# Patient Record
Sex: FEMALE | Race: WHITE | Employment: UNEMPLOYED | URBAN - METROPOLITAN AREA
[De-identification: names, ages, dates, MRNs, and addresses within clinical notes are randomized per-mention and may not be internally consistent; named-entity substitution may affect disease eponyms.]

---

## 2019-07-23 ENCOUNTER — HOSPITAL ENCOUNTER (EMERGENCY)
Age: 39
Discharge: HOME | End: 2019-07-24

## 2019-07-23 ENCOUNTER — APPOINTMENT (OUTPATIENT)
Dept: CT IMAGING | Age: 39
End: 2019-07-23

## 2019-07-23 ENCOUNTER — APPOINTMENT (OUTPATIENT)
Dept: GENERAL RADIOLOGY | Age: 39
End: 2019-07-23

## 2019-07-23 VITALS
OXYGEN SATURATION: 99 % | TEMPERATURE: 97.9 F | BODY MASS INDEX: 26.61 KG/M2 | HEIGHT: 68 IN | DIASTOLIC BLOOD PRESSURE: 52 MMHG | SYSTOLIC BLOOD PRESSURE: 94 MMHG | HEART RATE: 91 BPM | RESPIRATION RATE: 18 BRPM

## 2019-07-23 DIAGNOSIS — F19.10 POLYSUBSTANCE ABUSE (HCC): ICD-10-CM

## 2019-07-23 DIAGNOSIS — M25.551 RIGHT HIP PAIN: Primary | ICD-10-CM

## 2019-07-23 LAB
A/G RATIO: 0.8 (ref 1.1–2.2)
ALBUMIN SERPL-MCNC: 3.8 G/DL (ref 3.4–5)
ALP BLD-CCNC: 92 U/L (ref 40–129)
ALT SERPL-CCNC: 20 U/L (ref 10–40)
AMPHETAMINE SCREEN, URINE: POSITIVE
ANION GAP SERPL CALCULATED.3IONS-SCNC: 11 MMOL/L (ref 3–16)
AST SERPL-CCNC: 34 U/L (ref 15–37)
BARBITURATE SCREEN URINE: ABNORMAL
BASOPHILS ABSOLUTE: 0.1 K/UL (ref 0–0.2)
BASOPHILS RELATIVE PERCENT: 1.3 %
BENZODIAZEPINE SCREEN, URINE: POSITIVE
BILIRUB SERPL-MCNC: 0.3 MG/DL (ref 0–1)
BILIRUBIN URINE: ABNORMAL
BLOOD, URINE: NEGATIVE
BUN BLDV-MCNC: 10 MG/DL (ref 7–20)
CALCIUM SERPL-MCNC: 9.7 MG/DL (ref 8.3–10.6)
CANNABINOID SCREEN URINE: ABNORMAL
CHLORIDE BLD-SCNC: 97 MMOL/L (ref 99–110)
CLARITY: CLEAR
CO2: 31 MMOL/L (ref 21–32)
COCAINE METABOLITE SCREEN URINE: POSITIVE
COLOR: ABNORMAL
CREAT SERPL-MCNC: 0.8 MG/DL (ref 0.6–1.1)
EOSINOPHILS ABSOLUTE: 0.3 K/UL (ref 0–0.6)
EOSINOPHILS RELATIVE PERCENT: 2.9 %
GFR AFRICAN AMERICAN: >60
GFR NON-AFRICAN AMERICAN: >60
GLOBULIN: 4.8 G/DL
GLUCOSE BLD-MCNC: 102 MG/DL (ref 70–99)
GLUCOSE URINE: NEGATIVE MG/DL
HCG(URINE) PREGNANCY TEST: NEGATIVE
HCT VFR BLD CALC: 39.8 % (ref 36–48)
HEMOGLOBIN: 12.9 G/DL (ref 12–16)
KETONES, URINE: NEGATIVE MG/DL
LACTIC ACID: 0.9 MMOL/L (ref 0.4–2)
LEUKOCYTE ESTERASE, URINE: NEGATIVE
LYMPHOCYTES ABSOLUTE: 2.1 K/UL (ref 1–5.1)
LYMPHOCYTES RELATIVE PERCENT: 20.8 %
Lab: ABNORMAL
MCH RBC QN AUTO: 27.1 PG (ref 26–34)
MCHC RBC AUTO-ENTMCNC: 32.4 G/DL (ref 31–36)
MCV RBC AUTO: 83.7 FL (ref 80–100)
METHADONE SCREEN, URINE: ABNORMAL
MICROSCOPIC EXAMINATION: ABNORMAL
MONOCYTES ABSOLUTE: 0.8 K/UL (ref 0–1.3)
MONOCYTES RELATIVE PERCENT: 7.7 %
NEUTROPHILS ABSOLUTE: 6.7 K/UL (ref 1.7–7.7)
NEUTROPHILS RELATIVE PERCENT: 67.3 %
NITRITE, URINE: NEGATIVE
OPIATE SCREEN URINE: POSITIVE
OXYCODONE URINE: ABNORMAL
PDW BLD-RTO: 15.3 % (ref 12.4–15.4)
PH UA: 5
PH UA: 6 (ref 5–8)
PHENCYCLIDINE SCREEN URINE: ABNORMAL
PLATELET # BLD: 530 K/UL (ref 135–450)
PMV BLD AUTO: 8 FL (ref 5–10.5)
POTASSIUM SERPL-SCNC: 5.1 MMOL/L (ref 3.5–5.1)
PROPOXYPHENE SCREEN: ABNORMAL
PROTEIN UA: NEGATIVE MG/DL
RBC # BLD: 4.76 M/UL (ref 4–5.2)
SEDIMENTATION RATE, ERYTHROCYTE: 51 MM/HR (ref 0–20)
SODIUM BLD-SCNC: 139 MMOL/L (ref 136–145)
SPECIFIC GRAVITY UA: 1.02 (ref 1–1.03)
TOTAL PROTEIN: 8.6 G/DL (ref 6.4–8.2)
URINE REFLEX TO CULTURE: ABNORMAL
URINE TYPE: ABNORMAL
UROBILINOGEN, URINE: 1 E.U./DL
WBC # BLD: 9.9 K/UL (ref 4–11)

## 2019-07-23 PROCEDURE — 85652 RBC SED RATE AUTOMATED: CPT

## 2019-07-23 PROCEDURE — 80053 COMPREHEN METABOLIC PANEL: CPT

## 2019-07-23 PROCEDURE — 73701 CT LOWER EXTREMITY W/DYE: CPT

## 2019-07-23 PROCEDURE — 85025 COMPLETE CBC W/AUTO DIFF WBC: CPT

## 2019-07-23 PROCEDURE — 6360000002 HC RX W HCPCS: Performed by: PHYSICIAN ASSISTANT

## 2019-07-23 PROCEDURE — 83605 ASSAY OF LACTIC ACID: CPT

## 2019-07-23 PROCEDURE — 73502 X-RAY EXAM HIP UNI 2-3 VIEWS: CPT

## 2019-07-23 PROCEDURE — 96374 THER/PROPH/DIAG INJ IV PUSH: CPT

## 2019-07-23 PROCEDURE — 81003 URINALYSIS AUTO W/O SCOPE: CPT

## 2019-07-23 PROCEDURE — 84703 CHORIONIC GONADOTROPIN ASSAY: CPT

## 2019-07-23 PROCEDURE — 99284 EMERGENCY DEPT VISIT MOD MDM: CPT

## 2019-07-23 PROCEDURE — 80307 DRUG TEST PRSMV CHEM ANLYZR: CPT

## 2019-07-23 PROCEDURE — 6360000004 HC RX CONTRAST MEDICATION: Performed by: PHYSICIAN ASSISTANT

## 2019-07-23 PROCEDURE — 2580000003 HC RX 258: Performed by: PHYSICIAN ASSISTANT

## 2019-07-23 PROCEDURE — 36415 COLL VENOUS BLD VENIPUNCTURE: CPT

## 2019-07-23 RX ORDER — 0.9 % SODIUM CHLORIDE 0.9 %
1000 INTRAVENOUS SOLUTION INTRAVENOUS ONCE
Status: COMPLETED | OUTPATIENT
Start: 2019-07-23 | End: 2019-07-24

## 2019-07-23 RX ORDER — KETOROLAC TROMETHAMINE 30 MG/ML
15 INJECTION, SOLUTION INTRAMUSCULAR; INTRAVENOUS ONCE
Status: COMPLETED | OUTPATIENT
Start: 2019-07-23 | End: 2019-07-23

## 2019-07-23 RX ADMIN — IOPAMIDOL 75 ML: 755 INJECTION, SOLUTION INTRAVENOUS at 23:19

## 2019-07-23 RX ADMIN — SODIUM CHLORIDE 1000 ML: 9 INJECTION, SOLUTION INTRAVENOUS at 23:45

## 2019-07-23 RX ADMIN — KETOROLAC TROMETHAMINE 15 MG: 30 INJECTION, SOLUTION INTRAMUSCULAR at 22:08

## 2019-07-23 ASSESSMENT — PAIN DESCRIPTION - LOCATION: LOCATION: HIP

## 2019-07-23 ASSESSMENT — PAIN DESCRIPTION - ORIENTATION: ORIENTATION: RIGHT

## 2019-07-23 ASSESSMENT — PAIN SCALES - GENERAL
PAINLEVEL_OUTOF10: 10
PAINLEVEL_OUTOF10: 5

## 2019-07-23 ASSESSMENT — ENCOUNTER SYMPTOMS
SHORTNESS OF BREATH: 0
COLOR CHANGE: 0
VOMITING: 0

## 2019-07-23 ASSESSMENT — PAIN DESCRIPTION - DESCRIPTORS: DESCRIPTORS: DULL

## 2019-07-23 ASSESSMENT — PAIN DESCRIPTION - PAIN TYPE: TYPE: ACUTE PAIN

## 2019-07-24 RX ORDER — NAPROXEN 500 MG/1
500 TABLET ORAL 2 TIMES DAILY WITH MEALS
Qty: 20 TABLET | Refills: 0 | Status: SHIPPED | OUTPATIENT
Start: 2019-07-24 | End: 2022-07-20 | Stop reason: ALTCHOICE

## 2019-07-24 ASSESSMENT — PAIN SCALES - WONG BAKER: WONGBAKER_NUMERICALRESPONSE: 8

## 2019-07-24 ASSESSMENT — PAIN SCALES - GENERAL: PAINLEVEL_OUTOF10: 10

## 2019-07-24 NOTE — ED PROVIDER NOTES
[address] [facility]      Pt Name: Christine Clemens  MRN: [id number]  [place] 1980  Date of evaluation: 7/23/2019  Provider: ISMAEL Le    This patient was not seen and evaluated by the attending physician No att. providers found. CHIEF COMPLAINT       Chief Complaint   Patient presents with    Hip Pain     pt states she fell 3 weeks ago injuring right hip. states it got better then 3 days ago the pain came back. denies new injury. states she can not walk. CRITICAL CARE TIME   Total Critical Care time was 15 minutes, excluding separately reportable procedures. There was a high probability of clinically significant/life threatening deterioration in the patient's condition which required my urgent intervention. HISTORY OF PRESENT ILLNESS  (Location/Symptom, Timing/Onset, Context/Setting, Quality, Duration, Modifying Factors, Severity.)   Christine Clemens is a 46 y.o. female who presents to the emergency department accompanied by her mother and daughter. Patient states that 3 weeks ago she slipped and landed weird on her side. Believes that she landed on the hip and has had hip pain since then. She states initially the pain lasted for 3 or 4 days and then resolved almost completely and then came back again 3 days ago. She denies any new trauma or injury. Denies head neck or other injury. She has tried ibuprofen and admits to IV drug use. She states that the pain has progressed over the past 3 days. She denies chance of pregnancy or otherwise known chronic medical problems. No fevers. No numbness. She adamantly denies any back pain or leg weakness just states that it is pain on the outside of her hip. No bowel or bladder dysfunction no saddle anesthesia. Nursing Notes were reviewed and I agree.     REVIEW OF SYSTEMS    (2-9 systems for level 4, 10 or more for level 5)     Review of Systems   Constitutional: Narrative:     Performed at:  [address] [facility] [address]   Phone [phone]   CBC WITH AUTO DIFFERENTIAL - Abnormal; Notable for the following components:    Platelets 380 (*)     All other components within normal limits    Narrative:     Performed at:  [address] [facility] [address]   Phone [phone]   COMPREHENSIVE METABOLIC PANEL - Abnormal; Notable for the following components:    Chloride 97 (*)     Glucose 102 (*)     Total Protein 8.6 (*)     Albumin/Globulin Ratio 0.8 (*)     All other components within normal limits    Narrative:     Performed at:  [address] [facility] [address]   Phone [phone]   SEDIMENTATION RATE - Abnormal; Notable for the following components:    Sed Rate 51 (*)     All other components within normal limits    Narrative:     Performed at:  [address] [facility] [address]   Phone [phone]   PREGNANCY, URINE    Narrative:     Performed at:  [address] [facility] [address]   Phone [phone]   LACTIC ACID, PLASMA    Narrative:     Performed at:  [address] [facility] [address]   Phone [phone]       All other labs were within normal range or not returned as of this dictation.     EMERGENCY DEPARTMENT COURSE and DIFFERENTIAL DIAGNOSIS/MDM:   Vitals:    Vitals:    07/23/19 2034 07/23/19 2211 07/23/19 2231 07/23/19 2246   BP: 109/66 111/77 (!) 94/52 (!) 94/52   Pulse: 102 91     Resp: 14 18     Temp: 97.9 °F (36.6 °C)      TempSrc: Oral      SpO2: 98% 99% 100% 99%   Height: 5' 8\" (1.727 m)        I discussed with Cam Galarzaels and/or family the exam results, diagnosis, care, prognosis, reasons to return and the importance of follow up. Patient and/or family is in full agreement with plan and all questions have been answered. Specific discharge instructions explained, including reasons to return to the emergency department. Aurelio Acuña is well appearing, non-toxic, and afebrile at the time of discharge. Patient has pain over her right hip after a fall 3 weeks ago. She has good pulses and sensation intact. Pain with range of motion of the hip and palpation over the hip without evidence of rash or lesion or redness or warmth. She has normal white count normal lactic not tachycardic not febrile. She had a normal x-ray and contrasted CT of the hip. Was able to get her up. She will be discharged with instructions to follow-up with orthopedics. I estimate there is LOW risk for FRACTURE, COMPARTMENT SYNDROME, DEEP VENOUS THROMBOSIS, SEPTIC ARTHRITIS, TENDON OR NEUROVASCULAR INJURY, thus I consider the discharge disposition reasonable. CONSULTS:  None    PROCEDURES:  None    FINAL IMPRESSION      1. Right hip pain    2.  Polysubstance abuse [facility])          DISPOSITION/PLAN   DISPOSITION Decision To Discharge 07/24/2019 12:01:39 AM      PATIENT REFERRED TO:  [facility]) Pre-Services  [address] [facility], [address]  [phone]    Call in 1 day  For follow up with orthopedics      DISCHARGE MEDICATIONS:  Discharge Medication List as of 7/24/2019 12:03 AM      START taking these medications    Details   naproxen (NAPROSYN) 500 MG tablet Take 1 tablet by mouth 2 times daily (with meals) for 20 doses Do not take with ibuprofen or other NSAIDs or anti-inflammatories, Disp-20 tablet, R-0Print             (Please note that portions of this note were completed with a voice recognition program.  Efforts were made to edit the dictations but occasionally words are mis-transcribed.)    Tasia Amaya, [address], [address]  07/24/19 4112

## 2022-07-20 ENCOUNTER — HOSPITAL ENCOUNTER (EMERGENCY)
Age: 42
Discharge: HOME | End: 2022-07-20
Attending: EMERGENCY MEDICINE
Payer: MEDICAID

## 2022-07-20 VITALS
HEART RATE: 106 BPM | HEIGHT: 68 IN | BODY MASS INDEX: 23.19 KG/M2 | SYSTOLIC BLOOD PRESSURE: 141 MMHG | OXYGEN SATURATION: 98 % | RESPIRATION RATE: 18 BRPM | WEIGHT: 153 LBS | DIASTOLIC BLOOD PRESSURE: 84 MMHG | TEMPERATURE: 98.2 F

## 2022-07-20 DIAGNOSIS — N39.0 URINARY TRACT INFECTION WITHOUT HEMATURIA, SITE UNSPECIFIED: Primary | ICD-10-CM

## 2022-07-20 LAB
AMORPHOUS: ABNORMAL /HPF
BILIRUBIN URINE: NEGATIVE
BLOOD, URINE: NEGATIVE
CLARITY: CLEAR
COLOR: YELLOW
EPITHELIAL CELLS, UA: ABNORMAL /HPF (ref 0–5)
GLUCOSE URINE: NEGATIVE MG/DL
KETONES, URINE: NEGATIVE MG/DL
LEUKOCYTE ESTERASE, URINE: ABNORMAL
MICROSCOPIC EXAMINATION: YES
MUCUS: ABNORMAL /LPF
NITRITE, URINE: NEGATIVE
PH UA: 5.5 (ref 5–8)
PROTEIN UA: NEGATIVE MG/DL
RBC UA: ABNORMAL /HPF (ref 0–4)
SARS-COV-2, NAAT: NOT DETECTED
SPECIFIC GRAVITY UA: >=1.03 (ref 1–1.03)
URINE REFLEX TO CULTURE: YES
URINE TYPE: ABNORMAL
UROBILINOGEN, URINE: 1 E.U./DL
WBC UA: ABNORMAL /HPF (ref 0–5)

## 2022-07-20 PROCEDURE — U0005 INFEC AGEN DETEC AMPLI PROBE: HCPCS

## 2022-07-20 PROCEDURE — 87086 URINE CULTURE/COLONY COUNT: CPT

## 2022-07-20 PROCEDURE — 81001 URINALYSIS AUTO W/SCOPE: CPT

## 2022-07-20 PROCEDURE — 87635 SARS-COV-2 COVID-19 AMP PRB: CPT

## 2022-07-20 PROCEDURE — U0003 INFECTIOUS AGENT DETECTION BY NUCLEIC ACID (DNA OR RNA); SEVERE ACUTE RESPIRATORY SYNDROME CORONAVIRUS 2 (SARS-COV-2) (CORONAVIRUS DISEASE [COVID-19]), AMPLIFIED PROBE TECHNIQUE, MAKING USE OF HIGH THROUGHPUT TECHNOLOGIES AS DESCRIBED BY CMS-2020-01-R: HCPCS

## 2022-07-20 PROCEDURE — 99283 EMERGENCY DEPT VISIT LOW MDM: CPT

## 2022-07-20 RX ORDER — ONDANSETRON 4 MG/1
4 TABLET, ORALLY DISINTEGRATING ORAL 4 TIMES DAILY PRN
Qty: 15 TABLET | Refills: 0 | Status: SHIPPED | OUTPATIENT
Start: 2022-07-20 | End: 2022-07-25

## 2022-07-20 RX ORDER — IBUPROFEN 800 MG/1
800 TABLET ORAL EVERY 8 HOURS PRN
Qty: 30 TABLET | Refills: 0 | Status: SHIPPED | OUTPATIENT
Start: 2022-07-20 | End: 2022-09-07

## 2022-07-20 RX ORDER — CEFUROXIME AXETIL 250 MG/1
250 TABLET ORAL 2 TIMES DAILY
Qty: 20 TABLET | Refills: 0 | Status: SHIPPED | OUTPATIENT
Start: 2022-07-20 | End: 2022-07-30

## 2022-07-20 ASSESSMENT — PAIN - FUNCTIONAL ASSESSMENT: PAIN_FUNCTIONAL_ASSESSMENT: NONE - DENIES PAIN

## 2022-07-21 LAB — SARS-COV-2: NOT DETECTED

## 2022-07-21 NOTE — ED PROVIDER NOTES
Emergency Physician Note        Note Open Time: 10:33 PM EDT    Chief Complaint  Concern For BPTVV-92 (Sick since yesterday. Reports frontal HA at 6, feeling thirsty, no appetite, sore throat at 4, runny nose, and burning with urination. Dtr and gr child have covid now. Took tylenol at 1400) and Dysuria       History of Present Illness  Shukri Erwin is a 40 y.o. female who presents to the ED for dysuria. Patient reports she has dysuria and urinary frequency. She also has rhinorrhea and sore throat and family members with COVID this week. She denies any vomiting or diarrhea. She also denies chest pain or shortness of breath.     10 systems reviewed, pertinent positives per HPI otherwise noted to be negative    I have reviewed the following from the nursing documentation:      Prior to Admission medications    Not on File       Allergies as of 07/20/2022 - Fully Reviewed 07/20/2022   Allergen Reaction Noted    Ultram [tramadol hcl] Swelling 10/14/2010    Robaxin [methocarbamol] Other (See Comments) 10/14/2010    Penicillins  07/01/2010       Past Medical History:   Diagnosis Date    ADHD (attention deficit hyperactivity disorder)     Arthritis     Back pain     Depression     Migraine         Surgical History:   Past Surgical History:   Procedure Laterality Date    KNEE ARTHROSCOPY  10-5-10    PARTIAL HYSTERECTOMY (CERVIX NOT REMOVED)      TUBAL LIGATION  2004        Family History:    Family History   Problem Relation Age of Onset    Breast Cancer Maternal Grandmother 39    Arthritis Other     Asthma Other     Cancer Other     Diabetes Other     Seizures Other     Stroke Other        Social History     Socioeconomic History    Marital status: Single     Spouse name: Not on file    Number of children: Not on file    Years of education: Not on file    Highest education level: Not on file   Occupational History    Not on file   Tobacco Use    Smoking status: Every Day     Packs/day: 0.50     Years: 2.00 Pack years: 1.00     Types: Cigarettes    Smokeless tobacco: Never   Substance and Sexual Activity    Alcohol use: Yes     Comment: occas    Drug use: No    Sexual activity: Not on file   Other Topics Concern    Not on file   Social History Narrative    Not on file     Social Determinants of Health     Financial Resource Strain: Not on file   Food Insecurity: Not on file   Transportation Needs: Not on file   Physical Activity: Not on file   Stress: Not on file   Social Connections: Not on file   Intimate Partner Violence: Not on file   Housing Stability: Not on file       Nursing notes reviewed. ED Triage Vitals [07/20/22 2135]   Enc Vitals Group      BP (!) 141/84      Heart Rate (!) 106      Resp 18      Temp 98.2 °F (36.8 °C)      Temp Source Oral      SpO2 98 %      Weight 153 lb (69.4 kg)      Height 5' 8\" (1.727 m)      Head Circumference       Peak Flow       Pain Score       Pain Loc       Pain Edu? Excl. in [address]? GENERAL:  Awake, alert. Well developed, well nourished with no apparent distress. HENT:  Normocephalic, Atraumatic, moist mucous membranes. Posterior oropharynx slightly erythematous with no edema or exudate. EYES:  Pupils equal round and reactive to light, Conjunctiva normal, extraocular movements normal.  NECK:  No meningeal signs, Supple. CHEST:  Regular rate and rhythm, chest wall non-tender. LUNGS:  Clear to auscultation bilaterally. ABDOMEN:  Soft, non-tender, no rebound, rigidity or guarding, non-distended, normal bowel sounds. No costovertebral angle tenderness to palpation. BACK:  No tenderness. EXTREMITIES:  Normal range of motion, no edema, no bony tenderness, no deformity, distal pulses present. SKIN: Warm, dry and intact. NEUROLOGIC: Normal mental status. Moving all extremities to command.      LABS  Labs Reviewed   URINALYSIS WITH REFLEX TO CULTURE - Abnormal; Notable for the following components:       Result Value    Leukocyte Esterase, Urine SMALL (*) All other components within normal limits   MICROSCOPIC URINALYSIS - Abnormal; Notable for the following components:    Mucus, UA Rare (*)     WBC, UA 21-50 (*)     Epithelial Cells, UA 6-10 (*)     All other components within normal limits   COVID-19, RAPID   CULTURE, URINE       MEDICAL DECISION MAKING          I advised the patient to return to the emergency department immediately for any new or worsening symptoms, such as fever, chest pain or shortness of breath. The patient voiced agreement and understanding of the treatment plan. I estimate there is LOW risk for EPIGLOTTITIS, PNEUMONIA, MENINGITIS, OR URINARY TRACT INFECTION, thus I consider the discharge disposition reasonable. Also, there is no evidence or peritonitis, sepsis, or toxicity. Cristina Abarca and I have discussed the diagnosis and risks, and we agree with discharging home to follow-up with their primary doctor. We also discussed returning to the Emergency Department immediately if new or worsening symptoms occur. We have discussed the symptoms which are most concerning (e.g., changing or worsening pain, trouble swallowing or breating, neck stiffness, fever) that necessitate immediate return. Final Impression    1. Urinary tract infection without hematuria, site unspecified        Discharge Vital Signs:  Blood pressure (!) 141/84, pulse (!) 106, temperature 98.2 °F (36.8 °C), temperature source Oral, resp. rate 18, height 5' 8\" (1.727 m), weight 153 lb (69.4 kg), SpO2 98 %. Patient was given scripts for the following medications. I counseled patient how to take these medications. New Prescriptions    CEFUROXIME (CEFTIN) 250 MG TABLET    Take 1 tablet by mouth in the morning and 1 tablet before bedtime. Do all this for 10 days.     IBUPROFEN (ADVIL;MOTRIN) 800 MG TABLET    Take 1 tablet by mouth every 8 hours as needed for Pain    ONDANSETRON (ZOFRAN ODT) 4 MG DISINTEGRATING TABLET    Take 1 tablet by mouth 4 times daily as needed for Nausea or Vomiting       Disposition  Pt is in good condition upon Discharge to home. This chart was generated using the [address] [place] [address] [address] dictation system. I created this record but it may contain dictation errors.           Tay Clayton MD  07/20/22 1351

## 2022-07-21 NOTE — ED NOTES
Sick since yesterday. Reports frontal HA at 6, feeling thirsty, no appetite, sore throat at 4, runny nose. Dtr and gr child have covid now.    Took tylenol at [address]  07/20/22 3115

## 2022-07-22 LAB — URINE CULTURE, ROUTINE: NORMAL

## 2022-09-06 ENCOUNTER — HOSPITAL ENCOUNTER (EMERGENCY)
Age: 42
Discharge: HOME | End: 2022-09-07
Attending: EMERGENCY MEDICINE
Payer: MEDICAID

## 2022-09-06 DIAGNOSIS — M54.50 ACUTE RIGHT-SIDED LOW BACK PAIN, UNSPECIFIED WHETHER SCIATICA PRESENT: Primary | ICD-10-CM

## 2022-09-06 DIAGNOSIS — M79.671 RIGHT FOOT PAIN: ICD-10-CM

## 2022-09-06 DIAGNOSIS — N39.0 ACUTE UTI: ICD-10-CM

## 2022-09-06 PROCEDURE — 96372 THER/PROPH/DIAG INJ SC/IM: CPT

## 2022-09-06 PROCEDURE — 6360000002 HC RX W HCPCS: Performed by: EMERGENCY MEDICINE

## 2022-09-06 PROCEDURE — 99284 EMERGENCY DEPT VISIT MOD MDM: CPT

## 2022-09-06 RX ORDER — KETOROLAC TROMETHAMINE 15 MG/ML
15 INJECTION, SOLUTION INTRAMUSCULAR; INTRAVENOUS ONCE
Status: COMPLETED | OUTPATIENT
Start: 2022-09-07 | End: 2022-09-06

## 2022-09-06 RX ADMIN — KETOROLAC TROMETHAMINE 15 MG: 15 INJECTION, SOLUTION INTRAMUSCULAR; INTRAVENOUS at 23:57

## 2022-09-07 VITALS
WEIGHT: 141.09 LBS | HEART RATE: 97 BPM | RESPIRATION RATE: 16 BRPM | SYSTOLIC BLOOD PRESSURE: 120 MMHG | TEMPERATURE: 97.6 F | HEIGHT: 68 IN | OXYGEN SATURATION: 97 % | DIASTOLIC BLOOD PRESSURE: 80 MMHG | BODY MASS INDEX: 21.38 KG/M2

## 2022-09-07 LAB
BACTERIA: ABNORMAL /HPF
BILIRUBIN URINE: ABNORMAL
BLOOD, URINE: ABNORMAL
CLARITY: CLEAR
COLOR: YELLOW
COMMENT UA: ABNORMAL
EPITHELIAL CELLS, UA: ABNORMAL /HPF (ref 0–5)
GLUCOSE BLD-MCNC: 115 MG/DL (ref 70–99)
GLUCOSE URINE: 100 MG/DL
KETONES, URINE: ABNORMAL MG/DL
LEUKOCYTE ESTERASE, URINE: ABNORMAL
MICROSCOPIC EXAMINATION: YES
MUCUS: ABNORMAL /LPF
NITRITE, URINE: NEGATIVE
PERFORMED ON: ABNORMAL
PH UA: 5 (ref 5–8)
PROTEIN UA: 100 MG/DL
RBC UA: ABNORMAL /HPF (ref 0–4)
SARS-COV-2, NAAT: NOT DETECTED
SPECIFIC GRAVITY UA: >=1.03 (ref 1–1.03)
URINE REFLEX TO CULTURE: YES
URINE TYPE: ABNORMAL
UROBILINOGEN, URINE: 2 E.U./DL
WBC UA: ABNORMAL /HPF (ref 0–5)

## 2022-09-07 PROCEDURE — 87086 URINE CULTURE/COLONY COUNT: CPT

## 2022-09-07 PROCEDURE — 6370000000 HC RX 637 (ALT 250 FOR IP): Performed by: EMERGENCY MEDICINE

## 2022-09-07 PROCEDURE — 87186 SC STD MICRODIL/AGAR DIL: CPT

## 2022-09-07 PROCEDURE — 87635 SARS-COV-2 COVID-19 AMP PRB: CPT

## 2022-09-07 PROCEDURE — 99284 EMERGENCY DEPT VISIT MOD MDM: CPT

## 2022-09-07 PROCEDURE — 81001 URINALYSIS AUTO W/SCOPE: CPT

## 2022-09-07 PROCEDURE — 87077 CULTURE AEROBIC IDENTIFY: CPT

## 2022-09-07 RX ORDER — CEFDINIR 300 MG/1
300 CAPSULE ORAL ONCE
Status: COMPLETED | OUTPATIENT
Start: 2022-09-07 | End: 2022-09-07

## 2022-09-07 RX ORDER — CEFDINIR 300 MG/1
300 CAPSULE ORAL 2 TIMES DAILY
Qty: 14 CAPSULE | Refills: 0 | Status: ON HOLD
Start: 2022-09-07 | End: 2022-09-29 | Stop reason: HOSPADM

## 2022-09-07 RX ORDER — IBUPROFEN 800 MG/1
800 TABLET ORAL EVERY 8 HOURS PRN
Qty: 20 TABLET | Refills: 0 | Status: ON HOLD
Start: 2022-09-07 | End: 2022-09-29 | Stop reason: HOSPADM

## 2022-09-07 RX ADMIN — CEFDINIR 300 MG: 300 CAPSULE ORAL at 02:22

## 2022-09-07 ASSESSMENT — PAIN DESCRIPTION - ONSET: ONSET: ON-GOING

## 2022-09-07 ASSESSMENT — PAIN - FUNCTIONAL ASSESSMENT: PAIN_FUNCTIONAL_ASSESSMENT: WONG-BAKER FACES

## 2022-09-07 ASSESSMENT — PAIN DESCRIPTION - DESCRIPTORS: DESCRIPTORS: ACHING

## 2022-09-07 ASSESSMENT — PAIN DESCRIPTION - LOCATION: LOCATION: BACK;FOOT

## 2022-09-07 ASSESSMENT — PAIN DESCRIPTION - PAIN TYPE: TYPE: CHRONIC PAIN;ACUTE PAIN

## 2022-09-07 NOTE — ED NOTES
Pt states after her pain shot kicks in she will attempt to urinate .  Dr Susanne Isaac notified      Jose Martin Patton RN  09/07/22 6269

## 2022-09-07 NOTE — DISCHARGE INSTRUCTIONS
Follow-up with primary care for urine recheck. Finish antibiotics, we have sent your urine to culture, you will be notified if antibiotics change is needed.

## 2022-09-07 NOTE — ED NOTES
Pt ambulated to the bathroom.  Pt states she is unable to void at this time     Tisha De Anda RN  09/07/22 1737

## 2022-09-07 NOTE — ED PROVIDER NOTES
Emergency Department Physician Note     Location: [address] [place] [address]  9/6/2022    CHIEF COMPLAINT  Back pain and right foot pain    HISTORY OF PRESENT ILLNESS  Teresa Morris is a 40 y.o. female presents to the ED with right low back pain, concerned she could have a UTI, she did have a little frequency/dysuria right before this started, and an odor to the urine, symptoms started about 4 days ago, she has also been having foot pain on the right arch, reports having to carry a heavy book bag and walking funny, up on the ball of her foot because her back hurts, she does have a history of back pain, no abdominal pain, no known fevers, cannot rule out COVID, would like tested. No nausea/vomiting/diarrhea, no vaginal bleeding or discharge, history of hysterectomy, no headache or neck stiffness, denies fevers/chills, no IV drug use, no unexplained weight loss, no pain awakening from sleep, no saddle anesthesia, no new numbness or weakness, no direct trauma/injury, no bowel or bladder incontinence, no urinary retention, no history of cancer, no recent steroid use, no immunosuppression, no recent back surgery or injections, not on any anticoagulants, no other complaints, modifying factors or associated symptoms. I have reviewed the following from the nursing documentation.     Past Medical History:   Diagnosis Date    ADHD (attention deficit hyperactivity disorder)     Arthritis     Back pain     Depression     Migraine      Past Surgical History:   Procedure Laterality Date    KNEE ARTHROSCOPY  10-5-10    PARTIAL HYSTERECTOMY (CERVIX NOT REMOVED)      TUBAL LIGATION  2004     Family History   Problem Relation Age of Onset    Breast Cancer Maternal Grandmother 39    Arthritis Other     Asthma Other     Cancer Other     Diabetes Other     Seizures Other     Stroke Other      Social History     Socioeconomic History    Marital status: Single     Spouse name: Not on file    Number of children: Not on file    Years of education: Not on file    Highest education level: Not on file   Occupational History    Not on file   Tobacco Use    Smoking status: Every Day     Packs/day: 0.50     Years: 2.00     Pack years: 1.00     Types: Cigarettes    Smokeless tobacco: Never   Substance and Sexual Activity    Alcohol use: Yes     Comment: occas    Drug use: No    Sexual activity: Not on file   Other Topics Concern    Not on file   Social History Narrative    Not on file     Social Determinants of Health     Financial Resource Strain: Not on file   Food Insecurity: Not on file   Transportation Needs: Not on file   Physical Activity: Not on file   Stress: Not on file   Social Connections: Not on file   Intimate Partner Violence: Not on file   Housing Stability: Not on file     No current facility-administered medications for this encounter. Current Outpatient Medications   Medication Sig Dispense Refill    ibuprofen (ADVIL;MOTRIN) 800 MG tablet Take 1 tablet by mouth every 8 hours as needed for Pain 20 tablet 0    cefdinir (OMNICEF) 300 MG capsule Take 1 capsule by mouth 2 times daily for 7 days 14 capsule 0     Allergies   Allergen Reactions    Ultram [Tramadol Hcl] Swelling    Robaxin [Methocarbamol] Other (See Comments)     Made patient very dizzy    Penicillins        REVIEW OF SYSTEMS  10 systems reviewed, pertinent positives per HPI otherwise noted to be negative. PHYSICAL EXAM   /80   Pulse 97   Temp 97.6 °F (36.4 °C) (Oral)   Resp 16   Ht 5' 8\" (1.727 m)   Wt 141 lb 1.5 oz (64 kg)   SpO2 97%   BMI 21.45 kg/m²   GENERAL APPEARANCE: Awake and alert. Cooperative. No acute distress, appears fatigued, appears older than stated, scent of smoke on patient  HEAD: Normocephalic. Atraumatic. No agrawal's sign. EYES: PERRL. EOM's grossly intact. No scleral icterus. No drainage. No periorbital ecchymosis. ENT: Mucous membranes are moist. Airway patent. No stridor. No epistaxis.  No otorrhea or rhinorrhea. NECK/back: Supple. No rigidity, trachea midline, no midline spinal tenderness, she has tenderness to palpation over the right SI joint area, upper buttock on the right  HEART: RRR, borderline tachycardic around 100. No murmurs/gallups/rubs  LUNGS: Respirations unlabored, Lungs are clear to ausculation bilaterally, no wheezes/crackles/rhonchi   ABDOMEN: Soft. Non-distended. Non-tender. No guarding, no rebound tenderness, no rigidity. Normal bowel sounds. No McBurney's point tenderness, negative Rovsing's sign, negative Ramos's sign, no CVA tenderness  EXTREMITIES: No peripheral edema. Moves all extremities equally. No obvious deformities. Tenderness to palpation of the right arch of foot, able to perform full range of motion, extremities with 2+ DP and PT pulses, distal sensation intact in all digits, less than 2-second cap refill in all digits  SKIN: Warm and dry. No acute rashes. NEUROLOGICAL: Alert and oriented x4. No gross facial drooping. Strength 5/5, sensation intact. No truncal ataxia. 2+ DTR's present in the patellar/Achilles bilaterally  PSYCHIATRIC: Normal mood and affect. LABS  I have reviewed all labs for this visit.    Results for orders placed or performed during the hospital encounter of 09/06/22   COVID-19, Rapid    Specimen: Nasopharyngeal Swab   Result Value Ref Range    SARS-CoV-2, NAAT Not Detected Not Detected   Urinalysis with Reflex to Culture    Specimen: Urine   Result Value Ref Range    Color, UA Yellow Straw/Yellow    Clarity, UA Clear Clear    Glucose, Ur 100 (A) Negative mg/dL    Bilirubin Urine MODERATE (A) Negative    Ketones, Urine TRACE (A) Negative mg/dL    Specific Gravity, UA >=1.030 1.005 - 1.030    Blood, Urine SMALL (A) Negative    pH, UA 5.0 5.0 - 8.0    Protein,  (A) Negative mg/dL    Urobilinogen, Urine 2.0 (A) <2.0 E.U./dL    Nitrite, Urine Negative Negative    Leukocyte Esterase, Urine SMALL (A) Negative    Microscopic Examination YES     Urine Type NotGiven     Urine Reflex to Culture Yes    Microscopic Urinalysis   Result Value Ref Range    Mucus, UA Rare (A) None Seen /LPF    WBC, UA 10-20 (A) 0 - 5 /HPF    RBC, UA 5-10 (A) 0 - 4 /HPF    Epithelial Cells, UA 6-10 (A) 0 - 5 /HPF    Bacteria, UA 3+ (A) None Seen /HPF    Urinalysis Comments see below    POCT Glucose   Result Value Ref Range    POC Glucose 115 (H) 70 - 99 mg/dl    Performed on ACCU-CHEK          RADIOLOGY  No results found. PROCEDURES  -If applicable    I am the primary clinician of record. ED COURSE/MDM  Patient seen and evaluated. Old records reviewed. Labs and imaging reviewed and results discussed with patient. 40 y.o. female with concern for UTI, there were obvious infectious findings on UA, initiated antibiotics, took quite a while for her to give the urine specimen, prolonged her ED course, she felt little better after Toradol, patient slept throughout much of her ED visit, given prescription for the antibiotic and ibuprofen, she did have glucosuria, though checked her blood sugar and it was only 115, she was tachycardic on arrival, but normalized on repeat, she had requested to stay here and sleep for a while, I informed her that would be up to nursing protocol, COVID-negative, she attributes the right foot pain to the way she has been walking due to her back pain, no red flag back pain symptoms, low suspicion for obstructive uropathy or pyelonephritis, no CVA tenderness, the back tenderness was very low, SI joint and even buttock area primarily, no midline tenderness, no current suspicion for cauda equina/epidural abscess/epidural hematoma, normal neuro exam, encouraged primary care follow-up, strict return precautions given, all questions answered, will return if any worsening symptoms or new concerns, see AVS for further discharge information, patient verbalized understanding of plan, felt comfortable going home.      Orders Placed This Encounter   Procedures COVID-19, Rapid    Culture, Urine    Urinalysis with Reflex to Culture    Microscopic Urinalysis    Referral for No Primary Care Physician - Urgent    POCT Glucose    POCT Glucose     Orders Placed This Encounter   Medications    ketorolac (TORADOL) injection 15 mg    cefdinir (OMNICEF) capsule 300 mg     Order Specific Question:   Antimicrobial Indications     Answer:   Urinary Tract Infection    ibuprofen (ADVIL;MOTRIN) 800 MG tablet     Sig: Take 1 tablet by mouth every 8 hours as needed for Pain     Dispense:  20 tablet     Refill:  0    cefdinir (OMNICEF) 300 MG capsule     Sig: Take 1 capsule by mouth 2 times daily for 7 days     Dispense:  14 capsule     Refill:  0     ED Course as of 09/07/22 0301   Tue Sep 06, 2022   [id number] Patient states she needs about 10 more minutes to give urine specimen, had peed before she came, given specimen cup. [SY]      ED Course User Index  [SY] Piotr Rubio DO         CLINICAL IMPRESSION  1. Acute right-sided low back pain, unspecified whether sciatica present    2. Acute UTI    3. Right foot pain        Blood pressure 120/80, pulse 97, temperature 97.6 °F (36.4 °C), temperature source Oral, resp. rate 16, height 5' 8\" (1.727 m), weight 141 lb 1.5 oz (64 kg), SpO2 97 %. DISPOSITION  Cody Giron was discharged to home in stable condition.                        Piotr Rubio DO  09/07/22 0301

## 2022-09-09 LAB
ORGANISM: ABNORMAL
URINE CULTURE, ROUTINE: ABNORMAL

## 2022-09-12 ENCOUNTER — APPOINTMENT (OUTPATIENT)
Dept: CT IMAGING | Age: 42
DRG: 720 | End: 2022-09-12
Payer: MEDICAID

## 2022-09-12 ENCOUNTER — APPOINTMENT (OUTPATIENT)
Dept: GENERAL RADIOLOGY | Age: 42
DRG: 720 | End: 2022-09-12
Payer: MEDICAID

## 2022-09-12 ENCOUNTER — HOSPITAL ENCOUNTER (INPATIENT)
Age: 42
LOS: 19 days | Discharge: HOME | DRG: 720 | End: 2022-10-01
Attending: EMERGENCY MEDICINE | Admitting: INTERNAL MEDICINE
Payer: MEDICAID

## 2022-09-12 DIAGNOSIS — L02.31 GLUTEAL ABSCESS: ICD-10-CM

## 2022-09-12 DIAGNOSIS — A41.9 SEPTICEMIA (HCC): Primary | ICD-10-CM

## 2022-09-12 DIAGNOSIS — I76 SEPTIC EMBOLISM (HCC): ICD-10-CM

## 2022-09-12 DIAGNOSIS — F19.90 IVDU (INTRAVENOUS DRUG USER): ICD-10-CM

## 2022-09-12 PROBLEM — R11.2 INTRACTABLE NAUSEA AND VOMITING: Status: ACTIVE | Noted: 2022-09-12

## 2022-09-12 LAB
A/G RATIO: 0.4 (ref 1.1–2.2)
ALBUMIN SERPL-MCNC: 2 G/DL (ref 3.4–5)
ALP BLD-CCNC: 265 U/L (ref 40–129)
ALT SERPL-CCNC: 17 U/L (ref 10–40)
ANION GAP SERPL CALCULATED.3IONS-SCNC: 12 MMOL/L (ref 3–16)
ANISOCYTOSIS: ABNORMAL
AST SERPL-CCNC: 42 U/L (ref 15–37)
BACTERIA: ABNORMAL /HPF
BASOPHILS ABSOLUTE: 0 K/UL (ref 0–0.2)
BASOPHILS RELATIVE PERCENT: 0.1 %
BILIRUB SERPL-MCNC: 1.3 MG/DL (ref 0–1)
BILIRUBIN URINE: NEGATIVE
BLOOD, URINE: ABNORMAL
BUN BLDV-MCNC: 73 MG/DL (ref 7–20)
CALCIUM SERPL-MCNC: 9.4 MG/DL (ref 8.3–10.6)
CHLORIDE BLD-SCNC: 95 MMOL/L (ref 99–110)
CLARITY: ABNORMAL
CO2: 28 MMOL/L (ref 21–32)
COLOR: YELLOW
CREAT SERPL-MCNC: 1.5 MG/DL (ref 0.6–1.1)
EKG ATRIAL RATE: 144 BPM
EKG DIAGNOSIS: NORMAL
EKG P AXIS: 75 DEGREES
EKG P-R INTERVAL: 114 MS
EKG Q-T INTERVAL: 286 MS
EKG QRS DURATION: 82 MS
EKG QTC CALCULATION (BAZETT): 442 MS
EKG R AXIS: 50 DEGREES
EKG T AXIS: 56 DEGREES
EKG VENTRICULAR RATE: 144 BPM
EOSINOPHILS ABSOLUTE: 0 K/UL (ref 0–0.6)
EOSINOPHILS RELATIVE PERCENT: 0.3 %
EPITHELIAL CELLS, UA: 17 /HPF (ref 0–5)
GFR AFRICAN AMERICAN: 46
GFR NON-AFRICAN AMERICAN: 38
GLUCOSE BLD-MCNC: 97 MG/DL (ref 70–99)
GLUCOSE URINE: NEGATIVE MG/DL
HCT VFR BLD CALC: 35.9 % (ref 36–48)
HEMOGLOBIN: 12.1 G/DL (ref 12–16)
HYALINE CASTS: 3 /LPF (ref 0–8)
INR BLD: 1.2 (ref 0.87–1.14)
KETONES, URINE: NEGATIVE MG/DL
LACTIC ACID: 1.6 MMOL/L (ref 0.4–2)
LEUKOCYTE ESTERASE, URINE: ABNORMAL
LYMPHOCYTES ABSOLUTE: 0.9 K/UL (ref 1–5.1)
LYMPHOCYTES RELATIVE PERCENT: 5.9 %
MCH RBC QN AUTO: 27.2 PG (ref 26–34)
MCHC RBC AUTO-ENTMCNC: 33.7 G/DL (ref 31–36)
MCV RBC AUTO: 80.6 FL (ref 80–100)
MICROSCOPIC EXAMINATION: YES
MONOCYTES ABSOLUTE: 0.3 K/UL (ref 0–1.3)
MONOCYTES RELATIVE PERCENT: 1.9 %
NEUTROPHILS ABSOLUTE: 13.2 K/UL (ref 1.7–7.7)
NEUTROPHILS RELATIVE PERCENT: 91.8 %
NITRITE, URINE: NEGATIVE
OVALOCYTES: ABNORMAL
PDW BLD-RTO: 16.3 % (ref 12.4–15.4)
PH UA: 5.5 (ref 5–8)
PLATELET # BLD: 127 K/UL (ref 135–450)
PLATELET SLIDE REVIEW: ABNORMAL
PMV BLD AUTO: 8.6 FL (ref 5–10.5)
POTASSIUM REFLEX MAGNESIUM: 4.9 MMOL/L (ref 3.5–5.1)
PROCALCITONIN: 6.6 NG/ML (ref 0–0.15)
PROTEIN UA: 100 MG/DL
PROTHROMBIN TIME: 15.1 SEC (ref 11.7–14.5)
RBC # BLD: 4.45 M/UL (ref 4–5.2)
RBC UA: 105 /HPF (ref 0–4)
SLIDE REVIEW: ABNORMAL
SODIUM BLD-SCNC: 135 MMOL/L (ref 136–145)
SPECIFIC GRAVITY UA: 1.02 (ref 1–1.03)
TARGET CELLS: ABNORMAL
TOTAL PROTEIN: 7 G/DL (ref 6.4–8.2)
TROPONIN: <0.01 NG/ML
URINE REFLEX TO CULTURE: YES
URINE TYPE: ABNORMAL
UROBILINOGEN, URINE: 1 E.U./DL
WBC # BLD: 14.4 K/UL (ref 4–11)
WBC UA: 24 /HPF (ref 0–5)

## 2022-09-12 PROCEDURE — 2580000003 HC RX 258: Performed by: INTERNAL MEDICINE

## 2022-09-12 PROCEDURE — 85610 PROTHROMBIN TIME: CPT

## 2022-09-12 PROCEDURE — 71250 CT THORAX DX C-: CPT

## 2022-09-12 PROCEDURE — 36415 COLL VENOUS BLD VENIPUNCTURE: CPT

## 2022-09-12 PROCEDURE — 99285 EMERGENCY DEPT VISIT HI MDM: CPT

## 2022-09-12 PROCEDURE — 93010 ELECTROCARDIOGRAM REPORT: CPT | Performed by: INTERNAL MEDICINE

## 2022-09-12 PROCEDURE — 87150 DNA/RNA AMPLIFIED PROBE: CPT

## 2022-09-12 PROCEDURE — 86403 PARTICLE AGGLUT ANTBDY SCRN: CPT

## 2022-09-12 PROCEDURE — 87040 BLOOD CULTURE FOR BACTERIA: CPT

## 2022-09-12 PROCEDURE — 93005 ELECTROCARDIOGRAM TRACING: CPT | Performed by: EMERGENCY MEDICINE

## 2022-09-12 PROCEDURE — 81001 URINALYSIS AUTO W/SCOPE: CPT

## 2022-09-12 PROCEDURE — 71045 X-RAY EXAM CHEST 1 VIEW: CPT

## 2022-09-12 PROCEDURE — 96374 THER/PROPH/DIAG INJ IV PUSH: CPT

## 2022-09-12 PROCEDURE — 2580000003 HC RX 258: Performed by: EMERGENCY MEDICINE

## 2022-09-12 PROCEDURE — 87186 SC STD MICRODIL/AGAR DIL: CPT

## 2022-09-12 PROCEDURE — 83605 ASSAY OF LACTIC ACID: CPT

## 2022-09-12 PROCEDURE — 84145 PROCALCITONIN (PCT): CPT

## 2022-09-12 PROCEDURE — 6370000000 HC RX 637 (ALT 250 FOR IP): Performed by: EMERGENCY MEDICINE

## 2022-09-12 PROCEDURE — 94761 N-INVAS EAR/PLS OXIMETRY MLT: CPT

## 2022-09-12 PROCEDURE — 96361 HYDRATE IV INFUSION ADD-ON: CPT

## 2022-09-12 PROCEDURE — 6360000002 HC RX W HCPCS: Performed by: INTERNAL MEDICINE

## 2022-09-12 PROCEDURE — 87449 NOS EACH ORGANISM AG IA: CPT

## 2022-09-12 PROCEDURE — 84484 ASSAY OF TROPONIN QUANT: CPT

## 2022-09-12 PROCEDURE — 80053 COMPREHEN METABOLIC PANEL: CPT

## 2022-09-12 PROCEDURE — 2060000000 HC ICU INTERMEDIATE R&B

## 2022-09-12 PROCEDURE — 85025 COMPLETE CBC W/AUTO DIFF WBC: CPT

## 2022-09-12 PROCEDURE — 87086 URINE CULTURE/COLONY COUNT: CPT

## 2022-09-12 PROCEDURE — 6360000002 HC RX W HCPCS: Performed by: EMERGENCY MEDICINE

## 2022-09-12 PROCEDURE — 87077 CULTURE AEROBIC IDENTIFY: CPT

## 2022-09-12 RX ORDER — SODIUM CHLORIDE 9 MG/ML
INJECTION, SOLUTION INTRAVENOUS PRN
Status: DISCONTINUED | OUTPATIENT
Start: 2022-09-12 | End: 2022-09-17 | Stop reason: SDUPTHER

## 2022-09-12 RX ORDER — SODIUM CHLORIDE 0.9 % (FLUSH) 0.9 %
10 SYRINGE (ML) INJECTION PRN
Status: DISCONTINUED | OUTPATIENT
Start: 2022-09-12 | End: 2022-09-17 | Stop reason: SDUPTHER

## 2022-09-12 RX ORDER — ENOXAPARIN SODIUM 100 MG/ML
40 INJECTION SUBCUTANEOUS DAILY
Status: DISCONTINUED | OUTPATIENT
Start: 2022-09-13 | End: 2022-09-19

## 2022-09-12 RX ORDER — POTASSIUM CHLORIDE 7.45 MG/ML
10 INJECTION INTRAVENOUS PRN
Status: DISCONTINUED | OUTPATIENT
Start: 2022-09-12 | End: 2022-09-16 | Stop reason: SDUPTHER

## 2022-09-12 RX ORDER — MAGNESIUM SULFATE IN WATER 40 MG/ML
2000 INJECTION, SOLUTION INTRAVENOUS PRN
Status: DISCONTINUED | OUTPATIENT
Start: 2022-09-12 | End: 2022-09-19

## 2022-09-12 RX ORDER — SODIUM CHLORIDE 9 MG/ML
INJECTION, SOLUTION INTRAVENOUS CONTINUOUS
Status: DISCONTINUED | OUTPATIENT
Start: 2022-09-12 | End: 2022-09-19

## 2022-09-12 RX ORDER — 0.9 % SODIUM CHLORIDE 0.9 %
30 INTRAVENOUS SOLUTION INTRAVENOUS ONCE
Status: COMPLETED | OUTPATIENT
Start: 2022-09-12 | End: 2022-09-12

## 2022-09-12 RX ORDER — ACETAMINOPHEN 650 MG/1
650 SUPPOSITORY RECTAL EVERY 6 HOURS PRN
Status: DISCONTINUED | OUTPATIENT
Start: 2022-09-12 | End: 2022-10-01 | Stop reason: HOSPADM

## 2022-09-12 RX ORDER — ACETAMINOPHEN 650 MG/1
975 SUPPOSITORY RECTAL ONCE
Status: COMPLETED | OUTPATIENT
Start: 2022-09-12 | End: 2022-09-12

## 2022-09-12 RX ORDER — ACETAMINOPHEN 325 MG/1
650 TABLET ORAL EVERY 6 HOURS PRN
Status: DISCONTINUED | OUTPATIENT
Start: 2022-09-12 | End: 2022-10-01 | Stop reason: HOSPADM

## 2022-09-12 RX ORDER — MORPHINE SULFATE 2 MG/ML
1 INJECTION, SOLUTION INTRAMUSCULAR; INTRAVENOUS EVERY 4 HOURS PRN
Status: DISCONTINUED | OUTPATIENT
Start: 2022-09-12 | End: 2022-09-13

## 2022-09-12 RX ORDER — POTASSIUM CHLORIDE 7.45 MG/ML
10 INJECTION INTRAVENOUS PRN
Status: DISCONTINUED | OUTPATIENT
Start: 2022-09-12 | End: 2022-09-19

## 2022-09-12 RX ORDER — PROMETHAZINE HYDROCHLORIDE 25 MG/1
12.5 TABLET ORAL EVERY 6 HOURS PRN
Status: DISCONTINUED | OUTPATIENT
Start: 2022-09-12 | End: 2022-10-01 | Stop reason: HOSPADM

## 2022-09-12 RX ORDER — ONDANSETRON 2 MG/ML
4 INJECTION INTRAMUSCULAR; INTRAVENOUS EVERY 6 HOURS PRN
Status: DISCONTINUED | OUTPATIENT
Start: 2022-09-12 | End: 2022-10-01 | Stop reason: HOSPADM

## 2022-09-12 RX ORDER — SODIUM CHLORIDE 0.9 % (FLUSH) 0.9 %
10 SYRINGE (ML) INJECTION EVERY 12 HOURS SCHEDULED
Status: DISCONTINUED | OUTPATIENT
Start: 2022-09-12 | End: 2022-09-17 | Stop reason: SDUPTHER

## 2022-09-12 RX ORDER — POTASSIUM CHLORIDE 20 MEQ/1
40 TABLET, EXTENDED RELEASE ORAL PRN
Status: DISCONTINUED | OUTPATIENT
Start: 2022-09-12 | End: 2022-09-19

## 2022-09-12 RX ADMIN — CEFEPIME 2000 MG: 2 INJECTION, POWDER, FOR SOLUTION INTRAVENOUS at 18:12

## 2022-09-12 RX ADMIN — SODIUM CHLORIDE 1920 ML: 9 INJECTION, SOLUTION INTRAVENOUS at 15:49

## 2022-09-12 RX ADMIN — Medication 1500 MG: at 21:00

## 2022-09-12 RX ADMIN — ACETAMINOPHEN 975 MG: 650 SUPPOSITORY RECTAL at 16:02

## 2022-09-12 RX ADMIN — SODIUM CHLORIDE: 9 INJECTION, SOLUTION INTRAVENOUS at 21:01

## 2022-09-12 ASSESSMENT — PAIN SCALES - GENERAL
PAINLEVEL_OUTOF10: 0

## 2022-09-12 ASSESSMENT — PAIN - FUNCTIONAL ASSESSMENT: PAIN_FUNCTIONAL_ASSESSMENT: 0-10

## 2022-09-12 ASSESSMENT — ENCOUNTER SYMPTOMS: BACK PAIN: 1

## 2022-09-12 NOTE — H&P
Hospital Medicine History & Physical      PCP: No primary care provider on file. Date of Admission: 9/12/2022    Chief Complaint:  not feeling well, body achs    History Of Present Illness:    72-year-old female with past medical history of drug abuse, ADHD, who presents to the hospital due to feeling fatigue, pain in her legs as well as back, she has a history of IV drug abuse, per patient she has been sober for now, patient family is also on the bedside who also contributed to the patient history. According to the patient's sister patient has been feeling sick for past few days, not feeling well. Patient was noticed to have fevers as well as chills. Past Medical History:          Diagnosis Date    ADHD (attention deficit hyperactivity disorder)     Arthritis     Back pain     Depression     Migraine        Past Surgical History:          Procedure Laterality Date    KNEE ARTHROSCOPY  10-5-10    PARTIAL HYSTERECTOMY (CERVIX NOT REMOVED)      TUBAL LIGATION  2004       Medications Prior to Admission:      Prior to Admission medications    Medication Sig Start Date End Date Taking? Authorizing Provider   ibuprofen (ADVIL;MOTRIN) 800 MG tablet Take 1 tablet by mouth every 8 hours as needed for Pain 9/7/22   Jacey Smith DO   cefdinir (OMNICEF) 300 MG capsule Take 1 capsule by mouth 2 times daily for 7 days 9/7/22 9/14/22  Jacey Smith DO       Allergies:  Ultram [tramadol hcl], Robaxin [methocarbamol], and Penicillins    Social History:      TOBACCO:   reports that she has been smoking cigarettes. She has a 1.00 pack-year smoking history. She has never used smokeless tobacco.  ETOH:   reports current alcohol use.       Family History:       Reviewed in detail and non contributory          Problem Relation Age of Onset    Breast Cancer Maternal Grandmother 39    Arthritis Other     Asthma Other     Cancer Other     Diabetes Other     Seizures Other     Stroke Other        REVIEW OF SYSTEMS: Pertinent positives as noted in the HPI. All other systems reviewed and negative. PHYSICAL EXAM PERFORMED:    /64   Pulse (!) 136   Temp (!) 101.4 °F (38.6 °C) (Rectal)   Resp 12   SpO2 93%     General appearance:  No apparent distress, cooperative. HEENT:  Normal cephalic, atraumatic without obvious deformity. Conjunctivae/corneas clear. Neck: Supple, with full range of motion. No cervical lymphadenopathy  Respiratory:  Normal respiratory effort. Clear to auscultation, bilaterally without Rales/Wheezes/Rhonchi. Cardiovascular:  Regular rate and rhythm with normal S1/S2 without murmurs, rubs or gallops. Abdomen: Soft, non-tender, non-distended, normal bowel sounds. Musculoskeletal:  No edema noted bilaterally. No tenderness on palpation   Skin: no rash visible  Neurologic:  Neurologically intact without any focal sensory/motor deficits. grossly non-focal.  Psychiatric:  Alert and oriented, normal mood  Peripheral Pulses: +2 palpable, equal bilaterally       Labs:     Recent Labs     09/12/22  1544   WBC 14.4*   HGB 12.1   HCT 35.9*   *     Recent Labs     09/12/22  1544   *   K 4.9   CL 95*   CO2 28   BUN 73*   CREATININE 1.5*   CALCIUM 9.4     Recent Labs     09/12/22  1544   AST 42*   ALT 17   BILITOT 1.3*   ALKPHOS 265*     Recent Labs     09/12/22  1544   INR 1.20*     Recent Labs     09/12/22  1544   TROPONINI <0.01       Urinalysis:      Lab Results   Component Value Date/Time    NITRU Negative 09/12/2022 04:16 PM    WBCUA 24 09/12/2022 04:16 PM    BACTERIA None Seen 09/12/2022 04:16 PM    RBCUA 105 09/12/2022 04:16 PM    BLOODU LARGE 09/12/2022 04:16 PM    SPECGRAV 1.018 09/12/2022 04:16 PM    GLUCOSEU Negative 09/12/2022 04:16 PM    GLUCOSEU NEGATIVE 07/31/2010 02:59 PM       Radiology:       XR CHEST PORTABLE   Final Result   Multifocal patchy airspace disease and cavitary nodules. The appearance is   suggestive of septic emboli in this clinical setting.       Follow-up recommended to assure resolution. CT CHEST WO CONTRAST    (Results Pending)           Active Hospital Problems    Diagnosis Date Noted    Intractable nausea and vomiting [R11.2] 09/12/2022     Priority: Medium     28-year-old female with past medical history of drug abuse, ADHD, who presents to the hospital due to feeling fatigue, pain in her legs as well as back, she has a history of IV drug abuse, per patient she has been sober for now, patient family is also on the bedside who also contributed to the patient history. According to the patient's sister patient has been feeling sick for past few days, not feeling well. Patient was noticed to have fevers as well as chills. Assessment  Sepsis present on admission, unclear focal source at this time  Pulmonary septic emboli, cavitary nodules  History of IV drug abuse  ADHD      Plan  Start IV antibiotics with vancomycin, cefepime  Check blood cultures  Check procalcitonin  Consult infectious disease  Suspect endocarditis, will order echocardiogram  Resume home medications  DVT prophylaxis-Lovenox  Pain control  Diet: No diet orders on file  Code Status: No Order    PT/OT Eval Status: ordered    Dispo - pending clinical improvement       Lani Rosas MD    The note was completed using EMR and Dragon dictation system. Every effort was made to ensure accuracy; however, inadvertent computerized transcription errors may be present. Thank you No primary care provider on file. for the opportunity to be involved in this patient's care. If you have any questions or concerns please feel free to contact me at [phone].     Lani Rosas MD

## 2022-09-12 NOTE — ED PROVIDER NOTES
[address] [facility]  eMERGENCY dEPARTMENT eNCOUnter        Pt Name: Moriah Yougn  MRN: [id number]  [place] 1980  Date of evaluation: 9/12/2022  Provider: Jamaica Hoffman MD  PCP: No primary care provider on file. CHIEF COMPLAINT       Chief Complaint   Patient presents with    Tachycardia     Ems arrival tachycardic, 146 hr on monitor. Back pain ongoing, generalized weakness. HISTORY OFPRESENT ILLNESS   (Location/Symptom, Timing/Onset, Context/Setting, Quality, Duration, Modifying Factors,Severity)  Note limiting factors. Moriah Young is a 40 y.o. female   with a history of    has a past medical history of ADHD (attention deficit hyperactivity disorder), Arthritis, Back pain, Depression, and Migraine. Who presents with back pain. Patient is right now lethargic and not a very good provider of her history. She is an IV drug user. Other than that I could not get a lot of detail some the patient. However she did turn out to have 103.8 rectal temperature. History of present illness limited due to the patient's mental status. Nursing Noteswere all reviewed and agreed with or any disagreements were addressed  in the HPI. REVIEW OF SYSTEMS    (2-9 systems for level 4, 10 or more for level 5)     Review of Systems   Unable to perform ROS: Mental status change   Constitutional:  Positive for fever. Musculoskeletal:  Positive for back pain.        PAST MEDICAL HISTORY     Past Medical History:   Diagnosis Date    ADHD (attention deficit hyperactivity disorder)     Arthritis     Back pain     Depression     Migraine          SURGICAL HISTORY     Past Surgical History:   Procedure Laterality Date    KNEE ARTHROSCOPY  10-5-10    PARTIAL HYSTERECTOMY (CERVIX NOT REMOVED)      TUBAL LIGATION  2004         [address]       Current Discharge Medication List        CONTINUE these medications which have NOT CHANGED    Details   ibuprofen (ADVIL;MOTRIN) 800 MG tablet Take 1 tablet by mouth every 8 hours as needed for Pain  Qty: 20 tablet, Refills: 0      cefdinir (OMNICEF) 300 MG capsule Take 1 capsule by mouth 2 times daily for 7 days  Qty: 14 capsule, Refills: 0             ALLERGIES     Ultram [tramadol hcl], Robaxin [methocarbamol], and Penicillins    FAMILY HISTORY       Family History   Problem Relation Age of Onset    Breast Cancer Maternal Grandmother 39    Arthritis Other     Asthma Other     Cancer Other     Diabetes Other     Seizures Other     Stroke Other           SOCIAL HISTORY       Social History     Socioeconomic History    Marital status: Single   Tobacco Use    Smoking status: Every Day     Packs/day: 0.50     Years: 2.00     Pack years: 1.00     Types: Cigarettes    Smokeless tobacco: Never   Substance and Sexual Activity    Alcohol use: Yes     Comment: occas    Drug use: No       SCREENINGS    [place] Coma Scale  Eye Opening: Spontaneous  Best Verbal Response: Oriented  Best Motor Response: Obeys commands  Mag Coma Scale Score: 15        PHYSICAL EXAM    (up to 7 for level 4, 8 or more for level 5)     ED Triage Vitals [09/12/22 1454]   BP Temp Temp Source Heart Rate Resp SpO2 Height Weight   118/62 97.6 °F (36.4 °C) Oral (!) 147 (!) 32 93 % -- --      height is 5' 8\" (1.727 m) and weight is 140 lb 10.5 oz (63.8 kg). Her oral temperature is 100.3 °F (37.9 °C). Her blood pressure is 116/55 (abnormal) and her pulse is 127 (abnormal). Her respiration is 27 and oxygen saturation is 94%. Physical Exam  Constitutional:       Appearance: She is well-developed. She is ill-appearing. She is not diaphoretic. HENT:      Head: Normocephalic and atraumatic. Right Ear: External ear normal.      Left Ear: External ear normal.   Eyes:      General: No scleral icterus. Right eye: No discharge. Left eye: No discharge. Pupils: Pupils are equal, round, and reactive to light. Neck:      Thyroid: No thyromegaly. Vascular: No JVD. Trachea: No tracheal deviation. Cardiovascular:      Rate and Rhythm: Regular rhythm. Tachycardia present. Heart sounds: Normal heart sounds. No murmur heard. No friction rub. No gallop. Pulmonary:      Effort: Tachypnea present. No respiratory distress. Breath sounds: Decreased air movement present. No stridor. Decreased breath sounds present. No wheezing or rales. Abdominal:      General: There is no distension. Palpations: Abdomen is soft. Tenderness: There is generalized abdominal tenderness. There is no guarding or rebound. Musculoskeletal:         General: No tenderness. Cervical back: Normal range of motion. Skin:     General: Skin is warm and dry. Findings: No rash (On exposed body surfaces). Neurological:      General: No focal deficit present. Mental Status: She is lethargic and confused.       Coordination: Coordination normal.       DIAGNOSTIC RESULTS   LABS:    Results for orders placed or performed during the hospital encounter of 09/12/22   CBC with Auto Differential   Result Value Ref Range    WBC 14.4 (H) 4.0 - 11.0 K/uL    RBC 4.45 4.00 - 5.20 M/uL    Hemoglobin 12.1 12.0 - 16.0 g/dL    Hematocrit 35.9 (L) 36.0 - 48.0 %    MCV 80.6 80.0 - 100.0 fL    MCH 27.2 26.0 - 34.0 pg    MCHC 33.7 31.0 - 36.0 g/dL    RDW 16.3 (H) 12.4 - 15.4 %    Platelets 227 (L) 957 - 450 K/uL    MPV 8.6 5.0 - 10.5 fL    PLATELET SLIDE REVIEW Decreased     SLIDE REVIEW see below     Neutrophils % 91.8 %    Lymphocytes % 5.9 %    Monocytes % 1.9 %    Eosinophils % 0.3 %    Basophils % 0.1 %    Neutrophils Absolute 13.2 (H) 1.7 - 7.7 K/uL    Lymphocytes Absolute 0.9 (L) 1.0 - 5.1 K/uL    Monocytes Absolute 0.3 0.0 - 1.3 K/uL    Eosinophils Absolute 0.0 0.0 - 0.6 K/uL    Basophils Absolute 0.0 0.0 - 0.2 K/uL    Anisocytosis Occasional (A)     Ovalocytes Occasional (A)     Target Cells Occasional (A)    CMP w/ Reflex to MG   Result Value Ref Range    Sodium 135 (L) 136 - 145 mmol/L    Potassium reflex Magnesium 4.9 3.5 - 5.1 mmol/L    Chloride 95 (L) 99 - 110 mmol/L    CO2 28 21 - 32 mmol/L    Anion Gap 12 3 - 16    Glucose 97 70 - 99 mg/dL    BUN 73 (H) 7 - 20 mg/dL    Creatinine 1.5 (H) 0.6 - 1.1 mg/dL    GFR Non- 38 (A) >60    GFR  46 (A) >60    Calcium 9.4 8.3 - 10.6 mg/dL    Total Protein 7.0 6.4 - 8.2 g/dL    Albumin 2.0 (L) 3.4 - 5.0 g/dL    Albumin/Globulin Ratio 0.4 (L) 1.1 - 2.2    Total Bilirubin 1.3 (H) 0.0 - 1.0 mg/dL    Alkaline Phosphatase 265 (H) 40 - 129 U/L    ALT 17 10 - 40 U/L    AST 42 (H) 15 - 37 U/L   Lactic Acid   Result Value Ref Range    Lactic Acid 1.6 0.4 - 2.0 mmol/L   Troponin   Result Value Ref Range    Troponin <0.01 <0.01 ng/mL   Protime-INR   Result Value Ref Range    Protime 15.1 (H) 11.7 - 14.5 sec    INR 1.20 (H) 0.87 - 1.14   EKG 12 Lead   Result Value Ref Range    Ventricular Rate 144 BPM    Atrial Rate 144 BPM    P-R Interval 114 ms    QRS Duration 82 ms    Q-T Interval 286 ms    QTc Calculation (Bazett) 442 ms    P Axis 75 degrees    R Axis 50 degrees    T Axis 56 degrees    Diagnosis       ** Critical Test Result: High HRSinus tachycardiaPossible Left atrial enlargementNonspecific ST abnormalityAbnormal ECGNo previous ECGs availableConfirmed by [address] ([id number]) on 9/12/2022 5:00:35 PM       All other labs were within normal range or not returned as of this dictation. EKG: All EKG's are interpreted by the Emergency Department Physician who either signs orCo-signs this chart in the absence of a cardiologist.    EKG visualized preliminarily interpreted by myself shows sinus tachycardia. The rate is 144. Axis is 50. There is a RSR prime pattern. There is diffuse nonspecific ST findings but I do not see an acute injury and I just do not see any obvious flutter waves.     RADIOLOGY:   plain film images such as CT, Ultrasound and MRI are read by the radiologist. Plain radiographic images are visualized and preliminarily interpreted by the  EDProvider with the below findings:    XR CHEST PORTABLE    Result Date: 9/12/2022  EXAMINATION: ONE XRAY VIEW OF THE CHEST 9/12/2022 4:19 pm COMPARISON: None. HISTORY: ORDERING SYSTEM PROVIDED HISTORY: IVDU/Fever TECHNOLOGIST PROVIDED HISTORY: Reason for exam:->IVDU/Fever Reason for Exam: fever FINDINGS: The cardiomediastinal silhouette is normal in size. Multifocal patchy airspace opacities, as well as cavitary nodules are present bilaterally. No pleural effusion or pneumothorax is identified. Multifocal patchy airspace disease and cavitary nodules. The appearance is suggestive of septic emboli in this clinical setting. Follow-up recommended to assure resolution. PROCEDURES   Unless otherwise noted below, none     Procedures    CRITICAL CARE TIME   CRITICAL CARE: There was a high probability of clinically significant/life threatening deterioration in this patient's condition which required my urgent intervention. Total critical care time was 45 minutes. This excludes any time for separately reportable procedures.        CONSULTS:  PHARMACY TO DOSE VANCOMYCIN  IP CONSULT TO INFECTIOUS DISEASES    EMERGENCY DEPARTMENT COURSE and DIFFERENTIAL DIAGNOSIS/MDM:   Vitals:    Vitals:    09/12/22 1915 09/12/22 1945 09/12/22 2000 09/12/22 2053   BP:       Pulse: (!) 133   (!) 127   Resp: 23   27   Temp:  (!) 101.4 °F (38.6 °C) 100.3 °F (37.9 °C)    TempSrc:  Axillary Oral    SpO2:    94%   Weight:   140 lb 10.5 oz (63.8 kg)    Height:   5' 8\" (1.727 m)        Patient was given the following medications:  Medications   morphine (PF) injection 1 mg (has no administration in time range)   0.9 % sodium chloride infusion ( IntraVENous New Bag 9/12/22 2101)   promethazine (PHENERGAN) 6.25 mg in sodium chloride 0.9% 50 mL IVPB (has no administration in time range)   sodium chloride flush 0.9 % injection 10 mL ( IntraVENous Canceled Entry 9/12/22 2225)   sodium chloride flush 0.9 % injection 10 mL (has no administration in time range)   0.9 % sodium chloride infusion (has no administration in time range)   potassium chloride (KLOR-CON M) extended release tablet 40 mEq (has no administration in time range)     Or   potassium bicarb-citric acid (EFFER-K) effervescent tablet 40 mEq (has no administration in time range)     Or   potassium chloride 10 mEq/100 mL IVPB (Peripheral Line) (has no administration in time range)   potassium chloride 10 mEq/100 mL IVPB (Peripheral Line) (has no administration in time range)   magnesium sulfate 2000 mg in 50 mL IVPB premix (has no administration in time range)   enoxaparin (LOVENOX) injection 40 mg (has no administration in time range)   promethazine (PHENERGAN) tablet 12.5 mg (has no administration in time range)     Or   ondansetron (ZOFRAN) injection 4 mg (has no administration in time range)   magnesium hydroxide (MILK OF MAGNESIA) 400 MG/5ML suspension 30 mL (has no administration in time range)   acetaminophen (TYLENOL) tablet 650 mg (has no administration in time range)     Or   acetaminophen (TYLENOL) suppository 650 mg (has no administration in time range)   cefepime (MAXIPIME) 2000 mg IVPB minibag (has no administration in time range)   perflutren lipid microspheres (DEFINITY) injection 1.65 mg (has no administration in time range)   vancomycin (VANCOCIN) 1500 mg in dextrose 5 % 250 mL IVPB (1,500 mg IntraVENous New Bag 9/12/22 2100)   vancomycin (VANCOCIN) 1250 mg in dextrose 5 % 250 mL IVPB (has no administration in time range)   vancomycin (VANCOCIN) intermittent dosing (placeholder) (has no administration in time range)   acetaminophen (TYLENOL) suppository 975 mg (975 mg Rectal Given 9/12/22 1602)   0.9 % sodium chloride bolus (0 mLs IntraVENous Stopped 9/12/22 1750)   cefepime (MAXIPIME) 2000 mg IVPB minibag (2,000 mg IntraVENous New Bag 9/12/22 1812)       Heart rate is down to 128. Blood pressure is 022 systolic.   Much more alert requesting fluids to drink. Vancomycin and cefepime have been ordered. Fortunately lactic acid is less than 2. Is this patient to be included in the SEP-1 Core Measure due to severe sepsis or septic shock? Yes   SEP-1 CORE MEASURE DATA      Sepsis Criteria   Severe Sepsis Criteria   Septic Shock Criteria     Must be confirmed or suspected to move forward with diagnosis of sepsis. Must meet 2:    [x] Temperature > 100.9 F (38.3 C)        or < 96.8 F (36 C)  [x] HR > 90  [] RR > 20  [x] WBC > 12 or < 4 or 10% bands      AND:      [x] Infection Confirmed or        Suspected. Must meet 1:    [] Lactate > 2       or   [x] Signs of Organ Dysfunction:    - SBP < 90 or MAP < 65  - Altered mental status  - Creatinine > 2 or increased from      baseline  - Urine Output < 0.5 ml/kg/hr  - Bilirubin > 2  - INR > 1.5 (not anticoagulated)  - Platelets < 891,416  - Acute Respiratory Failure as     evidenced by new need for NIPPV     or mechanical ventilation      [] No criteria met for Severe Sepsis. Must meet 1:    [] Lactate > 4        or   [] SBP < 90 or MAP < 65 for at        least two readings in the first        hour after fluid bolus        administration      [] Vasopressors initiated (if hypotension persists after fluid resuscitation)        [x] No criteria met for Septic Shock.    Patient Vitals for the past 6 hrs:   BP Temp Pulse Resp SpO2 Height Weight Weight Method Percent Weight Change   09/12/22 1630 125/66 -- (!) 140 (!) 34 -- -- -- -- --   09/12/22 1645 98/65 -- (!) 142 (!) 52 93 % -- -- -- --   09/12/22 1700 (!) 114/57 -- (!) 143 27 -- -- -- -- --   09/12/22 1715 (!) 102/45 -- (!) 140 (!) 40 -- -- -- -- --   09/12/22 1730 (!) 121/53 -- (!) 141 (!) 39 -- -- -- -- --   09/12/22 1745 113/60 -- (!) 139 30 -- -- -- -- --   09/12/22 1800 123/64 -- (!) 136 12 -- -- -- -- --   09/12/22 1815 (!) 120/48 -- (!) 133 (!) 47 -- -- -- -- --   09/12/22 1820 -- (!) 101.4 °F (38.6 °C) -- -- -- -- -- -- --   09/12/22 1830 (!) 114/49 -- (!) 132 (!) 41 -- -- -- -- --   09/12/22 1845 (!) 117/53 -- (!) 131 (!) 46 91 % -- -- -- --   09/12/22 1900 (!) 116/55 -- (!) 132 (!) 38 95 % -- -- -- --   09/12/22 1915 -- -- (!) 133 23 -- -- -- -- --   09/12/22 1945 -- (!) 101.4 °F (38.6 °C) -- -- -- -- -- -- --   09/12/22 2000 -- 100.3 °F (37.9 °C) -- -- -- 5' 8\" (1.727 m) 140 lb 10.5 oz (63.8 kg) Actual;Bed scale 0   09/12/22 2053 -- -- (!) 127 27 94 % -- -- -- --      Recent Labs     09/12/22  1544 09/12/22  1553   WBC 14.4*  --    LACTA  --  1.6   CREATININE 1.5*  --    BILITOT 1.3*  --    INR 1.20*  --    *  --          Time Severe Sepsis Identified: 1725    Fluid Resuscitation Rational: at least 30mL/kg based on entered actual weight at time of triage      Repeat lactate level: not indicated due to initial lactate < 2    Reassessment Exam:   Not applicable. Patient does not have septic shock. FINAL IMPRESSION      1. Septicemia ([facility] [address]    2. IVDU (intravenous drug user)          DISPOSITION/PLAN   DISPOSITION Admitted 09/12/2022 06:59:43 PM      PATIENT REFERRED TO:  No follow-up provider specified.     DISCHARGE MEDICATIONS:  Current Discharge Medication List          DISCONTINUED MEDICATIONS:  Current Discharge Medication List                 (Please note that portions of this note were completed with a voice recognition program.  Efforts were made to editthe dictations but occasionally words are mis-transcribed.)    Sruthi العراقي MD (electronically signed)           Sruthi العراقي MD  09/12/22 8131

## 2022-09-12 NOTE — PROGRESS NOTES
Medication Reconciliation    List of medications patient is currently taking is complete. Source of information: 1. Conversation with patient's family at bedside                                      2. EPIC records                                       3. [place] report     Allergies  Ultram [tramadol hcl], Robaxin [methocarbamol], and Penicillins     Notes regarding home medications:   1. Patient was unable to communicate if she takes any medications. A cefdinir and ibuprofen were sent in on 9/7. Unsure if she has been taking these or not. 2. Patient's family member believes she may be seen [facility] in [place] and they may give her medication. I was unable to confirm this as they are currently closed, [place] was checked but nothing has been reported.      Yanick Valero, PharmD Candidate 9/12/2022 5:39 PM

## 2022-09-12 NOTE — ED NOTES
One set of blood cultures collected at this time. Per Dr. Refugio Frankel ok to start broad spectrum abx with one set.       Geovanny Herrmann RN  09/12/22 2951

## 2022-09-12 NOTE — LETTER
[facility]  [address] [address]  [phone]             To Whom It May concern,             Selena Banuelos was admitted to the hospital on September 12, 2022 and is still currently being treated in the inpatient setting.                                      Respectfully Signed,                           Electronically signed by Arti Fernandez RN on 9/19/2022 at 1:05 PM

## 2022-09-12 NOTE — LETTER
Rosendo Regan is being treated in the inpatient hospital setting. Patient was admitted on 9/12/2022 and is currently still being treated.          Electronically signed by Governor Chikis RN on 9/19/2022 at 12:58 PM

## 2022-09-12 NOTE — ED NOTES
Pt arrived to dept via ems. Pt c/o pain and fatigue. Tachycardic on monitor 145 hr.  Pt awake, responsive to voice, follows commands to best of ability. Skin warm and dry/normal color for ethnicity. Resp fast and unlabored. Pt placed in gown and on cardiac monitor. Call light in reach. Will continue to monitor.       Sarai Schneider RN  09/12/22 2511

## 2022-09-12 NOTE — ED NOTES
Straight cath performed per sterile protocol. Urine specimen obtained and sent to lab. Call light within reach. Will continue to monitor patient.         Gracie Gregg RN  09/12/22 9889

## 2022-09-13 LAB
ANION GAP SERPL CALCULATED.3IONS-SCNC: 13 MMOL/L (ref 3–16)
BASOPHILS ABSOLUTE: 0 K/UL (ref 0–0.2)
BASOPHILS RELATIVE PERCENT: 0 %
BUN BLDV-MCNC: 47 MG/DL (ref 7–20)
CALCIUM SERPL-MCNC: 7.7 MG/DL (ref 8.3–10.6)
CHLORIDE BLD-SCNC: 103 MMOL/L (ref 99–110)
CO2: 24 MMOL/L (ref 21–32)
CREAT SERPL-MCNC: 1.1 MG/DL (ref 0.6–1.1)
EOSINOPHILS ABSOLUTE: 0 K/UL (ref 0–0.6)
EOSINOPHILS RELATIVE PERCENT: 0.3 %
GFR AFRICAN AMERICAN: >60
GFR NON-AFRICAN AMERICAN: 55
GLUCOSE BLD-MCNC: 88 MG/DL (ref 70–99)
HCT VFR BLD CALC: 28.2 % (ref 36–48)
HEMOGLOBIN: 9.4 G/DL (ref 12–16)
L. PNEUMOPHILA SEROGP 1 UR AG: NORMAL
LV EF: 63 %
LVEF MODALITY: NORMAL
LYMPHOCYTES ABSOLUTE: 0.7 K/UL (ref 1–5.1)
LYMPHOCYTES RELATIVE PERCENT: 4.7 %
MCH RBC QN AUTO: 27.1 PG (ref 26–34)
MCHC RBC AUTO-ENTMCNC: 33.4 G/DL (ref 31–36)
MCV RBC AUTO: 81.1 FL (ref 80–100)
MONOCYTES ABSOLUTE: 0.4 K/UL (ref 0–1.3)
MONOCYTES RELATIVE PERCENT: 2.8 %
NEUTROPHILS ABSOLUTE: 14.3 K/UL (ref 1.7–7.7)
NEUTROPHILS RELATIVE PERCENT: 92.2 %
PDW BLD-RTO: 16.4 % (ref 12.4–15.4)
PLATELET # BLD: 103 K/UL (ref 135–450)
PMV BLD AUTO: 8.6 FL (ref 5–10.5)
POTASSIUM REFLEX MAGNESIUM: 3.7 MMOL/L (ref 3.5–5.1)
RBC # BLD: 3.47 M/UL (ref 4–5.2)
REPORT: NORMAL
SODIUM BLD-SCNC: 140 MMOL/L (ref 136–145)
STREP PNEUMONIAE ANTIGEN, URINE: NORMAL
URINE CULTURE, ROUTINE: NORMAL
VANCOMYCIN RANDOM: 16 UG/ML
WBC # BLD: 15.5 K/UL (ref 4–11)

## 2022-09-13 PROCEDURE — 97166 OT EVAL MOD COMPLEX 45 MIN: CPT

## 2022-09-13 PROCEDURE — 6370000000 HC RX 637 (ALT 250 FOR IP): Performed by: INTERNAL MEDICINE

## 2022-09-13 PROCEDURE — 97163 PT EVAL HIGH COMPLEX 45 MIN: CPT | Performed by: PHYSICAL THERAPIST

## 2022-09-13 PROCEDURE — 99255 IP/OBS CONSLTJ NEW/EST HI 80: CPT | Performed by: INTERNAL MEDICINE

## 2022-09-13 PROCEDURE — 97530 THERAPEUTIC ACTIVITIES: CPT | Performed by: PHYSICAL THERAPIST

## 2022-09-13 PROCEDURE — 2580000003 HC RX 258: Performed by: INTERNAL MEDICINE

## 2022-09-13 PROCEDURE — 36415 COLL VENOUS BLD VENIPUNCTURE: CPT

## 2022-09-13 PROCEDURE — 80202 ASSAY OF VANCOMYCIN: CPT

## 2022-09-13 PROCEDURE — 87641 MR-STAPH DNA AMP PROBE: CPT

## 2022-09-13 PROCEDURE — 6360000002 HC RX W HCPCS: Performed by: INTERNAL MEDICINE

## 2022-09-13 PROCEDURE — 80048 BASIC METABOLIC PNL TOTAL CA: CPT

## 2022-09-13 PROCEDURE — 97530 THERAPEUTIC ACTIVITIES: CPT

## 2022-09-13 PROCEDURE — 93306 TTE W/DOPPLER COMPLETE: CPT

## 2022-09-13 PROCEDURE — 2060000000 HC ICU INTERMEDIATE R&B

## 2022-09-13 PROCEDURE — 85025 COMPLETE CBC W/AUTO DIFF WBC: CPT

## 2022-09-13 PROCEDURE — 94760 N-INVAS EAR/PLS OXIMETRY 1: CPT

## 2022-09-13 RX ORDER — MORPHINE SULFATE 2 MG/ML
0.5 INJECTION, SOLUTION INTRAMUSCULAR; INTRAVENOUS EVERY 4 HOURS PRN
Status: DISCONTINUED | OUTPATIENT
Start: 2022-09-13 | End: 2022-09-14

## 2022-09-13 RX ORDER — CALCIUM CARBONATE 500 MG/1
500 TABLET, CHEWABLE ORAL 3 TIMES DAILY PRN
Status: DISCONTINUED | OUTPATIENT
Start: 2022-09-13 | End: 2022-10-01 | Stop reason: HOSPADM

## 2022-09-13 RX ADMIN — CEFEPIME 2000 MG: 2 INJECTION, POWDER, FOR SOLUTION INTRAVENOUS at 05:37

## 2022-09-13 RX ADMIN — MORPHINE SULFATE 1 MG: 2 INJECTION, SOLUTION INTRAMUSCULAR; INTRAVENOUS at 15:59

## 2022-09-13 RX ADMIN — ACETAMINOPHEN 650 MG: 325 TABLET ORAL at 09:34

## 2022-09-13 RX ADMIN — ENOXAPARIN SODIUM 40 MG: 100 INJECTION SUBCUTANEOUS at 09:28

## 2022-09-13 RX ADMIN — SODIUM CHLORIDE: 9 INJECTION, SOLUTION INTRAVENOUS at 09:31

## 2022-09-13 RX ADMIN — MORPHINE SULFATE 0.5 MG: 2 INJECTION, SOLUTION INTRAMUSCULAR; INTRAVENOUS at 20:39

## 2022-09-13 RX ADMIN — VANCOMYCIN HYDROCHLORIDE 1250 MG: 10 INJECTION, POWDER, LYOPHILIZED, FOR SOLUTION INTRAVENOUS at 15:16

## 2022-09-13 RX ADMIN — ONDANSETRON 4 MG: 2 INJECTION INTRAMUSCULAR; INTRAVENOUS at 07:09

## 2022-09-13 RX ADMIN — CEFEPIME 2000 MG: 2 INJECTION, POWDER, FOR SOLUTION INTRAVENOUS at 22:19

## 2022-09-13 RX ADMIN — ONDANSETRON 4 MG: 2 INJECTION INTRAMUSCULAR; INTRAVENOUS at 20:42

## 2022-09-13 RX ADMIN — Medication 10 ML: at 09:32

## 2022-09-13 RX ADMIN — ONDANSETRON 4 MG: 2 INJECTION INTRAMUSCULAR; INTRAVENOUS at 15:58

## 2022-09-13 RX ADMIN — MORPHINE SULFATE 1 MG: 2 INJECTION, SOLUTION INTRAMUSCULAR; INTRAVENOUS at 11:30

## 2022-09-13 RX ADMIN — ACETAMINOPHEN 650 MG: 325 TABLET ORAL at 01:04

## 2022-09-13 RX ADMIN — Medication 10 ML: at 20:39

## 2022-09-13 RX ADMIN — ANTACID TABLETS 500 MG: 500 TABLET, CHEWABLE ORAL at 12:55

## 2022-09-13 RX ADMIN — CEFEPIME 2000 MG: 2 INJECTION, POWDER, FOR SOLUTION INTRAVENOUS at 14:41

## 2022-09-13 ASSESSMENT — PAIN SCALES - GENERAL
PAINLEVEL_OUTOF10: 10
PAINLEVEL_OUTOF10: 0
PAINLEVEL_OUTOF10: 7
PAINLEVEL_OUTOF10: 10
PAINLEVEL_OUTOF10: 6
PAINLEVEL_OUTOF10: 8
PAINLEVEL_OUTOF10: 10
PAINLEVEL_OUTOF10: 7

## 2022-09-13 ASSESSMENT — PAIN DESCRIPTION - DESCRIPTORS
DESCRIPTORS: ACHING
DESCRIPTORS: ACHING;CRAMPING

## 2022-09-13 ASSESSMENT — LIFESTYLE VARIABLES
HOW OFTEN DO YOU HAVE A DRINK CONTAINING ALCOHOL: NEVER
HOW MANY STANDARD DRINKS CONTAINING ALCOHOL DO YOU HAVE ON A TYPICAL DAY: PATIENT DOES NOT DRINK

## 2022-09-13 ASSESSMENT — PAIN DESCRIPTION - PAIN TYPE
TYPE: CHRONIC PAIN

## 2022-09-13 ASSESSMENT — PAIN DESCRIPTION - ORIENTATION
ORIENTATION: RIGHT;LEFT
ORIENTATION: RIGHT;LEFT

## 2022-09-13 ASSESSMENT — PAIN DESCRIPTION - LOCATION
LOCATION: BACK
LOCATION: ABDOMEN
LOCATION: BACK;LEG
LOCATION: BACK

## 2022-09-13 ASSESSMENT — PAIN DESCRIPTION - ONSET
ONSET: ON-GOING
ONSET: ON-GOING

## 2022-09-13 ASSESSMENT — PAIN - FUNCTIONAL ASSESSMENT
PAIN_FUNCTIONAL_ASSESSMENT: PREVENTS OR INTERFERES SOME ACTIVE ACTIVITIES AND ADLS
PAIN_FUNCTIONAL_ASSESSMENT: PREVENTS OR INTERFERES SOME ACTIVE ACTIVITIES AND ADLS

## 2022-09-13 NOTE — PROGRESS NOTES
Clinical Pharmacy Note  Vancomycin Consult    Lg Collier is a 40 y.o. female ordered Vancomycin for MRSA bacteremia; consult received from Dr. Patricia Ruiz to manage therapy. Also receiving cefepime. Allergies:  Ultram [tramadol hcl], Robaxin [methocarbamol], and Penicillins     Temp max:  Temp (24hrs), Av.3 °F (37.9 °C), Min:97.6 °F (36.4 °C), Max:103.8 °F (39.9 °C)      Recent Labs     22  1544 22  0604   WBC 14.4* 15.5*       Recent Labs     22  1544 22  0604   BUN 73* 47*   CREATININE 1.5* 1.1         Intake/Output Summary (Last 24 hours) at 2022 1157  Last data filed at 2022 0533  Gross per 24 hour   Intake 1014 ml   Output 300 ml   Net 714 ml       Culture Results:  Blood cultures + MRSA and Serratia marcescens    Ht Readings from Last 1 Encounters:   22 5' 8\" (1.727 m)        Wt Readings from Last 1 Encounters:   22 142 lb 13.7 oz (64.8 kg)         Estimated Creatinine Clearance: 68 mL/min (based on SCr of 1.1 mg/dL). Assessment:  Day # 1 of vancomycin. Current regimen: Intermittent dosing based on levels due to MARYAN upon admission  Vancomycin level: 16 mg/L  SCr improving    Plan:  Vanco 1250 mg x 1 today  Random vanco tomorrow am    Thank you for the consult. Rogelio Farris, PharmD.   2022  11:59 AM

## 2022-09-13 NOTE — FLOWSHEET NOTE
Pt admitted into room 5131 via stretcher. Pt assisted into bed and placed on telemetry. ST per monitor. Rhythm confirmed with the [facility]. 's. Temp 100.3. Respirations 30-40's, shallow but unlabored and on room air. BP WNL. Pt daughter at bedside. States she will be staying with her tonight. Pt lethargic. Daughter oriented to room bed unit and call light. Encouraged daughter to have pt call for assist out of bed. Bed alarm on and call light in reach.

## 2022-09-13 NOTE — PROGRESS NOTES
This nurse attempted to sit patient on side of bed to dangle and patient unable to move due to generalized weakness Arpit Weaver RN

## 2022-09-13 NOTE — PROGRESS NOTES
Occupational Therapy  Facility/Department: [id number] [facility]  Occupational Therapy Initial Assessment    Name: Moriah Young  : 1980  MRN: [id number]  Date of Service: 2022    Discharge Recommendations:  Patient would benefit from continued therapy after discharge, 3-5 sessions per week  OT Equipment Recommendations  Other: will continue to assess     Moriah Young scored a  on the AM-PAC ADL Inpatient form. Current research shows that an AM-PAC score of 17 or less is typically not associated with a discharge to the patient's home setting. Based on the patient's AM-PAC score and their current ADL deficits, it is recommended that the patient have 3-5 sessions per week of Occupational Therapy at d/c to increase the patient's independence. Please see assessment section for further patient specific details. If patient discharges prior to next session this note will serve as a discharge summary. Please see below for the latest assessment towards goals. Patient Diagnosis(es): The primary encounter diagnosis was Septicemia ([facility] [address] A diagnosis of IVDU (intravenous drug user) was also pertinent to this visit. Past Medical History:  has a past medical history of ADHD (attention deficit hyperactivity disorder), Arthritis, Back pain, Depression, and Migraine. Past Surgical History:  has a past surgical history that includes Partial hysterectomy; Knee arthroscopy (10-5-10); and Tubal ligation (). Assessment   Performance deficits / Impairments: Decreased functional mobility ; Decreased safe awareness;Decreased balance;Decreased ADL status; Decreased high-level IADLs;Decreased endurance;Decreased strength  Assessment: 39 y/o female admitted 2022 with Sepsis and Pulmonary septic emboli, cavitary nodules. Pt with history of IV drug abuse. PTA pt lives at home with her parents and was independent with ADLs and functional mobility.  Today, pt significantly limited by pain all over. Pt required max A x 2 for bed mobility and tolerated sitting EOB ~ 5-6 min with min A. Pt with dec UE ROM- question effort vs weakness. Anticipate pt will require up to max A for LB ADLs. Pt is functioning below baseline and benefit from skilled therapy. Prognosis: Good  Decision Making: Medium Complexity  REQUIRES OT FOLLOW-UP: Yes  Activity Tolerance  Activity Tolerance Comments: significant back pain but did not rate        Plan   Plan  Times per Week: 3-5  Current Treatment Recommendations: Strengthening, Balance training, Functional mobility training, Endurance training, Self-Care / ADL, Safety education & training, Gait training     Restrictions  Restrictions/Precautions  Restrictions/Precautions: Fall Risk    Subjective   General  Chart Reviewed: Yes  Patient assessed for rehabilitation services?: Yes  Additional Pertinent Hx: Per H&P: \"40year-old female with past medical history of drug abuse, ADHD, who presents to the hospital due to feeling fatigue, pain in her legs as well as back, she has a history of IV drug abuse, per patient she has been sober for now, patient family is also on the bedside who also contributed to the patient history. According to the patient's sister patient has been feeling sick for past few days, not feeling well. Patient was noticed to have fevers as well as chills. \"  Family / Caregiver Present: No  Referring Practitioner: Dr. Villa Smoker: Pt seen bedside and agreeable to sitting EOB. Pt reporting significant pain sitting EOB. General Comment  Comments: Per RN ok for therapy.      Social/Functional History  Social/Functional History  Lives With: Family (mom and dad)  Type of Home: House  Home Layout: Two level, Laundry in basement (one story and a basement)  Home Access: Stairs to enter with rails  Entrance Stairs - Number of Steps: 10-12 with rail  Entrance Stairs - Rails: Both  Bathroom Shower/Tub: Tub/Shower unit  Bathroom Toilet: Standard  Bathroom Equipment: Shower chair  Home Equipment:  (no DME)  Has the patient had two or more falls in the past year or any fall with injury in the past year?: Yes (fall about a week ago)  ADL Assistance: Independent  Homemaking Assistance: Independent  Ambulation Assistance: Independent (no AD)  Transfer Assistance: Independent  Active : No  Occupation: Unemployed       Objective     Safety Devices  Type of Devices: Call light within reach;Nurse notified;Gait belt;Left in bed;Bed alarm in place           ADL  Additional Comments: Anticipate pt will be max A for LB bathing/dressing and toileting, min A for UB bathing/dressing and grooming based on balance and pain tolerance observed        Bed mobility  Sit to Supine: Maximum assistance;2 Person assistance  Scootin Person assistance;Maximal assistance  Bed Mobility Comments: Pt tolerated sitting EOB ~ 5-6 min with min A d/t pain    Transfers  Transfer Comments: Pt unable to tolerate standing d/t pain  Vision  Vision: Within Functional Limits  Hearing  Hearing: Within functional limits               Education Given To: Patient  Education Provided: Role of Therapy;Plan of Care;Transfer Training  Education Method: Demonstration;Verbal  Barriers to Learning: None  Education Outcome: Verbalized understanding;Demonstrated understanding    LUE AROM (degrees)  LUE General AROM: dec shoulder ROM- question effort provided  Left Hand AROM (degrees)  Left Hand AROM: WFL  RUE AROM (degrees)  RUE General AROM: dec shoulder ROM- question effort provided  Right Hand AROM (degrees)  Right Hand AROM: [facility]       AM-[facility] Score        AM-[facility] Inpatient Daily Activity Raw Score: 11 (22)  AM-PAC Inpatient ADL T-Scale Score : 29.04 (22)  ADL Inpatient CMS 0-100% Score: 70.42 (22)  ADL Inpatient CMS G-Code Modifier : CL (22)    Goals  Short Term Goals  Time Frame for Short term goals: Prior to DC:   Short Term Goal 1: Pt will complete ADL transfers with supervision  Short Term Goal 2: Pt will complete functional mobility with supervision  Short Term Goal 3: Pt will tolerate standing > 3 min for functional task with supervision  Short Term Goal 4: Pt will complete toileting with min A  Short Term Goal 5: Pt will complete UE exercises in all plane to inc strength endurance for ADLs  Patient Goals   Patient goals : to feel better       Therapy Time   Individual Concurrent Group Co-treatment   Time In 1440         Time Out 1512         Minutes 32         Timed Code Treatment Minutes: 15 Minutes     This note to serve as OT d/c summary if pt is d/c-ed prior to next therapy session.     Lizett Mills, OTR/L

## 2022-09-13 NOTE — CONSULTS
Infectious Diseases Inpatient Consult Note      Reason for Consult:  Sepsis, MRSA and Serratia Bacteremia from IVDA    Requesting Physician:       Primary Care Physician:  No primary care provider on file. History Obtained From:  Epic and pt    CHIEF COMPLAINT:     Chief Complaint   Patient presents with    Tachycardia     Ems arrival tachycardic, 146 hr on monitor. Back pain ongoing, generalized weakness. HISTORY OF PRESENT ILLNESS:  40 y.o. woman with a history of IV drug abuse, ADHD, back pain, depression admitted to the hospital secondary to fever chills fatigue back pain. Patient family noted she was unable to get up from the bed for the past 3 days secondary to fevers and not feeling well. Admission labs indicate creatinine 1.5 procalcitonin elevated to 6.6, WBC elevated 15.5 hemoglobin 9.4 bilirubin 1.3 AST 42, blood cultures from admission positive for MRSA as well as Serratia. T-max 103.8 on admission. CT chest with diffuse innumerable bilateral pulmonary nodules consistent with cavitation and septic emboli. Given the presentation concern is for endocarditis secondary to IV drug abuse. Patient not much interactive during normalization some of the history is provided by her daughters,        Past Medical History:    Past Medical History:   Diagnosis Date    ADHD (attention deficit hyperactivity disorder)     Arthritis     Back pain     Depression     Migraine        Past Surgical History:    Past Surgical History:   Procedure Laterality Date    KNEE ARTHROSCOPY  10-5-10    PARTIAL HYSTERECTOMY (CERVIX NOT REMOVED)      TUBAL LIGATION  2004       Current Medications:    No outpatient medications have been marked as taking for the 9/12/22 encounter [facility] Encounter).        Allergies:  Ultram [tramadol hcl], Robaxin [methocarbamol], and Penicillins    Immunizations :   Immunization History   Administered Date(s) Administered    Tdap (Boostrix, Adacel) 02/11/2015 Social History:    Social History     Tobacco Use    Smoking status: Every Day     Packs/day: 0.50     Years: 2.00     Pack years: 1.00     Types: Cigarettes    Smokeless tobacco: Never   Substance Use Topics    Alcohol use: Yes     Comment: occas    Drug use: No     Social History     Tobacco Use   Smoking Status Every Day    Packs/day: 0.50    Years: 2.00    Pack years: 1.00    Types: Cigarettes   Smokeless Tobacco Never      Family History   Problem Relation Age of Onset    Breast Cancer Maternal Grandmother 45    Arthritis Other     Asthma Other     Cancer Other     Diabetes Other     Seizures Other     Stroke Other           REVIEW OF SYSTEMS:      Constitutional:   fevers,++  chills,++  night sweats  Eyes:  negative for blurred vision, eye discharge, visual disturbance   HEENT:  negative for hearing loss, ear drainage,nasal congestion  Respiratory:  r cough+ , shortness of breath+  or hemoptysis   Cardiovascular:  negative for chest pain, palpitations, syncope  Gastrointestinal:  negative for nausea, vomiting, diarrhea, constipation, abdominal pain  Genitourinary:  negative for frequency, dysuria, urinary incontinence, hematuria  Hematologic/Lymphatic:  negative for easy bruising, bleeding and lymphadenopathy  Allergic/Immunologic:  negative for recurrent infections, angioedema, anaphylaxis   Endocrine:  negative for weight changes, polyuria, polydipsia and polyphagia  Musculoskeletal:  negative for joint  pain, swelling, decreased range of motion  Integumentary: No rashes, skin lesions  Neurological:  negative for headaches, slurred speech, unilateral weakness  Psychiatric: negative for hallucinations,confusion,agitation.      PHYSICAL EXAM:      Vitals:  t MAX  103.8   /68   Pulse 100   Temp 99.1 °F (37.3 °C) (Oral)   Resp 30   Ht 5' 8\" (1.727 m)   Wt 142 lb 13.7 oz (64.8 kg)   SpO2 100%   BMI 21.72 kg/m²     General Appearance: alert,in some acute distress, ++ pallor, no icterus   Skin: warm and dry, no rash or erythema  Head: normocephalic and atraumatic  Eyes: pupils equal, round, and reactive to light, conjunctivae normal  ENT: tympanic membrane, external ear and ear canal normal bilaterally, nose without deformity, nasal mucosa and turbinates normal without polyps  Neck: supple and non-tender without mass, no thyromegaly  no cervical lymphadenopathy  Pulmonary/Chest: Bi basal crepts+ - no wheezes, rales or rhonchi, normal air movement, no respiratory distress  Cardiovascular: normal rate, regular rhythm, normal S1 and S2, esm+ murmurs, rubs, clicks, or gallops, no carotid bruits  Abdomen: soft, non-tender, non-distended, normal bowel sounds, no masses or organomegaly  Extremities: no cyanosis, clubbing or edema  Musculoskeletal: normal range of motion, no joint swelling, deformity or tenderness  Integumentary: No rashes, no abnormal skin lesions, no petechiae  Neurologic: reflexes normal and symmetric, no cranial nerve deficit  Psych:  Orientation, sensorium, mood normal   Lines: IV  Needle tracks++    DATA:    CBC:   Lab Results   Component Value Date    WBC 15.5 (H) 09/13/2022    HGB 9.4 (L) 09/13/2022    HCT 28.2 (L) 09/13/2022    MCV 81.1 09/13/2022     (L) 09/13/2022     RENAL:   Lab Results   Component Value Date    CREATININE 1.1 09/13/2022    BUN 47 (H) 09/13/2022     09/13/2022    K 3.7 09/13/2022     09/13/2022    CO2 24 09/13/2022     SED RATE:   Lab Results   Component Value Date/Time    SEDRATE 51 07/23/2019 09:59 PM     CK: No results found for: CKTOTAL  CRP: No results found for: CRP  Hepatic Function Panel:   Lab Results   Component Value Date/Time    ALKPHOS 265 09/12/2022 03:44 PM    ALT 17 09/12/2022 03:44 PM    AST 42 09/12/2022 03:44 PM    PROT 7.0 09/12/2022 03:44 PM    PROT 6.5 07/28/2012 11:20 PM    BILITOT 1.3 09/12/2022 03:44 PM    LABALBU 2.0 09/12/2022 03:44 PM     UA:  Lab Results   Component Value Date/Time    COLORU Yellow 09/12/2022 04:16 PM CLARITYU CLOUDY 09/12/2022 04:16 PM    GLUCOSEU Negative 09/12/2022 04:16 PM    GLUCOSEU NEGATIVE 07/31/2010 02:59 PM    BILIRUBINUR Negative 09/12/2022 04:16 PM    BILIRUBINUR NEGATIVE 07/31/2010 02:59 PM    KETUA Negative 09/12/2022 04:16 PM    SPECGRAV 1.018 09/12/2022 04:16 PM    BLOODU LARGE 09/12/2022 04:16 PM    PHUR 5.5 09/12/2022 04:16 PM    PROTEINU 100 09/12/2022 04:16 PM    UROBILINOGEN 1.0 09/12/2022 04:16 PM    NITRU Negative 09/12/2022 04:16 PM    LEUKOCYTESUR MODERATE 09/12/2022 04:16 PM    LABMICR YES 09/12/2022 04:16 PM    URINETYPE NotGiven 09/12/2022 04:16 PM      Urine Microscopic:   Lab Results   Component Value Date/Time    BACTERIA None Seen 09/12/2022 04:16 PM    COMU see below 09/07/2022 01:55 AM    HYALCAST 3 09/12/2022 04:16 PM    WBCUA 24 09/12/2022 04:16 PM    RBCUA 105 09/12/2022 04:16 PM    EPIU 17 09/12/2022 04:16 PM     Urine Reflex to Culture:   Lab Results   Component Value Date/Time    URRFLXCULT Yes 09/12/2022 04:16 PM     Creat  1.5     Procal  6.60   WBC  15.5       MICRO: cultures reviewed and updated by me     Result Notes  Component 9/7/22 0201    Organism Klebsiella pneumoniae Abnormal     Urine Culture, Routine >100,000 CFU/ml    Resulting Agency [address] [facility]        Susceptibility    Klebsiella pneumoniae (1)    Antibiotic Interpretation Microscan  Method Status    ampicillin Resistant   BACTERIAL SUSCEPTIBILITY PANEL BY YA     ampicillin-sulbactam Sensitive 4 mcg/mL BACTERIAL SUSCEPTIBILITY PANEL BY YA     ceFAZolin Sensitive <=4 mcg/mL BACTERIAL SUSCEPTIBILITY PANEL BY YA      NOTE: Cefazolin should only be used for uncomplicated UTI         for E.coli or Klebsiella pneumoniae.         cefepime Sensitive <=0.12 mcg/mL BACTERIAL SUSCEPTIBILITY PANEL BY YA     cefTRIAXone Sensitive <=0.25 mcg/mL BACTERIAL SUSCEPTIBILITY PANEL BY YA     ciprofloxacin Sensitive <=0.25 mcg/mL BACTERIAL SUSCEPTIBILITY PANEL BY YA     ertapenem Sensitive <=0.12 mcg/mL BACTERIAL SUSCEPTIBILITY PANEL BY YA     gentamicin Sensitive <=1 mcg/mL BACTERIAL SUSCEPTIBILITY PANEL BY YA     levofloxacin Sensitive <=0.12 mcg/mL BACTERIAL SUSCEPTIBILITY PANEL BY YA     nitrofurantoin Sensitive <=16 mcg/mL BACTERIAL SUSCEPTIBILITY PANEL BY YA     piperacillin-tazobactam Sensitive <=4 mcg/mL BACTERIAL SUSCEPTIBILITY PANEL BY YA     trimethoprim-sulfamethoxazole Sensitive <=20 mcg/mL BACTERIAL SUSCEPTIBILITY PANEL BY YA        Narrative  Performed by: [facility]  ORDER#: [id number]                          ORDERED BY: Darryl Fowler   SOURCE: Urine Clean Catch                  COLLECTED:  09/07/22 02:01   ANTIBIOTICS AT [facility].:                      RECEIVED :  09/07/22 08:18      Specimen Collected: 09/07/22 02:01 EDT Last Resulted: 09/09/22                   Culture, Blood 2 [[id number]] (Abnormal) Collected: 09/12/22 2012   Order Status: Completed Specimen: Blood Updated: 09/13/22 1057    Culture, Blood 2 -- Abnormal     Gram stain Aerobic bottle:   Gram positive cocci in clusters   resembling Staphylococcus   Information to follow    Abnormal    Narrative:     ORDER#: [id number]                          ORDERED BY: Jenni Loera   SOURCE: Blood                              COLLECTED:  09/12/22 20:12   ANTIBIOTICS AT [facility]A.:                      RECEIVED :  09/12/22 20:21   CALL  [facility] tel. [phone],   Previous panic on this admission - call not needed per SOP, 09/13/2022 10:56,   by Shanti Patton   If child <=2 yrs old please draw pediatric bottle. ~Blood Culture #2   Strep Pneumoniae Antigen [[id number]] Collected: 09/12/22 1900   Order Status: Completed Specimen: Urine, clean catch Updated: 09/13/22 1054    STREP PNEUMONIAE ANTIGEN, URINE --    Presumptive Negative   Presumptive negative suggests no current or recent   pneumococcal infection.  Infection due to Strep pneumoniae   cannot be ruled out since the antigen present in the sample   may be below the detection limit of the test. Normal Range:Presumptive Negative    Narrative:     ORDER#: [id number]                          ORDERED BY: Dennise De Santiago   SOURCE: Urine Clean Catch                  COLLECTED:  09/12/22 19:00   ANTIBIOTICS AT [facility]A.:                      RECEIVED :  09/13/22 07:04   Legionella antigen, urine [[id number]] Collected: 09/12/22 1900   Order Status: Completed Specimen: Urine, clean catch Updated: 09/13/22 1047    L. pneumophila Serogp 1 Ur Ag --    Presumptive Negative   No Legionella pneumophila serogroup 1 antigens detected. A negative result does not exclude infection with   Legionella pneumophila serogroup 1 nor does it rule out   other microbial-caused respiratory infections or   disease caused by other serogroups of   Legionella pneumophila.    Normal Range: Presumptive Negative    Narrative:     ORDER#: [id number]                          ORDERED BY: KATIE FLETCHER   SOURCE: Urine Clean Catch                  COLLECTED:  09/12/22 19:00   ANTIBIOTICS AT [facility]A.:                      RECEIVED :  09/13/22 07:04   Culture, Blood, PCR ID Panel [[id number]] Collected: 09/12/22 1616   Order Status: Completed Updated: 09/13/22 0707    Report SEE IMAGE   Narrative:     Arnaldo Rust  [facility] tel. [phone],   Microbiology results called to and read back by Leona Gutiérrez,   09/13/2022 07:05, by [facility]   Blood Culture 1 [[id number]] (Abnormal) Collected: 09/12/22 1616   Order Status: Completed Specimen: Blood Updated: 09/13/22 0706    Blood Culture, Routine -- Abnormal     Gram stain Aerobic bottle:   Gram positive cocci in clusters   resembling Staphylococcus   Information to follow   and   Gram negative rods   Information to follow   Gram stain Anaerobic bottle:   Gram positive cocci in clusters   resembling Staphylococcus   Information to follow    Abnormal     Organism Staph aureus MRSA DNA Detected Abnormal     Blood Culture, Routine -- Abnormal     CONTACT PRECAUTIONS INDICATED   See additional report for complete BCID panel.   mecA/C gene and MREJ gene Detected. Abnormal     Organism Serratia marcescens DNA Detected Abnormal     Blood Culture, Routine See additional report for complete BCID panel. Narrative:     ORDER#: [id number]                          ORDERED BY: Lacho Castañeda   SOURCE: Blood                              COLLECTED:  09/12/22 16:16   ANTIBIOTICS AT [facility]A.:                      RECEIVED :  09/12/22 16:32   [facility] [phone],   Microbiology results called to and read back by [facility],   09/13/2022 07:05, by Pat Mendoza   If child <=2 yrs old please draw pediatric bottle. ~Blood Culture 1   Culture, Urine [[id number]] Collected: 09/12/22 1616   Order Status: No result Updated: 09/12/22 1935   MRSA DNA Probe, Nasal [[id number]]    Order Status: Sent Specimen: Nares    Culture, Blood 2 [[id number]] Collected: 09/12/22 0000   Order Status: Canceled Specimen: Blood    Culture, Blood 1 [[id number]] Collected: 09/12/22 0000   Order Status: Canceled Specimen: Blood        Blood Culture:   Lab Results   Component Value Date/Time    [facility]  09/12/2022 04:16 PM     Gram stain Aerobic bottle:  Gram positive cocci in clusters  resembling Staphylococcus  Information to follow  and  Gram negative rods  Information to follow  Gram stain Anaerobic bottle:  Gram positive cocci in clusters  resembling Staphylococcus  Information to follow      [facility]  09/12/2022 04:16 PM     CONTACT PRECAUTIONS INDICATED  See additional report for complete BCID panel. mecA/C gene and MREJ gene Detected.       BC See additional report for complete BCID panel. 09/12/2022 04:16 PM    Lico Nava  09/12/2022 08:12 PM     Gram stain Aerobic bottle:  Gram positive cocci in clusters  resembling Staphylococcus  Information to follow         Viral Culture:    Lab Results   Component Value Date/Time    COVID19 Not Detected 09/07/2022 12:00 AM    COVID19 Not Detected 07/20/2022 11:20 PM     Urine Culture: No results for input(s): Fabio Massey in the last 72 hours. Scheduled Meds:   cefepime  2,000 mg IntraVENous Q8H    vancomycin  1,250 mg IntraVENous Once    sodium chloride flush  10 mL IntraVENous 2 times per day    enoxaparin  40 mg SubCUTAneous Daily    vancomycin (VANCOCIN) intermittent dosing (placeholder)   Other RX Placeholder       Continuous Infusions:   sodium chloride 125 mL/hr at 09/13/22 0931    sodium chloride         PRN Meds:  calcium carbonate, morphine, promethazine, sodium chloride flush, sodium chloride, potassium chloride **OR** potassium alternative oral replacement **OR** potassium chloride, potassium chloride, magnesium sulfate, promethazine **OR** ondansetron, magnesium hydroxide, acetaminophen **OR** acetaminophen, perflutren lipid microspheres    Imaging:   CT CHEST WO CONTRAST   Final Result   Diffuse in numeral bilateral pulmonary nodules many of which are cavitary   becoming coalescent at the right lung apex however diffusely throughout the   bilateral lungs consistent of septic emboli with small to moderate right   pleural effusion      Partially visualized portions of the upper abdomen reveal hepatosplenomegaly         XR CHEST PORTABLE   Final Result   Multifocal patchy airspace disease and cavitary nodules. The appearance is   suggestive of septic emboli in this clinical setting. Follow-up recommended to assure resolution. All pertinent images and reports for the current Hospitalization were reviewed by me.     IMPRESSION:    Patient Active Problem List   Diagnosis    Tear of medial cartilage or meniscus of knee, current    Plica syndrome    Boxer's metacarpal fracture, neck, closed    Chondromalacia of patella    Degeneration of lumbar or lumbosacral intervertebral disc    Varicose veins of lower extremity    Intractable nausea and vomiting     Sepsis  Fevers  WBC elevation  IVDA  Septic embolism   Cavitary PNA  Endocarditis   Suspect TV involvement  MRSA bacteremia  Serratia Bacteremia  LFT elevation CT chest is abnormal     She remains very ill from ongoing sepsis high-grade bacteremia from MRSA and Serratia. WBC count is elevated high fever from ongoing bloodstream infection. Given IV drug abuse suspect endocarditis transthoracic echocardiogram requested is pending      Labs, Microbiology, Radiology and pertinent results from current hospitalization and care every where were reviewed by me as a part of the consultation. PLAN :  Cont IV cefepime x 2 gm q 8 hrs  Cont IV Vancomycin x 750 mg q 12 HRS  Add IV Linezolid  Repeat Blood cx  TTE pending   Will need IV abx  Watch for complications  HIV and Hepatitis screen     Discussed with patient/Family and Nursing   Risk of Complications/Morbidity: High      Illness(es)/ Infection present that pose threat to bodily function. There is potential for severe exacerbation of infection/side effects of treatment. Therapy requires intensive monitoring for antimicrobial agent toxicity. Thanks for allowing me to participate in your patient's care please call me with any questions or concerns.     Dr. Kari Ortiz MD  [facility]  Phone: [phone]   Fax : [phone]

## 2022-09-13 NOTE — PROGRESS NOTES
Patient c/o generalized chronic pain in back and bilateral feet/legs-tylenol non effective-prn morphine administered with positive effects Mingo Hebert RN

## 2022-09-13 NOTE — CONSULTS
Clinical Pharmacy Note  Vancomycin Consult    Selena Banuelos is a 40 y.o. female ordered Vancomycin for sepsis, R/O endocarditits; consult received from Dr. Odalys Potter to manage therapy. Also receiving cefepime. Allergies:  Ultram [tramadol hcl], Robaxin [methocarbamol], and Penicillins     Temp max:  Temp (24hrs), Av.9 °F (38.3 °C), Min:97.6 °F (36.4 °C), Max:103.8 °F (39.9 °C)      Recent Labs     22  1544   WBC 14.4*       Recent Labs     22  1544   BUN 73*   CREATININE 1.5*       No intake or output data in the 24 hours ending 22    Culture Results:  pending    Ht Readings from Last 1 Encounters:   22 5' 8\" (1.727 m)        Wt Readings from Last 1 Encounters:   22 140 lb 10.5 oz (63.8 kg)         Estimated Creatinine Clearance: 50 mL/min (A) (based on SCr of 1.5 mg/dL (H)). Assessment/Plan:  Vancomycin 1500 mg IV loading dose then 1250mg every 24 hours ordered. Regimen projects a trough level of 15-20 mg/L. Level ordered for 0600 9/15/22. Thank you for the consult.

## 2022-09-13 NOTE — PROGRESS NOTES
Physical Therapy  Facility/Department: [facility] 4Z PROGRESSIVE CARE  Physical Therapy Initial Assessment    Name: Tim De Leon  : 1980  MRN: [id number]  Date of Service: 2022    Discharge Recommendations:  Patient would benefit from continued therapy after discharge (3-5x/wk)   PT Equipment Recommendations  Other: defer to next level of care    Tim De Leon scored a 8/24 on the AM-PAC short mobility form. Current research shows that an AM-PAC score of 17 or less is typically not associated with a discharge to the patient's home setting. Based on the patient's AM-PAC score and their current functional mobility deficits, it is recommended that the patient have 3-5 sessions per week of Physical Therapy at d/c to increase the patient's independence. Please see assessment section for further patient specific details. If patient discharges prior to next session this note will serve as a discharge summary. Please see below for the latest assessment towards goals. Patient Diagnosis(es): The primary encounter diagnosis was Septicemia ([facility] [address] A diagnosis of IVDU (intravenous drug user) was also pertinent to this visit. Past Medical History:  has a past medical history of ADHD (attention deficit hyperactivity disorder), Arthritis, Back pain, Depression, and Migraine. Past Surgical History:  has a past surgical history that includes Partial hysterectomy; Knee arthroscopy (10-5-10); and Tubal ligation (). Assessment   Body Structures, Functions, Activity Limitations Requiring Skilled Therapeutic Intervention: Decreased functional mobility   Assessment: Pt is a 45-year-old female with past medical history of drug abuse, ADHD, who presents to the hospital due to feeling fatigue, pain in her legs as well as back, she has a history of IV drug abuse, per patient she has been sober for now, patient family is also on the bedside who also contributed to the patient history.  According to the patient's sister patient has been feeling sick for past few days, not feeling well. Patient was noticed to have fevers as well as chills. Pt at baseline is Ind with mobility tasks; she currently is very weak and needing max A of 2 for bed mob and not able to attempt transfers; pt is a high fall risk and not safe to return home at discharge; recommend continued therapy 3-5x/wk to address deficits to allow pt to regain her Ind  Therapy Prognosis: Fair;Guarded  Decision Making: High Complexity  Clinical Presentation: evolving  Barriers to Learning: pain  Requires PT Follow-Up: Yes  Activity Tolerance  Activity Tolerance: Patient tolerated evaluation without incident;Patient limited by pain; Patient limited by endurance     Plan   Plan  Plan: 3-5 times per week  Current Treatment Recommendations: Functional mobility training  Safety Devices  Type of Devices: Call light within reach, Nurse notified, Gait belt, Left in bed, Bed alarm in place     Restrictions  Restrictions/Precautions  Restrictions/Precautions: Fall Risk     Subjective   General  Chart Reviewed: Yes  Patient assessed for rehabilitation services?: Yes  Additional Pertinent Hx: per Dr Esdras Guerrero note: \"40year-old female with past medical history of drug abuse, ADHD, who presents to the hospital due to feeling fatigue, pain in her legs as well as back, she has a history of IV drug abuse, per patient she has been sober for now, patient family is also on the bedside who also contributed to the patient history. According to the patient's sister patient has been feeling sick for past few days, not feeling well. Patient was noticed to have fevers as well as chills. \"  Response To Previous Treatment: Not applicable  Family / Caregiver Present: Yes  Referring Practitioner: Dr Judith Draper  Referral Date : 09/12/22  Follows Commands: Impaired  Subjective  Subjective: pt lethargic during session; c/o pain in her back and R hip (fell 3 weeks ago per pt repor)         Social/Functional History  Social/Functional History  Lives With: Family (mom and dad)  Type of Home: House  Home Layout: Two level, Laundry in basement (one story and a basement)  Home Access: Stairs to enter with rails  Entrance Stairs - Number of Steps: 10-12 with rail  Entrance Stairs - Rails: Both  Bathroom Shower/Tub: Tub/Shower unit  Bathroom Toilet: Standard  Bathroom Equipment: Shower chair  Home Equipment:  (no DME)  Has the patient had two or more falls in the past year or any fall with injury in the past year?: Yes (fall about a week ago)  ADL Assistance: Independent  Homemaking Assistance: Independent  Ambulation Assistance: Independent (no AD)  Transfer Assistance: Independent  Active : No  Occupation: Unemployed  Vision/Hearing  Vision  Vision: Within Functional Limits  Hearing  Hearing: Within functional limits    Cognition         Objective   Heart Rate: 100  Heart Rate Source: Monitor  BP: 124/68  BP Location: Right Arm  BP Method: Automatic  Patient Position: Semi fowlers  MAP (Calculated): 86.67  Resp: 30  SpO2: 100 %  O2 Device: Nasal cannula              AROM RLE (degrees)  RLE General AROM: AAROM WFL but limited due to pain  AROM LLE (degrees)  LLE AROM : WFL  Strength RLE  Comment: limited due to pain; approx 2 to 2+/5 grossly throughout  Strength LLE  Comment: approx 2 to 2+/5 grossly throughout           Bed mobility  Supine to Sit: Maximum assistance;2 Person assistance  Sit to Supine: Maximum assistance;2 Person assistance  Scootin Person assistance;Maximal assistance  Bed Mobility Comments: Pt tolerated sitting EOB ~ 5-6 min with min A d/t pain  Transfers  Sit to Stand: Unable to assess  Stand to sit: Unable to assess        Balance  Comments: CGA/min A for sitting EOB; too weak to attempt standing           OutComes Score                                                  AM-PAC Score  AM-PAC Inpatient Mobility Raw Score : 8 (22 1519)  AM-PAC Inpatient T-Scale Score : 28.52 (22 1519)  Mobility Inpatient CMS 0-100% Score: 86.62 (09/13/22 1519)  Mobility Inpatient CMS G-Code Modifier : CM (09/13/22 1519)          Tinneti Score       Goals  Short Term Goals  Time Frame for Short term goals: by discharge  Short term goal 1: bed mob mod A  Short term goal 2: transfers mod A  Short term goal 3: progress to gait when able  Patient Goals   Patient goals : pt did not state a goal       Education  Patient Education  Education Given To: Patient  Education Provided: Role of Therapy  Education Method: Verbal  Education Outcome: Verbalized understanding;Continued education needed      Therapy Time   Individual Concurrent Group Co-treatment   Time In 1435         Time Out 1520         Minutes 45                 CHLOÉ ALMAZAN PT   Electronically signed by CHLOÉ ALMAZAN PT on 9/13/2022 at 3:27 PM

## 2022-09-13 NOTE — CARE COORDINATION
INITIAL CASE MANAGEMENT ASSESSMENT    Met with patient to assess possible discharge needs. Explained Case Management role/services. 09/13/22 2079   Service Assessment   Patient Orientation Alert and Oriented;Person;Place;Situation;Self   Cognition Alert   History Provided By Patient; Child/Family   Primary Caregiver Self   Accompanied By/Relationship daughter   Support Systems Family Members   Patient's Healthcare Decision Maker is: Legal Next of Irina Pickard   PCP Verified by CM No  (no pcp at this time, list given)   Prior Functional Level Independent in ADLs/IADLs   Current Functional Level Independent in ADLs/IADLs   Can patient return to prior living arrangement Yes   Ability to make needs known: Good   Family able to assist with home care needs: Yes   Would you like for me to discuss the discharge plan with any other family members/significant others, and if so, who? No   Social/Functional History   Lives With Family  (grandfather)   Type of [address] One level   [facility] to enter with rails   Entrance Stairs - Number of Steps 10-12   Entrance Stairs - Rails Both   Bathroom Shower/Tub Tub/Shower unit   Bathroom Toilet Standard   ADL Assistance Independent   Homemaking Assistance Independent   Ambulation Assistance Independent   Transfer Assistance Independent   Active  No   Discharge Planning   Type of [place] Other (Comment)  (grandfather)   Current Services Prior To Admission None   Potential Assistance Needed N/A   DME Ordered? No   Potential Assistance Purchasing Medications No   Type of Home Care Services None   Patient expects to be discharged to: [address] Discharge   Transition of Care Consult (CM Consult) Other   Services At/After Discharge None   Confirm Follow Up Transport Family     Mary [phone]    PT/OT RECS: Eval pending at this time.     PLAN/COMMENTS: Patient plans to return home with family. Family can transport. Denied any needs at discharge. ID consult pending for potential need for antibiotics at discharge. Provided contact information for patient or family to call with any questions. Will follow and assist as needed.     #[phone]  Electronically signed by Delilah Mejia RN on 9/13/2022 at 1:33 PM

## 2022-09-13 NOTE — PROGRESS NOTES
Hospitalist Progress Note      PCP: No primary care provider on file. Chief Complaint. 45-year-old female with past medical history of drug abuse, ADHD, who presents to the hospital due to feeling fatigue, pain in her legs as well as back, she has a history of IV drug abuse, per patient she has been sober for now, patient family is also on the bedside who also contributed to the patient history. According to the patient's sister patient has been feeling sick for past few days, not feeling well. Patient was noticed to have fevers as well as chills. Date of Admission: 9/12/2022    Subjective:   denies chest pain, nausea, vomiting, shortness of breath, fever or chills. mention feels overall better    Medications:  Reviewed    Infusion Medications    sodium chloride 125 mL/hr at 09/13/22 0931    sodium chloride       Scheduled Medications    cefepime  2,000 mg IntraVENous Q8H    sodium chloride flush  10 mL IntraVENous 2 times per day    enoxaparin  40 mg SubCUTAneous Daily    vancomycin (VANCOCIN) intermittent dosing (placeholder)   Other RX Placeholder     PRN Meds: calcium carbonate, morphine, promethazine, sodium chloride flush, sodium chloride, potassium chloride **OR** potassium alternative oral replacement **OR** potassium chloride, potassium chloride, magnesium sulfate, promethazine **OR** ondansetron, magnesium hydroxide, acetaminophen **OR** acetaminophen, perflutren lipid microspheres      Intake/Output Summary (Last 24 hours) at 9/13/2022 1912  Last data filed at 9/13/2022 0533  Gross per 24 hour   Intake 1014 ml   Output 300 ml   Net 714 ml       Physical Exam Performed:    /64   Pulse 98   Temp 98.7 °F (37.1 °C)   Resp (!) 39   Ht 5' 8\" (1.727 m)   Wt 142 lb 13.7 oz (64.8 kg)   SpO2 99%   BMI 21.72 kg/m²     General appearance: NAD   HEENT:  Conjunctivae/corneas clear. Neck: Supple, with full range of motion. Respiratory:  Normal respiratory effort.  Clear to auscultation, bilaterally without Rales/Wheezes/Rhonchi. Cardiovascular: Regular rate and rhythm with normal S1/S2 without murmurs or rubs  Abdomen: Soft, non-tender, non-distended, normal bowel sounds. Musculoskeletal: No cyanosis or edema bilaterally  Neurologic:  without any focal sensory/motor deficits. grossly non-focal.  Psychiatric: Alert and oriented, Normal mood  Peripheral Pulses: +2 palpable, equal bilaterally     Labs:   Recent Labs     09/12/22  1544 09/13/22  0604   WBC 14.4* 15.5*   HGB 12.1 9.4*   HCT 35.9* 28.2*   * 103*     Recent Labs     09/12/22  1544 09/13/22  0604   * 140   K 4.9 3.7   CL 95* 103   CO2 28 24   BUN 73* 47*   CREATININE 1.5* 1.1   CALCIUM 9.4 7.7*     Recent Labs     09/12/22  1544   AST 42*   ALT 17   BILITOT 1.3*   ALKPHOS 265*     Recent Labs     09/12/22  1544   INR 1.20*     Recent Labs     09/12/22  1544   TROPONINI <0.01       Urinalysis:      Lab Results   Component Value Date/Time    NITRU Negative 09/12/2022 04:16 PM    WBCUA 24 09/12/2022 04:16 PM    BACTERIA None Seen 09/12/2022 04:16 PM    RBCUA 105 09/12/2022 04:16 PM    BLOODU LARGE 09/12/2022 04:16 PM    SPECGRAV 1.018 09/12/2022 04:16 PM    GLUCOSEU Negative 09/12/2022 04:16 PM    GLUCOSEU NEGATIVE 07/31/2010 02:59 PM       Radiology:  CT CHEST WO CONTRAST   Final Result   Diffuse in numeral bilateral pulmonary nodules many of which are cavitary   becoming coalescent at the right lung apex however diffusely throughout the   bilateral lungs consistent of septic emboli with small to moderate right   pleural effusion      Partially visualized portions of the upper abdomen reveal hepatosplenomegaly         XR CHEST PORTABLE   Final Result   Multifocal patchy airspace disease and cavitary nodules. The appearance is   suggestive of septic emboli in this clinical setting. Follow-up recommended to assure resolution.                Assessment/Plan:    Active Hospital Problems    Diagnosis     Intractable nausea and vomiting [R11.2]      Priority: Medium       71-year-old female with past medical history of drug abuse, ADHD, who presents to the hospital due to feeling fatigue, pain in her legs as well as back, she has a history of IV drug abuse, per patient she has been sober for now, patient family is also on the bedside who also contributed to the patient history. According to the patient's sister patient has been feeling sick for past few days, not feeling well. Patient was noticed to have fevers as well as chills. Assessment  Sepsis present on admission, unclear focal source at this time  Pulmonary septic emboli, cavitary nodules  Endocarditis  History of IV drug abuse  ADHD     Plan  Start IV antibiotics with vancomycin, cefepime  Check blood cultures  Check procalcitonin  Consult infectious disease  Suspect endocarditis, echocardiogram - vegetation on TV   Resume home medications  DVT prophylaxis-Lovenox  Pain control  DVT Prophylaxis:  Diet: ADULT DIET;  Regular  Code Status: Full Code    PT/OT Eval Status: ordered    Dispo/Plan of care - Echo positive for vagetation on TV, ID consulted    Denise Weinberg MD

## 2022-09-14 ENCOUNTER — APPOINTMENT (OUTPATIENT)
Dept: CT IMAGING | Age: 42
DRG: 720 | End: 2022-09-14
Payer: MEDICAID

## 2022-09-14 LAB
ANION GAP SERPL CALCULATED.3IONS-SCNC: 9 MMOL/L (ref 3–16)
BASOPHILS ABSOLUTE: 0 K/UL (ref 0–0.2)
BASOPHILS RELATIVE PERCENT: 0.1 %
BUN BLDV-MCNC: 36 MG/DL (ref 7–20)
CALCIUM SERPL-MCNC: 7.4 MG/DL (ref 8.3–10.6)
CHLORIDE BLD-SCNC: 105 MMOL/L (ref 99–110)
CO2: 24 MMOL/L (ref 21–32)
CREAT SERPL-MCNC: 1.1 MG/DL (ref 0.6–1.1)
EOSINOPHILS ABSOLUTE: 0.1 K/UL (ref 0–0.6)
EOSINOPHILS RELATIVE PERCENT: 0.4 %
GFR AFRICAN AMERICAN: >60
GFR NON-AFRICAN AMERICAN: 55
GLUCOSE BLD-MCNC: 108 MG/DL (ref 70–99)
GLUCOSE BLD-MCNC: 92 MG/DL (ref 70–99)
HAV IGM SER IA-ACNC: ABNORMAL
HCT VFR BLD CALC: 23.6 % (ref 36–48)
HEMOGLOBIN: 8.1 G/DL (ref 12–16)
HEPATITIS B CORE IGM ANTIBODY: ABNORMAL
HEPATITIS B SURFACE ANTIGEN INTERPRETATION: ABNORMAL
HEPATITIS C ANTIBODY INTERPRETATION: REACTIVE
HIV AG/AB: NORMAL
HIV ANTIGEN: NORMAL
HIV-1 ANTIBODY: NORMAL
HIV-2 AB: NORMAL
LYMPHOCYTES ABSOLUTE: 1 K/UL (ref 1–5.1)
LYMPHOCYTES RELATIVE PERCENT: 6.8 %
MCH RBC QN AUTO: 27.5 PG (ref 26–34)
MCHC RBC AUTO-ENTMCNC: 34.4 G/DL (ref 31–36)
MCV RBC AUTO: 80 FL (ref 80–100)
MONOCYTES ABSOLUTE: 0.6 K/UL (ref 0–1.3)
MONOCYTES RELATIVE PERCENT: 3.9 %
MRSA SCREEN RT-PCR: NORMAL
NEUTROPHILS ABSOLUTE: 12.8 K/UL (ref 1.7–7.7)
NEUTROPHILS RELATIVE PERCENT: 88.8 %
PDW BLD-RTO: 16.1 % (ref 12.4–15.4)
PERFORMED ON: ABNORMAL
PLATELET # BLD: 163 K/UL (ref 135–450)
PMV BLD AUTO: 8.5 FL (ref 5–10.5)
POTASSIUM REFLEX MAGNESIUM: 3.7 MMOL/L (ref 3.5–5.1)
RBC # BLD: 2.95 M/UL (ref 4–5.2)
SODIUM BLD-SCNC: 138 MMOL/L (ref 136–145)
VANCOMYCIN RANDOM: 14.1 UG/ML
WBC # BLD: 14.4 K/UL (ref 4–11)

## 2022-09-14 PROCEDURE — 85025 COMPLETE CBC W/AUTO DIFF WBC: CPT

## 2022-09-14 PROCEDURE — 36415 COLL VENOUS BLD VENIPUNCTURE: CPT

## 2022-09-14 PROCEDURE — 99233 SBSQ HOSP IP/OBS HIGH 50: CPT | Performed by: INTERNAL MEDICINE

## 2022-09-14 PROCEDURE — 86702 HIV-2 ANTIBODY: CPT

## 2022-09-14 PROCEDURE — 86701 HIV-1ANTIBODY: CPT

## 2022-09-14 PROCEDURE — 2580000003 HC RX 258: Performed by: INTERNAL MEDICINE

## 2022-09-14 PROCEDURE — 80048 BASIC METABOLIC PNL TOTAL CA: CPT

## 2022-09-14 PROCEDURE — 6360000002 HC RX W HCPCS: Performed by: INTERNAL MEDICINE

## 2022-09-14 PROCEDURE — 6370000000 HC RX 637 (ALT 250 FOR IP): Performed by: INTERNAL MEDICINE

## 2022-09-14 PROCEDURE — 80074 ACUTE HEPATITIS PANEL: CPT

## 2022-09-14 PROCEDURE — 97530 THERAPEUTIC ACTIVITIES: CPT

## 2022-09-14 PROCEDURE — 97535 SELF CARE MNGMENT TRAINING: CPT

## 2022-09-14 PROCEDURE — 94760 N-INVAS EAR/PLS OXIMETRY 1: CPT

## 2022-09-14 PROCEDURE — 87040 BLOOD CULTURE FOR BACTERIA: CPT

## 2022-09-14 PROCEDURE — 80202 ASSAY OF VANCOMYCIN: CPT

## 2022-09-14 PROCEDURE — 70450 CT HEAD/BRAIN W/O DYE: CPT

## 2022-09-14 PROCEDURE — 97530 THERAPEUTIC ACTIVITIES: CPT | Performed by: PHYSICAL THERAPIST

## 2022-09-14 PROCEDURE — 2060000000 HC ICU INTERMEDIATE R&B

## 2022-09-14 PROCEDURE — 87390 HIV-1 AG IA: CPT

## 2022-09-14 RX ORDER — LINEZOLID 2 MG/ML
600 INJECTION, SOLUTION INTRAVENOUS EVERY 12 HOURS
Status: DISCONTINUED | OUTPATIENT
Start: 2022-09-14 | End: 2022-09-24

## 2022-09-14 RX ORDER — NALOXONE HYDROCHLORIDE 0.4 MG/ML
0.4 INJECTION, SOLUTION INTRAMUSCULAR; INTRAVENOUS; SUBCUTANEOUS PRN
Status: DISCONTINUED | OUTPATIENT
Start: 2022-09-14 | End: 2022-10-01 | Stop reason: HOSPADM

## 2022-09-14 RX ORDER — NAPROXEN 250 MG/1
250 TABLET ORAL ONCE
Status: COMPLETED | OUTPATIENT
Start: 2022-09-14 | End: 2022-09-14

## 2022-09-14 RX ORDER — MORPHINE SULFATE 2 MG/ML
0.5 INJECTION, SOLUTION INTRAMUSCULAR; INTRAVENOUS EVERY 4 HOURS PRN
Status: DISCONTINUED | OUTPATIENT
Start: 2022-09-14 | End: 2022-09-21

## 2022-09-14 RX ADMIN — ENOXAPARIN SODIUM 40 MG: 100 INJECTION SUBCUTANEOUS at 09:02

## 2022-09-14 RX ADMIN — SODIUM CHLORIDE 250 ML: 9 INJECTION, SOLUTION INTRAVENOUS at 09:14

## 2022-09-14 RX ADMIN — CEFEPIME 2000 MG: 2 INJECTION, POWDER, FOR SOLUTION INTRAVENOUS at 14:14

## 2022-09-14 RX ADMIN — NALOXONE HYDROCHLORIDE 0.4 MG: 0.4 INJECTION, SOLUTION INTRAMUSCULAR; INTRAVENOUS; SUBCUTANEOUS at 11:07

## 2022-09-14 RX ADMIN — SODIUM CHLORIDE: 9 INJECTION, SOLUTION INTRAVENOUS at 22:53

## 2022-09-14 RX ADMIN — CEFEPIME 2000 MG: 2 INJECTION, POWDER, FOR SOLUTION INTRAVENOUS at 05:42

## 2022-09-14 RX ADMIN — LINEZOLID 600 MG: 600 INJECTION, SOLUTION INTRAVENOUS at 22:56

## 2022-09-14 RX ADMIN — VANCOMYCIN HYDROCHLORIDE 750 MG: 750 INJECTION, POWDER, LYOPHILIZED, FOR SOLUTION INTRAVENOUS at 20:41

## 2022-09-14 RX ADMIN — SODIUM CHLORIDE: 9 INJECTION, SOLUTION INTRAVENOUS at 23:07

## 2022-09-14 RX ADMIN — Medication 10 ML: at 09:03

## 2022-09-14 RX ADMIN — ACETAMINOPHEN 650 MG: 325 TABLET ORAL at 13:04

## 2022-09-14 RX ADMIN — ONDANSETRON 4 MG: 2 INJECTION INTRAMUSCULAR; INTRAVENOUS at 04:39

## 2022-09-14 RX ADMIN — SODIUM CHLORIDE: 9 INJECTION, SOLUTION INTRAVENOUS at 20:38

## 2022-09-14 RX ADMIN — CEFEPIME 2000 MG: 2 INJECTION, POWDER, FOR SOLUTION INTRAVENOUS at 23:09

## 2022-09-14 RX ADMIN — MORPHINE SULFATE 0.5 MG: 2 INJECTION, SOLUTION INTRAMUSCULAR; INTRAVENOUS at 18:58

## 2022-09-14 RX ADMIN — MORPHINE SULFATE 0.5 MG: 2 INJECTION, SOLUTION INTRAMUSCULAR; INTRAVENOUS at 04:39

## 2022-09-14 RX ADMIN — VANCOMYCIN HYDROCHLORIDE 750 MG: 750 INJECTION, POWDER, LYOPHILIZED, FOR SOLUTION INTRAVENOUS at 09:15

## 2022-09-14 RX ADMIN — LINEZOLID 600 MG: 600 INJECTION, SOLUTION INTRAVENOUS at 13:02

## 2022-09-14 RX ADMIN — NAPROXEN 250 MG: 250 TABLET ORAL at 15:11

## 2022-09-14 RX ADMIN — MORPHINE SULFATE 0.5 MG: 2 INJECTION, SOLUTION INTRAMUSCULAR; INTRAVENOUS at 00:44

## 2022-09-14 RX ADMIN — MORPHINE SULFATE 0.5 MG: 2 INJECTION, SOLUTION INTRAMUSCULAR; INTRAVENOUS at 09:02

## 2022-09-14 ASSESSMENT — PAIN DESCRIPTION - ORIENTATION
ORIENTATION: RIGHT;MID
ORIENTATION: RIGHT
ORIENTATION: RIGHT;LOWER
ORIENTATION: RIGHT
ORIENTATION: RIGHT;LOWER
ORIENTATION: RIGHT
ORIENTATION: RIGHT

## 2022-09-14 ASSESSMENT — PAIN DESCRIPTION - DESCRIPTORS
DESCRIPTORS: SHARP
DESCRIPTORS: DISCOMFORT
DESCRIPTORS: SHARP
DESCRIPTORS: ACHING;DISCOMFORT
DESCRIPTORS: SHARP

## 2022-09-14 ASSESSMENT — PAIN DESCRIPTION - LOCATION
LOCATION: BACK;LEG
LOCATION: BACK;LEG
LOCATION: LEG
LOCATION: BACK

## 2022-09-14 ASSESSMENT — PAIN SCALES - GENERAL
PAINLEVEL_OUTOF10: 7
PAINLEVEL_OUTOF10: 10
PAINLEVEL_OUTOF10: 7
PAINLEVEL_OUTOF10: 7
PAINLEVEL_OUTOF10: 10
PAINLEVEL_OUTOF10: 7
PAINLEVEL_OUTOF10: 3
PAINLEVEL_OUTOF10: 3

## 2022-09-14 ASSESSMENT — PAIN DESCRIPTION - ONSET: ONSET: ON-GOING

## 2022-09-14 NOTE — PROGRESS NOTES
Hospitalist Progress Note      PCP: No primary care provider on file. Chief Complaint. 45-year-old female with past medical history of drug abuse, ADHD, who presents to the hospital due to feeling fatigue, pain in her legs as well as back, she has a history of IV drug abuse, per patient she has been sober for now, patient family is also on the bedside who also contributed to the patient history. According to the patient's sister patient has been feeling sick for past few days, not feeling well. Patient was noticed to have fevers as well as chills.     Date of Admission: 9/12/2022    Subjective: she had rapid response called after she was lethargic after morphine    Medications:  Reviewed    Infusion Medications    sodium chloride 125 mL/hr at 09/13/22 0931    sodium chloride 250 mL (09/14/22 0914)     Scheduled Medications    vancomycin  750 mg IntraVENous Q12H    linezolid  600 mg IntraVENous Q12H    cefepime  2,000 mg IntraVENous Q8H    sodium chloride flush  10 mL IntraVENous 2 times per day    enoxaparin  40 mg SubCUTAneous Daily    vancomycin (VANCOCIN) intermittent dosing (placeholder)   Other RX Placeholder     PRN Meds: naloxone, morphine, calcium carbonate, promethazine, sodium chloride flush, sodium chloride, potassium chloride **OR** potassium alternative oral replacement **OR** potassium chloride, potassium chloride, magnesium sulfate, promethazine **OR** ondansetron, magnesium hydroxide, acetaminophen **OR** acetaminophen, perflutren lipid microspheres      Intake/Output Summary (Last 24 hours) at 9/14/2022 1840  Last data filed at 9/14/2022 0355  Gross per 24 hour   Intake 240 ml   Output 1200 ml   Net -960 ml         Physical Exam Performed:    /75   Pulse 87   Temp 98.2 °F (36.8 °C) (Oral)   Resp 20   Ht 5' 8\" (1.727 m)   Wt 142 lb 6.7 oz (64.6 kg)   SpO2 96%   BMI 21.65 kg/m²     Patient not in room    Labs:   Recent Labs     09/12/22  1544 09/13/22  0604 09/14/22  7507 WBC 14.4* 15.5* 14.4*   HGB 12.1 9.4* 8.1*   HCT 35.9* 28.2* 23.6*   * 103* 163       Recent Labs     09/12/22  1544 09/13/22  0604 09/14/22  0435   * 140 138   K 4.9 3.7 3.7   CL 95* 103 105   CO2 28 24 24   BUN 73* 47* 36*   CREATININE 1.5* 1.1 1.1   CALCIUM 9.4 7.7* 7.4*       Recent Labs     09/12/22  1544   AST 42*   ALT 17   BILITOT 1.3*   ALKPHOS 265*       Recent Labs     09/12/22  1544   INR 1.20*       Recent Labs     09/12/22  1544   TROPONINI <0.01         Urinalysis:      Lab Results   Component Value Date/Time    NITRU Negative 09/12/2022 04:16 PM    WBCUA 24 09/12/2022 04:16 PM    BACTERIA None Seen 09/12/2022 04:16 PM    RBCUA 105 09/12/2022 04:16 PM    BLOODU LARGE 09/12/2022 04:16 PM    SPECGRAV 1.018 09/12/2022 04:16 PM    GLUCOSEU Negative 09/12/2022 04:16 PM    GLUCOSEU NEGATIVE 07/31/2010 02:59 PM       Radiology:  CT HEAD WO CONTRAST   Final Result   No acute intracranial abnormality. CT CHEST WO CONTRAST   Final Result   Diffuse in numeral bilateral pulmonary nodules many of which are cavitary   becoming coalescent at the right lung apex however diffusely throughout the   bilateral lungs consistent of septic emboli with small to moderate right   pleural effusion      Partially visualized portions of the upper abdomen reveal hepatosplenomegaly         XR CHEST PORTABLE   Final Result   Multifocal patchy airspace disease and cavitary nodules. The appearance is   suggestive of septic emboli in this clinical setting. Follow-up recommended to assure resolution.          VL Extremity Venous Bilateral    (Results Pending)         Assessment/Plan:    Active Hospital Problems    Diagnosis     Intractable nausea and vomiting [R11.2]      Priority: Medium       76-year-old female with past medical history of drug abuse, ADHD, who presents to the hospital due to feeling fatigue, pain in her legs as well as back, she has a history of IV drug abuse, per patient she has been sober for now, patient family is also on the bedside who also contributed to the patient history. According to the patient's sister patient has been feeling sick for past few days, not feeling well. Patient was noticed to have fevers as well as chills. Assessment  Sepsis present on admission, unclear focal source at this time  Pulmonary septic emboli, cavitary nodules  Endocarditis  History of IV drug abuse  ADHD     Plan  Start IV antibiotics with vancomycin, cefepime  Check blood cultures  Check procalcitonin  Consult infectious disease  Suspect endocarditis, echocardiogram - vegetation on TV   Resume home medications  DVT prophylaxis-Lovenox  Pain control  DVT Prophylaxis:  Diet: ADULT DIET; Regular  Code Status: Full Code    PT/OT Eval Status: ordered    Dispo/Plan of care - Echo positive for vagetation on TV, ID consulted, continue IV abx, patient given narcan during rapid response.  ID following, CT head unremarkable    Phyllis Vincent MD

## 2022-09-14 NOTE — CARE COORDINATION
Therapy recommending PT/OT 3-5 sessions. Patient likely to decline services at discharge. The Plan for Transition of Care is related to the following treatment goals: strengthening    The Patient and/or patient representative family was provided with a choice of provider and agrees   with the discharge plan. undecided    Freedom of choice list was provided with basic dialogue that supports the patient's individualized plan of care/goals, treatment preferences and shares the quality data associated with the providers. [x] Yes [] No    Home Health Agency list copy given to family at bedside. Awaiting decision or choices. Rapid response called today.     Electronically signed by Demarco Mejia RN on 9/14/2022 at 2:42 PM

## 2022-09-14 NOTE — PROGRESS NOTES
Infectious Disease Follow up Notes  Admit Date: 9/12/2022  Hospital Day: 3    Antibiotics :   IV Vancomycin   IV Linezolid  IV Cefepime     CHIEF COMPLAINT:     Sepsis  MRSA bacteremia  Serratia Bacteremia  Endocarditis  IVDA  TV Vegetation     Subjective interval History :  40 y. o.woman with a history of IV drug abuse, ADHD, back pain, depression admitted to the hospital secondary to fever chills fatigue back pain. Patient family noted she was unable to get up from the bed for the past 3 days secondary to fevers and not feeling well. Admission labs indicate creatinine 1.5 procalcitonin elevated to 6.6, WBC elevated 15.5 hemoglobin 9.4 bilirubin 1.3 AST 42, blood cultures from admission positive for MRSA as well as Serratia. T-max 103.8 on admission. CT chest with diffuse innumerable bilateral pulmonary nodules consistent with cavitation and septic emboli. Given the presentation concern is for endocarditis secondary to IV drug abuse. Patient not much interactive during normalization some of the history is provided by her daughters,       Interval History : more awake asking for pain meds and on going back pain and cough + fever trend down - repeat Blood cx in process       Past Medical History:    Past Medical History:   Diagnosis Date    ADHD (attention deficit hyperactivity disorder)     Arthritis     Back pain     Depression     Migraine        Past Surgical History:    Past Surgical History:   Procedure Laterality Date    KNEE ARTHROSCOPY  10-5-10    PARTIAL HYSTERECTOMY (CERVIX NOT REMOVED)      TUBAL LIGATION  2004       Current Medications:    No outpatient medications have been marked as taking for the 9/12/22 encounter [facility] Encounter).        Allergies:  Ultram [tramadol hcl], Robaxin [methocarbamol], and Penicillins    Immunizations :   Immunization History   Administered Date(s) Administered    Tdap (Boostrix, Adacel) 02/11/2015       Social History:    Social History     Tobacco Use    Smoking status: Every Day     Packs/day: 0.50     Years: 2.00     Pack years: 1.00     Types: Cigarettes    Smokeless tobacco: Never   Substance Use Topics    Alcohol use: Yes     Comment: occas    Drug use: No     Social History     Tobacco Use   Smoking Status Every Day    Packs/day: 0.50    Years: 2.00    Pack years: 1.00    Types: Cigarettes   Smokeless Tobacco Never      Family History   Problem Relation Age of Onset    Breast Cancer Maternal Grandmother 45    Arthritis Other     Asthma Other     Cancer Other     Diabetes Other     Seizures Other     Stroke Other           REVIEW OF SYSTEMS:      Constitutional:  fevers,++  chills +=, night sweats  Eyes:  negative for blurred vision, eye discharge, visual disturbance   HEENT:  negative for hearing loss, ear drainage,nasal congestion  Respiratory:  negative for cough, shortness of breath or hemoptysis   Cardiovascular:  negative for chest pain, palpitations, syncope  Gastrointestinal:  negative for nausea, vomiting, diarrhea, constipation, abdominal pain  Genitourinary:  negative for frequency, dysuria, urinary incontinence, hematuria  Hematologic/Lymphatic:  negative for easy bruising, bleeding and lymphadenopathy  Allergic/Immunologic:  negative for recurrent infections, angioedema, anaphylaxis   Endocrine:  negative for weight changes, polyuria, polydipsia and polyphagia  Musculoskeletal:  negative for joint  pain, swelling, decreased range of motion  Integumentary: No rashes, skin lesions  Neurological:  negative for headaches, slurred speech, unilateral weakness  Psychiatric: negative for hallucinations,confusion,agitation.                 PHYSICAL EXAM:      Vitals:    /79   Pulse 89   Temp 97.4 °F (36.3 °C) (Axillary)   Resp (!) 32   Ht 5' 8\" (1.727 m)   Wt 142 lb 6.7 oz (64.6 kg)   SpO2 97%   BMI 21.65 kg/m²        General Appearance: alert,in some acute distress, ++ pallor, no icterus   Skin: warm and dry, no rash or erythema  Head: normocephalic and atraumatic  Eyes: pupils equal, round, and reactive to light, conjunctivae normal  ENT: tympanic membrane, external ear and ear canal normal bilaterally, nose without deformity, nasal mucosa and turbinates normal without polyps  Neck: supple and non-tender without mass, no thyromegaly  no cervical lymphadenopathy  Pulmonary/Chest: Bi basal crepts+ - no wheezes, rales or rhonchi, normal air movement, no respiratory distress  Cardiovascular: normal rate, regular rhythm, normal S1 and S2, esm+ murmurs, rubs, clicks, or gallops, no carotid bruits  Abdomen: soft, non-tender, non-distended, normal bowel sounds, no masses or organomegaly  Extremities: no cyanosis, clubbing or edema  Musculoskeletal: normal range of motion, no joint swelling, deformity or tenderness  Integumentary: No rashes, no abnormal skin lesions, no petechiae  Neurologic: reflexes normal and symmetric, no cranial nerve deficit  Psych:  Orientation, sensorium, mood normal            Lines: IV  Needle tracks++     Data Review:    CBC:   Lab Results   Component Value Date    WBC 14.4 (H) 09/14/2022    HGB 8.1 (L) 09/14/2022    HCT 23.6 (L) 09/14/2022    MCV 80.0 09/14/2022     09/14/2022     RENAL:   Lab Results   Component Value Date    CREATININE 1.1 09/14/2022    BUN 36 (H) 09/14/2022     09/14/2022    K 3.7 09/14/2022     09/14/2022    CO2 24 09/14/2022     SED RATE:   Lab Results   Component Value Date/Time    SEDRATE 51 07/23/2019 09:59 PM     CK: No results found for: CKTOTAL  CRP: No results found for: CRP  Hepatic Function Panel:   Lab Results   Component Value Date/Time    ALKPHOS 265 09/12/2022 03:44 PM    ALT 17 09/12/2022 03:44 PM    AST 42 09/12/2022 03:44 PM    PROT 7.0 09/12/2022 03:44 PM    PROT 6.5 07/28/2012 11:20 PM    BILITOT 1.3 09/12/2022 03:44 PM    LABALBU 2.0 09/12/2022 03:44 PM     UA:  Lab Results   Component Value Date/Time    COLORU Yellow 09/12/2022 04:16 PM    CLARITYU CLOUDY 09/12/2022 04:16 PM    GLUCOSEU Negative 09/12/2022 04:16 PM    GLUCOSEU NEGATIVE 07/31/2010 02:59 PM    BILIRUBINUR Negative 09/12/2022 04:16 PM    BILIRUBINUR NEGATIVE 07/31/2010 02:59 PM    KETUA Negative 09/12/2022 04:16 PM    SPECGRAV 1.018 09/12/2022 04:16 PM    BLOODU LARGE 09/12/2022 04:16 PM    PHUR 5.5 09/12/2022 04:16 PM    PROTEINU 100 09/12/2022 04:16 PM    UROBILINOGEN 1.0 09/12/2022 04:16 PM    NITRU Negative 09/12/2022 04:16 PM    LEUKOCYTESUR MODERATE 09/12/2022 04:16 PM    LABMICR YES 09/12/2022 04:16 PM    URINETYPE NotGiven 09/12/2022 04:16 PM      Urine Microscopic:   Lab Results   Component Value Date/Time    BACTERIA None Seen 09/12/2022 04:16 PM    COMU see below 09/07/2022 01:55 AM    HYALCAST 3 09/12/2022 04:16 PM    WBCUA 24 09/12/2022 04:16 PM    RBCUA 105 09/12/2022 04:16 PM    EPIU 17 09/12/2022 04:16 PM     Urine Reflex to Culture:   Lab Results   Component Value Date/Time    URRFLXCULT Yes 09/12/2022 04:16 PM         MICRO: cultures reviewed and updated by me   Blood Culture:          MRSA DNA Probe, Nasal [[id number]] Collected: 09/13/22 1220   Order Status: Completed Specimen: Nares Updated: 09/14/22 1131    MRSA SCREEN RT-PCR --    Negative  MRSA DNA not detected.    Normal Range: Not detected    Narrative:     ORDER#: [id number]                          ORDERED BY: Ambrosio Wayne   SOURCE: Nares                              COLLECTED:  09/13/22 12:20   ANTIBIOTICS AT [facility]LLA.:                      RECEIVED :  09/13/22 12:46   Blood Culture 1 [[id number]] (Abnormal) Collected: 09/12/22 1616   Order Status: Completed Specimen: Blood Updated: 09/14/22 0939    Blood Culture, Routine -- Abnormal     Gram stain Aerobic bottle:   Gram positive cocci in clusters   resembling Staphylococcus   Information to follow   and   Gram negative rods   Information to follow   Gram stain Anaerobic bottle:   Gram positive cocci in clusters resembling Staphylococcus   Information to follow    Abnormal     Organism Staph aureus MRSA DNA Detected Abnormal     Blood Culture, Routine -- Abnormal     CONTACT PRECAUTIONS INDICATED   See additional report for complete BCID panel. mecA/C gene and MREJ gene Detected. Abnormal     Organism Serratia marcescens DNA Detected Abnormal     Blood Culture, Routine See additional report for complete BCID panel. Organism Staph aureus MRSA Abnormal     Blood Culture, Routine -- Abnormal     POSITIVE for   Sensitivity to follow   CONTACT PRECAUTIONS INDICATED   Isolated two of two sets    Abnormal     Organism Serratia marcescens Abnormal     Blood Culture, Routine --    POSITIVE for   Sensitivity to follow   Isolated one of two sets    Narrative:     ORDER#: [id number]                          ORDERED BY: Saloni Love   SOURCE: Blood                              COLLECTED:  09/12/22 16:16   ANTIBIOTICS AT [facility].:                      RECEIVED :  09/12/22 16:32   [facility]  [phone]   Microbiology results called to and read back by Lewis Mac RN,   09/13/2022 08:24, by [facility]   Microbiology results called to and read back by Godfrey Campos,   09/13/2022 07:05, by [facility]   If child <=2 yrs old please draw pediatric bottle. ~Blood Culture 1   Culture, Blood 2 [[id number]] Collected: 09/14/22 0525   Order Status: Sent Specimen: Blood Updated: 09/14/22 0600   Culture, Blood 1 [[id number]] Collected: 09/14/22 0436   Order Status: Sent Specimen: Blood Updated: 09/14/22 0441   Culture, Urine [[id number]] Collected: 09/12/22 1616   Order Status: Completed Specimen: Urine, clean catch Updated: 09/13/22 2245    Urine Culture, Routine No growth at 18 to 36 hours   Narrative:     ORDER#: [id number]                          ORDERED BY: Saloni Love   SOURCE: Urine Clean Catch                  COLLECTED:  09/12/22 16:16   ANTIBIOTICS AT [facility].:                      RECEIVED :  09/12/22 19:35   Culture, Blood 2 [[id number]] (Abnormal) Collected: 09/12/22 2012   Order Status: Completed Specimen: Blood Updated: 09/13/22 1416    Culture, Blood 2 -- Abnormal     Gram stain Aerobic bottle:   Gram positive cocci in clusters   resembling Staphylococcus   Information to follow   Gram stain Anaerobic bottle:   Gram positive cocci in clusters   resembling Staphylococcus   Information to follow    Abnormal    Narrative:     ORDER#: [id number]                          ORDERED BY: Dae North   SOURCE: Blood                              COLLECTED:  09/12/22 20:12   ANTIBIOTICS AT [facility].:                      RECEIVED :  09/12/22 20:21   CALL  [place]  [facility] tel. [phone],   Previous panic on this admission - call not needed per SOP, 09/13/2022 10:56,   by Garcia Hurst   If child <=2 yrs old please draw pediatric bottle. ~Blood Culture #2   Strep Pneumoniae Antigen [[id number]] Collected: 09/12/22 1900   Order Status: Completed Specimen: Urine, clean catch Updated: 09/13/22 1054    STREP PNEUMONIAE ANTIGEN, URINE --    Presumptive Negative   Presumptive negative suggests no current or recent   pneumococcal infection. Infection due to Strep pneumoniae   cannot be ruled out since the antigen present in the sample   may be below the detection limit of the test.   Normal Range:Presumptive Negative    Narrative:     ORDER#: [id number]                          ORDERED BY: Kitty Rice   SOURCE: Urine Clean Catch                  COLLECTED:  09/12/22 19:00   ANTIBIOTICS AT [facility].:                      RECEIVED :  09/13/22 07:04   Legionella antigen, urine [[id number]] Collected: 09/12/22 1900   Order Status: Completed Specimen: Urine, clean catch Updated: 09/13/22 1047    L. pneumophila Serogp 1 Ur Ag --    Presumptive Negative   No Legionella pneumophila serogroup 1 antigens detected.    A negative result does not exclude infection with   Legionella pneumophila serogroup 1 nor does it rule out   other microbial-caused respiratory infections or disease caused by other serogroups of   Legionella pneumophila. Normal Range: Presumptive Negative    Narrative:     ORDER#: [id number]                          ORDERED BY: KATIE FLETCHER   SOURCE: Urine Clean Catch                  COLLECTED:  09/12/22 19:00   ANTIBIOTICS AT [facility].:                      RECEIVED :  09/13/22 07:04   Culture, Blood, PCR ID Panel [[id number]] Collected: 09/12/22 1616   Order Status: Completed Updated: 09/13/22 0707    Report SEE IMAGE   Narrative:     [facility] tel. [phone],   Microbiology results called to and read back by Maddy Campos,   09/13/2022 07:05, by Michelle Cassette   Culture, Blood 2 [[id number]] Collected: 09/12/22 0000   Order Status: Canceled Specimen: Blood    Culture, Blood 1 [[id number]] Collected: 09/12/22 0000   Order Status: Canceled Specimen: Blood      Lab Results   Component Value Date/Time    [facility]  09/12/2022 04:16 PM     Gram stain Aerobic bottle:  Gram positive cocci in clusters  resembling Staphylococcus  Information to follow  and  Gram negative rods  Information to follow  Gram stain Anaerobic bottle:  Gram positive cocci in clusters  resembling Staphylococcus  Information to follow      [facility]  09/12/2022 04:16 PM     CONTACT PRECAUTIONS INDICATED  See additional report for complete BCID panel. mecA/C gene and MREJ gene Detected.       BC See additional report for complete BCID panel. 09/12/2022 04:16 PM    [facility]  09/12/2022 04:16 PM     POSITIVE for  Sensitivity to follow  CONTACT PRECAUTIONS INDICATED  Isolated two of two sets      [facility]  09/12/2022 04:16 PM     POSITIVE for  Sensitivity to follow  Isolated one of two sets      Letta Banco  09/12/2022 08:12 PM     Gram stain Aerobic bottle:  Gram positive cocci in clusters  resembling Staphylococcus  Information to follow  Gram stain Anaerobic bottle:  Gram positive cocci in clusters  resembling Staphylococcus  Information to follow         Respiratory Culture:  No results found for: CULTRESP, LABGRAM  AFB:No results found for: AFBSMEAR  Viral Culture:  Lab Results   Component Value Date/Time    COVID19 Not Detected 09/07/2022 12:00 AM    COVID19 Not Detected 07/20/2022 11:20 PM     Urine Culture:   Recent Labs     09/12/22  1616   LABURIN No growth at 18 to 36 hours     Summary   Normal left ventricle size, wall thickness, and systolic function with an   estimated ejection fraction of 60-65%. No regional wall motion abnormalities   are seen. Normal diastolic function. Normal right ventricular size and function. Mobile vegetation on tricuspid valve suggestive of endocarditis. Moderate tricuspid regurgitation. The right atrium is mildly dilated. Signature      ------------------------------------------------------------------   Electronically signed by Sarthak Avila MD   (Interpreting physician) on 09/13/2022 at 04:15 PM   ------------------------------------------------------------------    IMAGING:    CT HEAD WO CONTRAST   Final Result   No acute intracranial abnormality. CT CHEST WO CONTRAST   Final Result   Diffuse in numeral bilateral pulmonary nodules many of which are cavitary   becoming coalescent at the right lung apex however diffusely throughout the   bilateral lungs consistent of septic emboli with small to moderate right   pleural effusion      Partially visualized portions of the upper abdomen reveal hepatosplenomegaly         XR CHEST PORTABLE   Final Result   Multifocal patchy airspace disease and cavitary nodules. The appearance is   suggestive of septic emboli in this clinical setting. Follow-up recommended to assure resolution.                All the pertinent images and reports for the current Hospitalization were reviewed by me     Scheduled Meds:   vancomycin  750 mg IntraVENous Q12H    linezolid  600 mg IntraVENous Q12H    cefepime  2,000 mg IntraVENous Q8H    sodium chloride flush  10 mL IntraVENous 2 times per day    enoxaparin  40 mg SubCUTAneous Daily    vancomycin (VANCOCIN) intermittent dosing (placeholder)   Other RX Placeholder       Continuous Infusions:   sodium chloride 125 mL/hr at 09/13/22 0931    sodium chloride 250 mL (09/14/22 0914)       PRN Meds:  naloxone, calcium carbonate, promethazine, sodium chloride flush, sodium chloride, potassium chloride **OR** potassium alternative oral replacement **OR** potassium chloride, potassium chloride, magnesium sulfate, promethazine **OR** ondansetron, magnesium hydroxide, acetaminophen **OR** acetaminophen, perflutren lipid microspheres      Assessment:     Patient Active Problem List   Diagnosis    Tear of medial cartilage or meniscus of knee, current    Plica syndrome    Boxer's metacarpal fracture, neck, closed    Chondromalacia of patella    Degeneration of lumbar or lumbosacral intervertebral disc    Varicose veins of lower extremity    Intractable nausea and vomiting     Sepsis  Fevers  WBC elevation  IVDA  Septic embolism   Cavitary PNA  Endocarditis   Suspect TV involvement  MRSA bacteremia  Serratia Bacteremia  LFT elevation   CT chest is abnormal septic emboli  TV Vegetation +      She remains very ill from ongoing sepsis high-grade bacteremia from MRSA and Serratia. WBC count is elevated high fever from ongoing bloodstream infection. Given IV drug abuse suspect endocarditis transthoracic echocardiogram with TV Vegetation    Trend WBC and repeat Blood cx in process    Will need placement to complete IV ABX    Will ask Cardiology eval for possible angiovac given the vegetation ?     Labs, Microbiology, Radiology and all the pertinent results from current hospitalization and  care every where were reviewed  by me as a part of the evaluation   Plan:   Cont IV cefepime x 2 gm q 8 hrs for Serratia bacteremia   Cont IV Vancomycin x 750 mg q 12 HRS  Cont  IV Linezolid x 600 mg q 12 HRS   Repeat Blood cx  TTE Abnormal with vegetation   Will need IV abx  Watch for complications  HIV -ve and Hepatitis screen Hep C+Ve  Cardiology consult  for possible angiovac ? Discussed with patient/Family and Nursing   Risk of Complications/Morbidity: High      Illness(es)/ Infection present that pose threat to bodily function. There is potential for severe exacerbation of infection/side effects of treatment. Therapy requires intensive monitoring for antimicrobial agent toxicity. Discussed with patient/Family and Nursing staff     Thanks for allowing me to participate in your patient's care and please call me with any questions or concerns.     Colton Juarez MD  Infectious Disease  [facility] [place] ([facility]) Physician  Phone: [phone]   Fax : [phone]

## 2022-09-14 NOTE — SIGNIFICANT EVENT
Called to rapid response. Patient has been receiving morphine and patient was noted to be minimally responsive to painful stimulus and vigorous sternal rub. Upon arrival, patient was lethargic. She was able to wake to voice, tell me her name, where she was, the year, and why she was in the hospital. Shortly thereafter, she was difficult to arouse. IV Narvan 0.4 mg was ordered and administered, and a PRN order was placed. Patient became more responsive after administration of Narcan and remains stable. Physical Exam  Constitutional:       Appearance: She is ill-appearing. HENT:      Head: Normocephalic and atraumatic. Nose: Nose normal.      Mouth/Throat:      Mouth: Mucous membranes are moist.      Pharynx: Oropharynx is clear. Eyes:      Conjunctiva/sclera: Conjunctivae normal.   Cardiovascular:      Rate and Rhythm: Normal rate and regular rhythm. Pulses: Normal pulses. Heart sounds: Normal heart sounds. Pulmonary:      Effort: Pulmonary effort is normal.      Breath sounds: Normal breath sounds. Abdominal:      General: Abdomen is flat. Bowel sounds are normal.      Palpations: Abdomen is soft. Musculoskeletal:         General: Normal range of motion. Cervical back: Normal range of motion and neck supple. Skin:     General: Skin is warm and dry. Neurological:      Mental Status: She is disoriented. Patient may remain on 5W at this time. IV Narcan PRN order remains in place. I spent 40 minutes of critical care time due to rapid response for unresponsiveness and concern for clinical deterioration.     Conner Gunter MD, MPH  Hospitalist  Pager: [phone]

## 2022-09-14 NOTE — PROGRESS NOTES
Physical Therapy  Facility/Department: [address] PROGRESSIVE CARE  Physical Therapy Treatment Note  Name: Buzz Ball  : 1980  MRN: [id number]  Date of Service: 2022    Discharge Recommendations:  Patient would benefit from continued therapy after discharge (3-5x/wk)   PT Equipment Recommendations  Other: defer to next level of care    Buzz Ball scored a 9/24 on the AM-PAC short mobility form. Current research shows that an AM-PAC score of 17 or less is typically not associated with a discharge to the patient's home setting. Based on the patient's AM-PAC score and their current functional mobility deficits, it is recommended that the patient have 3-5 sessions per week of Physical Therapy at d/c to increase the patient's independence. Please see assessment section for further patient specific details. If patient discharges prior to next session this note will serve as a discharge summary. Please see below for the latest assessment towards goals. Patient Diagnosis(es): The primary encounter diagnosis was Septicemia ([facility] [address] A diagnosis of IVDU (intravenous drug user) was also pertinent to this visit. Past Medical History:  has a past medical history of ADHD (attention deficit hyperactivity disorder), Arthritis, Back pain, Depression, and Migraine. Past Surgical History:  has a past surgical history that includes Partial hysterectomy; Knee arthroscopy (10-5-10); and Tubal ligation ().     Assessment   Body Structures, Functions, Activity Limitations Requiring Skilled Therapeutic Intervention: Decreased functional mobility   Assessment: pt making some progress in therapy however pt still very weak and has poor endurance; pt was able to complete bed mob with mod A of 2 and min/mod A of 2 to stand on roger stedy; pt is a high fall risk and not safe to return directly home at discharge; recommend continued therapy 3-5x/wk to address deficits to assist pt in regaining her max potential  Therapy Prognosis: Fair  Decision Making: High Complexity  Barriers to Learning: pain  Requires PT Follow-Up: Yes  Activity Tolerance  Activity Tolerance: Patient limited by endurance     Plan   Plan  Plan: 3-5 times per week  Current Treatment Recommendations: Functional mobility training  Safety Devices  Type of Devices: Call light within reach, Nurse notified, Gait belt, Chair alarm in place, Left in chair     Restrictions  Restrictions/Precautions  Restrictions/Precautions: Fall Risk     Subjective   General  Chart Reviewed: Yes  Additional Pertinent Hx: per Dr Juanita Hanna note: \"40year-old female with past medical history of drug abuse, ADHD, who presents to the hospital due to feeling fatigue, pain in her legs as well as back, she has a history of IV drug abuse, per patient she has been sober for now, patient family is also on the bedside who also contributed to the patient history. According to the patient's sister patient has been feeling sick for past few days, not feeling well. Patient was noticed to have fevers as well as chills. \"  Response To Previous Treatment: Patient with no complaints from previous session.   Family / Caregiver Present: Yes (daughter in room)  Referring Practitioner: Dr Azul Rodriguez  Referral Date : 09/12/22  General Comment  Comments: pt had a rapid response called earlier today due to being less responsive; narcan given with some improvement per nursing report  Subjective  Subjective: pt was intermittently lethargic but after sitting at EOB more awake and agreeable to getting up to chair         Social/Functional History  Social/Functional History  Lives With: Family (mom and dad)  Type of Home: House  Home Layout: Two level, Laundry in basement (one story and a basement)  Home Access: Stairs to enter with rails  Entrance Stairs - Number of Steps: 10-12 with rail  Entrance Stairs - Rails: Both  Bathroom Shower/Tub: Tub/Shower unit  Bathroom Toilet: Standard  Bathroom Equipment: Shower chair  Home Equipment:  (no DME)  Has the patient had two or more falls in the past year or any fall with injury in the past year?: Yes (fall about a week ago)  ADL Assistance: Independent  Homemaking Assistance: Independent  Ambulation Assistance: Independent (no AD)  Transfer Assistance: Independent  Active : No  Occupation: Unemployed  Vision/Hearing  Vision  Vision: Within Functional Limits  Hearing  Hearing: Within functional limits    Cognition         Objective   Heart Rate: 79  Heart Rate Source: Monitor  BP: 117/77  BP Location: Right upper arm  BP Method: Automatic  Patient Position: Sitting;Up in chair  MAP (Calculated): 90.33  Resp: (!) 33  SpO2: 97 %  O2 Device: None (Room air)                          Bed Mobility Training  Bed Mobility Training: Yes  Overall Level of Assistance: Moderate assistance;Assist X2  Interventions: Demonstration;Manual cues; Safety awareness training  Supine to Sit: Moderate assistance;Assist X2  Sit to Supine:  (left in recliner at end of session)  Scooting: Contact-guard assistance  Balance  Sitting: Intact  Standing: With support  Transfer Training  Transfer Training: Yes  Sit to Stand: Minimum assistance; Moderate assistance;Assist X2 (sit to  roger stedy with min mod A of 2 and cues for safety.)  Stand to Sit: Minimum assistance;Assist X2  Bed mobility  Supine to Sit: Moderate assistance;2 Person assistance  Scooting: Contact guard assistance (to scoot to EOB in sitting)  Bed Mobility Comments: pt up in chair at end of session; maxi move lift pad under her in chair  Transfers  Sit to Stand: Minimal Assistance; Moderate Assistance;2 Person Assistance (from EOB to stedy)  Stand to sit: Minimal Assistance; Moderate Assistance (from stedy to chair)  Bed to Chair: Dependent/Total (with stedy)  Comment: pt leaning over front bar of stedy on her forearms when sitting on stedy   Pt left sitting in chair with LEs elevated with all needs in reach Balance  Comments: CGA/min A for sitting EOB; stood briefly on stedy with CGA/min A of 2 to ricarda adult brief but only stood for approx 10-20 sec           OutComes Score                                                  AM-PAC Score  AM-PAC Inpatient Mobility Raw Score : 9 (09/14/22 1517)  AM-PAC Inpatient T-Scale Score : 30.55 (09/14/22 1517)  Mobility Inpatient CMS 0-100% Score: 81.38 (09/14/22 1517)  Mobility Inpatient CMS G-Code Modifier : CM (09/14/22 1517)          Tinneti Score       Goals  Short Term Goals  Time Frame for Short term goals: by discharge  Short term goal 1: bed mob mod A  Short term goal 2: transfers mod A  Short term goal 3: progress to gait when able  Patient Goals   Patient goals : pt did not state a goal       Education  Patient Education  Education Given To: Patient  Education Provided Comments: reviewed call light and not getting up without assist  Education Method: Verbal  Education Outcome: Verbalized understanding;Continued education needed      Therapy Time   Individual Concurrent Group Co-treatment   Time In 1440         Time Out 1520         Minutes 40                 CHLOÉ ALMAZAN, PT   Electronically signed by CHLOÉ ALMAZAN PT on 9/14/2022 at 3:22 PM

## 2022-09-14 NOTE — PROGRESS NOTES
This Rn responded to family member's asking for someone to look at the patient. Pt was minimally responsive to painful stimulus and vigorous sternal rub. This RN called the primary nurse and with no response called charge nurse. Secure message sent to MD for a PRN order of naloxone but with no responsive, Rapid Responsive called and MD placed PRN order. Naloxone given. Pt more responsive. Vitals WDL. Primary RN at bedside now.      Electronically signed by Benito Torres RN on 9/14/2022 at 11:14 AM

## 2022-09-14 NOTE — PROGRESS NOTES
Clinical Pharmacy Note  Vancomycin Consult    Moriah Young is a 40 y.o. female ordered Vancomycin for MRSA bacteremia; consult received from Dr. Doug Ng to manage therapy. Also receiving cefepime. Allergies:  Ultram [tramadol hcl], Robaxin [methocarbamol], and Penicillins     Temp max:  Temp (24hrs), Av.4 °F (37.4 °C), Min:98.6 °F (37 °C), Max:101.1 °F (38.4 °C)      Recent Labs     22  1544 22  0604 22  0436   WBC 14.4* 15.5* 14.4*         Recent Labs     22  1544 22  0604 22  0435   BUN 73* 47* 36*   CREATININE 1.5* 1.1 1.1           Intake/Output Summary (Last 24 hours) at 2022 6380  Last data filed at 2022 0355  Gross per 24 hour   Intake 240 ml   Output 1200 ml   Net -960 ml         Culture Results:  Blood cultures + MRSA and Serratia marcescens    Ht Readings from Last 1 Encounters:   22 5' 8\" (1.727 m)        Wt Readings from Last 1 Encounters:   22 142 lb 6.7 oz (64.6 kg)         Estimated Creatinine Clearance: 68 mL/min (based on SCr of 1.1 mg/dL). Assessment:  Day # 3 of vancomycin. SCr continues at 1.1  Vancomycin level: 14.1 mg/L  SCr improving    Plan:  Vanco 750 mg q12h  Random vanco tomorrow 1900    Thank you for the consult.      Electronically signed by Fransico Cortez [facility] on 2022 at 7:14 AM

## 2022-09-14 NOTE — ACP (ADVANCE CARE PLANNING)
Advance Care Planning     Advance Care Planning Inpatient Note  [facility] Department    Today's Date: 9/14/2022  Unit: [facility] 5W PROGRESSIVE CARE    Received request from IDT Member. Upon review of chart and communication with care team, request Health Care Provider's clarification of patient's decision making capacity. . Patient and Child/Children was/were present in the room during visit. Goals of ACP Conversation:  Discuss advance care planning documents    Health Care Decision Makers:       Primary Decision Maker: Georgia Vora - Parent - [phone]    Primary Decision Maker: Enrique Rosario - Parent - [phone]    Supplemental (Other) Decision Maker: Huong Castillo Child - [phone]  Summary:  Completed [facility]    Advance Care Planning Documents (Patient Wishes):  Healthcare Power of /Advance Directive Appointment of Health Care Agent     Assessment:  Patient lethargic and lying in bed, but willing to complete HC-POA. Patient identified her two parents and one of her daughters as healthcare decision makers. Her youngest daughter [facility] is at bedside and will make sure all the agents get copies of the advance directives. Patient had expressed same wishes to her nurse Joshua Russell today. Interventions:  Completed HC-POA with patient and daughter Chriss Vuong Preferences Communicated:   No    Outcomes/Plan:  ACP Discussion: Completed  New advance directive completed. Returned original document(s) to patient, as well as copies for distribution to appointed agents  Copy of advance directive given to staff to scan into medical record.     Electronically signed by Germán Figueroa, [address] [facility] on 9/14/2022 at 5:30 PM

## 2022-09-14 NOTE — PROGRESS NOTES
Occupational Therapy  Facility/Department: [facility] 5W PROGRESSIVE CARE  Daily Treatment Note  Should patient be discharged prior to another treatment session, this note shall serve as the discharge summary. NAME: Ernesto Luke  : 1980  MRN: [id number]    Date of Service: 2022    Discharge Recommendations:  Patient would benefit from continued therapy after discharge, 3-5 sessions per week, Continue to assess pending progress  OT Equipment Recommendations  Other: will continue to assess      Patient Diagnosis(es): The primary encounter diagnosis was Septicemia ([facility] [address] A diagnosis of IVDU (intravenous drug user) was also pertinent to this visit. Assessment    Assessment: Pt seen in room, sound asleep at first but then became aware and engaged and wanting to get OOB. Pt moved to EOB with  mod A of 2 but sat without assist.  Pt stood to [address] [place] [address] with min/mod A of 2 for transfer to bedside recliner. Left on maxi move pad for poss transfer back to bed. Pt's dtr present to assist with feeding. Pt improved with mobility but her endurance is still very limited. Recommend cont OT slower pace 3-5x but will cont to assess. Activity Tolerance: Patient limited by endurance  Discharge Recommendations: Patient would benefit from continued therapy after discharge;3-5 sessions per week;Continue to assess pending progress  Other: will continue to assess      Plan   Plan  Times per Week: 3-5  Current Treatment Recommendations: Strengthening;Balance training;Functional mobility training; Endurance training;Self-Care / ADL; Safety education & training;Gait training;Equipment evaluation, education, & procurement;Patient/Caregiver education & training     Restrictions  Restrictions/Precautions  Restrictions/Precautions: Fall Risk    Subjective   Subjective  Subjective: Pt seen in room with PT. Pt asleep but awoke with encouragement. Pt's dtr present. Pt agreed to OOB.   Complaining of pain in LEs but was sound asleep on arrival.  RN in to address pain  Orientation  Overall Orientation Status: Within Functional Limits  Cognition  Overall Cognitive Status: WFL  Cognition Comment: poor judgment but appears cognitively wfl        Objective    Vitals     Bed Mobility Training  Bed Mobility Training: Yes  Overall Level of Assistance: Moderate assistance;Assist X2  Interventions: Demonstration;Manual cues; Safety awareness training  Supine to Sit: Moderate assistance;Assist X2  Sit to Supine:  (left in recliner at end of session)  Scooting: Contact-guard assistance  Balance  Sitting: Intact  Standing: With support  Transfer Training  Transfer Training: Yes  Sit to Stand: Minimum assistance; Moderate assistance;Assist X2 (sit to  [facility] with min mod A of 2 and cues for safety.)  Stand to Sit: Minimum assistance;Assist X2     ADL  Toileting: Maximum assistance  Toileting Skilled Clinical Factors: assist to don depends while standing in [facility]        Safety Devices  Type of Devices: Call light within reach;Nurse notified;Gait belt; Chair alarm in place; Left in chair     Patient Education  Education Given To: Patient  Education Provided: Role of Therapy;Plan of Care;Transfer Training  Barriers to Learning: None  Education Outcome: Verbalized understanding;Demonstrated understanding    Goals  Short Term Goals  Time Frame for Short term goals: Prior to DC:   Short Term Goal 1: Pt will complete ADL transfers with supervision  Short Term Goal 2: Pt will complete functional mobility with supervision  Short Term Goal 3: Pt will tolerate standing > 3 min for functional task with supervision  Short Term Goal 4: Pt will complete toileting with min A  Short Term Goal 5: Pt will complete UE exercises in all plane to inc strength endurance for ADLs  Patient Goals   Patient goals : to feel better       Therapy Time   Individual Concurrent Group Co-treatment   Time In 1440         Time Out 1514         Minutes 34         Timed Code Treatment Minutes: 3 Toshia Angulo [place]

## 2022-09-15 ENCOUNTER — APPOINTMENT (OUTPATIENT)
Dept: MRI IMAGING | Age: 42
DRG: 720 | End: 2022-09-15
Payer: MEDICAID

## 2022-09-15 PROBLEM — F19.90 IVDU (INTRAVENOUS DRUG USER): Status: ACTIVE | Noted: 2022-09-15

## 2022-09-15 PROBLEM — A41.02 SEPSIS DUE TO METHICILLIN RESISTANT STAPHYLOCOCCUS AUREUS (MRSA) WITHOUT ACUTE ORGAN DYSFUNCTION (HCC): Status: ACTIVE | Noted: 2022-09-15

## 2022-09-15 PROBLEM — A41.9 SEPTICEMIA (HCC): Status: ACTIVE | Noted: 2022-09-15

## 2022-09-15 PROBLEM — B95.62 MRSA BACTEREMIA: Status: ACTIVE | Noted: 2022-09-15

## 2022-09-15 PROBLEM — I76 SEPTIC EMBOLISM (HCC): Status: ACTIVE | Noted: 2022-09-15

## 2022-09-15 PROBLEM — B18.2 HEP C W/O COMA, CHRONIC (HCC): Status: ACTIVE | Noted: 2022-09-15

## 2022-09-15 PROBLEM — A49.8 BACTERIAL INFECTION DUE TO SERRATIA: Status: ACTIVE | Noted: 2022-09-15

## 2022-09-15 PROBLEM — R50.9 FEVER AND CHILLS: Status: ACTIVE | Noted: 2022-09-15

## 2022-09-15 PROBLEM — I33.0 ENDOCARDITIS DUE TO STAPHYLOCOCCUS: Status: ACTIVE | Noted: 2022-09-15

## 2022-09-15 PROBLEM — R78.81 MRSA BACTEREMIA: Status: ACTIVE | Noted: 2022-09-15

## 2022-09-15 PROBLEM — R93.89 ABNORMAL CT OF THE CHEST: Status: ACTIVE | Noted: 2022-09-15

## 2022-09-15 PROBLEM — D72.9 NEUTROPHILIA: Status: ACTIVE | Noted: 2022-09-15

## 2022-09-15 PROBLEM — B95.8 ENDOCARDITIS DUE TO STAPHYLOCOCCUS: Status: ACTIVE | Noted: 2022-09-15

## 2022-09-15 LAB
ANION GAP SERPL CALCULATED.3IONS-SCNC: 12 MMOL/L (ref 3–16)
BASOPHILS ABSOLUTE: 0 K/UL (ref 0–0.2)
BASOPHILS RELATIVE PERCENT: 0.2 %
BLOOD CULTURE, ROUTINE: ABNORMAL
BUN BLDV-MCNC: 36 MG/DL (ref 7–20)
CALCIUM SERPL-MCNC: 7.4 MG/DL (ref 8.3–10.6)
CHLORIDE BLD-SCNC: 106 MMOL/L (ref 99–110)
CO2: 21 MMOL/L (ref 21–32)
CREAT SERPL-MCNC: 1.3 MG/DL (ref 0.6–1.1)
CULTURE, BLOOD 2: ABNORMAL
EOSINOPHILS ABSOLUTE: 0.1 K/UL (ref 0–0.6)
EOSINOPHILS RELATIVE PERCENT: 1.1 %
GFR AFRICAN AMERICAN: 54
GFR NON-AFRICAN AMERICAN: 45
GLUCOSE BLD-MCNC: 97 MG/DL (ref 70–99)
HCT VFR BLD CALC: 21.8 % (ref 36–48)
HEMOGLOBIN: 7.3 G/DL (ref 12–16)
LYMPHOCYTES ABSOLUTE: 1 K/UL (ref 1–5.1)
LYMPHOCYTES RELATIVE PERCENT: 8.1 %
MCH RBC QN AUTO: 27.5 PG (ref 26–34)
MCHC RBC AUTO-ENTMCNC: 33.6 G/DL (ref 31–36)
MCV RBC AUTO: 81.7 FL (ref 80–100)
MONOCYTES ABSOLUTE: 0.6 K/UL (ref 0–1.3)
MONOCYTES RELATIVE PERCENT: 4.6 %
NEUTROPHILS ABSOLUTE: 10.9 K/UL (ref 1.7–7.7)
NEUTROPHILS RELATIVE PERCENT: 86 %
ORGANISM: ABNORMAL
PDW BLD-RTO: 16.4 % (ref 12.4–15.4)
PLATELET # BLD: 231 K/UL (ref 135–450)
PMV BLD AUTO: 8.5 FL (ref 5–10.5)
POTASSIUM REFLEX MAGNESIUM: 3.7 MMOL/L (ref 3.5–5.1)
RBC # BLD: 2.67 M/UL (ref 4–5.2)
SODIUM BLD-SCNC: 139 MMOL/L (ref 136–145)
VANCOMYCIN RANDOM: 24 UG/ML
WBC # BLD: 12.7 K/UL (ref 4–11)

## 2022-09-15 PROCEDURE — 80048 BASIC METABOLIC PNL TOTAL CA: CPT

## 2022-09-15 PROCEDURE — 93970 EXTREMITY STUDY: CPT

## 2022-09-15 PROCEDURE — 6360000002 HC RX W HCPCS: Performed by: INTERNAL MEDICINE

## 2022-09-15 PROCEDURE — 99233 SBSQ HOSP IP/OBS HIGH 50: CPT | Performed by: INTERNAL MEDICINE

## 2022-09-15 PROCEDURE — 36415 COLL VENOUS BLD VENIPUNCTURE: CPT

## 2022-09-15 PROCEDURE — 85025 COMPLETE CBC W/AUTO DIFF WBC: CPT

## 2022-09-15 PROCEDURE — 2580000003 HC RX 258: Performed by: INTERNAL MEDICINE

## 2022-09-15 PROCEDURE — 6370000000 HC RX 637 (ALT 250 FOR IP): Performed by: INTERNAL MEDICINE

## 2022-09-15 PROCEDURE — 6360000004 HC RX CONTRAST MEDICATION: Performed by: INTERNAL MEDICINE

## 2022-09-15 PROCEDURE — A9577 INJ MULTIHANCE: HCPCS | Performed by: INTERNAL MEDICINE

## 2022-09-15 PROCEDURE — 94760 N-INVAS EAR/PLS OXIMETRY 1: CPT

## 2022-09-15 PROCEDURE — 9990000010 HC NO CHARGE VISIT: Performed by: PHYSICAL THERAPIST

## 2022-09-15 PROCEDURE — 99222 1ST HOSP IP/OBS MODERATE 55: CPT | Performed by: NURSE PRACTITIONER

## 2022-09-15 PROCEDURE — 72158 MRI LUMBAR SPINE W/O & W/DYE: CPT

## 2022-09-15 PROCEDURE — 2060000000 HC ICU INTERMEDIATE R&B

## 2022-09-15 PROCEDURE — 80202 ASSAY OF VANCOMYCIN: CPT

## 2022-09-15 RX ADMIN — LINEZOLID 600 MG: 600 INJECTION, SOLUTION INTRAVENOUS at 22:27

## 2022-09-15 RX ADMIN — SODIUM CHLORIDE: 9 INJECTION, SOLUTION INTRAVENOUS at 00:41

## 2022-09-15 RX ADMIN — MORPHINE SULFATE 0.5 MG: 2 INJECTION, SOLUTION INTRAMUSCULAR; INTRAVENOUS at 00:15

## 2022-09-15 RX ADMIN — ACETAMINOPHEN 650 MG: 325 TABLET ORAL at 19:53

## 2022-09-15 RX ADMIN — ACETAMINOPHEN 650 MG: 325 TABLET ORAL at 13:39

## 2022-09-15 RX ADMIN — MORPHINE SULFATE 0.5 MG: 2 INJECTION, SOLUTION INTRAMUSCULAR; INTRAVENOUS at 11:36

## 2022-09-15 RX ADMIN — Medication 10 ML: at 13:06

## 2022-09-15 RX ADMIN — SODIUM CHLORIDE: 9 INJECTION, SOLUTION INTRAVENOUS at 13:08

## 2022-09-15 RX ADMIN — VANCOMYCIN HYDROCHLORIDE 750 MG: 750 INJECTION, POWDER, LYOPHILIZED, FOR SOLUTION INTRAVENOUS at 11:31

## 2022-09-15 RX ADMIN — Medication 10 ML: at 22:27

## 2022-09-15 RX ADMIN — MORPHINE SULFATE 0.5 MG: 2 INJECTION, SOLUTION INTRAMUSCULAR; INTRAVENOUS at 21:03

## 2022-09-15 RX ADMIN — LINEZOLID 600 MG: 600 INJECTION, SOLUTION INTRAVENOUS at 11:36

## 2022-09-15 RX ADMIN — MORPHINE SULFATE 0.5 MG: 2 INJECTION, SOLUTION INTRAMUSCULAR; INTRAVENOUS at 16:50

## 2022-09-15 RX ADMIN — GADOBENATE DIMEGLUMINE 13 ML: 529 INJECTION, SOLUTION INTRAVENOUS at 16:30

## 2022-09-15 RX ADMIN — ACETAMINOPHEN 650 MG: 325 TABLET ORAL at 05:25

## 2022-09-15 RX ADMIN — CEFEPIME 2000 MG: 2 INJECTION, POWDER, FOR SOLUTION INTRAVENOUS at 05:25

## 2022-09-15 RX ADMIN — ENOXAPARIN SODIUM 40 MG: 100 INJECTION SUBCUTANEOUS at 11:07

## 2022-09-15 RX ADMIN — CEFEPIME 2000 MG: 2 INJECTION, POWDER, FOR SOLUTION INTRAVENOUS at 17:08

## 2022-09-15 ASSESSMENT — PAIN DESCRIPTION - LOCATION
LOCATION: LEG
LOCATION: LEG;BACK
LOCATION: BACK;LEG
LOCATION: BACK

## 2022-09-15 ASSESSMENT — PAIN DESCRIPTION - DESCRIPTORS
DESCRIPTORS: DISCOMFORT
DESCRIPTORS: ACHING
DESCRIPTORS: STABBING
DESCRIPTORS: ACHING;STABBING

## 2022-09-15 ASSESSMENT — PAIN SCALES - GENERAL
PAINLEVEL_OUTOF10: 7
PAINLEVEL_OUTOF10: 8
PAINLEVEL_OUTOF10: 8
PAINLEVEL_OUTOF10: 7
PAINLEVEL_OUTOF10: 8
PAINLEVEL_OUTOF10: 9

## 2022-09-15 ASSESSMENT — PAIN DESCRIPTION - ORIENTATION
ORIENTATION: POSTERIOR
ORIENTATION: RIGHT
ORIENTATION: RIGHT

## 2022-09-15 NOTE — PROGRESS NOTES
Physical Therapy  Perez Tejada  [id number]  [id number]    Attempted to see for PT session however pt out of room to vascular lab; will continue to follow and attempt later as schedule permits  Electronically signed by CHLOÉ ALMAZAN, PT on 9/15/2022 at 9:30 AM

## 2022-09-15 NOTE — PLAN OF CARE
Problem: Discharge Planning  Goal: Discharge to home or other facility with appropriate resources  Outcome: Progressing  Flowsheets (Taken 9/14/2022 6645 by Juliana Monet)  Discharge to home or other facility with appropriate resources: Identify barriers to discharge with patient and caregiver     Problem: Safety - Adult  Goal: Free from fall injury  Outcome: Progressing     Problem: Pain  Goal: Verbalizes/displays adequate comfort level or baseline comfort level  Outcome: Progressing

## 2022-09-15 NOTE — PLAN OF CARE
Problem: Discharge Planning  Goal: Discharge to home or other facility with appropriate resources  9/15/2022 0414 by Naheed Epperson RN  Outcome: Progressing  9/14/2022 2048 by Reta Lake RN  Outcome: Progressing  Flowsheets (Taken 9/14/2022 0845 by Amy Bah)  Discharge to home or other facility with appropriate resources: Identify barriers to discharge with patient and caregiver     Problem: Safety - Adult  Goal: Free from fall injury  9/15/2022 0414 by Naheed Epperson RN  Outcome: Progressing  9/14/2022 2048 by Reta Lake RN  Outcome: Progressing  Flowsheets (Taken 9/14/2022 2048)  Free From Fall Injury: Instruct family/caregiver on patient safety     Problem: Pain  Goal: Verbalizes/displays adequate comfort level or baseline comfort level  9/15/2022 0414 by Naheed Epperson RN  Outcome: Progressing  9/14/2022 2048 by Reta Lake RN  Outcome: Progressing     Problem: ABCDS Injury Assessment  Goal: Absence of physical injury  Outcome: Progressing     Problem: Skin/Tissue Integrity  Goal: Absence of new skin breakdown  Description: 1. Monitor for areas of redness and/or skin breakdown  2. Assess vascular access sites hourly  3. Every 4-6 hours minimum:  Change oxygen saturation probe site  4. Every 4-6 hours:  If on nasal continuous positive airway pressure, respiratory therapy assess nares and determine need for appliance change or resting period.   Outcome: Progressing

## 2022-09-15 NOTE — PLAN OF CARE
Problem: Discharge Planning  Goal: Discharge to home or other facility with appropriate resources  9/15/2022 1256 by Kaiden Melchor RN  Outcome: Progressing     Problem: Safety - Adult  Goal: Free from fall injury  9/15/2022 1256 by Kaiden Melchor RN  Outcome: Progressing     Problem: Pain  Goal: Verbalizes/displays adequate comfort level or baseline comfort level  9/15/2022 1256 by Kaiden Melchor RN  Outcome: Progressing     Problem: ABCDS Injury Assessment  Goal: Absence of physical injury  9/15/2022 1256 by Kaiden Melchor RN  Outcome: Progressing     Problem: Skin/Tissue Integrity  Goal: Absence of new skin breakdown  Description: 1. Monitor for areas of redness and/or skin breakdown  2. Assess vascular access sites hourly  3. Every 4-6 hours minimum:  Change oxygen saturation probe site  4. Every 4-6 hours:  If on nasal continuous positive airway pressure, respiratory therapy assess nares and determine need for appliance change or resting period.   9/15/2022 1256 by Kaiden Melchor RN  Outcome: Progressing

## 2022-09-15 NOTE — PROGRESS NOTES
Occupational Therapy  Therapy attempt. Pt off the floor for testing. Will follow and attempt later as schedule permits. Refer to the last note for status if pt is discharged.   Maryse NASH/SRAVANTHI,122

## 2022-09-15 NOTE — CONSULTS
[facility], [place]  Cardiology Consult              Patient Name: Perez Tejada  Date of admission: 9/12/2022  2:47 PM  Patient's age: 40 y.o., 1980  Admission Dx: Septicemia ([address] [A41.9]  IVDU (intravenous drug user) [F19.90]  Intractable nausea and vomiting [R11.2]    Reason for Consult:  endocarditis   Requesting Physician: Festus Wright MD  Code Status: Full Code   CHIEF COMPLAINT:  fevers, weakness     History Obtained From:  patient, electronic medical record    HISTORY OF PRESENT ILLNESS:      The patient is a 40 y.o.  female who is admitted to the hospital for fevers, chills and lethargy. Family was unable to get patient out of bed for at least the past three days. Patient with history of IV drug abuse per patient she has been sober. Concern for endocarditis. Cardiology consulted for consideration of angiovac given mobile vegetation per TTE on tricuspid valve. Patient with significant back pain. Continues to be febrile. Past Medical History/Past Surgical History/Social History/Family History/Living Situation: Reviewed per record            Home Medications:    Prior to Admission medications    Medication Sig Start Date End Date Taking?  Authorizing Provider   ibuprofen (ADVIL;MOTRIN) 800 MG tablet Take 1 tablet by mouth every 8 hours as needed for Pain 9/7/22   Timmothy Schlatter, DO       Current Medications:  Scheduled Meds:   vancomycin  750 mg IntraVENous Q12H    linezolid  600 mg IntraVENous Q12H    cefepime  2,000 mg IntraVENous Q8H    sodium chloride flush  10 mL IntraVENous 2 times per day    enoxaparin  40 mg SubCUTAneous Daily    vancomycin (VANCOCIN) intermittent dosing (placeholder)   Other RX Placeholder     Continuous Infusions:   sodium chloride 125 mL/hr at 09/15/22 1308    sodium chloride Stopped (09/15/22 0041)     PRN Meds:.naloxone, morphine, calcium carbonate, promethazine, sodium chloride flush, sodium chloride, potassium chloride **OR** potassium alternative oral replacement **OR** potassium chloride, potassium chloride, magnesium sulfate, promethazine **OR** ondansetron, magnesium hydroxide, acetaminophen **OR** acetaminophen, perflutren lipid microspheres    Allergies:  Ultram [tramadol hcl], Robaxin [methocarbamol], and Penicillins      REVIEW OF SYSTEMS:    Constitutional:No change in weight  Yes change in energy level, Yes change in sleep pattern, Yes change in  activity level. Yes fevers, chills. Eyes: No visual changes or diplopia. No scleral icterus. ENT: No Headaches, hearing loss or vertigo. No mouth sores or sore throat. Cardiovascular: Yes for chest pain, Yes for dyspnea on exertion, No for palpitations or No for loss of consciousness. No cough, hemoptysis, Yes for pleuritic pain, or phlebitis. Respiratory: Yes for cough or [place]zing, No sputum production. Gastrointestinal: No abdominal pain, appetite loss, blood in stools. No change in bowel or bladder habits. Genitourinary: No dysuria, trouble voiding, or hematuria. Musculoskeletal:  No gait disturbance, Yes for weakness or joint complaints. Integumentary: No rash or pruritis. Neurological: No headache, diplopia, change in muscle strength, numbness or tingling. No change in gait, balance, coordination, mood, affect, memory, mentation, behavior. Psychiatric: No anxiety, or depression. Endocrine: No temperature intolerance. No excessive thirst, fluid intake, or urination. No tremor. Hematologic/Lymphatic: No abnormal bruising or bleeding, blood clots or swollen lymph nodes. Allergic/Immunologic: No nasal congestion or hives. PHYSICAL EXAM:      /78   Pulse 67   Temp 97.8 °F (36.6 °C) (Oral)   Resp (!) 32   Ht 5' 8\" (1.727 m)   Wt 143 lb 11.8 oz (65.2 kg)   SpO2 93%   BMI 21.86 kg/m²    Constitutional and General Appearance: fatigued, cooperative, mild distress, and appears older than stated age  Eyes: PERRL, non-icteric  HENT: no cervical lymphadenopathy.  No masses palpable. Normal oral mucosa, no neck stiffness  Skin: Normal turgor, no rashes, no bruising  Respiratory:   Normal excursion and expansion without use of accessory muscles   Resp Auscultation: Distant   Cardiovascular:   Heart tones are crisp and normal. Distant, regular S1 and S2. No murmurs, rubs or gallops   Jugular venous pulsation Normal    Peripheral pulses are symmetrical and full  Gastrointestinal:   Soft, non-tender              No masses     Bowel sounds normal active x4  Musculoskeletal:  No muscle wasting or decreased range of motion  Extremities:   No Cyanosis or Clubbing   Lower extremity edema: No   Skin: Warm and dry  Neurological:   CN 3-12 grossly intact   No gross sensory deficits   No tremors   Psychiatric:   Alert oriented to person, place and time. Normal insight and normal affect. DATA:      Telemetry: normal sinus rhythm  EC22    Radiology/Imaging:    CT Chest:  Diffuse in numeral bilateral pulmonary nodules many of which are cavitary   becoming coalescent at the right lung apex however diffusely throughout the   bilateral lungs consistent of septic emboli with small to moderate right   pleural effusion       Partially visualized portions of the upper abdomen reveal hepatosplenomegaly     Echo: 22   Summary   Normal left ventricle size, wall thickness, and systolic function with an   estimated ejection fraction of 60-65%. No regional wall motion abnormalities   are seen. Normal diastolic function. Normal right ventricular size and function. Mobile vegetation on tricuspid valve suggestive of endocarditis. Moderate tricuspid regurgitation. The right atrium is mildly dilated.     VL extremity:  Pending       In: 3617 [I.V.:2554.1]  Out: -    Wt Readings from Last 7 Encounters:   09/15/22 143 lb 11.8 oz (65.2 kg)   22 141 lb 1.5 oz (64 kg)   22 153 lb (69.4 kg)   13 182 lb (82.6 kg)   12 173 lb (78.5 kg)   12 180 lb (81.6 kg) 04/26/12 180 lb (81.6 kg)         IMPRESSION:    Patient Active Problem List   Diagnosis    Tear of medial cartilage or meniscus of knee, current    Plica syndrome    Boxer's metacarpal fracture, neck, closed    Chondromalacia of patella    Degeneration of lumbar or lumbosacral intervertebral disc    Varicose veins of lower extremity    Intractable nausea and vomiting    Septicemia (HCC)    IVDU (intravenous drug user)    MRSA bacteremia    Sepsis due to methicillin resistant Staphylococcus aureus (MRSA) without acute organ dysfunction ([address]    Bacterial infection due to Serratia    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 Endocarditis   3. Sepsis    Per ID   4. Anemia    Trasfuse if hemoglobin < 7   Defer further workup to primary         All questions and concerns were addressed to the patient/family. Alternatives to my treatment were discussed. The note was completed using EMR. Every effort was made to ensure accuracy; however, inadvertent computerized transcription errors may be present. Discussed with other Dr Janell Phipps and Nurse.     Marilu Yang, [address]  9/15/2022  1:35 PM

## 2022-09-15 NOTE — PROGRESS NOTES
Hospitalist Progress Note      PCP: No primary care provider on file. Chief Complaint. 50-year-old female with past medical history of drug abuse, ADHD, who presents to the hospital due to feeling fatigue, pain in her legs as well as back, she has a history of IV drug abuse, per patient she has been sober for now, patient family is also on the bedside who also contributed to the patient history. According to the patient's sister patient has been feeling sick for past few days, not feeling well. Patient was noticed to have fevers as well as chills. Date of Admission: 9/12/2022    Subjective: alert and awake, complains of back pain    Medications:  Reviewed    Infusion Medications    sodium chloride 125 mL/hr at 09/15/22 1308    sodium chloride Stopped (09/15/22 0041)     Scheduled Medications    vancomycin  750 mg IntraVENous Q12H    linezolid  600 mg IntraVENous Q12H    cefepime  2,000 mg IntraVENous Q8H    sodium chloride flush  10 mL IntraVENous 2 times per day    enoxaparin  40 mg SubCUTAneous Daily    vancomycin (VANCOCIN) intermittent dosing (placeholder)   Other RX Placeholder     PRN Meds: naloxone, morphine, calcium carbonate, promethazine, sodium chloride flush, sodium chloride, potassium chloride **OR** potassium alternative oral replacement **OR** potassium chloride, potassium chloride, magnesium sulfate, promethazine **OR** ondansetron, magnesium hydroxide, acetaminophen **OR** acetaminophen, perflutren lipid microspheres      Intake/Output Summary (Last 24 hours) at 9/15/2022 1927  Last data filed at 9/15/2022 0402  Gross per 24 hour   Intake 3617.03 ml   Output --   Net 3617.03 ml         Physical Exam Performed:    /78   Pulse 67   Temp 97.8 °F (36.6 °C) (Oral)   Resp (!) 32   Ht 5' 8\" (1.727 m)   Wt 143 lb 11.8 oz (65.2 kg)   SpO2 93%   BMI 21.86 kg/m²     General appearance: NAD  HEENT:  Conjunctivae/corneas clear. Neck: Supple, with full range of motion.    Respiratory: Normal respiratory effort. Clear to auscultation, bilaterally without Rales/Wheezes/Rhonchi. Cardiovascular: Regular rate and rhythm with normal S1/S2 without murmurs or rubs  Abdomen: Soft, non-tender, non-distended, normal bowel sounds. Musculoskeletal: No cyanosis or edema bilaterally  Neurologic:  without any focal sensory/motor deficits. grossly non-focal.  Psychiatric: Alert and oriented, Normal mood  Peripheral Pulses: +2 palpable, equal bilaterally     Labs:   Recent Labs     09/13/22 0604 09/14/22  0436 09/15/22  0602   WBC 15.5* 14.4* 12.7*   HGB 9.4* 8.1* 7.3*   HCT 28.2* 23.6* 21.8*   * 163 231       Recent Labs     09/13/22  0604 09/14/22  0435 09/15/22  0602    138 139   K 3.7 3.7 3.7    105 106   CO2 24 24 21   BUN 47* 36* 36*   CREATININE 1.1 1.1 1.3*   CALCIUM 7.7* 7.4* 7.4*       No results for input(s): AST, ALT, BILIDIR, BILITOT, ALKPHOS in the last 72 hours. No results for input(s): INR in the last 72 hours. No results for input(s): Normajean Mize in the last 72 hours. Urinalysis:      Lab Results   Component Value Date/Time    NITRU Negative 09/12/2022 04:16 PM    WBCUA 24 09/12/2022 04:16 PM    BACTERIA None Seen 09/12/2022 04:16 PM    RBCUA 105 09/12/2022 04:16 PM    BLOODU LARGE 09/12/2022 04:16 PM    SPECGRAV 1.018 09/12/2022 04:16 PM    GLUCOSEU Negative 09/12/2022 04:16 PM    GLUCOSEU NEGATIVE 07/31/2010 02:59 PM       Radiology:  MRI LUMBAR SPINE W WO CONTRAST   Final Result   Partially visualized septic arthritis/osteomyelitis of the right sacroiliac   joint, with a large adjacent retroperitoneal abscess extending into the right   pelvic sidewall. Small intramuscular abscess also present within the left psoas muscle. Epidural phlegmon/abscess of the sacral canal.      Pelvic ascites. The findings were sent to the Radiology Results [address] at 7:23   pm on 9/15/2022 to be communicated to a licensed caregiver.          VL Extremity Venous Bilateral   Final Result      CT HEAD WO CONTRAST   Final Result   No acute intracranial abnormality. CT CHEST WO CONTRAST   Final Result   Diffuse in numeral bilateral pulmonary nodules many of which are cavitary   becoming coalescent at the right lung apex however diffusely throughout the   bilateral lungs consistent of septic emboli with small to moderate right   pleural effusion      Partially visualized portions of the upper abdomen reveal hepatosplenomegaly         XR CHEST PORTABLE   Final Result   Multifocal patchy airspace disease and cavitary nodules. The appearance is   suggestive of septic emboli in this clinical setting. Follow-up recommended to assure resolution. 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 [I33.0, B95.8]      Priority: Medium    Septic embolism (HCC) [I76]      Priority: Medium    Abnormal CT of the chest [R93.89]      Priority: Medium    Neutrophilia [D72.9]      Priority: Medium    Fever and chills [R50.9]      Priority: Medium    Hep C w/o coma, chronic (HCC) [B18.2]      Priority: Medium    Intractable nausea and vomiting [R11.2]      Priority: Medium       76-year-old female with past medical history of drug abuse, ADHD, who presents to the hospital due to feeling fatigue, pain in her legs as well as back, she has a history of IV drug abuse, per patient she has been sober for now, patient family is also on the bedside who also contributed to the patient history.   According to the patient's sister patient has been feeling sick for past few days, not feeling well. Patient was noticed to have fevers as well as chills. Assessment  Sepsis present on admission, unclear focal source at this time  Pulmonary septic emboli, cavitary nodules  Endocarditis  History of IV drug abuse  ADHD     Plan  Start IV antibiotics with vancomycin, cefepime  Check blood cultures  Check procalcitonin  Consult infectious disease  Suspect endocarditis, echocardiogram - vegetation on TV   Resume home medications  DVT prophylaxis-Lovenox  Pain control  DVT Prophylaxis:  Diet: ADULT DIET;  Regular  Diet NPO  Code Status: Full Code    PT/OT Eval Status: ordered    Dispo/Plan of care - Echo positive for vagetation on TV, cardiology consulted - STEFANO tomorrow, NPO after MN, ID consulted  continue IV abx, ID following, CT head unremarkable    Xavier Lopez MD

## 2022-09-15 NOTE — PROGRESS NOTES
Infectious Disease Follow up Notes  Admit Date: 9/12/2022  Hospital Day: 4    Antibiotics :   IV Vancomycin   IV Linezolid  IV Cefepime     CHIEF COMPLAINT:     Sepsis  MRSA bacteremia  Serratia Bacteremia  Endocarditis  IVDA  TV Vegetation     Subjective interval History :  40 y. o.woman with a history of IV drug abuse, ADHD, back pain, depression admitted to the hospital secondary to fever chills fatigue back pain. Patient family noted she was unable to get up from the bed for the past 3 days secondary to fevers and not feeling well. Admission labs indicate creatinine 1.5 procalcitonin elevated to 6.6, WBC elevated 15.5 hemoglobin 9.4 bilirubin 1.3 AST 42, blood cultures from admission positive for MRSA as well as Serratia. T-max 103.8 on admission. CT chest with diffuse innumerable bilateral pulmonary nodules consistent with cavitation and septic emboli. Given the presentation concern is for endocarditis secondary to IV drug abuse. Patient not much interactive during normalization some of the history is provided by her daughters,       Interval History : more awake asking for pain meds and on going back pain and Rt gluteal area pain , Blood cx in process having pain with Rt hip movements     Past Medical History:    Past Medical History:   Diagnosis Date    ADHD (attention deficit hyperactivity disorder)     Arthritis     Back pain     Depression     Migraine     Neutrophilia 9/15/2022       Past Surgical History:    Past Surgical History:   Procedure Laterality Date    KNEE ARTHROSCOPY  10-5-10    PARTIAL HYSTERECTOMY (CERVIX NOT REMOVED)      TUBAL LIGATION  2004       Current Medications:    No outpatient medications have been marked as taking for the 9/12/22 encounter [facility] Encounter).        Allergies:  Ultram [tramadol hcl], Robaxin [methocarbamol], and Penicillins    Immunizations :   Immunization History   Administered Date(s) Administered    Tdap (Boostrix, Adacel) 02/11/2015       Social History:    Social History     Tobacco Use    Smoking status: Every Day     Packs/day: 0.50     Years: 2.00     Pack years: 1.00     Types: Cigarettes    Smokeless tobacco: Never   Substance Use Topics    Alcohol use: Yes     Comment: occas    Drug use: No     Social History     Tobacco Use   Smoking Status Every Day    Packs/day: 0.50    Years: 2.00    Pack years: 1.00    Types: Cigarettes   Smokeless Tobacco Never      Family History   Problem Relation Age of Onset    Breast Cancer Maternal Grandmother 45    Arthritis Other     Asthma Other     Cancer Other     Diabetes Other     Seizures Other     Stroke Other           REVIEW OF SYSTEMS:      Constitutional:  fevers,++  chills +=, night sweats  Eyes:  negative for blurred vision, eye discharge, visual disturbance   HEENT:  negative for hearing loss, ear drainage,nasal congestion  Respiratory:  negative for cough, shortness of breath or hemoptysis   Cardiovascular:  negative for chest pain, palpitations, syncope  Gastrointestinal:  negative for nausea, vomiting, diarrhea, constipation, abdominal pain  Genitourinary:  negative for frequency, dysuria, urinary incontinence, hematuria  Hematologic/Lymphatic:  negative for easy bruising, bleeding and lymphadenopathy  Allergic/Immunologic:  negative for recurrent infections, angioedema, anaphylaxis   Endocrine:  negative for weight changes, polyuria, polydipsia and polyphagia  Musculoskeletal:  Rt hip and lower back pain ++   pain, swelling, decreased range of motion  Integumentary: No rashes, skin lesions  Neurological:  negative for headaches, slurred speech, unilateral weakness  Psychiatric: negative for hallucinations,confusion,agitation.                 PHYSICAL EXAM:      Vitals:    /78   Pulse 67   Temp 97.8 °F (36.6 °C) (Oral)   Resp (!) 32   Ht 5' 8\" (1.727 m)   Wt 143 lb 11.8 oz (65.2 kg)   SpO2 93%   BMI 21.86 kg/m² General Appearance: alert,in  acute distress, ++ pallor, no icterus  poor skin hygiene   Skin: warm and dry, no rash or erythema  Head: normocephalic and atraumatic  Eyes: pupils equal, round, and reactive to light, conjunctivae normal  ENT: tympanic membrane, external ear and ear canal normal bilaterally, nose without deformity, nasal mucosa and turbinates normal without polyps  Neck: supple and non-tender without mass, no thyromegaly  no cervical lymphadenopathy  Pulmonary/Chest: Bi basal crepts+ - no wheezes, rales or rhonchi, normal air movement, no respiratory distress  Cardiovascular: normal rate, regular rhythm, normal S1 and S2, esm+ murmurs, rubs, clicks, or gallops, no carotid bruits  Abdomen: soft, non-tender, non-distended, normal bowel sounds, no masses or organomegaly  Extremities: no cyanosis, clubbing or edema  Musculoskeletal: normal range of motion, no joint swelling, deformity or tenderness  Integumentary: No rashes, no abnormal skin lesions, no petechiae  Neurologic: reflexes normal and symmetric, no cranial nerve deficit  Psych:  Orientation, sensorium, mood normal            Lines: IV  Needle tracks++   Rt gluteal area pain  Lower leg multiple skin lesions from IVDA++     Data Review:    CBC:   Lab Results   Component Value Date    WBC 12.7 (H) 09/15/2022    HGB 7.3 (L) 09/15/2022    HCT 21.8 (L) 09/15/2022    MCV 81.7 09/15/2022     09/15/2022     RENAL:   Lab Results   Component Value Date    CREATININE 1.3 (H) 09/15/2022    BUN 36 (H) 09/15/2022     09/15/2022    K 3.7 09/15/2022     09/15/2022    CO2 21 09/15/2022     SED RATE:   Lab Results   Component Value Date/Time    SEDRATE 51 07/23/2019 09:59 PM     CK: No results found for: CKTOTAL  CRP: No results found for: CRP  Hepatic Function Panel:   Lab Results   Component Value Date/Time    ALKPHOS 265 09/12/2022 03:44 PM    ALT 17 09/12/2022 03:44 PM    AST 42 09/12/2022 03:44 PM    PROT 7.0 09/12/2022 03:44 PM    PROT 6.5 07/28/2012 11:20 PM    BILITOT 1.3 09/12/2022 03:44 PM    LABALBU 2.0 09/12/2022 03:44 PM     UA:  Lab Results   Component Value Date/Time    COLORU Yellow 09/12/2022 04:16 PM    CLARITYU CLOUDY 09/12/2022 04:16 PM    GLUCOSEU Negative 09/12/2022 04:16 PM    GLUCOSEU NEGATIVE 07/31/2010 02:59 PM    BILIRUBINUR Negative 09/12/2022 04:16 PM    BILIRUBINUR NEGATIVE 07/31/2010 02:59 PM    KETUA Negative 09/12/2022 04:16 PM    SPECGRAV 1.018 09/12/2022 04:16 PM    BLOODU LARGE 09/12/2022 04:16 PM    PHUR 5.5 09/12/2022 04:16 PM    PROTEINU 100 09/12/2022 04:16 PM    UROBILINOGEN 1.0 09/12/2022 04:16 PM    NITRU Negative 09/12/2022 04:16 PM    LEUKOCYTESUR MODERATE 09/12/2022 04:16 PM    LABMICR YES 09/12/2022 04:16 PM    URINETYPE NotGiven 09/12/2022 04:16 PM      Urine Microscopic:   Lab Results   Component Value Date/Time    BACTERIA None Seen 09/12/2022 04:16 PM    COMU see below 09/07/2022 01:55 AM    HYALCAST 3 09/12/2022 04:16 PM    WBCUA 24 09/12/2022 04:16 PM    RBCUA 105 09/12/2022 04:16 PM    EPIU 17 09/12/2022 04:16 PM     Urine Reflex to Culture:   Lab Results   Component Value Date/Time    URRFLXCULT Yes 09/12/2022 04:16 PM         MICRO: cultures reviewed and updated by me   Blood Culture:          MRSA DNA Probe, Nasal [[id number]] Collected: 09/13/22 1220   Order Status: Completed Specimen: Nares Updated: 09/14/22 1131    MRSA SCREEN RT-PCR --    Negative  MRSA DNA not detected.    Normal Range: Not detected    Narrative:     ORDER#: [id number]                          ORDERED BY: Jennifer Velasquez   SOURCE: Nares                              COLLECTED:  09/13/22 12:20   ANTIBIOTICS AT [facility].:                      RECEIVED :  09/13/22 12:46   Blood Culture 1 [[id number]] (Abnormal) Collected: 09/12/22 1616   Order Status: Completed Specimen: Blood Updated: 09/14/22 0939    Blood Culture, Routine -- Abnormal     Gram stain Aerobic bottle:   Gram positive cocci in clusters   resembling Staphylococcus Information to follow   and   Gram negative rods   Information to follow   Gram stain Anaerobic bottle:   Gram positive cocci in clusters   resembling Staphylococcus   Information to follow    Abnormal     Organism Staph aureus MRSA DNA Detected Abnormal     Blood Culture, Routine -- Abnormal     CONTACT PRECAUTIONS INDICATED   See additional report for complete BCID panel. mecA/C gene and MREJ gene Detected. Abnormal     Organism Serratia marcescens DNA Detected Abnormal     Blood Culture, Routine See additional report for complete BCID panel. Organism Staph aureus MRSA Abnormal     Blood Culture, Routine -- Abnormal     POSITIVE for   Sensitivity to follow   CONTACT PRECAUTIONS INDICATED   Isolated two of two sets    Abnormal     Organism Serratia marcescens Abnormal     Blood Culture, Routine --    POSITIVE for   Sensitivity to follow   Isolated one of two sets    Narrative:     ORDER#: [id number]                          ORDERED BY: Roger Monday   SOURCE: Blood                              COLLECTED:  09/12/22 16:16   ANTIBIOTICS AT [facility].:                      RECEIVED :  09/12/22 16:32   CALL  [facility]  [id number] Jacintalinda Held [phone],   Microbiology results called to and read back by Carmelo Charles RN,   09/13/2022 08:24, by [facility]   Microbiology results called to and read back by Michael Campos,   09/13/2022 07:05, by [facility]   If child <=2 yrs old please draw pediatric bottle. ~Blood Culture 1   Culture, Blood 2 [[id number]] Collected: 09/14/22 0525   Order Status: Sent Specimen: Blood Updated: 09/14/22 0600   Culture, Blood 1 [[id number]] Collected: 09/14/22 0436   Order Status: Sent Specimen: Blood Updated: 09/14/22 0441   Culture, Urine [[id number]] Collected: 09/12/22 1616   Order Status: Completed Specimen: Urine, clean catch Updated: 09/13/22 2245    Urine Culture, Routine No growth at 18 to 36 hours   Narrative:     ORDER#: [id number]                          ORDERED BY: Roger Monday   SOURCE: Urine Clean Catch                  COLLECTED:  09/12/22 16:16   ANTIBIOTICS AT [facility].:                      RECEIVED :  09/12/22 19:35   Culture, Blood 2 [[id number]] (Abnormal) Collected: 09/12/22 2012   Order Status: Completed Specimen: Blood Updated: 09/13/22 1416    Culture, Blood 2 -- Abnormal     Gram stain Aerobic bottle:   Gram positive cocci in clusters   resembling Staphylococcus   Information to follow   Gram stain Anaerobic bottle:   Gram positive cocci in clusters   resembling Staphylococcus   Information to follow    Abnormal    Narrative:     ORDER#: [id number]                          ORDERED BY: Malachi Loco   SOURCE: Blood                              COLLECTED:  09/12/22 20:12   ANTIBIOTICS AT [facility].:                      RECEIVED :  09/12/22 20:21   [phone]   Previous panic on this admission - call not needed per SOP, 09/13/2022 10:56,   by Chip Began   If child <=2 yrs old please draw pediatric bottle. ~Blood Culture #2   Strep Pneumoniae Antigen [[id number]] Collected: 09/12/22 1900   Order Status: Completed Specimen: Urine, clean catch Updated: 09/13/22 1054    STREP PNEUMONIAE ANTIGEN, URINE --    Presumptive Negative   Presumptive negative suggests no current or recent   pneumococcal infection. Infection due to Strep pneumoniae   cannot be ruled out since the antigen present in the sample   may be below the detection limit of the test.   Normal Range:Presumptive Negative    Narrative:     ORDER#: [id number]                          ORDERED BY: Monika Morton   SOURCE: Urine Clean Catch                  COLLECTED:  09/12/22 19:00   ANTIBIOTICS AT [facility].:                      RECEIVED :  09/13/22 07:04   Legionella antigen, urine [[id number]] Collected: 09/12/22 1900   Order Status: Completed Specimen: Urine, clean catch Updated: 09/13/22 1047    L. pneumophila Serogp 1 Ur Ag --    Presumptive Negative   No Legionella pneumophila serogroup 1 antigens detected.    A negative result does not exclude infection with   Legionella pneumophila serogroup 1 nor does it rule out   other microbial-caused respiratory infections or   disease caused by other serogroups of   Legionella pneumophila. Normal Range: Presumptive Negative    Narrative:     ORDER#: [id number]                          ORDERED BY: Hossein Webster   SOURCE: Urine Clean Catch                  COLLECTED:  09/12/22 19:00   ANTIBIOTICS AT [facility].:                      RECEIVED :  09/13/22 07:04   Culture, Blood, PCR ID Panel [[id number]] Collected: 09/12/22 1616   Order Status: Completed Updated: 09/13/22 0707    Report SEE IMAGE   Narrative:     Germán Omalley  [id number] tel. [phone],   Microbiology results called to and read back by Raphael Queen,   09/13/2022 07:05, by Rikki Greene   Culture, Blood 2 [[id number]] Collected: 09/12/22 0000   Order Status: Canceled Specimen: Blood    Culture, Blood 1 [[id number]] Collected: 09/12/22 0000   Order Status: Canceled Specimen: Blood      Lab Results   Component Value Date/Time    [facility]  09/14/2022 04:36 AM     No Growth to date. Any change in status will be called. Chris Lennox  09/14/2022 05:25 AM     No Growth to date. Any change in status will be called.      Susceptibility    Staph aureus mrsa (3)    Antibiotic Interpretation Microscan  Method Status    ceFAZolin Resistant >16 mcg/mL BACTERIAL SUSCEPTIBILITY PANEL BY YA     clindamycin Sensitive <=0.5 mcg/mL BACTERIAL SUSCEPTIBILITY PANEL BY YA     erythromycin Resistant >4 mcg/mL BACTERIAL SUSCEPTIBILITY PANEL BY YA     oxacillin Resistant >2 mcg/mL BACTERIAL SUSCEPTIBILITY PANEL BY YA     tetracycline Sensitive <=4 mcg/mL BACTERIAL SUSCEPTIBILITY PANEL BY YA     trimethoprim-sulfamethoxazole Sensitive 1/19 mcg/mL BACTERIAL SUSCEPTIBILITY PANEL BY YA     vancomycin Sensitive 2 mcg/mL BACTERIAL SUSCEPTIBILITY PANEL BY YA       Serratia marcescens (4)    Antibiotic Interpretation Microscan  Method Status    amoxicillin-clavulanate Resistant >16/8 mcg/mL BACTERIAL SUSCEPTIBILITY PANEL BY YA     ampicillin Resistant >16 mcg/mL BACTERIAL SUSCEPTIBILITY PANEL BY YA     ampicillin-sulbactam Resistant 16/8 mcg/mL BACTERIAL SUSCEPTIBILITY PANEL BY YA     ceFAZolin Resistant >16 mcg/mL BACTERIAL SUSCEPTIBILITY PANEL BY YA     cefepime Sensitive <=2 mcg/mL BACTERIAL SUSCEPTIBILITY PANEL BY YA     cefTRIAXone Sensitive <=1 mcg/mL BACTERIAL SUSCEPTIBILITY PANEL BY YA     cefuroxime Resistant >16 mcg/mL BACTERIAL SUSCEPTIBILITY PANEL BY YA     ciprofloxacin Sensitive <=1 mcg/mL BACTERIAL SUSCEPTIBILITY PANEL BY YA     ertapenem Sensitive <=0.5 mcg/mL BACTERIAL SUSCEPTIBILITY PANEL BY YA     gentamicin Sensitive <=4 mcg/mL BACTERIAL SUSCEPTIBILITY PANEL BY YA     meropenem Sensitive <=1 mcg/mL BACTERIAL SUSCEPTIBILITY PANEL BY YA     piperacillin-tazobactam Sensitive <=16 mcg/mL BACTERIAL SUSCEPTIBILITY PANEL BY YA     trimethoprim-sulfamethoxazole Sensitive <=2/38 mcg/mL BACTERIAL SUSCEPTIBILITY PANEL BY YA      Condensed View       Respiratory Culture:  No results found for: Kaila Maher  AFB:No results found for: AFSANTOSH  Viral Culture:  Lab Results   Component Value Date/Time    COVID19 Not Detected 09/07/2022 12:00 AM    COVID19 Not Detected 07/20/2022 11:20 PM     Urine Culture:   Recent Labs     09/12/22  1616   LABURIN No growth at 18 to 36 hours       Summary   Normal left ventricle size, wall thickness, and systolic function with an   estimated ejection fraction of 60-65%. No regional wall motion abnormalities   are seen. Normal diastolic function. Normal right ventricular size and function. Mobile vegetation on tricuspid valve suggestive of endocarditis. Moderate tricuspid regurgitation. The right atrium is mildly dilated.       Signature      ------------------------------------------------------------------   Electronically signed by Christopher Orozco MD   (Interpreting physician) on 09/13/2022 at 04:15 PM ------------------------------------------------------------------    IMAGING:    CT HEAD WO CONTRAST   Final Result   No acute intracranial abnormality. CT CHEST WO CONTRAST   Final Result   Diffuse in numeral bilateral pulmonary nodules many of which are cavitary   becoming coalescent at the right lung apex however diffusely throughout the   bilateral lungs consistent of septic emboli with small to moderate right   pleural effusion      Partially visualized portions of the upper abdomen reveal hepatosplenomegaly         XR CHEST PORTABLE   Final Result   Multifocal patchy airspace disease and cavitary nodules. The appearance is   suggestive of septic emboli in this clinical setting. Follow-up recommended to assure resolution.          VL Extremity Venous Bilateral    (Results Pending)         All the pertinent images and reports for the current Hospitalization were reviewed by me     Scheduled Meds:   vancomycin  750 mg IntraVENous Q12H    linezolid  600 mg IntraVENous Q12H    cefepime  2,000 mg IntraVENous Q8H    sodium chloride flush  10 mL IntraVENous 2 times per day    enoxaparin  40 mg SubCUTAneous Daily    vancomycin (VANCOCIN) intermittent dosing (placeholder)   Other RX Placeholder       Continuous Infusions:   sodium chloride 125 mL/hr at 09/15/22 0402    sodium chloride Stopped (09/15/22 0041)       PRN Meds:  naloxone, morphine, calcium carbonate, promethazine, sodium chloride flush, sodium chloride, potassium chloride **OR** potassium alternative oral replacement **OR** potassium chloride, potassium chloride, magnesium sulfate, promethazine **OR** ondansetron, magnesium hydroxide, acetaminophen **OR** acetaminophen, perflutren lipid microspheres      Assessment:     Patient Active Problem List   Diagnosis    Tear of medial cartilage or meniscus of knee, current    Plica syndrome    Boxer's metacarpal fracture, neck, closed    Chondromalacia of patella    Degeneration of lumbar or lumbosacral intervertebral disc    Varicose veins of lower extremity    Intractable nausea and vomiting    Septicemia (HCC)    IVDU (intravenous drug user)    MRSA bacteremia    Sepsis due to methicillin resistant Staphylococcus aureus (MRSA) without acute organ dysfunction (HCC)    Bacterial infection due to Serratia    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 WBC count is elevated high fever from ongoing bloodstream infection. Given IV drug abuse suspect endocarditis transthoracic echocardiogram with TV Vegetation    Trend WBC and repeat Blood cx in process    Will need placement to complete IV ABX    Will ask Cardiology eval for possible angiovac given the vegetation ? Rt gluteal and lower Lumbar area pain will check for abscess or Diskitis given IVDA      Labs, Microbiology, Radiology and all the pertinent results from current hospitalization and  care every where were reviewed  by me as a part of the evaluation   Plan:   Cont IV cefepime x 2 gm q 8 hrs for Serratia bacteremia   Cont IV Vancomycin x 750 mg q 12 HRS  Cont  IV Linezolid x 600 mg q 12 HRS   Repeat Blood cx in process    TTE Abnormal with vegetation   Will need IV abx  Watch for complications  HIV -ve and Hepatitis screen Hep C+Ve  Cardiology consult  for possible angiovac ? MRI L spine to include sacral area check for abscess and Diskitis      Discussed with patient/Family and Nursing   Risk of Complications/Morbidity: High      Illness(es)/ Infection present that pose threat to bodily function.    There is potential for severe exacerbation of infection/side effects of treatment. Therapy requires intensive monitoring for antimicrobial agent toxicity. Discussed with patient/Family and Nursing staff     Thanks for allowing me to participate in your patient's care and please call me with any questions or concerns.     Kindra Schneider MD  Infectious Disease  [facility]) Physician  Phone: [phone]   Fax : [phone]

## 2022-09-16 ENCOUNTER — HOSPITAL ENCOUNTER (INPATIENT)
Dept: CARDIAC CATH/INVASIVE PROCEDURES | Age: 42
Discharge: HOME | DRG: 720 | End: 2022-09-16
Payer: MEDICAID

## 2022-09-16 ENCOUNTER — APPOINTMENT (OUTPATIENT)
Dept: ULTRASOUND IMAGING | Age: 42
DRG: 720 | End: 2022-09-16
Payer: MEDICAID

## 2022-09-16 ENCOUNTER — APPOINTMENT (OUTPATIENT)
Dept: CT IMAGING | Age: 42
DRG: 720 | End: 2022-09-16
Payer: MEDICAID

## 2022-09-16 LAB
ANION GAP SERPL CALCULATED.3IONS-SCNC: 8 MMOL/L (ref 3–16)
BASOPHILS ABSOLUTE: 0 K/UL (ref 0–0.2)
BASOPHILS RELATIVE PERCENT: 0.2 %
BUN BLDV-MCNC: 42 MG/DL (ref 7–20)
C4 COMPLEMENT: 11 MG/DL (ref 10–40)
CALCIUM SERPL-MCNC: 7.2 MG/DL (ref 8.3–10.6)
CHLORIDE BLD-SCNC: 107 MMOL/L (ref 99–110)
CO2: 22 MMOL/L (ref 21–32)
CREAT SERPL-MCNC: 1.8 MG/DL (ref 0.6–1.1)
EOSINOPHILS ABSOLUTE: 0.2 K/UL (ref 0–0.6)
EOSINOPHILS RELATIVE PERCENT: 1.6 %
GFR AFRICAN AMERICAN: 37
GFR NON-AFRICAN AMERICAN: 31
GLUCOSE BLD-MCNC: 89 MG/DL (ref 70–99)
HCT VFR BLD CALC: 20.8 % (ref 36–48)
HEMOGLOBIN: 7 G/DL (ref 12–16)
LV EF: 58 %
LVEF MODALITY: NORMAL
LYMPHOCYTES ABSOLUTE: 0.9 K/UL (ref 1–5.1)
LYMPHOCYTES RELATIVE PERCENT: 8.2 %
MCH RBC QN AUTO: 27.1 PG (ref 26–34)
MCHC RBC AUTO-ENTMCNC: 33.4 G/DL (ref 31–36)
MCV RBC AUTO: 81 FL (ref 80–100)
MONOCYTES ABSOLUTE: 0.6 K/UL (ref 0–1.3)
MONOCYTES RELATIVE PERCENT: 5.4 %
NEUTROPHILS ABSOLUTE: 9.4 K/UL (ref 1.7–7.7)
NEUTROPHILS RELATIVE PERCENT: 84.6 %
PDW BLD-RTO: 16.1 % (ref 12.4–15.4)
PHOSPHORUS: 4.5 MG/DL (ref 2.5–4.9)
PLATELET # BLD: 327 K/UL (ref 135–450)
PMV BLD AUTO: 8.5 FL (ref 5–10.5)
POTASSIUM REFLEX MAGNESIUM: 3.8 MMOL/L (ref 3.5–5.1)
RBC # BLD: 2.57 M/UL (ref 4–5.2)
SODIUM BLD-SCNC: 137 MMOL/L (ref 136–145)
TOTAL CK: 10 U/L (ref 26–192)
WBC # BLD: 11.1 K/UL (ref 4–11)

## 2022-09-16 PROCEDURE — 93325 DOPPLER ECHO COLOR FLOW MAPG: CPT

## 2022-09-16 PROCEDURE — 2060000000 HC ICU INTERMEDIATE R&B

## 2022-09-16 PROCEDURE — 6370000000 HC RX 637 (ALT 250 FOR IP): Performed by: INTERNAL MEDICINE

## 2022-09-16 PROCEDURE — 0W9H30Z DRAINAGE OF RETROPERITONEUM WITH DRAINAGE DEVICE, PERCUTANEOUS APPROACH: ICD-10-PCS | Performed by: RADIOLOGY

## 2022-09-16 PROCEDURE — 6370000000 HC RX 637 (ALT 250 FOR IP)

## 2022-09-16 PROCEDURE — 93312 ECHO TRANSESOPHAGEAL: CPT

## 2022-09-16 PROCEDURE — 77012 CT SCAN FOR NEEDLE BIOPSY: CPT

## 2022-09-16 PROCEDURE — 99233 SBSQ HOSP IP/OBS HIGH 50: CPT | Performed by: INTERNAL MEDICINE

## 2022-09-16 PROCEDURE — 87186 SC STD MICRODIL/AGAR DIL: CPT

## 2022-09-16 PROCEDURE — 6360000002 HC RX W HCPCS: Performed by: RADIOLOGY

## 2022-09-16 PROCEDURE — 2580000003 HC RX 258: Performed by: INTERNAL MEDICINE

## 2022-09-16 PROCEDURE — 2580000003 HC RX 258

## 2022-09-16 PROCEDURE — 6360000002 HC RX W HCPCS: Performed by: INTERNAL MEDICINE

## 2022-09-16 PROCEDURE — 6360000002 HC RX W HCPCS

## 2022-09-16 PROCEDURE — 86403 PARTICLE AGGLUT ANTBDY SCRN: CPT

## 2022-09-16 PROCEDURE — 2500000003 HC RX 250 WO HCPCS

## 2022-09-16 PROCEDURE — 86160 COMPLEMENT ANTIGEN: CPT

## 2022-09-16 PROCEDURE — 85025 COMPLETE CBC W/AUTO DIFF WBC: CPT

## 2022-09-16 PROCEDURE — 76770 US EXAM ABDO BACK WALL COMP: CPT

## 2022-09-16 PROCEDURE — 9990000010 HC NO CHARGE VISIT: Performed by: PHYSICAL THERAPIST

## 2022-09-16 PROCEDURE — 87075 CULTR BACTERIA EXCEPT BLOOD: CPT

## 2022-09-16 PROCEDURE — B24BZZ4 ULTRASONOGRAPHY OF HEART WITH AORTA, TRANSESOPHAGEAL: ICD-10-PCS | Performed by: INTERNAL MEDICINE

## 2022-09-16 PROCEDURE — 84100 ASSAY OF PHOSPHORUS: CPT

## 2022-09-16 PROCEDURE — 80048 BASIC METABOLIC PNL TOTAL CA: CPT

## 2022-09-16 PROCEDURE — 94760 N-INVAS EAR/PLS OXIMETRY 1: CPT

## 2022-09-16 PROCEDURE — 87077 CULTURE AEROBIC IDENTIFY: CPT

## 2022-09-16 PROCEDURE — 82550 ASSAY OF CK (CPK): CPT

## 2022-09-16 PROCEDURE — 87205 SMEAR GRAM STAIN: CPT

## 2022-09-16 PROCEDURE — 87070 CULTURE OTHR SPECIMN AEROBIC: CPT

## 2022-09-16 PROCEDURE — C1769 GUIDE WIRE: HCPCS

## 2022-09-16 PROCEDURE — 36415 COLL VENOUS BLD VENIPUNCTURE: CPT

## 2022-09-16 RX ORDER — MIDAZOLAM HYDROCHLORIDE 1 MG/ML
INJECTION INTRAMUSCULAR; INTRAVENOUS DAILY PRN
Status: COMPLETED | OUTPATIENT
Start: 2022-09-16 | End: 2022-09-16

## 2022-09-16 RX ORDER — FENTANYL CITRATE 50 UG/ML
INJECTION, SOLUTION INTRAMUSCULAR; INTRAVENOUS DAILY PRN
Status: COMPLETED | OUTPATIENT
Start: 2022-09-16 | End: 2022-09-16

## 2022-09-16 RX ADMIN — MORPHINE SULFATE 0.5 MG: 2 INJECTION, SOLUTION INTRAMUSCULAR; INTRAVENOUS at 01:06

## 2022-09-16 RX ADMIN — CEFEPIME 2000 MG: 2 INJECTION, POWDER, FOR SOLUTION INTRAVENOUS at 00:37

## 2022-09-16 RX ADMIN — FENTANYL CITRATE 25 MCG: 50 INJECTION INTRAMUSCULAR; INTRAVENOUS at 11:58

## 2022-09-16 RX ADMIN — ONDANSETRON 4 MG: 2 INJECTION INTRAMUSCULAR; INTRAVENOUS at 07:31

## 2022-09-16 RX ADMIN — MORPHINE SULFATE 0.5 MG: 2 INJECTION, SOLUTION INTRAMUSCULAR; INTRAVENOUS at 23:58

## 2022-09-16 RX ADMIN — LINEZOLID 600 MG: 600 INJECTION, SOLUTION INTRAVENOUS at 22:30

## 2022-09-16 RX ADMIN — SODIUM CHLORIDE: 9 INJECTION, SOLUTION INTRAVENOUS at 21:53

## 2022-09-16 RX ADMIN — LINEZOLID 600 MG: 600 INJECTION, SOLUTION INTRAVENOUS at 10:36

## 2022-09-16 RX ADMIN — CEFTAROLINE FOSAMIL 600 MG: 600 POWDER, FOR SOLUTION INTRAVENOUS at 14:21

## 2022-09-16 RX ADMIN — SODIUM CHLORIDE: 9 INJECTION, SOLUTION INTRAVENOUS at 12:44

## 2022-09-16 RX ADMIN — MORPHINE SULFATE 0.5 MG: 2 INJECTION, SOLUTION INTRAMUSCULAR; INTRAVENOUS at 19:55

## 2022-09-16 RX ADMIN — MIDAZOLAM 0.5 MG: 1 INJECTION INTRAMUSCULAR; INTRAVENOUS at 11:58

## 2022-09-16 RX ADMIN — VANCOMYCIN HYDROCHLORIDE 750 MG: 750 INJECTION, POWDER, LYOPHILIZED, FOR SOLUTION INTRAVENOUS at 05:38

## 2022-09-16 RX ADMIN — Medication 10 ML: at 19:56

## 2022-09-16 RX ADMIN — SODIUM CHLORIDE: 9 INJECTION, SOLUTION INTRAVENOUS at 02:19

## 2022-09-16 RX ADMIN — MORPHINE SULFATE 0.5 MG: 2 INJECTION, SOLUTION INTRAMUSCULAR; INTRAVENOUS at 15:28

## 2022-09-16 RX ADMIN — MORPHINE SULFATE 0.5 MG: 2 INJECTION, SOLUTION INTRAMUSCULAR; INTRAVENOUS at 05:04

## 2022-09-16 RX ADMIN — ACETAMINOPHEN 650 MG: 325 TABLET ORAL at 17:19

## 2022-09-16 ASSESSMENT — PAIN SCALES - GENERAL
PAINLEVEL_OUTOF10: 9
PAINLEVEL_OUTOF10: 7
PAINLEVEL_OUTOF10: 8
PAINLEVEL_OUTOF10: 7
PAINLEVEL_OUTOF10: 7

## 2022-09-16 ASSESSMENT — PAIN DESCRIPTION - ORIENTATION
ORIENTATION: RIGHT
ORIENTATION: RIGHT

## 2022-09-16 ASSESSMENT — PAIN DESCRIPTION - DESCRIPTORS
DESCRIPTORS: ACHING
DESCRIPTORS: ACHING
DESCRIPTORS: ACHING;DISCOMFORT
DESCRIPTORS: ACHING;DISCOMFORT

## 2022-09-16 ASSESSMENT — PAIN DESCRIPTION - ONSET
ONSET: ON-GOING
ONSET: ON-GOING

## 2022-09-16 ASSESSMENT — PAIN DESCRIPTION - FREQUENCY
FREQUENCY: CONTINUOUS
FREQUENCY: CONTINUOUS

## 2022-09-16 ASSESSMENT — PAIN DESCRIPTION - LOCATION
LOCATION: LEG
LOCATION: LEG;BACK
LOCATION: BACK
LOCATION: BACK

## 2022-09-16 ASSESSMENT — PAIN DESCRIPTION - PAIN TYPE
TYPE: ACUTE PAIN
TYPE: ACUTE PAIN

## 2022-09-16 NOTE — PRE SEDATION
Sedation Pre-Procedure Note    Patient Name: Rosendo Regan   YOB: 1980  Room/Bed: R0U-1680/1852-17  Medical Record Number: [id number]  Date: 9/16/2022   Time: 12:23 PM       Indication:  abscess drain placement. Consent: I have discussed with the patient and/or the patient representative the indication, alternatives, and the possible risks and/or complications of the planned procedure and the anesthesia methods. The patient and/or patient representative appear to understand and agree to proceed. Vital Signs:   Vitals:    09/16/22 1215   BP:    Pulse: 87   Resp: 23   Temp:    SpO2:        Past Medical History:   has a past medical history of ADHD (attention deficit hyperactivity disorder), Arthritis, Back pain, Depression, Migraine, and Neutrophilia. Past Surgical History:   has a past surgical history that includes Partial hysterectomy; Knee arthroscopy (10-5-10); and Tubal ligation (2004). Medications:   Scheduled Meds:    ceftaroline fosamil (TEFLARO) IVPB  600 mg IntraVENous Q12H    linezolid  600 mg IntraVENous Q12H    sodium chloride flush  10 mL IntraVENous 2 times per day    enoxaparin  40 mg SubCUTAneous Daily     Continuous Infusions:    sodium chloride 125 mL/hr at 09/16/22 0219    sodium chloride Stopped (09/15/22 0041)     PRN Meds: naloxone, morphine, calcium carbonate, promethazine, sodium chloride flush, sodium chloride, potassium chloride **OR** potassium alternative oral replacement **OR** potassium chloride, magnesium sulfate, promethazine **OR** ondansetron, magnesium hydroxide, acetaminophen **OR** acetaminophen, perflutren lipid microspheres  Home Meds:   Prior to Admission medications    Medication Sig Start Date End Date Taking?  Authorizing Provider   ibuprofen (ADVIL;MOTRIN) 800 MG tablet Take 1 tablet by mouth every 8 hours as needed for Pain 9/7/22   Raegan Neely DO     Coumadin Use Last 7 Days:  no  Antiplatelet drug therapy use last 7 days: no  Other anticoagulant use last 7 days: no  Additional Medication Information:  n/a      Pre-Sedation Documentation and Exam:   I have reviewed the patient's history and review of systems.     Mallampati Airway Assessment:  Mallampati Class II - (soft palate, fauces & uvula are visible)    Prior History of Anesthesia Complications:   none    ASA Classification:  Class 2 - A normal healthy patient with mild systemic disease    Sedation/ Anesthesia Plan:   intravenous sedation    Medications Planned:   midazolam (Versed) intravenously and fentanyl intravenously    Patient is an appropriate candidate for plan of sedation: yes    Electronically signed by Valentino Battle, MD on 9/16/2022 at 12:23 PM

## 2022-09-16 NOTE — PROGRESS NOTES
Physical Therapy  [facility]  [id number]  [id number]    Attempted to see for PT session however pt just back from having drain placed in the right iliacus abscess and pt in too much pain to participate; will continue to follow  Electronically signed by CHLOÉ ALMAZAN, PT on 9/16/2022 at 1:11 PM

## 2022-09-16 NOTE — PLAN OF CARE
Problem: Discharge Planning  Goal: Discharge to home or other facility with appropriate resources  9/16/2022 1142 by Vamsi Vaughn RN  Outcome: Progressing     Problem: Safety - Adult  Goal: Free from fall injury  9/16/2022 1142 by Vamsi Vaughn RN  Outcome: Progressing     Problem: Pain  Goal: Verbalizes/displays adequate comfort level or baseline comfort level  9/16/2022 1142 by Vamsi Vaughn RN  Outcome: Progressing     Problem: ABCDS Injury Assessment  Goal: Absence of physical injury  9/16/2022 1142 by Vamsi Vaughn RN  Outcome: Progressing     Problem: Skin/Tissue Integrity  Goal: Absence of new skin breakdown  Description: 1. Monitor for areas of redness and/or skin breakdown  2. Assess vascular access sites hourly  3. Every 4-6 hours minimum:  Change oxygen saturation probe site  4. Every 4-6 hours:  If on nasal continuous positive airway pressure, respiratory therapy assess nares and determine need for appliance change or resting period.   9/16/2022 1142 by Vamsi Vaughn RN  Outcome: Progressing

## 2022-09-16 NOTE — PROGRESS NOTES
Occupational Therapy    Attempted to see pt for OT Tx. Pt off floor in cath lab. Will follow up as schedule and pt condition permit.     Electronically signed by Mo Rogers OT on 9/16/2022 at 8:56 AM

## 2022-09-16 NOTE — PLAN OF CARE
Problem: Discharge Planning  Goal: Discharge to home or other facility with appropriate resources  Outcome: Progressing     Problem: Safety - Adult  Goal: Free from fall injury  Outcome: Progressing     Problem: Pain  Goal: Verbalizes/displays adequate comfort level or baseline comfort level  Outcome: Progressing     Problem: ABCDS Injury Assessment  Goal: Absence of physical injury  Outcome: Progressing     Problem: Skin/Tissue Integrity  Goal: Absence of new skin breakdown  Description: 1. Monitor for areas of redness and/or skin breakdown  2. Assess vascular access sites hourly  3. Every 4-6 hours minimum:  Change oxygen saturation probe site  4. Every 4-6 hours:  If on nasal continuous positive airway pressure, respiratory therapy assess nares and determine need for appliance change or resting period.   Outcome: Progressing

## 2022-09-16 NOTE — BRIEF OP NOTE
Brief Postoperative Note    Hailey Nicholas  YOB: 1980  [id number]    Pre-operative Diagnosis: right iliacus abscess    Post-operative Diagnosis: Same    Procedure: CT-guided drain placement    Anesthesia: Moderate Sedation    Surgeons: Oni Johnston MD    Estimated Blood Loss: Less than 5 mL    Complications: None    Specimens: Was Obtained: 100cc purulent fluid drained and sent for culture    Findings: Successful placement of a 10F drain in the right iliacus abscess.     Electronically signed by Oni Johnston MD on 9/16/2022 at 12:24 PM

## 2022-09-16 NOTE — CONSULTS
[facility]  Phone: [phone]  Fax: [phone]  [email or web address]         Reason for Consult: MARYAN  Requesting Physician:  Dr. Deirdre Gracia From:  patient, electronic medical record    History of Present Ilness:  39 y/o WF with h/o depression, IVDA and back pain admitted with feeling weak, fatigued and pain in her back She was seen in ER on 9/7/22 with back apin and diagnosed with UTI Received toradol and was discharged on Motrin and Cefdinir Urine CX grew klebsiella pan senstive However she continued to feel poorly with back pain, and fatigue   Had fever to 101 on admission Started on Vancomycin and Cefepime Cr was 1.5 mg on admission improved to 1.1 mg but has increased to 1.8 mg now [facility] from admission growing MRSA and Serratia CT chest with cavitation and septic emboli  She reports decreased UOP no dysuria hematuria  Has been taking Ibuprofen 800 mg TID prior to admission  MRI showed OM of R SI joint and large retroperitoneal abscess   Underwent drainage tube placement today          Past Medical History:        Diagnosis Date    ADHD (attention deficit hyperactivity disorder)     Arthritis     Back pain     Depression     Migraine     Neutrophilia 9/15/2022       Past Surgical History:        Procedure Laterality Date    KNEE ARTHROSCOPY  10-5-10    PARTIAL HYSTERECTOMY (CERVIX NOT REMOVED)      TUBAL LIGATION  2004       Home Medications:    No current facility-administered medications on file prior to encounter.      Current Outpatient Medications on File Prior to Encounter   Medication Sig Dispense Refill    ibuprofen (ADVIL;MOTRIN) 800 MG tablet Take 1 tablet by mouth every 8 hours as needed for Pain 20 tablet 0       Allergies:  Ultram [tramadol hcl], Robaxin [methocarbamol], and Penicillins    Social History:    Social History     Socioeconomic History    Marital status: Single     Spouse name: Not on file    Number of children: Not on file    Years of education: Not on file    Highest education level: Not on file   Occupational History    Not on file   Tobacco Use    Smoking status: Every Day     Packs/day: 0.50     Years: 2.00     Pack years: 1.00     Types: Cigarettes    Smokeless tobacco: Never   Substance and Sexual Activity    Alcohol use: Yes     Comment: occas    Drug use: No    Sexual activity: Not on file   Other Topics Concern    Not on file   Social History Narrative    Not on file     Social Determinants of Health     Financial Resource Strain: Not on file   Food Insecurity: Not on file   Transportation Needs: Not on file   Physical Activity: Not on file   Stress: Not on file   Social Connections: Not on file   Intimate Partner Violence: Not on file   Housing Stability: Not on file       Family History:   Family History   Problem Relation Age of Onset    Breast Cancer Maternal Grandmother 45    Arthritis Other     Asthma Other     Cancer Other     Diabetes Other     Seizures Other     Stroke Other        Review of Systems:   Pertinent positives stated above in HPI. All other systems were reviewed and were negative.     Physical exam:   Constitutional:  VITALS:  /69   Pulse 84   Temp 98.3 °F (36.8 °C)   Resp 27   Ht 5' 8\" (1.727 m)   Wt 147 lb 11.3 oz (67 kg)   SpO2 100%   BMI 22.46 kg/m²   Gen: ill appearing   Skin: no rash, turgor wnl  Heent:  eomi, mmm  Neck: no bruits or jvd noted, thyroid normal  Cardiovascular:  S1, S2 without m/r/g  Respiratory: CTA B without w/r/r; respiratory effort normal  Abdomen:  +bs, soft, nt, nd, no hepatosplenomegaly Drain present RLQ   Ext: no lower extremity edema  Psychiatric: mood and affect appropriate; judgement and insight intact  Musculoskeletal:  Rom, muscular strength intact; digits, nails normal    Data/  CBC:   Lab Results   Component Value Date/Time    WBC 11.1 09/16/2022 04:13 AM    RBC 2.57 09/16/2022 04:13 AM    HGB 7.0 09/16/2022 04:13 AM    HCT 20.8 09/16/2022 04:13 AM    MCV 81.0 09/16/2022 04:13 AM MCH 27.1 09/16/2022 04:13 AM    MCHC 33.4 09/16/2022 04:13 AM    RDW 16.1 09/16/2022 04:13 AM     09/16/2022 04:13 AM    MPV 8.5 09/16/2022 04:13 AM     BMP:    Lab Results   Component Value Date/Time     09/16/2022 04:13 AM    K 3.8 09/16/2022 04:13 AM     09/16/2022 04:13 AM    CO2 22 09/16/2022 04:13 AM    BUN 42 09/16/2022 04:13 AM    LABALBU 2.0 09/12/2022 03:44 PM    CREATININE 1.8 09/16/2022 04:13 AM    CALCIUM 7.2 09/16/2022 04:13 AM    GFRAA 37 09/16/2022 04:13 AM    GFRAA >60 07/28/2012 11:20 PM    LABGLOM 31 09/16/2022 04:13 AM    GLUCOSE 89 09/16/2022 04:13 AM         Assessment/  1-MARYAN suspect Vancomycin toxicity Vanc random level 24 yesterday correlates with the rise in Cr although, SIRS can be contributing Cannot r/o post infectious GN , doubt AIN   2-MRSA and serratia bacteremia with retroperitoneal abscess  septic pulmonary emboli and 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 Please do not hesitate to call with questions.     Mk Hernandez MD, Linsey Perry

## 2022-09-16 NOTE — PROGRESS NOTES
Infectious Disease Follow up Notes  Admit Date: 9/12/2022  Hospital Day: 5    Antibiotics :   IV Vancomycin d/c due to MARYAN  IV Linezolid  IV Ceftaroline      CHIEF COMPLAINT:     Sepsis  MRSA bacteremia  Serratia Bacteremia  Endocarditis  IVDA  TV Vegetation     Subjective interval History :  40 y. o.woman with a history of IV drug abuse, ADHD, back pain, depression admitted to the hospital secondary to fever chills fatigue back pain. Patient family noted she was unable to get up from the bed for the past 3 days secondary to fevers and not feeling well. Admission labs indicate creatinine 1.5 procalcitonin elevated to 6.6, WBC elevated 15.5 hemoglobin 9.4 bilirubin 1.3 AST 42, blood cultures from admission positive for MRSA as well as Serratia. T-max 103.8 on admission. CT chest with diffuse innumerable bilateral pulmonary nodules consistent with cavitation and septic emboli. Given the presentation concern is for endocarditis secondary to IV drug abuse. Patient not much interactive during normalization some of the history is provided by her daughters,       Interval History : more awake asking for pain meds and on going back pain and Rt gluteal area pain ,YESI with Rt side abscess noted and IR consult placed for drainage  -   Past Medical History:    Past Medical History:   Diagnosis Date    ADHD (attention deficit hyperactivity disorder)     Arthritis     Back pain     Depression     Migraine     Neutrophilia 9/15/2022       Past Surgical History:    Past Surgical History:   Procedure Laterality Date    KNEE ARTHROSCOPY  10-5-10    PARTIAL HYSTERECTOMY (CERVIX NOT REMOVED)      TUBAL LIGATION  2004       Current Medications:    No outpatient medications have been marked as taking for the 9/12/22 encounter [facility] Encounter).        Allergies:  Ultram [tramadol hcl], Robaxin [methocarbamol], and Penicillins    Immunizations : Immunization History   Administered Date(s) Administered    Tdap (Boostrix, Adacel) 02/11/2015       Social History:    Social History     Tobacco Use    Smoking status: Every Day     Packs/day: 0.50     Years: 2.00     Pack years: 1.00     Types: Cigarettes    Smokeless tobacco: Never   Substance Use Topics    Alcohol use: Yes     Comment: occas    Drug use: No     Social History     Tobacco Use   Smoking Status Every Day    Packs/day: 0.50    Years: 2.00    Pack years: 1.00    Types: Cigarettes   Smokeless Tobacco Never      Family History   Problem Relation Age of Onset    Breast Cancer Maternal Grandmother 45    Arthritis Other     Asthma Other     Cancer Other     Diabetes Other     Seizures Other     Stroke Other           REVIEW OF SYSTEMS:      Constitutional:  fevers,++  chills +=, night sweats  Eyes:  negative for blurred vision, eye discharge, visual disturbance   HEENT:  negative for hearing loss, ear drainage,nasal congestion  Respiratory:  negative for cough, shortness of breath or hemoptysis   Cardiovascular:  negative for chest pain, palpitations, syncope  Gastrointestinal:  negative for nausea, vomiting, diarrhea, constipation, abdominal pain  Genitourinary:  negative for frequency, dysuria, urinary incontinence, hematuria  Hematologic/Lymphatic:  negative for easy bruising, bleeding and lymphadenopathy  Allergic/Immunologic:  negative for recurrent infections, angioedema, anaphylaxis   Endocrine:  negative for weight changes, polyuria, polydipsia and polyphagia  Musculoskeletal:  Rt hip and lower back pain ++   pain, swelling, decreased range of motion  Integumentary: No rashes, skin lesions  Neurological:  negative for headaches, slurred speech, unilateral weakness  Psychiatric: negative for hallucinations,confusion,agitation.                 PHYSICAL EXAM:      Vitals:    /70   Pulse 82   Temp 98.5 °F (36.9 °C) (Oral)   Resp 20   Ht 5' 8\" (1.727 m)   Wt 147 lb 11.3 oz (67 kg)   SpO2 97%   BMI 22.46 kg/m²        General Appearance: alert,in  acute distress, ++ pallor, no icterus  poor skin hygiene   Skin: warm and dry, no rash or erythema  Head: normocephalic and atraumatic  Eyes: pupils equal, round, and reactive to light, conjunctivae normal  ENT: tympanic membrane, external ear and ear canal normal bilaterally, nose without deformity, nasal mucosa and turbinates normal without polyps  Neck: supple and non-tender without mass, no thyromegaly  no cervical lymphadenopathy  Pulmonary/Chest: Bi basal crepts+ - no wheezes, rales or rhonchi, normal air movement, no respiratory distress  Cardiovascular: normal rate, regular rhythm, normal S1 and S2, esm+ murmurs, rubs, clicks, or gallops, no carotid bruits  Abdomen: soft, non-tender, non-distended, normal bowel sounds, no masses or organomegaly  Extremities: no cyanosis, clubbing or edema  Musculoskeletal: normal range of motion, no joint swelling, deformity or tenderness  Integumentary: No rashes, no abnormal skin lesions, no petechiae  Neurologic: reflexes normal and symmetric, no cranial nerve deficit  Psych:  Orientation, sensorium, mood normal            Lines: IV  Needle tracks++   Rt gluteal area pain  Lower leg multiple skin lesions from IVDA++     Data Review:    CBC:   Lab Results   Component Value Date    WBC 11.1 (H) 09/16/2022    HGB 7.0 (L) 09/16/2022    HCT 20.8 (LL) 09/16/2022    MCV 81.0 09/16/2022     09/16/2022     RENAL:   Lab Results   Component Value Date    CREATININE 1.8 (H) 09/16/2022    BUN 42 (H) 09/16/2022     09/16/2022    K 3.8 09/16/2022     09/16/2022    CO2 22 09/16/2022     SED RATE:   Lab Results   Component Value Date/Time    SEDRATE 51 07/23/2019 09:59 PM     CK: No results found for: CKTOTAL  CRP: No results found for: CRP  Hepatic Function Panel:   Lab Results   Component Value Date/Time    ALKPHOS 265 09/12/2022 03:44 PM    ALT 17 09/12/2022 03:44 PM    AST 42 09/12/2022 03:44 PM    PROT 7.0 09/12/2022 03:44 PM    PROT 6.5 07/28/2012 11:20 PM    BILITOT 1.3 09/12/2022 03:44 PM    LABALBU 2.0 09/12/2022 03:44 PM     UA:  Lab Results   Component Value Date/Time    COLORU Yellow 09/12/2022 04:16 PM    CLARITYU CLOUDY 09/12/2022 04:16 PM    GLUCOSEU Negative 09/12/2022 04:16 PM    GLUCOSEU NEGATIVE 07/31/2010 02:59 PM    BILIRUBINUR Negative 09/12/2022 04:16 PM    BILIRUBINUR NEGATIVE 07/31/2010 02:59 PM    KETUA Negative 09/12/2022 04:16 PM    SPECGRAV 1.018 09/12/2022 04:16 PM    BLOODU LARGE 09/12/2022 04:16 PM    PHUR 5.5 09/12/2022 04:16 PM    PROTEINU 100 09/12/2022 04:16 PM    UROBILINOGEN 1.0 09/12/2022 04:16 PM    NITRU Negative 09/12/2022 04:16 PM    LEUKOCYTESUR MODERATE 09/12/2022 04:16 PM    LABMICR YES 09/12/2022 04:16 PM    URINETYPE NotGiven 09/12/2022 04:16 PM      Urine Microscopic:   Lab Results   Component Value Date/Time    BACTERIA None Seen 09/12/2022 04:16 PM    COMU see below 09/07/2022 01:55 AM    HYALCAST 3 09/12/2022 04:16 PM    WBCUA 24 09/12/2022 04:16 PM    RBCUA 105 09/12/2022 04:16 PM    EPIU 17 09/12/2022 04:16 PM     Urine Reflex to Culture:   Lab Results   Component Value Date/Time    URRFLXCULT Yes 09/12/2022 04:16 PM         MICRO: cultures reviewed and updated by me   Blood Culture:          MRSA DNA Probe, Nasal [[id number]] Collected: 09/13/22 1220   Order Status: Completed Specimen: Nares Updated: 09/14/22 1131    MRSA SCREEN RT-PCR --    Negative  MRSA DNA not detected.    Normal Range: Not detected    Narrative:     ORDER#: [id number]                          ORDERED BY: Shahriar Olivier   SOURCE: Nares                              COLLECTED:  09/13/22 12:20   ANTIBIOTICS AT ANABELLA.:                      RECEIVED :  09/13/22 12:46   Blood Culture 1 [[id number]] (Abnormal) Collected: 09/12/22 1616   Order Status: Completed Specimen: Blood Updated: 09/14/22 0939    Blood Culture, Routine -- Abnormal     Gram stain Aerobic bottle:   Gram positive cocci in clusters resembling Staphylococcus   Information to follow   and   Gram negative rods   Information to follow   Gram stain Anaerobic bottle:   Gram positive cocci in clusters   resembling Staphylococcus   Information to follow    Abnormal     Organism Staph aureus MRSA DNA Detected Abnormal     Blood Culture, Routine -- Abnormal     CONTACT PRECAUTIONS INDICATED   See additional report for complete BCID panel. mecA/C gene and MREJ gene Detected. Abnormal     Organism Serratia marcescens DNA Detected Abnormal     Blood Culture, Routine See additional report for complete BCID panel. Organism Staph aureus MRSA Abnormal     Blood Culture, Routine -- Abnormal     POSITIVE for   Sensitivity to follow   CONTACT PRECAUTIONS INDICATED   Isolated two of two sets    Abnormal     Organism Serratia marcescens Abnormal     Blood Culture, Routine --    POSITIVE for   Sensitivity to follow   Isolated one of two sets    Narrative:     ORDER#: [id number]                          ORDERED BY: Oscar Chang   SOURCE: Blood                              COLLECTED:  09/12/22 16:16   ANTIBIOTICS AT ANABELLA.:                      RECEIVED :  09/12/22 16:32   CALL  [facility] [phone],   Microbiology results called to and read back by Zaire Green RN,   09/13/2022 08:24, by [facility]   Microbiology results called to and read back by Rose Campos,   09/13/2022 07:05, by [facility]   If child <=2 yrs old please draw pediatric bottle. ~Blood Culture 1   Culture, Blood 2 [[id number]] Collected: 09/14/22 0525   Order Status: Sent Specimen: Blood Updated: 09/14/22 0600   Culture, Blood 1 [[id number]] Collected: 09/14/22 0436   Order Status: Sent Specimen: Blood Updated: 09/14/22 0441   Culture, Urine [[id number]] Collected: 09/12/22 1616   Order Status: Completed Specimen: Urine, clean catch Updated: 09/13/22 2245    Urine Culture, Routine No growth at 18 to 36 hours   Narrative:     ORDER#: [id number]                          ORDERED BY: Swapna Rudolph   SOURCE: Urine Clean Catch                  COLLECTED:  09/12/22 16:16   ANTIBIOTICS AT ANABELLA.:                      RECEIVED :  09/12/22 19:35   Culture, Blood 2 [[id number]] (Abnormal) Collected: 09/12/22 2012   Order Status: Completed Specimen: Blood Updated: 09/13/22 1416    Culture, Blood 2 -- Abnormal     Gram stain Aerobic bottle:   Gram positive cocci in clusters   resembling Staphylococcus   Information to follow   Gram stain Anaerobic bottle:   Gram positive cocci in clusters   resembling Staphylococcus   Information to follow    Abnormal    Narrative:     ORDER#: [id number]                          ORDERED BY: Swapna Rudolph   SOURCE: Blood                              COLLECTED:  09/12/22 20:12   ANTIBIOTICS AT ANABELLA.:                      RECEIVED :  09/12/22 20:21   CALL  [facility]  [id number] tel. [phone],   Previous panic on this admission - call not needed per SOP, 09/13/2022 10:56,   by Ashly Downing   If child <=2 yrs old please draw pediatric bottle. ~Blood Culture #2   Strep Pneumoniae Antigen [[id number]] Collected: 09/12/22 1900   Order Status: Completed Specimen: Urine, clean catch Updated: 09/13/22 1054    STREP PNEUMONIAE ANTIGEN, URINE --    Presumptive Negative   Presumptive negative suggests no current or recent   pneumococcal infection.  Infection due to Strep pneumoniae   cannot be ruled out since the antigen present in the sample   may be below the detection limit of the test.   Normal Range:Presumptive Negative    Narrative:     ORDER#: [id number]                          ORDERED BY: Renetta Ochoaegal   SOURCE: Urine Clean Catch                  COLLECTED:  09/12/22 19:00   ANTIBIOTICS AT ANABELLA.:                      RECEIVED :  09/13/22 07:04   Legionella antigen, urine [[id number]] Collected: 09/12/22 1900   Order Status: Completed Specimen: Urine, clean catch Updated: 09/13/22 1047    L. pneumophila Serogp 1 Ur Ag --    Presumptive Negative   No Legionella pneumophila serogroup 1 antigens detected. A negative result does not exclude infection with   Legionella pneumophila serogroup 1 nor does it rule out   other microbial-caused respiratory infections or   disease caused by other serogroups of   Legionella pneumophila. Normal Range: Presumptive Negative    Narrative:     ORDER#: [id number]                          ORDERED BY: Desi Lagos   SOURCE: Urine Clean Catch                  COLLECTED:  09/12/22 19:00   ANTIBIOTICS AT ANABELLA.:                      RECEIVED :  09/13/22 07:04   Culture, Blood, PCR ID Panel [[id number]] Collected: 09/12/22 1616   Order Status: Completed Updated: 09/13/22 0707    Report SEE IMAGE   Narrative:     Haily Martinez  [id number] tel. [phone],   Microbiology results called to and read back by Woody Galindo,   09/13/2022 07:05, by Catarino Cedeno   Culture, Blood 2 [[id number]] Collected: 09/12/22 0000   Order Status: Canceled Specimen: Blood    Culture, Blood 1 [[id number]] Collected: 09/12/22 0000   Order Status: Canceled Specimen: Blood      Lab Results   Component Value Date/Time    [facility]  09/14/2022 04:36 AM     No Growth to date. Any change in status will be called. Shawnee Yanez  09/14/2022 05:25 AM     No Growth to date. Any change in status will be called.      Susceptibility    Staph aureus mrsa (3)    Antibiotic Interpretation Microscan  Method Status    ceFAZolin Resistant >16 mcg/mL BACTERIAL SUSCEPTIBILITY PANEL BY YA     clindamycin Sensitive <=0.5 mcg/mL BACTERIAL SUSCEPTIBILITY PANEL BY YA     erythromycin Resistant >4 mcg/mL BACTERIAL SUSCEPTIBILITY PANEL BY YA     oxacillin Resistant >2 mcg/mL BACTERIAL SUSCEPTIBILITY PANEL BY YA     tetracycline Sensitive <=4 mcg/mL BACTERIAL SUSCEPTIBILITY PANEL BY YA     trimethoprim-sulfamethoxazole Sensitive 1/19 mcg/mL BACTERIAL SUSCEPTIBILITY PANEL BY YA     vancomycin Sensitive 2 mcg/mL BACTERIAL SUSCEPTIBILITY PANEL BY YA       Serratia marcescens (4)    Antibiotic Interpretation Microscan  Method Status amoxicillin-clavulanate Resistant >16/8 mcg/mL BACTERIAL SUSCEPTIBILITY PANEL BY YA     ampicillin Resistant >16 mcg/mL BACTERIAL SUSCEPTIBILITY PANEL BY YA     ampicillin-sulbactam Resistant 16/8 mcg/mL BACTERIAL SUSCEPTIBILITY PANEL BY YA     ceFAZolin Resistant >16 mcg/mL BACTERIAL SUSCEPTIBILITY PANEL BY YA     cefepime Sensitive <=2 mcg/mL BACTERIAL SUSCEPTIBILITY PANEL BY YA     cefTRIAXone Sensitive <=1 mcg/mL BACTERIAL SUSCEPTIBILITY PANEL BY YA     cefuroxime Resistant >16 mcg/mL BACTERIAL SUSCEPTIBILITY PANEL BY YA     ciprofloxacin Sensitive <=1 mcg/mL BACTERIAL SUSCEPTIBILITY PANEL BY YA     ertapenem Sensitive <=0.5 mcg/mL BACTERIAL SUSCEPTIBILITY PANEL BY YA     gentamicin Sensitive <=4 mcg/mL BACTERIAL SUSCEPTIBILITY PANEL BY YA     meropenem Sensitive <=1 mcg/mL BACTERIAL SUSCEPTIBILITY PANEL BY YA     piperacillin-tazobactam Sensitive <=16 mcg/mL BACTERIAL SUSCEPTIBILITY PANEL BY YA     trimethoprim-sulfamethoxazole Sensitive <=2/38 mcg/mL BACTERIAL SUSCEPTIBILITY PANEL BY YA      Condensed View       Respiratory Culture:  No results found for: Barry No  AFB:No results found for: DERIKBSJIM  Viral Culture:  Lab Results   Component Value Date/Time    COVID19 Not Detected 09/07/2022 12:00 AM    COVID19 Not Detected 07/20/2022 11:20 PM     Urine Culture:   No results for input(s): LABURIN in the last 72 hours. Summary   Normal left ventricle size, wall thickness, and systolic function with an   estimated ejection fraction of 60-65%. No regional wall motion abnormalities   are seen. Normal diastolic function. Normal right ventricular size and function. Mobile vegetation on tricuspid valve suggestive of endocarditis. Moderate tricuspid regurgitation. The right atrium is mildly dilated.       Signature      ------------------------------------------------------------------   Electronically signed by Nolan Javed MD   (Interpreting physician) on 09/13/2022 at 04:15 PM   ------------------------------------------------------------------    IMAGING:    MRI LUMBAR SPINE W WO CONTRAST   Final Result   Partially visualized septic arthritis/osteomyelitis of the right sacroiliac   joint, with a large adjacent retroperitoneal abscess extending into the right   pelvic sidewall. Small intramuscular abscess also present within the left psoas muscle. Epidural phlegmon/abscess of the sacral canal.      Pelvic ascites. The findings were sent to the Radiology Results [address] at 7:23   pm on 9/15/2022 to be communicated to a licensed caregiver. VL Extremity Venous Bilateral   Final Result      CT HEAD WO CONTRAST   Final Result   No acute intracranial abnormality. CT CHEST WO CONTRAST   Final Result   Diffuse in numeral bilateral pulmonary nodules many of which are cavitary   becoming coalescent at the right lung apex however diffusely throughout the   bilateral lungs consistent of septic emboli with small to moderate right   pleural effusion      Partially visualized portions of the upper abdomen reveal hepatosplenomegaly         XR CHEST PORTABLE   Final Result   Multifocal patchy airspace disease and cavitary nodules. The appearance is   suggestive of septic emboli in this clinical setting. Follow-up recommended to assure resolution.          CT ABSCESS DRAINAGE W CATH PLACEMENT S&I    (Results Pending)         All the pertinent images and reports for the current Hospitalization were reviewed by me     Scheduled Meds:   vancomycin  750 mg IntraVENous Q18H    linezolid  600 mg IntraVENous Q12H    cefepime  2,000 mg IntraVENous Q8H    sodium chloride flush  10 mL IntraVENous 2 times per day    enoxaparin  40 mg SubCUTAneous Daily    vancomycin (VANCOCIN) intermittent dosing (placeholder)   Other RX Placeholder       Continuous Infusions:   sodium chloride 125 mL/hr at 09/16/22 0219    sodium chloride Stopped (09/15/22 0041)       PRN Meds:  naloxone, morphine, calcium carbonate, promethazine, sodium chloride flush, sodium chloride, potassium chloride **OR** potassium alternative oral replacement **OR** potassium chloride, potassium chloride, magnesium sulfate, promethazine **OR** ondansetron, magnesium hydroxide, acetaminophen **OR** acetaminophen, perflutren lipid microspheres      Assessment:     Patient Active Problem List   Diagnosis    Tear of medial cartilage or meniscus of knee, current    Plica syndrome    Boxer's metacarpal fracture, neck, closed    Chondromalacia of patella    Degeneration of lumbar or lumbosacral intervertebral disc    Varicose veins of lower extremity    Intractable nausea and vomiting    Septicemia ([facility] [address]    IVDU (intravenous drug user)    MRSA bacteremia    Sepsis due to methicillin resistant Staphylococcus aureus (MRSA) without acute organ dysfunction ([facility] [address]    Bacterial infection due to Serratia    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 WBC count is elevated high fever from ongoing bloodstream infection. Given IV drug abuse suspect endocarditis transthoracic echocardiogram with TV Vegetation    Trend WBC and repeat Blood cx in process    Will need placement to complete IV ABX    Will ask Cardiology eval for possible angiovac given the vegetation ?     Rt gluteal and lower Lumbar area pain MRI L spine with abscess, septic joint   S/p IR aspiration of the abscess cx in process      Labs, Microbiology, Radiology and all the pertinent results from current hospitalization and  care every where were reviewed  by me as a part of the evaluation   Plan:   Change  TO IV Ceftaroline - 600 mg q 12 HRS will cover MRSA and Serratia  Cont  IV Linezolid x 600 mg q 12 HRS   Repeat Blood cx in process    TTE Abnormal with vegetation   Will need IV abx  Watch for complications  HIV -ve and Hepatitis screen Hep C+Ve  Cardiology consult  STEFANO completed  d/w Dr. Aliza Canas is small for angiovac  would be able to treat with IV abx   MRI L spine noted very abnormal s/p ID abscess dot  Daptomycin not effective in LUNGS and Vancomycin resulted in MARYAN          Discussed with patient/Family and Nursing   Risk of Complications/Morbidity: High      Illness(es)/ Infection present that pose threat to bodily function. There is potential for severe exacerbation of infection/side effects of treatment. Therapy requires intensive monitoring for antimicrobial agent toxicity. Discussed with patient/Family and Nursing staff     Thanks for allowing me to participate in your patient's care and please call me with any questions or concerns.     Kindra Schneider MD  Infectious Disease  [facility]) Physician  Phone: [phone]   Fax : [phone]

## 2022-09-16 NOTE — PROGRESS NOTES
Clinical Pharmacy Note  Vancomycin Consult    Slade Bassett is a 40 y.o. female ordered Vancomycin for MRSA bacteremia; consult received from Dr. Janice Villa to manage therapy. Also receiving cefepime. Allergies:  Ultram [tramadol hcl], Robaxin [methocarbamol], and Penicillins     Temp max:  Temp (24hrs), Av.5 °F (36.9 °C), Min:97.8 °F (36.6 °C), Max:99 °F (37.2 °C)      Recent Labs     22  0604 22  0436 09/15/22  0602   WBC 15.5* 14.4* 12.7*         Recent Labs     22  0604 22  0435 09/15/22  0602   BUN 47* 36* 36*   CREATININE 1.1 1.1 1.3*           Intake/Output Summary (Last 24 hours) at 9/15/2022 2247  Last data filed at 9/15/2022 0402  Gross per 24 hour   Intake 3617.03 ml   Output --   Net 3617.03 ml         Culture Results:  Blood cultures + MRSA and Serratia marcescens    Ht Readings from Last 1 Encounters:   22 5' 8\" (1.727 m)        Wt Readings from Last 1 Encounters:   09/15/22 143 lb 11.8 oz (65.2 kg)         Estimated Creatinine Clearance: 57 mL/min (A) (based on SCr of 1.3 mg/dL (H)). Assessment:  Day # 4 of vancomycin. Current regimen: vancomycin 750mg IV Q12H  SCr slightly worsened to 1.3 mg/dL  Vancomycin level (8 hours after dose): 24.0 mg/L --> after assessment of level, current regimen predicts supratherapeutic     Plan:  -Based on PK, will adjust to vancomycin 750 mg Q18H to begin at 0600 on .  -Estimated , trough 15.5 mg/L  -Will draw another level after 2 doses of new regimen (ordered for 1600 on )    Thank you for the consult.     Darlene Marino, [facility] on 9/15/2022 at 10:47 PM

## 2022-09-16 NOTE — PROGRESS NOTES
Hospitalist Progress Note      PCP: No primary care provider on file. Chief Complaint. 28-year-old female with past medical history of drug abuse, ADHD, who presents to the hospital due to feeling fatigue, pain in her legs as well as back, she has a history of IV drug abuse, per patient she has been sober for now, patient family is also on the bedside who also contributed to the patient history. According to the patient's sister patient has been feeling sick for past few days, not feeling well. Patient was noticed to have fevers as well as chills. Date of Admission: 9/12/2022    Subjective: alert and awake, complains of back pain, she had abscess draining from iliacus muscle this morning by OR    Medications:  Reviewed    Infusion Medications    sodium chloride 125 mL/hr at 09/16/22 1244    sodium chloride Stopped (09/15/22 0041)     Scheduled Medications    ceftaroline fosamil (TEFLARO) IVPB  600 mg IntraVENous Q12H    linezolid  600 mg IntraVENous Q12H    sodium chloride flush  10 mL IntraVENous 2 times per day    enoxaparin  40 mg SubCUTAneous Daily     PRN Meds: naloxone, morphine, calcium carbonate, promethazine, sodium chloride flush, sodium chloride, potassium chloride **OR** potassium alternative oral replacement **OR** potassium chloride, magnesium sulfate, promethazine **OR** ondansetron, magnesium hydroxide, acetaminophen **OR** acetaminophen, perflutren lipid microspheres      Intake/Output Summary (Last 24 hours) at 9/16/2022 1923  Last data filed at 9/16/2022 1209  Gross per 24 hour   Intake --   Output 100 ml   Net -100 ml         Physical Exam Performed:    /62   Pulse 91   Temp 98.3 °F (36.8 °C) (Oral)   Resp 15   Ht 5' 8\" (1.727 m)   Wt 147 lb 11.3 oz (67 kg)   SpO2 98%   BMI 22.46 kg/m²     General appearance: NAD, lying in bed, alert awake  HEENT:  Conjunctivae/corneas clear. Neck: Supple, with full range of motion. Respiratory:  Normal respiratory effort.  Clear to auscultation, bilaterally without Rales/Wheezes/Rhonchi. Cardiovascular: Regular rate and rhythm with normal S1/S2 without murmurs or rubs  Abdomen: Soft, non-tender, non-distended, normal bowel sounds. Musculoskeletal: No cyanosis or edema bilaterally  Neurologic:  without any focal sensory/motor deficits. grossly non-focal.  Psychiatric: Alert and oriented, Normal mood  Peripheral Pulses: +2 palpable, equal bilaterally     Labs:   Recent Labs     09/14/22  0436 09/15/22  0602 09/16/22  0413   WBC 14.4* 12.7* 11.1*   HGB 8.1* 7.3* 7.0*   HCT 23.6* 21.8* 20.8*    231 327       Recent Labs     09/14/22  0435 09/15/22  0602 09/16/22  0413    139 137   K 3.7 3.7 3.8    106 107   CO2 24 21 22   BUN 36* 36* 42*   CREATININE 1.1 1.3* 1.8*   CALCIUM 7.4* 7.4* 7.2*   PHOS  --   --  4.5       No results for input(s): AST, ALT, BILIDIR, BILITOT, ALKPHOS in the last 72 hours. No results for input(s): INR in the last 72 hours. Recent Labs     09/16/22 0413   CKTOTAL 10*         Urinalysis:      Lab Results   Component Value Date/Time    NITRU Negative 09/12/2022 04:16 PM    WBCUA 24 09/12/2022 04:16 PM    BACTERIA None Seen 09/12/2022 04:16 PM    RBCUA 105 09/12/2022 04:16 PM    BLOODU LARGE 09/12/2022 04:16 PM    SPECGRAV 1.018 09/12/2022 04:16 PM    GLUCOSEU Negative 09/12/2022 04:16 PM    GLUCOSEU NEGATIVE 07/31/2010 02:59 PM       Radiology:  CT ABSCESS DRAINAGE W CATH PLACEMENT S&I   Final Result   Successful CT guided placement of a 10 Hungarian drainage catheter in the right   iliacus abscess. CT GUIDED NEEDLE PLACEMENT   Final Result   Successful CT guided placement of a 10 Hungarian drainage catheter in the right   iliacus abscess. MRI LUMBAR SPINE W WO CONTRAST   Final Result   Partially visualized septic arthritis/osteomyelitis of the right sacroiliac   joint, with a large adjacent retroperitoneal abscess extending into the right   pelvic sidewall.       Small intramuscular abscess also present within the left psoas muscle. Epidural phlegmon/abscess of the sacral canal.      Pelvic ascites. The findings were sent to the Radiology Results [address] at 7:23   pm on 9/15/2022 to be communicated to a licensed caregiver. VL Extremity Venous Bilateral   Final Result      CT HEAD WO CONTRAST   Final Result   No acute intracranial abnormality. CT CHEST WO CONTRAST   Final Result   Diffuse in numeral bilateral pulmonary nodules many of which are cavitary   becoming coalescent at the right lung apex however diffusely throughout the   bilateral lungs consistent of septic emboli with small to moderate right   pleural effusion      Partially visualized portions of the upper abdomen reveal hepatosplenomegaly         XR CHEST PORTABLE   Final Result   Multifocal patchy airspace disease and cavitary nodules. The appearance is   suggestive of septic emboli in this clinical setting. Follow-up recommended to assure resolution.          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, 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]      Priority: Medium    Septic embolism ([facility] [address] [I76]      Priority: Medium    Abnormal CT of the chest [R93.89]      Priority: Medium    Neutrophilia [D72.9]      Priority: Medium    Fever and chills [R50.9]      Priority: Medium    Hep C w/o coma, chronic (HCC) [B18.2]      Priority: Medium    Intractable nausea and vomiting [R11.2]      Priority: Medium       58-year-old female with past medical history of drug abuse, ADHD, who presents to the hospital due to feeling fatigue, pain in her legs as well as back, she has a history of IV drug abuse, per patient she has been sober for now, patient family is also on the bedside who also contributed to the patient history. According to the patient's sister patient has been feeling sick for past few days, not feeling well. Patient was noticed to have fevers as well as chills. Assessment  Sepsis present on admission, unclear focal source at this time  Pulmonary septic emboli, cavitary nodules  Endocarditis  History of IV drug abuse  ADHD     Plan  Start IV antibiotics with vancomycin, cefepime  Check blood cultures  Check procalcitonin  Consult infectious disease  Suspect endocarditis, echocardiogram - vegetation on TV   Resume home medications  DVT prophylaxis-Lovenox  Pain control  DVT Prophylaxis:  Diet: ADULT DIET;  Regular  Code Status: Full Code    PT/OT Eval Status: ordered    Dispo/Plan of care - Echo positive for vagetation on TV, cardiology consulted - s/p STEFANO, sever TR  continue IV abx, ID following    Gary Ferrer MD

## 2022-09-16 NOTE — CARE COORDINATION
Attempted to meet with patient at bedside to discuss options for treatment at discharge. Patient off the floor for procedure at this time. Unable to discharge home with home care and iv antibiotics due to history of substance abuse. SNF versus home with daily infusions for iv antibiotics at discharge. Will follow up as time permits.    JAROD Kumar, STEVEN, Social Work/Case Management   [phone]  Electronically signed by JAROD Kumar, STEVEN on 9/16/2022 at 11:29 AM

## 2022-09-17 LAB
ABO/RH: NORMAL
ANION GAP SERPL CALCULATED.3IONS-SCNC: 13 MMOL/L (ref 3–16)
ANTIBODY SCREEN: NORMAL
BASOPHILS ABSOLUTE: 0 K/UL (ref 0–0.2)
BASOPHILS RELATIVE PERCENT: 0.3 %
BLOOD BANK DISPENSE STATUS: NORMAL
BLOOD BANK DISPENSE STATUS: NORMAL
BLOOD BANK PRODUCT CODE: NORMAL
BLOOD BANK PRODUCT CODE: NORMAL
BPU ID: NORMAL
BPU ID: NORMAL
BUN BLDV-MCNC: 39 MG/DL (ref 7–20)
C3 COMPLEMENT: 101 MG/DL (ref 90–180)
CALCIUM IONIZED: 1.08 MMOL/L (ref 1.12–1.32)
CALCIUM SERPL-MCNC: 7 MG/DL (ref 8.3–10.6)
CHLORIDE BLD-SCNC: 108 MMOL/L (ref 99–110)
CO2: 16 MMOL/L (ref 21–32)
CREAT SERPL-MCNC: 1.9 MG/DL (ref 0.6–1.1)
DAT POLYSPECIFIC: NORMAL
DESCRIPTION BLOOD BANK: NORMAL
DESCRIPTION BLOOD BANK: NORMAL
EOSINOPHILS ABSOLUTE: 0.1 K/UL (ref 0–0.6)
EOSINOPHILS RELATIVE PERCENT: 0.9 %
FERRITIN: 1045 NG/ML (ref 15–150)
GFR AFRICAN AMERICAN: 35
GFR NON-AFRICAN AMERICAN: 29
GLUCOSE BLD-MCNC: 89 MG/DL (ref 70–99)
HCT VFR BLD CALC: 17.5 % (ref 36–48)
HCT VFR BLD CALC: 19.4 % (ref 36–48)
HCT VFR BLD CALC: 21.1 % (ref 36–48)
HEMOGLOBIN: 5.9 G/DL (ref 12–16)
HEMOGLOBIN: 6.2 G/DL (ref 12–16)
HEMOGLOBIN: 7 G/DL (ref 12–16)
IRON SATURATION: 18 % (ref 15–50)
IRON: 23 UG/DL (ref 37–145)
LYMPHOCYTES ABSOLUTE: 1 K/UL (ref 1–5.1)
LYMPHOCYTES RELATIVE PERCENT: 11.1 %
MCH RBC QN AUTO: 27.5 PG (ref 26–34)
MCHC RBC AUTO-ENTMCNC: 33.5 G/DL (ref 31–36)
MCV RBC AUTO: 82.1 FL (ref 80–100)
MONOCYTES ABSOLUTE: 0.6 K/UL (ref 0–1.3)
MONOCYTES RELATIVE PERCENT: 6.8 %
NEUTROPHILS ABSOLUTE: 7.4 K/UL (ref 1.7–7.7)
NEUTROPHILS RELATIVE PERCENT: 80.9 %
PDW BLD-RTO: 16 % (ref 12.4–15.4)
PH VENOUS: 7.43 (ref 7.35–7.45)
PLATELET # BLD: 331 K/UL (ref 135–450)
PMV BLD AUTO: 8.1 FL (ref 5–10.5)
POTASSIUM REFLEX MAGNESIUM: 3.8 MMOL/L (ref 3.5–5.1)
RBC # BLD: 2.13 M/UL (ref 4–5.2)
SODIUM BLD-SCNC: 137 MMOL/L (ref 136–145)
TOTAL IRON BINDING CAPACITY: 127 UG/DL (ref 260–445)
VITAMIN D 25-HYDROXY: 16.2 NG/ML
WBC # BLD: 9.2 K/UL (ref 4–11)

## 2022-09-17 PROCEDURE — 6360000002 HC RX W HCPCS: Performed by: INTERNAL MEDICINE

## 2022-09-17 PROCEDURE — 80048 BASIC METABOLIC PNL TOTAL CA: CPT

## 2022-09-17 PROCEDURE — 86160 COMPLEMENT ANTIGEN: CPT

## 2022-09-17 PROCEDURE — 86901 BLOOD TYPING SEROLOGIC RH(D): CPT

## 2022-09-17 PROCEDURE — 82306 VITAMIN D 25 HYDROXY: CPT

## 2022-09-17 PROCEDURE — 36415 COLL VENOUS BLD VENIPUNCTURE: CPT

## 2022-09-17 PROCEDURE — 86900 BLOOD TYPING SEROLOGIC ABO: CPT

## 2022-09-17 PROCEDURE — 94760 N-INVAS EAR/PLS OXIMETRY 1: CPT

## 2022-09-17 PROCEDURE — 2580000003 HC RX 258: Performed by: INTERNAL MEDICINE

## 2022-09-17 PROCEDURE — 02HV33Z INSERTION OF INFUSION DEVICE INTO SUPERIOR VENA CAVA, PERCUTANEOUS APPROACH: ICD-10-PCS | Performed by: INTERNAL MEDICINE

## 2022-09-17 PROCEDURE — 83540 ASSAY OF IRON: CPT

## 2022-09-17 PROCEDURE — 82728 ASSAY OF FERRITIN: CPT

## 2022-09-17 PROCEDURE — 85018 HEMOGLOBIN: CPT

## 2022-09-17 PROCEDURE — 6370000000 HC RX 637 (ALT 250 FOR IP): Performed by: INTERNAL MEDICINE

## 2022-09-17 PROCEDURE — 82330 ASSAY OF CALCIUM: CPT

## 2022-09-17 PROCEDURE — 83550 IRON BINDING TEST: CPT

## 2022-09-17 PROCEDURE — 86923 COMPATIBILITY TEST ELECTRIC: CPT

## 2022-09-17 PROCEDURE — 85014 HEMATOCRIT: CPT

## 2022-09-17 PROCEDURE — 85025 COMPLETE CBC W/AUTO DIFF WBC: CPT

## 2022-09-17 PROCEDURE — P9016 RBC LEUKOCYTES REDUCED: HCPCS

## 2022-09-17 PROCEDURE — 6370000000 HC RX 637 (ALT 250 FOR IP): Performed by: NURSE PRACTITIONER

## 2022-09-17 PROCEDURE — 2060000000 HC ICU INTERMEDIATE R&B

## 2022-09-17 PROCEDURE — 86850 RBC ANTIBODY SCREEN: CPT

## 2022-09-17 PROCEDURE — 36430 TRANSFUSION BLD/BLD COMPNT: CPT

## 2022-09-17 RX ORDER — HYDROXYZINE PAMOATE 25 MG/1
25 CAPSULE ORAL 3 TIMES DAILY PRN
Status: DISCONTINUED | OUTPATIENT
Start: 2022-09-17 | End: 2022-10-01 | Stop reason: HOSPADM

## 2022-09-17 RX ORDER — LIDOCAINE HYDROCHLORIDE 10 MG/ML
5 INJECTION, SOLUTION EPIDURAL; INFILTRATION; INTRACAUDAL; PERINEURAL ONCE
Status: DISCONTINUED | OUTPATIENT
Start: 2022-09-17 | End: 2022-10-01 | Stop reason: HOSPADM

## 2022-09-17 RX ORDER — SODIUM CHLORIDE 0.9 % (FLUSH) 0.9 %
5-40 SYRINGE (ML) INJECTION EVERY 12 HOURS SCHEDULED
Status: DISCONTINUED | OUTPATIENT
Start: 2022-09-17 | End: 2022-10-01 | Stop reason: HOSPADM

## 2022-09-17 RX ORDER — CHLORHEXIDINE GLUCONATE 0.12 MG/ML
15 RINSE ORAL 2 TIMES DAILY PRN
Status: DISCONTINUED | OUTPATIENT
Start: 2022-09-17 | End: 2022-10-01 | Stop reason: HOSPADM

## 2022-09-17 RX ORDER — SODIUM CHLORIDE 9 MG/ML
25 INJECTION, SOLUTION INTRAVENOUS PRN
Status: DISCONTINUED | OUTPATIENT
Start: 2022-09-17 | End: 2022-10-01 | Stop reason: HOSPADM

## 2022-09-17 RX ORDER — SODIUM CHLORIDE 0.9 % (FLUSH) 0.9 %
5-40 SYRINGE (ML) INJECTION PRN
Status: DISCONTINUED | OUTPATIENT
Start: 2022-09-17 | End: 2022-10-01 | Stop reason: HOSPADM

## 2022-09-17 RX ORDER — SODIUM CHLORIDE 9 MG/ML
INJECTION, SOLUTION INTRAVENOUS PRN
Status: DISCONTINUED | OUTPATIENT
Start: 2022-09-17 | End: 2022-10-01 | Stop reason: HOSPADM

## 2022-09-17 RX ADMIN — MORPHINE SULFATE 0.5 MG: 2 INJECTION, SOLUTION INTRAMUSCULAR; INTRAVENOUS at 04:00

## 2022-09-17 RX ADMIN — MORPHINE SULFATE 0.5 MG: 2 INJECTION, SOLUTION INTRAMUSCULAR; INTRAVENOUS at 16:17

## 2022-09-17 RX ADMIN — ONDANSETRON 4 MG: 2 INJECTION INTRAMUSCULAR; INTRAVENOUS at 06:50

## 2022-09-17 RX ADMIN — MORPHINE SULFATE 0.5 MG: 2 INJECTION, SOLUTION INTRAMUSCULAR; INTRAVENOUS at 12:10

## 2022-09-17 RX ADMIN — CEFTAROLINE FOSAMIL 600 MG: 600 POWDER, FOR SOLUTION INTRAVENOUS at 11:36

## 2022-09-17 RX ADMIN — CEFTAROLINE FOSAMIL 600 MG: 600 POWDER, FOR SOLUTION INTRAVENOUS at 23:58

## 2022-09-17 RX ADMIN — MORPHINE SULFATE 0.5 MG: 2 INJECTION, SOLUTION INTRAMUSCULAR; INTRAVENOUS at 08:06

## 2022-09-17 RX ADMIN — LINEZOLID 600 MG: 600 INJECTION, SOLUTION INTRAVENOUS at 21:58

## 2022-09-17 RX ADMIN — LINEZOLID 600 MG: 600 INJECTION, SOLUTION INTRAVENOUS at 10:23

## 2022-09-17 RX ADMIN — MORPHINE SULFATE 0.5 MG: 2 INJECTION, SOLUTION INTRAMUSCULAR; INTRAVENOUS at 20:42

## 2022-09-17 RX ADMIN — CEFTAROLINE FOSAMIL 600 MG: 600 POWDER, FOR SOLUTION INTRAVENOUS at 00:00

## 2022-09-17 RX ADMIN — SODIUM CHLORIDE: 9 INJECTION, SOLUTION INTRAVENOUS at 06:23

## 2022-09-17 RX ADMIN — SODIUM CHLORIDE, PRESERVATIVE FREE 10 ML: 5 INJECTION INTRAVENOUS at 19:52

## 2022-09-17 RX ADMIN — HYDROXYZINE PAMOATE 25 MG: 25 CAPSULE ORAL at 19:50

## 2022-09-17 RX ADMIN — SODIUM CHLORIDE: 9 INJECTION, SOLUTION INTRAVENOUS at 20:03

## 2022-09-17 RX ADMIN — ACETAMINOPHEN 650 MG: 325 TABLET ORAL at 02:55

## 2022-09-17 ASSESSMENT — PAIN SCALES - GENERAL
PAINLEVEL_OUTOF10: 7
PAINLEVEL_OUTOF10: 8
PAINLEVEL_OUTOF10: 8
PAINLEVEL_OUTOF10: 9
PAINLEVEL_OUTOF10: 7
PAINLEVEL_OUTOF10: 7
PAINLEVEL_OUTOF10: 8

## 2022-09-17 ASSESSMENT — PAIN DESCRIPTION - ONSET: ONSET: ON-GOING

## 2022-09-17 ASSESSMENT — PAIN DESCRIPTION - FREQUENCY: FREQUENCY: CONTINUOUS

## 2022-09-17 ASSESSMENT — PAIN DESCRIPTION - DESCRIPTORS: DESCRIPTORS: ACHING;DISCOMFORT

## 2022-09-17 ASSESSMENT — PAIN - FUNCTIONAL ASSESSMENT: PAIN_FUNCTIONAL_ASSESSMENT: PREVENTS OR INTERFERES SOME ACTIVE ACTIVITIES AND ADLS

## 2022-09-17 ASSESSMENT — PAIN DESCRIPTION - LOCATION
LOCATION: BACK;LEG
LOCATION: BACK;LEG

## 2022-09-17 ASSESSMENT — PAIN DESCRIPTION - PAIN TYPE: TYPE: ACUTE PAIN

## 2022-09-17 ASSESSMENT — PAIN DESCRIPTION - ORIENTATION
ORIENTATION: RIGHT
ORIENTATION: RIGHT

## 2022-09-17 NOTE — PROGRESS NOTES
RN went into room to administer pt's second unit of blood, and upon obtaining vitals, pt temp had increased from 98.6 to 100.4. Blood bank notified, blood bank staff notified RN to start transfusion reaction protocol. Night shift NP notified of this. Charge nurse made aware. Pt currently resting comfortably in bed with no complaints. BP stable. Pt otherwise stable other than the increase in temp. Awaiting response from NP.      Electronically signed by Mat Díaz RN on 9/17/2022 at 7:18 PM

## 2022-09-17 NOTE — PROGRESS NOTES
Arrived to place PICC line in patient with, Fátima WELSH at bedside, pre procedure and allergies reviewed, no issues accessing basilic  vein. Pt tolerated procedure well. Tip verified with 3cg technology. Pt left in stable condition and bed braked and in lowest position. Pt call light within reach. Handoff to RN.

## 2022-09-17 NOTE — PROGRESS NOTES
Pt back in room from renal ultrasound. VSS. Pain medications given, see eMAR. No complaints. Will continue to monitor.      Electronically signed by Aline Watters RN on 9/16/2022 at 8:18 PM

## 2022-09-17 NOTE — PROGRESS NOTES
Received call from nephro stating pt is okay to get PICC line placed.      Electronically signed by Maty Browning RN on 9/17/2022 at 6:06 PM

## 2022-09-17 NOTE — PROGRESS NOTES
Progress Note    Admit Date: 9/12/2022         Subjective and Overnight Events: Pt being followed up for sepsis endocarditis   Hb 5.5 today  no active bleeding seen  will transfuse   no fever  no chills         Objective:   Vitals: /81   Pulse 75   Temp 98.3 °F (36.8 °C) (Oral)   Resp (!) 33   Ht 5' 8\" (1.727 m)   Wt 150 lb 12.7 oz (68.4 kg)   SpO2 98%   BMI 22.93 kg/m²   /81   Pulse 75   Temp 98.3 °F (36.8 °C) (Oral)   Resp (!) 33   Ht 5' 8\" (1.727 m)   Wt 150 lb 12.7 oz (68.4 kg)   SpO2 98%   BMI 22.93 kg/m²     General Appearance:    Alert, cooperative, no distress, appears stated age   Head:    Normocephalic, without obvious abnormality, atraumatic   Eyes:    PERRL, conjunctiva/corneas clear       Ears:    Normal TM's and external ear canals, both ears   Nose:   Nares normal, septum midline, mucosa normal   Throat:   Lips, mucosa, and tongue normal; teeth and gums normal           Lungs:     Clear to auscultation bilaterally, respirations unlabored       Heart:    Regular rate and rhythm, S1 and S2 normal, no murmur, rub    or gallop   Abdomen:     Soft, non-tender, bowel sounds active all four quadrants,     no masses, no organomegaly           Extremities:   Extremities normal, atraumatic, no cyanosis or edema   Pulses:   2+ and symmetric all extremities   Skin:   Skin color, texture, turgor normal, no rashes or lesions       Neurologic:   CNII-XII intact.  Normal strength, sensation and reflexes       throughout     Data:     Scheduled Medications:    ceftaroline fosamil (TEFLARO) IVPB  600 mg IntraVENous Q12H    linezolid  600 mg IntraVENous Q12H    sodium chloride flush  10 mL IntraVENous 2 times per day    enoxaparin  40 mg SubCUTAneous Daily      PRN Medications: sodium chloride, naloxone, morphine, calcium carbonate, promethazine, sodium chloride flush, sodium chloride, potassium chloride **OR** potassium alternative oral replacement **OR** potassium chloride, magnesium sulfate, promethazine **OR** ondansetron, magnesium hydroxide, acetaminophen **OR** acetaminophen, perflutren lipid microspheres  Diet: ADULT DIET; Regular    Continuous Infusions:   sodium chloride      sodium chloride 125 mL/hr at 09/17/22 6657    sodium chloride Stopped (09/15/22 0041)         Intake/Output Summary (Last 24 hours) at 9/17/2022 1125  Last data filed at 9/17/2022 0415  Gross per 24 hour   Intake 120 ml   Output 165 ml   Net -45 ml       CBC:   Recent Labs     09/16/22  0413 09/17/22  0559   WBC 11.1* 9.2   HGB 7.0* 5.9*    331     BMP:  Recent Labs     09/16/22  0413 09/17/22  0559    137   K 3.8 3.8    108   CO2 22 16*   BUN 42* 39*   CREATININE 1.8* 1.9*   GLUCOSE 89 89     ABGs: No results found for: PHART, PO2ART, PNC5APB    Assessment/plan     Patient Active Problem List:     Tear of medial cartilage or meniscus of knee, current     Plica syndrome     Boxer's metacarpal fracture, neck, closed     Chondromalacia of patella     Degeneration of lumbar or lumbosacral intervertebral disc     Varicose veins of lower extremity     Intractable nausea and vomiting     Septicemia (HCC)     IVDU (intravenous drug user)     MRSA bacteremia     Sepsis due to methicillin resistant Staphylococcus aureus (MRSA) without acute organ dysfunction (HCC)     Bacterial infection due to Serratia     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   According to the patient's sister patient has been feeling sick for past few days, not feeling well. Patient was noticed to have fevers as well as chills.      Assessment  Sepsis present on admission - endocarditis +/- cavitary PNA - MRSA/Serratia bacteremia   Pulmonary septic emboli, cavitary nodules  Endocarditis  History of IV drug abuse  ADHD    Plan  Continue atbx as per ID - IV Ceftaroline - 600 mg q 12 HRS +  IV Linezolid x 600 mg q 12 HRS   Endocarditis  + cardiology - atbx ok  no angiovac   Hb 5.5  will transfuse 2 u PRBC and recheck     Full Lucrecia Navarro MD

## 2022-09-18 LAB
ALBUMIN SERPL-MCNC: 1.9 G/DL (ref 3.4–5)
ALP BLD-CCNC: 93 U/L (ref 40–129)
ALT SERPL-CCNC: 10 U/L (ref 10–40)
ANION GAP SERPL CALCULATED.3IONS-SCNC: 11 MMOL/L (ref 3–16)
AST SERPL-CCNC: 11 U/L (ref 15–37)
BACTERIA: ABNORMAL /HPF
BACTERIA: ABNORMAL /HPF
BILIRUB SERPL-MCNC: 0.4 MG/DL (ref 0–1)
BILIRUBIN DIRECT: <0.2 MG/DL (ref 0–0.3)
BILIRUBIN URINE: NEGATIVE
BILIRUBIN URINE: NEGATIVE
BILIRUBIN, INDIRECT: ABNORMAL MG/DL (ref 0–1)
BLOOD CULTURE, ROUTINE: NORMAL
BLOOD, URINE: ABNORMAL
BLOOD, URINE: ABNORMAL
BUDDING YEAST: PRESENT
BUN BLDV-MCNC: 35 MG/DL (ref 7–20)
CALCIUM SERPL-MCNC: 7.3 MG/DL (ref 8.3–10.6)
CHLORIDE BLD-SCNC: 106 MMOL/L (ref 99–110)
CLARITY: ABNORMAL
CLARITY: ABNORMAL
CO2: 17 MMOL/L (ref 21–32)
COLOR: YELLOW
COLOR: YELLOW
CREAT SERPL-MCNC: 2.3 MG/DL (ref 0.6–1.1)
CULTURE, BLOOD 2: NORMAL
EKG ATRIAL RATE: 78 BPM
EKG DIAGNOSIS: NORMAL
EKG P AXIS: 52 DEGREES
EKG P-R INTERVAL: 122 MS
EKG Q-T INTERVAL: 400 MS
EKG QRS DURATION: 90 MS
EKG QTC CALCULATION (BAZETT): 456 MS
EKG R AXIS: 17 DEGREES
EKG T AXIS: 25 DEGREES
EKG VENTRICULAR RATE: 78 BPM
EPITHELIAL CELLS, UA: 1 /HPF (ref 0–5)
EPITHELIAL CELLS, UA: 6 /HPF (ref 0–5)
GFR AFRICAN AMERICAN: 28
GFR NON-AFRICAN AMERICAN: 23
GLUCOSE BLD-MCNC: 111 MG/DL (ref 70–99)
GLUCOSE URINE: NEGATIVE MG/DL
GLUCOSE URINE: NEGATIVE MG/DL
HCT VFR BLD CALC: 20.1 % (ref 36–48)
HEMOGLOBIN: 7.1 G/DL (ref 12–16)
HYALINE CASTS: 15 /LPF (ref 0–8)
HYALINE CASTS: 6 /LPF (ref 0–8)
KETONES, URINE: NEGATIVE MG/DL
KETONES, URINE: NEGATIVE MG/DL
LEUKOCYTE ESTERASE, URINE: ABNORMAL
LEUKOCYTE ESTERASE, URINE: ABNORMAL
MCH RBC QN AUTO: 29.1 PG (ref 26–34)
MCHC RBC AUTO-ENTMCNC: 35.4 G/DL (ref 31–36)
MCV RBC AUTO: 82.3 FL (ref 80–100)
MICROSCOPIC EXAMINATION: YES
MICROSCOPIC EXAMINATION: YES
NITRITE, URINE: NEGATIVE
NITRITE, URINE: NEGATIVE
PDW BLD-RTO: 15.8 % (ref 12.4–15.4)
PH UA: 5 (ref 5–8)
PH UA: 5.5 (ref 5–8)
PHOSPHORUS: 4.8 MG/DL (ref 2.5–4.9)
PLATELET # BLD: 356 K/UL (ref 135–450)
PMV BLD AUTO: 8 FL (ref 5–10.5)
POTASSIUM REFLEX MAGNESIUM: 3.8 MMOL/L (ref 3.5–5.1)
POTASSIUM SERPL-SCNC: 3.8 MMOL/L (ref 3.5–5.1)
PRO-BNP: ABNORMAL PG/ML (ref 0–124)
PROTEIN UA: 100 MG/DL
PROTEIN UA: >=1000 MG/DL
RBC # BLD: 2.44 M/UL (ref 4–5.2)
RBC UA: 14 /HPF (ref 0–4)
RBC UA: 869 /HPF (ref 0–4)
SODIUM BLD-SCNC: 134 MMOL/L (ref 136–145)
SPECIFIC GRAVITY UA: 1.01 (ref 1–1.03)
SPECIFIC GRAVITY UA: 1.02 (ref 1–1.03)
TOTAL CK: 10 U/L (ref 26–192)
TOTAL PROTEIN: 5.6 G/DL (ref 6.4–8.2)
URINE TYPE: ABNORMAL
URINE TYPE: ABNORMAL
UROBILINOGEN, URINE: 0.2 E.U./DL
UROBILINOGEN, URINE: 0.2 E.U./DL
WBC # BLD: 8.1 K/UL (ref 4–11)
WBC UA: 189 /HPF (ref 0–5)
WBC UA: 98 /HPF (ref 0–5)

## 2022-09-18 PROCEDURE — 2580000003 HC RX 258: Performed by: INTERNAL MEDICINE

## 2022-09-18 PROCEDURE — 6370000000 HC RX 637 (ALT 250 FOR IP): Performed by: INTERNAL MEDICINE

## 2022-09-18 PROCEDURE — 94760 N-INVAS EAR/PLS OXIMETRY 1: CPT

## 2022-09-18 PROCEDURE — 6360000002 HC RX W HCPCS: Performed by: INTERNAL MEDICINE

## 2022-09-18 PROCEDURE — 93010 ELECTROCARDIOGRAM REPORT: CPT | Performed by: INTERNAL MEDICINE

## 2022-09-18 PROCEDURE — 82550 ASSAY OF CK (CPK): CPT

## 2022-09-18 PROCEDURE — 83880 ASSAY OF NATRIURETIC PEPTIDE: CPT

## 2022-09-18 PROCEDURE — 80076 HEPATIC FUNCTION PANEL: CPT

## 2022-09-18 PROCEDURE — 81001 URINALYSIS AUTO W/SCOPE: CPT

## 2022-09-18 PROCEDURE — 2060000000 HC ICU INTERMEDIATE R&B

## 2022-09-18 PROCEDURE — 93005 ELECTROCARDIOGRAM TRACING: CPT

## 2022-09-18 PROCEDURE — 85027 COMPLETE CBC AUTOMATED: CPT

## 2022-09-18 PROCEDURE — 80069 RENAL FUNCTION PANEL: CPT

## 2022-09-18 RX ORDER — FUROSEMIDE 10 MG/ML
20 INJECTION INTRAMUSCULAR; INTRAVENOUS ONCE
Status: COMPLETED | OUTPATIENT
Start: 2022-09-18 | End: 2022-09-18

## 2022-09-18 RX ORDER — LORAZEPAM 1 MG/1
1 TABLET ORAL EVERY 8 HOURS PRN
Status: DISCONTINUED | OUTPATIENT
Start: 2022-09-18 | End: 2022-10-01 | Stop reason: HOSPADM

## 2022-09-18 RX ADMIN — MORPHINE SULFATE 0.5 MG: 2 INJECTION, SOLUTION INTRAMUSCULAR; INTRAVENOUS at 18:01

## 2022-09-18 RX ADMIN — ANTACID TABLETS 500 MG: 500 TABLET, CHEWABLE ORAL at 02:13

## 2022-09-18 RX ADMIN — CEFTAROLINE FOSAMIL 600 MG: 600 POWDER, FOR SOLUTION INTRAVENOUS at 23:33

## 2022-09-18 RX ADMIN — SODIUM CHLORIDE, PRESERVATIVE FREE 10 ML: 5 INJECTION INTRAVENOUS at 08:27

## 2022-09-18 RX ADMIN — LORAZEPAM 1 MG: 1 TABLET ORAL at 08:18

## 2022-09-18 RX ADMIN — ANTACID TABLETS 500 MG: 500 TABLET, CHEWABLE ORAL at 07:34

## 2022-09-18 RX ADMIN — SODIUM CHLORIDE: 9 INJECTION, SOLUTION INTRAVENOUS at 13:03

## 2022-09-18 RX ADMIN — SODIUM CHLORIDE, PRESERVATIVE FREE 10 ML: 5 INJECTION INTRAVENOUS at 20:29

## 2022-09-18 RX ADMIN — MORPHINE SULFATE 0.5 MG: 2 INJECTION, SOLUTION INTRAMUSCULAR; INTRAVENOUS at 23:50

## 2022-09-18 RX ADMIN — ENOXAPARIN SODIUM 40 MG: 100 INJECTION SUBCUTANEOUS at 08:18

## 2022-09-18 RX ADMIN — ONDANSETRON 4 MG: 2 INJECTION INTRAMUSCULAR; INTRAVENOUS at 05:01

## 2022-09-18 RX ADMIN — ACETAMINOPHEN 650 MG: 325 TABLET ORAL at 20:29

## 2022-09-18 RX ADMIN — MORPHINE SULFATE 0.5 MG: 2 INJECTION, SOLUTION INTRAMUSCULAR; INTRAVENOUS at 13:48

## 2022-09-18 RX ADMIN — LINEZOLID 600 MG: 600 INJECTION, SOLUTION INTRAVENOUS at 08:33

## 2022-09-18 RX ADMIN — LINEZOLID 600 MG: 600 INJECTION, SOLUTION INTRAVENOUS at 22:14

## 2022-09-18 RX ADMIN — MORPHINE SULFATE 0.5 MG: 2 INJECTION, SOLUTION INTRAMUSCULAR; INTRAVENOUS at 05:01

## 2022-09-18 RX ADMIN — FUROSEMIDE 20 MG: 10 INJECTION, SOLUTION INTRAMUSCULAR; INTRAVENOUS at 15:43

## 2022-09-18 RX ADMIN — CEFTAROLINE FOSAMIL 600 MG: 600 POWDER, FOR SOLUTION INTRAVENOUS at 13:04

## 2022-09-18 RX ADMIN — MORPHINE SULFATE 0.5 MG: 2 INJECTION, SOLUTION INTRAMUSCULAR; INTRAVENOUS at 00:44

## 2022-09-18 RX ADMIN — LORAZEPAM 1 MG: 1 TABLET ORAL at 17:09

## 2022-09-18 ASSESSMENT — PAIN DESCRIPTION - DESCRIPTORS
DESCRIPTORS: ACHING;DISCOMFORT

## 2022-09-18 ASSESSMENT — PAIN SCALES - GENERAL
PAINLEVEL_OUTOF10: 7
PAINLEVEL_OUTOF10: 8

## 2022-09-18 ASSESSMENT — PAIN DESCRIPTION - ONSET
ONSET: ON-GOING

## 2022-09-18 ASSESSMENT — PAIN DESCRIPTION - ORIENTATION
ORIENTATION: MID
ORIENTATION: RIGHT
ORIENTATION: MID;RIGHT

## 2022-09-18 ASSESSMENT — PAIN DESCRIPTION - PAIN TYPE
TYPE: ACUTE PAIN

## 2022-09-18 ASSESSMENT — PAIN DESCRIPTION - FREQUENCY
FREQUENCY: CONTINUOUS

## 2022-09-18 ASSESSMENT — PAIN DESCRIPTION - LOCATION
LOCATION: BACK;LEG
LOCATION: BACK;LEG
LOCATION: BACK

## 2022-09-18 ASSESSMENT — PAIN - FUNCTIONAL ASSESSMENT
PAIN_FUNCTIONAL_ASSESSMENT: PREVENTS OR INTERFERES SOME ACTIVE ACTIVITIES AND ADLS

## 2022-09-18 NOTE — PROGRESS NOTES
Transfusion reaction protocol in place. Labs drawn and sent to lab per protocol. Paperwork sent to blood bank per protocol. Awaiting urine specimen, pt incontinent and r/f purewick at this time. Blood bank informed.      Electronically signed by Ivan Allen RN on 9/17/2022 at 10:15 PM

## 2022-09-18 NOTE — PROGRESS NOTES
Department of Internal Medicine  Nephrology Progress Note        39 y/o WF with h/o depression, IVDA and back pain admitted with feeling weak, fatigued and pain in her back She was seen in ER on 9/7/22 with back apin and diagnosed with UTI Received toradol and was discharged on Motrin and Cefdinir Urine CX grew klebsiella pan senstive However she continued to feel poorly with back pain, and fatigue   Had fever to 101 on admission Started on Vancomycin and Cefepime Cr was 1.5 mg on admission improved to 1.1 mg but has increased to 1.8 mg now [facility] from admission growing MRSA and Serratia CT chest with cavitation and septic emboli  She reports decreased UOP no dysuria hematuria  Has been taking Ibuprofen 800 mg TID prior to admission  MRI showed OM of R SI joint and large retroperitoneal abscess   Underwent drainage tube placement . Events noted , labs reviewed . S/p Tx . REVIEW OF SYSTEMS:  No CP/SOB or GEIGER . ROS limited due to pt factor  . Family at bed side     Physical Exam:    VITALS:  /79   Pulse 74   Temp 98.9 °F (37.2 °C) (Oral)   Resp 21   Ht 5' 8\" (1.727 m)   Wt 149 lb 14.6 oz (68 kg)   SpO2 99%   BMI 22.79 kg/m²   24HR INTAKE/OUTPUT:    Intake/Output Summary (Last 24 hours) at 9/18/2022 1454  Last data filed at 9/18/2022 0003  Gross per 24 hour   Intake 291.25 ml   Output 40 ml   Net 251.25 ml         Constitutional:  Doing well  Respiratory:  Decrease BS at  bases   Gastrointestinal:  + tenderness.   Normal Bowel Sounds  Cardiovascular:  S1, S2 RRR   Edema:  +  edema    DATA:    CBC:  Lab Results   Component Value Date/Time    WBC 8.1 09/18/2022 04:45 AM    RBC 2.44 09/18/2022 04:45 AM    HGB 7.1 09/18/2022 04:45 AM    HCT 20.1 09/18/2022 04:45 AM    MCV 82.3 09/18/2022 04:45 AM    MCH 29.1 09/18/2022 04:45 AM    MCHC 35.4 09/18/2022 04:45 AM    RDW 15.8 09/18/2022 04:45 AM     09/18/2022 04:45 AM    MPV 8.0 09/18/2022 04:45 AM     CMP:  Lab Results   Component Value Date/Time     09/18/2022 04:45 AM    K 3.8 09/18/2022 04:45 AM    K 3.8 09/18/2022 04:45 AM     09/18/2022 04:45 AM    CO2 17 09/18/2022 04:45 AM    BUN 35 09/18/2022 04:45 AM    CREATININE 2.3 09/18/2022 04:45 AM    GFRAA 28 09/18/2022 04:45 AM    GFRAA >60 07/28/2012 11:20 PM    AGRATIO 0.4 09/12/2022 03:44 PM    LABGLOM 23 09/18/2022 04:45 AM    GLUCOSE 111 09/18/2022 04:45 AM    PROT 5.6 09/18/2022 04:45 AM    PROT 6.5 07/28/2012 11:20 PM    CALCIUM 7.3 09/18/2022 04:45 AM    BILITOT 0.4 09/18/2022 04:45 AM    ALKPHOS 93 09/18/2022 04:45 AM    AST 11 09/18/2022 04:45 AM    ALT 10 09/18/2022 04:45 AM      Hepatic Function Panel:   Lab Results   Component Value Date/Time    ALKPHOS 93 09/18/2022 04:45 AM    ALT 10 09/18/2022 04:45 AM    AST 11 09/18/2022 04:45 AM    PROT 5.6 09/18/2022 04:45 AM    PROT 6.5 07/28/2012 11:20 PM    BILITOT 0.4 09/18/2022 04:45 AM    BILIDIR <0.2 09/18/2022 04:45 AM    IBILI see below 09/18/2022 04:45 AM      Phosphorus:   Lab Results   Component Value Date/Time    PHOS 4.8 09/18/2022 04:45 AM       ASSESSMENT:  Principal Problem:    Intractable nausea and vomiting  Active Problems:    Septicemia (HCC)    IVDU (intravenous drug user)    MRSA bacteremia    Sepsis due to methicillin resistant Staphylococcus aureus (MRSA) without acute organ dysfunction ([address]    Bacterial infection due to Serratia    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 *      PLAN  Assessment/  1-MARYAN suspect Vancomycin toxicity Vanc random level 24 yesterday correlates with the rise in Cr although, SIRS can be contributing Cannot r/o post infectious GN   Continue with supportive care . Cr noted , cut back on IVF . Lasix times 1 .    2-MRSA and serratia bacteremia with retroperitoneal abscess  septic pulmonary emboli and possible TV endocarditis  3-IVDA   4 Anemia  Hb  noted . S/p Tx .   5 Hypocalcemia with severe Hypoalbuminemia  Plan dw pt and family .      Charis Muir MD, FACP

## 2022-09-18 NOTE — PROGRESS NOTES
Pt states she \"feels wet\". RN in room to assist pt in changing brief. Pt states she does not want to be cleaned up at this time. RN educated pt on importance of staying dry to prevent skin breakdown. Pt states she will call when ready to be cleaned up.     Electronically signed by Theo Berger RN on 9/17/2022 at 9:06 PM

## 2022-09-18 NOTE — PROGRESS NOTES
[facility], NP ordered redraw of H/H prior to administration of 2nd unit of blood. NP notified [facility] at 7.0. NP to discontinue 2nd unit of blood.      Electronically signed by Cedric Santiago RN on 9/17/2022 at 9:18 PM

## 2022-09-18 NOTE — PROGRESS NOTES
Per blood bank, if pt will need additional blood products before possible blood transfusion reaction investigation is complete by a pathologist RN is to contact blood bank who will call on call pathologist prior to blood product administration.     Electronically signed by Katrin Blackman RN on 9/17/2022 at 10:20 PM

## 2022-09-18 NOTE — PLAN OF CARE
Problem: Discharge Planning  Goal: Discharge to home or other facility with appropriate resources  Outcome: Progressing     Problem: Safety - Adult  Goal: Free from fall injury  Outcome: Progressing     Problem: Skin/Tissue Integrity  Goal: Absence of new skin breakdown  Description: 1. Monitor for areas of redness and/or skin breakdown  2. Assess vascular access sites hourly  3. Every 4-6 hours minimum:  Change oxygen saturation probe site  4. Every 4-6 hours:  If on nasal continuous positive airway pressure, respiratory therapy assess nares and determine need for appliance change or resting period.   Outcome: Progressing     Problem: Pain  Goal: Verbalizes/displays adequate comfort level or baseline comfort level  Outcome: Not Progressing

## 2022-09-18 NOTE — PROGRESS NOTES
Progress Note    Admit Date: 9/12/2022         Subjective and Overnight Events: Pt being followed up for sepsis endocarditis   Hb 7 today -transfused 1u PRBCS  no active bleeding seen   she had a mild reaction -fever and tachy -  no fever  no chills         Objective:   Vitals: /79   Pulse 74   Temp 98.9 °F (37.2 °C) (Oral)   Resp 21   Ht 5' 8\" (1.727 m)   Wt 149 lb 14.6 oz (68 kg)   SpO2 99%   BMI 22.79 kg/m²   /79   Pulse 74   Temp 98.9 °F (37.2 °C) (Oral)   Resp 21   Ht 5' 8\" (1.727 m)   Wt 149 lb 14.6 oz (68 kg)   SpO2 99%   BMI 22.79 kg/m²     General Appearance:    Alert, cooperative, no distress, appears stated age   Head:    Normocephalic, without obvious abnormality, atraumatic   Eyes:    PERRL, conjunctiva/corneas clear       Ears:    Normal TM's and external ear canals, both ears   Nose:   Nares normal, septum midline, mucosa normal   Throat:   Lips, mucosa, and tongue normal; teeth and gums normal           Lungs:     Clear to auscultation bilaterally, respirations unlabored       Heart:    Regular rate and rhythm, S1 and S2 normal, no murmur, rub    or gallop   Abdomen:     Soft, non-tender, bowel sounds active all four quadrants,     no masses, no organomegaly           Extremities:   Extremities normal, atraumatic, no cyanosis or edema   Pulses:   2+ and symmetric all extremities   Skin:   Skin color, texture, turgor normal, no rashes or lesions       Neurologic:   CNII-XII intact.  Normal strength, sensation and reflexes       throughout     Data:     Scheduled Medications:    lidocaine 1 % injection  5 mL IntraDERmal Once    sodium chloride flush  5-40 mL IntraVENous 2 times per day    ceftaroline fosamil (TEFLARO) IVPB  600 mg IntraVENous Q12H    linezolid  600 mg IntraVENous Q12H    enoxaparin  40 mg SubCUTAneous Daily      PRN Medications: sodium chloride, chlorhexidine, sodium chloride flush, sodium chloride, hydrOXYzine pamoate, naloxone, morphine, calcium carbonate, promethazine, potassium chloride **OR** potassium alternative oral replacement **OR** potassium chloride, magnesium sulfate, promethazine **OR** ondansetron, magnesium hydroxide, acetaminophen **OR** acetaminophen, perflutren lipid microspheres  Diet: ADULT DIET; Regular    Continuous Infusions:   sodium chloride      sodium chloride      sodium chloride 75 mL/hr at 09/17/22 2003         Intake/Output Summary (Last 24 hours) at 9/18/2022 0734  Last data filed at 9/18/2022 0003  Gross per 24 hour   Intake 120 ml   Output 25 ml   Net 95 ml       CBC:   Recent Labs     09/17/22  0559 09/17/22  1400 09/17/22  1959 09/18/22  0445   WBC 9.2  --   --  8.1   HGB 5.9*   < > 7.0* 7.1*     --   --  356    < > = values in this interval not displayed.      BMP:  Recent Labs     09/17/22  0559 09/18/22  0445    134*   K 3.8 3.8    106   CO2 16* 17*   BUN 39* 35*   CREATININE 1.9* 2.3*   GLUCOSE 89 111*     ABGs: No results found for: PHART, PO2ART, [id number]    Assessment/plan     Patient Active Problem List:     Tear of medial cartilage or meniscus of knee, current     Plica syndrome     Boxer's metacarpal fracture, neck, closed     Chondromalacia of patella     Degeneration of lumbar or lumbosacral intervertebral disc     Varicose veins of lower extremity     Intractable nausea and vomiting     Septicemia (HCC)     IVDU (intravenous drug user)     MRSA bacteremia     Sepsis due to methicillin resistant Staphylococcus aureus (MRSA) without acute organ dysfunction (HCC)     Bacterial infection due to Serratia     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 According to the patient's sister patient has been feeling sick for past few days, not feeling well. Patient was noticed to have fevers as well as chills.      Assessment  Sepsis present on admission - endocarditis +/- cavitary PNA - MRSA/Serratia bacteremia   Pulmonary septic emboli, cavitary nodules  Endocarditis  History of IV drug abuse  ADHD    Plan  Continue atbx as per ID - IV Ceftaroline - 600 mg q 12 HRS +  IV Linezolid x 600 mg q 12 HRS   Endocarditis  + cardiology - atbx ok  no angiovac   Hb 5.5  transfuse 1 u PRBC and recheck was 7 - monitor    Added ensure with meals as she has low po intake     Full Code    Kaylie Clarke MD

## 2022-09-19 LAB
ALBUMIN SERPL-MCNC: 1.8 G/DL (ref 3.4–5)
ALP BLD-CCNC: 83 U/L (ref 40–129)
ALT SERPL-CCNC: 11 U/L (ref 10–40)
ANION GAP SERPL CALCULATED.3IONS-SCNC: 13 MMOL/L (ref 3–16)
APTT: 32.6 SEC (ref 23–34.3)
AST SERPL-CCNC: 13 U/L (ref 15–37)
BASOPHILS ABSOLUTE: 0 K/UL (ref 0–0.2)
BASOPHILS RELATIVE PERCENT: 0.5 %
BILIRUB SERPL-MCNC: 0.3 MG/DL (ref 0–1)
BILIRUBIN DIRECT: <0.2 MG/DL (ref 0–0.3)
BILIRUBIN, INDIRECT: ABNORMAL MG/DL (ref 0–1)
BLOOD BANK DISPENSE STATUS: NORMAL
BLOOD BANK PRODUCT CODE: NORMAL
BPU ID: NORMAL
BUN BLDV-MCNC: 46 MG/DL (ref 7–20)
CALCIUM IONIZED: 1.05 MMOL/L (ref 1.12–1.32)
CALCIUM SERPL-MCNC: 7 MG/DL (ref 8.3–10.6)
CHLORIDE BLD-SCNC: 108 MMOL/L (ref 99–110)
CO2: 16 MMOL/L (ref 21–32)
CREAT SERPL-MCNC: 3.1 MG/DL (ref 0.6–1.1)
DESCRIPTION BLOOD BANK: NORMAL
EOSINOPHILS ABSOLUTE: 0.1 K/UL (ref 0–0.6)
EOSINOPHILS RELATIVE PERCENT: 1 %
GFR AFRICAN AMERICAN: 20
GFR NON-AFRICAN AMERICAN: 17
GLUCOSE BLD-MCNC: 97 MG/DL (ref 70–99)
HAPTOGLOBIN: 328 MG/DL (ref 30–200)
HCT VFR BLD CALC: 19.4 % (ref 36–48)
HEMOGLOBIN: 6.5 G/DL (ref 12–16)
INR BLD: 1.34 (ref 0.87–1.14)
LYMPHOCYTES ABSOLUTE: 1.1 K/UL (ref 1–5.1)
LYMPHOCYTES RELATIVE PERCENT: 16.9 %
MCH RBC QN AUTO: 28.4 PG (ref 26–34)
MCHC RBC AUTO-ENTMCNC: 33.5 G/DL (ref 31–36)
MCV RBC AUTO: 84.8 FL (ref 80–100)
MONOCYTES ABSOLUTE: 0.5 K/UL (ref 0–1.3)
MONOCYTES RELATIVE PERCENT: 7.3 %
NEUTROPHILS ABSOLUTE: 4.6 K/UL (ref 1.7–7.7)
NEUTROPHILS RELATIVE PERCENT: 74.3 %
PATHOLOGIST REPORT, TRANSFUSION REACTION: NORMAL
PDW BLD-RTO: 16.5 % (ref 12.4–15.4)
PH VENOUS: 7.34 (ref 7.35–7.45)
PHOSPHORUS: 6.2 MG/DL (ref 2.5–4.9)
PLATELET # BLD: 371 K/UL (ref 135–450)
PMV BLD AUTO: 7.4 FL (ref 5–10.5)
POTASSIUM REFLEX MAGNESIUM: 4.3 MMOL/L (ref 3.5–5.1)
POTASSIUM SERPL-SCNC: 4.3 MMOL/L (ref 3.5–5.1)
PRO-BNP: ABNORMAL PG/ML (ref 0–124)
PROTHROMBIN TIME: 16.5 SEC (ref 11.7–14.5)
RBC # BLD: 2.29 M/UL (ref 4–5.2)
SODIUM BLD-SCNC: 137 MMOL/L (ref 136–145)
TOTAL PROTEIN: 5.1 G/DL (ref 6.4–8.2)
WBC # BLD: 6.2 K/UL (ref 4–11)

## 2022-09-19 PROCEDURE — 97530 THERAPEUTIC ACTIVITIES: CPT

## 2022-09-19 PROCEDURE — 6370000000 HC RX 637 (ALT 250 FOR IP): Performed by: INTERNAL MEDICINE

## 2022-09-19 PROCEDURE — 82330 ASSAY OF CALCIUM: CPT

## 2022-09-19 PROCEDURE — 36430 TRANSFUSION BLD/BLD COMPNT: CPT

## 2022-09-19 PROCEDURE — 9990000010 HC NO CHARGE VISIT: Performed by: PHYSICAL THERAPIST

## 2022-09-19 PROCEDURE — 80069 RENAL FUNCTION PANEL: CPT

## 2022-09-19 PROCEDURE — 6360000002 HC RX W HCPCS: Performed by: INTERNAL MEDICINE

## 2022-09-19 PROCEDURE — 94760 N-INVAS EAR/PLS OXIMETRY 1: CPT

## 2022-09-19 PROCEDURE — 85025 COMPLETE CBC W/AUTO DIFF WBC: CPT

## 2022-09-19 PROCEDURE — 2500000003 HC RX 250 WO HCPCS: Performed by: INTERNAL MEDICINE

## 2022-09-19 PROCEDURE — 2580000003 HC RX 258: Performed by: INTERNAL MEDICINE

## 2022-09-19 PROCEDURE — 2060000000 HC ICU INTERMEDIATE R&B

## 2022-09-19 PROCEDURE — 97530 THERAPEUTIC ACTIVITIES: CPT | Performed by: PHYSICAL THERAPIST

## 2022-09-19 PROCEDURE — 94150 VITAL CAPACITY TEST: CPT

## 2022-09-19 PROCEDURE — 85730 THROMBOPLASTIN TIME PARTIAL: CPT

## 2022-09-19 PROCEDURE — 83880 ASSAY OF NATRIURETIC PEPTIDE: CPT

## 2022-09-19 PROCEDURE — 6370000000 HC RX 637 (ALT 250 FOR IP): Performed by: NURSE PRACTITIONER

## 2022-09-19 PROCEDURE — 99233 SBSQ HOSP IP/OBS HIGH 50: CPT | Performed by: INTERNAL MEDICINE

## 2022-09-19 PROCEDURE — 84132 ASSAY OF SERUM POTASSIUM: CPT

## 2022-09-19 PROCEDURE — 51798 US URINE CAPACITY MEASURE: CPT

## 2022-09-19 PROCEDURE — 83010 ASSAY OF HAPTOGLOBIN QUANT: CPT

## 2022-09-19 PROCEDURE — 80076 HEPATIC FUNCTION PANEL: CPT

## 2022-09-19 PROCEDURE — 97116 GAIT TRAINING THERAPY: CPT | Performed by: PHYSICAL THERAPIST

## 2022-09-19 PROCEDURE — 85610 PROTHROMBIN TIME: CPT

## 2022-09-19 RX ORDER — HEPARIN SODIUM 5000 [USP'U]/ML
5000 INJECTION, SOLUTION INTRAVENOUS; SUBCUTANEOUS EVERY 8 HOURS SCHEDULED
Status: DISCONTINUED | OUTPATIENT
Start: 2022-09-19 | End: 2022-10-01 | Stop reason: HOSPADM

## 2022-09-19 RX ORDER — SODIUM CHLORIDE 9 MG/ML
INJECTION, SOLUTION INTRAVENOUS PRN
Status: COMPLETED | OUTPATIENT
Start: 2022-09-19 | End: 2022-09-19

## 2022-09-19 RX ORDER — SODIUM BICARBONATE 650 MG/1
1300 TABLET ORAL 3 TIMES DAILY
Status: DISCONTINUED | OUTPATIENT
Start: 2022-09-19 | End: 2022-09-23

## 2022-09-19 RX ADMIN — CEFTAROLINE FOSAMIL 300 MG: 600 POWDER, FOR SOLUTION INTRAVENOUS at 21:39

## 2022-09-19 RX ADMIN — SODIUM BICARBONATE 1300 MG: 650 TABLET ORAL at 21:05

## 2022-09-19 RX ADMIN — LORAZEPAM 1 MG: 1 TABLET ORAL at 21:05

## 2022-09-19 RX ADMIN — MORPHINE SULFATE 0.5 MG: 2 INJECTION, SOLUTION INTRAMUSCULAR; INTRAVENOUS at 05:08

## 2022-09-19 RX ADMIN — LINEZOLID 600 MG: 600 INJECTION, SOLUTION INTRAVENOUS at 23:25

## 2022-09-19 RX ADMIN — CEFTAROLINE FOSAMIL 300 MG: 600 POWDER, FOR SOLUTION INTRAVENOUS at 16:01

## 2022-09-19 RX ADMIN — LORAZEPAM 1 MG: 1 TABLET ORAL at 08:26

## 2022-09-19 RX ADMIN — SODIUM CHLORIDE, PRESERVATIVE FREE 10 ML: 5 INJECTION INTRAVENOUS at 21:05

## 2022-09-19 RX ADMIN — LINEZOLID 600 MG: 600 INJECTION, SOLUTION INTRAVENOUS at 10:41

## 2022-09-19 RX ADMIN — HYDROXYZINE PAMOATE 25 MG: 25 CAPSULE ORAL at 18:15

## 2022-09-19 RX ADMIN — MORPHINE SULFATE 0.5 MG: 2 INJECTION, SOLUTION INTRAMUSCULAR; INTRAVENOUS at 16:38

## 2022-09-19 RX ADMIN — HEPARIN SODIUM 5000 UNITS: 5000 INJECTION INTRAVENOUS; SUBCUTANEOUS at 21:40

## 2022-09-19 RX ADMIN — HEPARIN SODIUM 5000 UNITS: 5000 INJECTION INTRAVENOUS; SUBCUTANEOUS at 15:55

## 2022-09-19 RX ADMIN — SODIUM BICARBONATE 1300 MG: 650 TABLET ORAL at 15:23

## 2022-09-19 RX ADMIN — SODIUM BICARBONATE: 84 INJECTION, SOLUTION INTRAVENOUS at 15:55

## 2022-09-19 RX ADMIN — MORPHINE SULFATE 0.5 MG: 2 INJECTION, SOLUTION INTRAMUSCULAR; INTRAVENOUS at 23:13

## 2022-09-19 RX ADMIN — MORPHINE SULFATE 0.5 MG: 2 INJECTION, SOLUTION INTRAMUSCULAR; INTRAVENOUS at 12:29

## 2022-09-19 RX ADMIN — ANTACID TABLETS 500 MG: 500 TABLET, CHEWABLE ORAL at 05:54

## 2022-09-19 RX ADMIN — HEPARIN SODIUM 5000 UNITS: 5000 INJECTION INTRAVENOUS; SUBCUTANEOUS at 15:23

## 2022-09-19 RX ADMIN — SODIUM CHLORIDE, PRESERVATIVE FREE 10 ML: 5 INJECTION INTRAVENOUS at 10:36

## 2022-09-19 RX ADMIN — ACETAMINOPHEN 650 MG: 325 TABLET ORAL at 18:15

## 2022-09-19 RX ADMIN — ANTACID TABLETS 500 MG: 500 TABLET, CHEWABLE ORAL at 12:29

## 2022-09-19 RX ADMIN — SODIUM BICARBONATE 1300 MG: 650 TABLET ORAL at 15:51

## 2022-09-19 RX ADMIN — SODIUM CHLORIDE: 9 INJECTION, SOLUTION INTRAVENOUS at 23:07

## 2022-09-19 ASSESSMENT — PAIN SCALES - GENERAL
PAINLEVEL_OUTOF10: 6

## 2022-09-19 ASSESSMENT — PAIN DESCRIPTION - LOCATION
LOCATION: BACK
LOCATION: BACK

## 2022-09-19 NOTE — PROGRESS NOTES
Clinical Pharmacy Note  Subcutaneous Anticoagulant Adjustment     Enoxaparin has been adjusted to Heparin SQ 5000 units TID based on [facility] policy. Recent Labs     09/18/22  0445 09/19/22  0551   CREATININE 2.3* 3.1*     Recent Labs     09/19/22  0551   HGB 6.5*   HCT 19.4*        Estimated Creatinine Clearance: 24 mL/min (A) (based on SCr of 3.1 mg/dL (H)). Pharmacist Review of Appropriate Use and Automatic Dose Adjustment of Subcutaneous Anticoagulants (Adult)    The guidance below is to provide initial recommendations for dosing. If recommended dose does not align well with patient's current clinical picture, communications with the care team will occur to determine most appropriate medication and dose. TABLE 1. ENOXAPARIN ROUTINE PROPHYLAXIS DOSING (Medically ill, routine surgery)   Patient Weight (kg)     50.9 and below 51 - 100.9 101 - 150.9 151 - 174.9 175 or greater         Estimated CrCl  (ml/min) 30 or greater   30 mg SUBQ daily   40 mg SUBQ daily 30 mg SUBQ BID  40 mg SUBQ BID 60mg SUBQ BID      15-29 UFH 5000 units SUBQ BID   30 mg SUBQ daily 30 mg SUBQ daily 40 mg SUBQ daily   60 mg SUBQ daily      Less than 15 or Dialysis UFH 5000 units SUBQ BID   UFH 5000 units SUBQ TID UFH 7500 units SUBQ TID       TABLE 2. ENOXAPARIN TREATMENT DOSING   (Based on 1mg/kg BID for DVT/PE/AFib)   Patient Weight (kg)     50.9 and below .9 151-189.9 190 or greater         Estimated CrCl  (ml/min) 30 or greater Recommend [facility] standardized UFH infusion, apixaban or rivaroxaban 1mg/kg SUBQ BID 1mg/kg SUBQ BID if anti-Xa levels are feasible per institution. Alternatively,  recommend switch to [facility] standardized UFH infusion     Recommend switch to [facility] standardized UFH infusion. 15-29 Recommend [facility] standardized UFH infusion or apixaban 1mg/kg SUBQ daily Recommend switch to [facility] standardized UFH infusion     Less than 15 or Dialysis Recommend switch to [facility] standardized UFH infusion.      Sahara Daria Brown, [address] [facility] 9/19/2022 9:33 AM

## 2022-09-19 NOTE — PROGRESS NOTES
Hemoglobin came back at 6.5, no obvious signs of active bleeding. Secure message sent to Dr. Agata Celis. Awaiting response. This RN called blood bank to make aware of possible transfusion.      Electronically signed by Maik Freeman RN on 9/19/2022 at 7:02 AM

## 2022-09-19 NOTE — PLAN OF CARE
Nutrition Problem #1: Inadequate oral intake  Intervention: Food and/or Nutrient Delivery: Continue Current Diet, Modify Oral Nutrition Supplement

## 2022-09-19 NOTE — PROGRESS NOTES
Physical Therapy  Facility/Department: [facility]  Physical Therapy Treatment Note    Name: Sandra Fletcher  : 1980  MRN: [id number]  Date of Service: 2022    Discharge Recommendations:  Patient would benefit from continued therapy after discharge (3-5x/wk)   PT Equipment Recommendations  Other: defer to next level of care    Sandra Fletcher scored a 12/24 on the AM-PAC short mobility form. Current research shows that an AM-PAC score of 17 or less is typically not associated with a discharge to the patient's home setting. Based on the patient's AM-PAC score and their current functional mobility deficits, it is recommended that the patient have 3-5 sessions per week of Physical Therapy at d/c to increase the patient's independence. Please see assessment section for further patient specific details. If patient discharges prior to next session this note will serve as a discharge summary. Please see below for the latest assessment towards goals. Patient Diagnosis(es): The primary encounter diagnosis was Septicemia ([facility] [address] A diagnosis of IVDU (intravenous drug user) was also pertinent to this visit. Past Medical History:  has a past medical history of ADHD (attention deficit hyperactivity disorder), Arthritis, Back pain, Depression, Migraine, and Neutrophilia. Past Surgical History:  has a past surgical history that includes Partial hysterectomy; Knee arthroscopy (10-5-10); and Tubal ligation ().     Assessment   Body Structures, Functions, Activity Limitations Requiring Skilled Therapeutic Intervention: Decreased functional mobility   Assessment: pt making some progress in therapy however pt still very weak and has poor endurance; pt was able to complete bed mob with mod A and min A of 2 to stand with walker in front and min A of 2 to take a few steps to BS chair with RW; pt is a high fall risk and not safe to return directly home at discharge; recommend continued therapy 3-5x/wk to address deficits to assist pt in regaining her max potential  Therapy Prognosis: Fair  Decision Making: High Complexity  Clinical Presentation: evolving  Barriers to Learning: pain  Requires PT Follow-Up: Yes  Activity Tolerance  Activity Tolerance: Patient limited by endurance     Plan   Plan  Plan: 3-5 times per week  Current Treatment Recommendations: Functional mobility training  Safety Devices  Type of Devices: Call light within reach, Nurse notified, Gait belt, Chair alarm in place, Left in chair     Restrictions  Restrictions/Precautions  Restrictions/Precautions: Fall Risk     Subjective   General  Chart Reviewed: Yes  Patient assessed for rehabilitation services?: Yes  Additional Pertinent Hx: per Dr Jason Bojorquez note: \"40year-old female with past medical history of drug abuse, ADHD, who presents to the hospital due to feeling fatigue, pain in her legs as well as back, she has a history of IV drug abuse, per patient she has been sober for now, patient family is also on the bedside who also contributed to the patient history. According to the patient's sister patient has been feeling sick for past few days, not feeling well. Patient was noticed to have fevers as well as chills. \" 9-16 s/p CT-guided drain placement for R iliacus abcess  Response To Previous Treatment: Patient with no complaints from previous session.   Family / Caregiver Present: No  Referring Practitioner: Dr Renetta De La Garza  Referral Date : 09/12/22  Subjective  Subjective: pt's H&H low but pt wanted to get up to chair; nursing okayed pt to get up to chair with therapy         Social/Functional History  Social/Functional History  Lives With: Family (mom and dad)  Type of Home: House  Home Layout: Two level, Laundry in basement (one story and a basement)  Home Access: Stairs to enter with rails  Entrance Stairs - Number of Steps: 10-12 with rail  Entrance Stairs - Rails: Both  Bathroom Shower/Tub: Tub/Shower unit  Bathroom Toilet: Standard  Bathroom Equipment: Shower chair  Home Equipment:  (no DME)  Has the patient had two or more falls in the past year or any fall with injury in the past year?: Yes (fall about a week ago)  ADL Assistance: Independent  Homemaking Assistance: Independent  Ambulation Assistance: Independent (no AD)  Transfer Assistance: Independent  Active : No  Occupation: Unemployed  Vision/Hearing  Vision  Vision: Within Functional Limits  Hearing  Hearing: Within functional limits    Cognition   Orientation  Overall Orientation Status: Within Functional Limits  Cognition  Overall Cognitive Status: WFL  Cognition Comment: poor judgment but appears cognitively wfl     Objective   Heart Rate: 85  Heart Rate Source: Monitor  BP: 135/88  BP Location: Left upper arm  BP Method: Automatic  MAP (Calculated): 103.67  Resp: 30  SpO2: 95 %  O2 Device: None (Room air)                             Bed mobility  Supine to Sit: Moderate assistance  Transfers  Sit to Stand: Minimal Assistance;2 Person Assistance  Stand to sit: Minimal Assistance;2 Person Assistance  Ambulation  Surface: level tile  Device: Rolling Walker  Assistance: Minimal assistance;2 Person assistance  Quality of Gait: slow small steps with pt staying on R toes for WB due to pain from R iliacus abcess; pt slighlty unsteady  Distance: 3' to [facility] chair  Comments: pt positioned in chair for comfort with all needs in reach and LEs elevated; warm blankets given to pt and encouraged her to try and stay up for a few hours     Balance  Comments: CGA/SBA for sitting EOB; CGA/min A for static standing with RW           OutComes Score                                                  AM-PAC Score  AM-PAC Inpatient Mobility Raw Score : 12 (09/19/22 1509)  AM-PAC Inpatient T-Scale Score : 35.33 (09/19/22 1509)  Mobility Inpatient CMS 0-100% Score: 68.66 (09/19/22 1509)  Mobility Inpatient CMS G-Code Modifier : CL (09/19/22 1509)          Tinneti Score       Goals  Short Term Goals  Time Frame for Short term goals: by discharge  Short term goal 1: bed mob mod A  Short term goal 2: transfers mod A  Short term goal 3: progress to gait when able  Patient Goals   Patient goals : pt did not state a goal       Education  Patient Education  Education Given To: Patient  Education Provided Comments: reviewed call light and not getting up without assist  Education Method: Verbal  Education Outcome: Verbalized understanding;Continued education needed      Therapy Time   Individual Concurrent Group Co-treatment   Time In 1440         Time Out 1510         Minutes 30                 CHLOÉ ALMAZAN PT   Electronically signed by CHLOÉ ALMAZAN PT on 9/19/2022 at 3:11 PM

## 2022-09-19 NOTE — PROGRESS NOTES
Up to bedside commode voided 50 ml   very small stool soft formed ,  back to bed   bladder scanned for 267

## 2022-09-19 NOTE — PROGRESS NOTES
Comprehensive Nutrition Assessment    Type and Reason for Visit:  Initial, RD Nutrition Re-Screen/LOS    Nutrition Recommendations/Plan:   Continue regular diet  Increase Ensure Enlive to 4 times per day     Malnutrition Assessment:  Malnutrition Status: At risk for malnutrition (Comment) (09/19/22 1540)    Context:  Acute Illness     Findings of the 6 clinical characteristics of malnutrition:  Energy Intake:  50% or less of estimated energy requirements for 5 or more days  Weight Loss:  Unable to assess (fluid shifts, diuretic use)     Body Fat Loss:  Unable to assess     Muscle Mass Loss:  Unable to assess    Fluid Accumulation:  No significant fluid accumulation     Strength:  Not Performed    Nutrition Assessment:    Pt screened for LOS. PMH includes IV drug abuse (sober for now). Pt adm with Intractable nausea and vomiting and back pain. Found to have UTI, Neutrophilia, MARYAN, Retroperitoneal Abscess (drain placed) and Septic PEs. Diet adv to regular. Intake down so Ensure Enlive ordered tid per provider. Feedback revealed that po is improving a little with 100% acceptance of the Ensure Enlive. Will increase Ensure Enlive to 4 times per day which would offer; 1400 kcals and 80 gms per day. Will continue to follow. Nutrition Related Findings:    Labs reviewed. Noted BM on 8/19. Noted no edema. Wound Type: None       Current Nutrition Intake & Therapies:    Average Meal Intake: 1-25%, 26-50% (improving per family)  Average Supplements Intake: % (per family)  ADULT DIET; Regular  ADULT ORAL NUTRITION SUPPLEMENT; Breakfast, Lunch, Dinner; Standard High Calorie/High Protein Oral Supplement    Anthropometric Measures:  Height: 5' 8\" (172.7 cm)  Ideal Body Weight (IBW): 140 lbs (64 kg)    Admission Body Weight: 141 lb (64 kg)  Current Body Weight: 151 lb (68.5 kg), 107.9 % IBW.  Weight Source: Bed Scale  Current BMI (kg/m2): 23                          BMI Categories: Normal Weight (BMI 18.5-24. 9)    Estimated Daily Nutrient Needs:        Energy (kcal/day): 0963-2501 (25-30 x ABW 69 kg)     Protein (g/day):  (1.2-1.5 x ABW 69 kg)  Method Used for Fluid Requirements: 1 ml/kcal  Fluid (ml/day):      Nutrition Diagnosis:   Inadequate oral intake related to inadequate protein-energy intake as evidenced by intake 0-25%, intake 26-50%    Nutrition Interventions:   Food and/or Nutrient Delivery: Continue Current Diet, Modify Oral Nutrition Supplement  Nutrition Education/Counseling: No recommendation at this time  Coordination of Nutrition Care: Continue to monitor while inpatient       Goals:     Goals: PO intake 50% or greater       Nutrition Monitoring and Evaluation:   Behavioral-Environmental Outcomes: None Identified  Food/Nutrient Intake Outcomes: Food and Nutrient Intake, Supplement Intake  Physical Signs/Symptoms Outcomes: Biochemical Data, Constipation, Diarrhea, Nausea or Vomiting, Fluid Status or Edema, Skin, Weight    Discharge Planning:     Too soon to determine     Nicola Walker, [address] [place] [address],   Contact: [phone]

## 2022-09-19 NOTE — PROGRESS NOTES
Progress Note    Admit Date: 9/12/2022         Subjective and Overnight Events: seen at bedside has no CP, SOB but has complains of back pain    Objective:   Vitals: /75   Pulse 85   Temp 98.1 °F (36.7 °C) (Oral)   Resp 30   Ht 5' 8\" (1.727 m)   Wt 151 lb 3.8 oz (68.6 kg)   SpO2 93%   BMI 23.00 kg/m²   /75   Pulse 85   Temp 98.1 °F (36.7 °C) (Oral)   Resp 30   Ht 5' 8\" (1.727 m)   Wt 151 lb 3.8 oz (68.6 kg)   SpO2 93%   BMI 23.00 kg/m²     General appearance: NAD  HEENT:  Conjunctivae/corneas clear. Neck: Supple, with full range of motion. Respiratory:  Normal respiratory effort. Clear to auscultation, bilaterally without Rales/Wheezes/Rhonchi. Cardiovascular: has murmur  Abdomen: Soft, non-tender, non-distended, normal bowel sounds. Musculoskeletal: No cyanosis or edema bilaterally  Neurologic:  without any focal sensory/motor deficits. grossly non-focal.  Psychiatric: Alert and oriented, Normal mood  Peripheral Pulses: +2 palpable, equal bilaterally     Data:     Scheduled Medications:    heparin (porcine)  5,000 Units SubCUTAneous 3 times per day    ceftaroline fosamil (TEFLARO) IVPB  300 mg IntraVENous q8h    sodium bicarbonate  1,300 mg Oral TID    lidocaine 1 % injection  5 mL IntraDERmal Once    sodium chloride flush  5-40 mL IntraVENous 2 times per day    linezolid  600 mg IntraVENous Q12H      PRN Medications: sodium chloride, LORazepam, sodium chloride, chlorhexidine, sodium chloride flush, sodium chloride, hydrOXYzine pamoate, naloxone, morphine, calcium carbonate, promethazine, promethazine **OR** ondansetron, acetaminophen **OR** acetaminophen, perflutren lipid microspheres  Diet: ADULT DIET;  Regular  ADULT ORAL NUTRITION SUPPLEMENT; Breakfast, Lunch, Dinner; Standard High Calorie/High Protein Oral Supplement    Continuous Infusions:   sodium bicarbonate infusion 50 mL/hr at 09/19/22 1555    sodium chloride      sodium chloride      sodium chloride           Intake/Output Summary (Last 24 hours) at 9/19/2022 1907  Last data filed at 9/19/2022 1237  Gross per 24 hour   Intake 30 ml   Output 185 ml   Net -155 ml         CBC:   Recent Labs     09/18/22  0445 09/19/22  0551   WBC 8.1 6.2   HGB 7.1* 6.5*    371       BMP:  Recent Labs     09/18/22 0445 09/19/22  0551   * 137   K 3.8  3.8 4.3  4.3    108   CO2 17* 16*   BUN 35* 46*   CREATININE 2.3* 3.1*   GLUCOSE 111* 97       ABGs: No results found for: PHART, PO2ART, [id number]    Assessment/plan     Patient Active Problem List:     Tear of medial cartilage or meniscus of knee, current     Plica syndrome     Boxer's metacarpal fracture, neck, closed     Chondromalacia of patella     Degeneration of lumbar or lumbosacral intervertebral disc     Varicose veins of lower extremity     Intractable nausea and vomiting     Septicemia (HCC)     IVDU (intravenous drug user)     MRSA bacteremia     Sepsis due to methicillin resistant Staphylococcus aureus (MRSA) without acute organ dysfunction (HCC)     Bacterial infection due to Serratia     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 According to the patient's sister patient has been feeling sick for past few days, not feeling well. Patient was noticed to have fevers as well as chills.      Assessment  Sepsis present on admission - endocarditis +/- cavitary PNA - MRSA/Serratia bacteremia   Pulmonary septic emboli, cavitary nodules  Endocarditis  History of IV drug abuse  ADHD  Anemia - will order PRBC, patient has consented, will order    Plan  Continue atbx as per ID   Endocarditis  + cardiology - atbx ok  no angiovac   Consulted hematology, r/u hemolysis, ordered haptoglobin  Added ensure with meals as she has low po intake   Pain control    Full Code    Nay Lopez MD

## 2022-09-19 NOTE — PROGRESS NOTES
Department of Internal Medicine  Nephrology Progress Note        39 y/o WF with h/o depression, IVDA and back pain admitted with feeling weak, fatigued and pain in her back She was seen in ER on 9/7/22 with back apin and diagnosed with UTI Received toradol and was discharged on Motrin and Cefdinir Urine CX grew klebsiella pan senstive However she continued to feel poorly with back pain, and fatigue   Had fever to 101 on admission Started on Vancomycin and Cefepime Cr was 1.5 mg on admission improved to 1.1 mg but has increased to 1.8 mg now [facility] from admission growing MRSA and Serratia CT chest with cavitation and septic emboli  She reports decreased UOP no dysuria hematuria  Has been taking Ibuprofen 800 mg TID prior to admission  MRI showed OM of R SI joint and large retroperitoneal abscess   Underwent drainage tube placement . Events noted , labs reviewed . S/p Tx . REVIEW OF SYSTEMS:  No CP/SOB or GEIGER . feels weak. tired  . ROS limited due to pt factor  . Family at bed side     Physical Exam:    VITALS:  /88   Pulse 85   Temp 97.8 °F (36.6 °C) (Oral)   Resp 30   Ht 5' 8\" (1.727 m)   Wt 151 lb 3.8 oz (68.6 kg)   SpO2 95%   BMI 23.00 kg/m²   24HR INTAKE/OUTPUT:    Intake/Output Summary (Last 24 hours) at 9/19/2022 1253  Last data filed at 9/19/2022 1237  Gross per 24 hour   Intake 30 ml   Output 265 ml   Net -235 ml         Constitutional:  Doing well  Respiratory:  Decrease BS at  bases   Gastrointestinal:  + tenderness.   Normal Bowel Sounds  Cardiovascular:  S1, S2 RRR   Edema:  +  edema    DATA:    CBC:  Lab Results   Component Value Date/Time    WBC 6.2 09/19/2022 05:51 AM    RBC 2.29 09/19/2022 05:51 AM    HGB 6.5 09/19/2022 05:51 AM    HCT 19.4 09/19/2022 05:51 AM    MCV 84.8 09/19/2022 05:51 AM    MCH 28.4 09/19/2022 05:51 AM    MCHC 33.5 09/19/2022 05:51 AM    RDW 16.5 09/19/2022 05:51 AM     09/19/2022 05:51 AM    MPV 7.4 09/19/2022 05:51 AM     CMP:  Lab Results Component Value Date/Time     09/19/2022 05:51 AM    K 4.3 09/19/2022 05:51 AM    K 4.3 09/19/2022 05:51 AM     09/19/2022 05:51 AM    CO2 16 09/19/2022 05:51 AM    BUN 46 09/19/2022 05:51 AM    CREATININE 3.1 09/19/2022 05:51 AM    GFRAA 20 09/19/2022 05:51 AM    GFRAA >60 07/28/2012 11:20 PM    AGRATIO 0.4 09/12/2022 03:44 PM    LABGLOM 17 09/19/2022 05:51 AM    GLUCOSE 97 09/19/2022 05:51 AM    PROT 5.1 09/19/2022 05:51 AM    PROT 6.5 07/28/2012 11:20 PM    CALCIUM 7.0 09/19/2022 05:51 AM    BILITOT 0.3 09/19/2022 05:51 AM    ALKPHOS 83 09/19/2022 05:51 AM    AST 13 09/19/2022 05:51 AM    ALT 11 09/19/2022 05:51 AM      Hepatic Function Panel:   Lab Results   Component Value Date/Time    ALKPHOS 83 09/19/2022 05:51 AM    ALT 11 09/19/2022 05:51 AM    AST 13 09/19/2022 05:51 AM    PROT 5.1 09/19/2022 05:51 AM    PROT 6.5 07/28/2012 11:20 PM    BILITOT 0.3 09/19/2022 05:51 AM    BILIDIR <0.2 09/19/2022 05:51 AM    IBILI see below 09/19/2022 05:51 AM      Phosphorus:   Lab Results   Component Value Date/Time    PHOS 6.2 09/19/2022 05:51 AM       ASSESSMENT:  Principal Problem:    Intractable nausea and vomiting  Active Problems:    Septicemia (HCC)    IVDU (intravenous drug user)    MRSA bacteremia    Sepsis due to methicillin resistant Staphylococcus aureus (MRSA) without acute organ dysfunction ([address]    Bacterial infection due to Serratia    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 *      PLAN  Assessment/  1-MARYAN suspect Vancomycin toxicity Vanc random level 24 yesterday correlates with the rise in Cr although, SIRS can be contributing Cannot r/o post infectious GN   Gradual decline in renal function , IVF adjusted  . May need dialysis if no change or decline. No acute indication to start dialysis today .    2-MRSA and serratia bacteremia with retroperitoneal abscess  septic pulmonary emboli and possible TV endocarditis  3-IVDA   4 Anemia  Hb  noted . S/p Tx .   5 Hypocalcemia with severe Hypoalbuminemia , onTums  . Recheck Ionized Ca   Plan dw pt and family .      Bishop Leonidas MD, FACP

## 2022-09-19 NOTE — PROGRESS NOTES
Physical Therapy  Denguera Bull  [id number]  [id number]    Attempted to see for PT session however pt's HGB is 6.5 this am; will defer therapy at this time  Electronically signed by CHLOÉ ALMAZAN PT on 9/19/2022 at 8:28 AM

## 2022-09-19 NOTE — PROGRESS NOTES
Occupational Therapy  Facility/Department: [facility]  Occupational Therapy Daily Treatment Note    Name: Kit Angulo  : 1980  MRN: [id number]  Date of Service: 2022    Discharge Recommendations:  Patient would benefit from continued therapy after discharge, 3-5 sessions per week  OT Equipment Recommendations  Other: defer to MA facility     Kit Angulo scored a 15/24 on the AM-PAC ADL Inpatient form. Current research shows that an AM-PAC score of 17 or less is typically not associated with a discharge to the patient's home setting. Based on the patient's AM-PAC score and their current ADL deficits, it is recommended that the patient have 3-5 sessions per week of Occupational Therapy at d/c to increase the patient's independence. Please see assessment section for further patient specific details. If patient discharges prior to next session this note will serve as a discharge summary. Please see below for the latest assessment towards goals. Patient Diagnosis(es): The primary encounter diagnosis was Septicemia ([facility] [address] A diagnosis of IVDU (intravenous drug user) was also pertinent to this visit. Past Medical History:  has a past medical history of ADHD (attention deficit hyperactivity disorder), Arthritis, Back pain, Depression, Migraine, and Neutrophilia. Past Surgical History:  has a past surgical history that includes Partial hysterectomy; Knee arthroscopy (10-5-10); and Tubal ligation (). Assessment   Performance deficits / Impairments: Decreased functional mobility ; Decreased safe awareness;Decreased balance;Decreased ADL status; Decreased high-level IADLs;Decreased endurance;Decreased strength  Assessment: 41 y/o female admitted 2022 with Sepsis and Pulmonary septic emboli, cavitary nodules. Pt with history of IV drug abuse. PTA pt lives at home with her parents and was independent with ADLs and functional mobility.  Today, pt required min A x 2 for transfers and to take a few steps bed > chair with RW. Pt limited by significant pain. Anticipate pt will require up to max A for LB ADLs. Pt is functioning below baseline and benefit from skilled therapy. Prognosis: Good  REQUIRES OT FOLLOW-UP: Yes  Activity Tolerance  Activity Tolerance: Patient limited by fatigue;Patient limited by pain        Plan   Plan  Times per Week: 3-5  Current Treatment Recommendations: Strengthening, Balance training, Functional mobility training, Endurance training, Self-Care / ADL, Safety education & training, Gait training, Equipment evaluation, education, & procurement, Patient/Caregiver education & training     Restrictions  Restrictions/Precautions  Restrictions/Precautions: Fall Risk    Subjective   General  Chart Reviewed: Yes  Patient assessed for rehabilitation services?: Yes  Additional Pertinent Hx: Per H&P: \"40year-old female with past medical history of drug abuse, ADHD, who presents to the hospital due to feeling fatigue, pain in her legs as well as back, she has a history of IV drug abuse, per patient she has been sober for now, patient family is also on the bedside who also contributed to the patient history. According to the patient's sister patient has been feeling sick for past few days, not feeling well. Patient was noticed to have fevers as well as chills. \" MRI showed OM of R SI joint and large retroperitoneal abscess. 9/16 Underwent drainage tube placement . Family / Caregiver Present: No  Referring Practitioner: Dr. Altagracia Mederos: Pt seen bedside and agreeable to therapy with encouragement  General Comment  Comments: Per RN ok to see for therapy despite low H/H.      Social/Functional History  Social/Functional History  Lives With: Family (mom and dad)  Type of Home: House  Home Layout: Two level, Laundry in basement (one story and a basement)  Home Access: Stairs to enter with rails  Entrance Stairs - Number of Steps: 10-12 with rail  Entrance Stairs - Rails: Both  Bathroom Shower/Tub: Tub/Shower unit  Bathroom Toilet: Standard  Bathroom Equipment: Shower chair  Home Equipment:  (no DME)  Has the patient had two or more falls in the past year or any fall with injury in the past year?: Yes (fall about a week ago)  ADL Assistance: Independent  Homemaking Assistance: Independent  Ambulation Assistance: Independent (no AD)  Transfer Assistance: Independent  Active : No  Occupation: Unemployed       Objective   Safety Devices  Type of Devices: Call light within reach;Nurse notified;Gait belt; Chair alarm in place; Left in chair        AROM: Within functional limits  Strength: Generally decreased, functional  Coordination: Within functional limits        Activity Tolerance  Activity Tolerance: Patient limited by endurance    Bed mobility  Supine to Sit: Moderate assistance (pt in sidelying at start of session)  Sit to Supine:  (pt in chair at end of session)    Transfers  Sit to stand: Minimal assistance;2 Person assistance  Stand to sit: 2 Person assistance;Minimal assistance  Transfer Comments: Pt stood from bed and took a few steps bed > chair with RW and min A x 2.     Vision  Vision: Within Functional Limits  Hearing  Hearing: Within functional limits    Cognition  Overall Cognitive Status: [facility]  Cognition Comment: flat affect; poor judgment but appears cognitively [facility]  Orientation  Overall Orientation Status: Within Functional Limits        Education Given To: Patient  Education Provided: Role of Therapy;Plan of Care;Transfer Training  Education Method: Demonstration;Verbal  Barriers to Learning: None  Education Outcome: Verbalized understanding;Demonstrated understanding     AM-PAC Score  AM-PAC Inpatient Daily Activity Raw Score: 15 (09/19/22 1508)  AM-PAC Inpatient ADL T-Scale Score : 34.69 (09/19/22 1508)  ADL Inpatient CMS 0-100% Score: 56.46 (09/19/22 1508)  ADL Inpatient CMS G-Code Modifier : CK (09/19/22 1508)    Goals  Short Term Goals  Time Frame for Short term goals: Prior to DC: goals ongoing  Short Term Goal 1: Pt will complete ADL transfers with supervision  Short Term Goal 2: Pt will complete functional mobility with supervision  Short Term Goal 3: Pt will tolerate standing > 3 min for functional task with supervision  Short Term Goal 4: Pt will complete toileting with min A  Short Term Goal 5: Pt will complete UE exercises in all plane to inc strength endurance for ADLs  Patient Goals   Patient goals : to feel better       Therapy Time   Individual Concurrent Group Co-treatment   Time In 1445         Time Out 1509         Minutes 24         Timed Code Treatment Minutes: 24 Minutes     This note to serve as OT d/c summary if pt is d/c-ed prior to next therapy session.     Mindy Roberts, OTR/L

## 2022-09-19 NOTE — PROGRESS NOTES
Infectious Disease Follow up Notes  Admit Date: 9/12/2022  Hospital Day: 8    Antibiotics :   IV Vancomycin d/c due to MARYAN  IV Linezolid  IV Ceftaroline      CHIEF COMPLAINT:     Sepsis  MRSA bacteremia  Serratia Bacteremia  Endocarditis  IVDA  TV Vegetation   Rt gluteal abscess     Subjective interval History :  40 y. o.woman with a history of IV drug abuse, ADHD, back pain, depression admitted to the hospital secondary to fever chills fatigue back pain. Patient family noted she was unable to get up from the bed for the past 3 days secondary to fevers and not feeling well. Admission labs indicate creatinine 1.5 procalcitonin elevated to 6.6, WBC elevated 15.5 hemoglobin 9.4 bilirubin 1.3 AST 42, blood cultures from admission positive for MRSA as well as Serratia. T-max 103.8 on admission. CT chest with diffuse innumerable bilateral pulmonary nodules consistent with cavitation and septic emboli. Given the presentation concern is for endocarditis secondary to IV drug abuse. Patient not much interactive during normalization some of the history is provided by her daughters,       Interval History : Has ongoing right gluteal pain unfortunately kidney function worsening nephrology following. Follow-up blood cultures have been negative. Abscess culture from the right gluteal area positive for MRSA and Serratia. -   Past Medical History:    Past Medical History:   Diagnosis Date    ADHD (attention deficit hyperactivity disorder)     Arthritis     Back pain     Depression     Migraine     Neutrophilia 9/15/2022       Past Surgical History:    Past Surgical History:   Procedure Laterality Date    KNEE ARTHROSCOPY  10-5-10    PARTIAL HYSTERECTOMY (CERVIX NOT REMOVED)      TUBAL LIGATION  2004       Current Medications:    No outpatient medications have been marked as taking for the 9/12/22 encounter [facility] Encounter).        Allergies: (36.6 °C) (Oral)   Resp 30   Ht 5' 8\" (1.727 m)   Wt 151 lb 3.8 oz (68.6 kg)   SpO2 97%   BMI 23.00 kg/m²        General Appearance: alert,in  acute distress, ++ pallor, no icterus  poor skin hygiene   Skin: warm and dry, no rash or erythema  Head: normocephalic and atraumatic  Eyes: pupils equal, round, and reactive to light, conjunctivae normal  ENT: tympanic membrane, external ear and ear canal normal bilaterally, nose without deformity, nasal mucosa and turbinates normal without polyps  Neck: supple and non-tender without mass, no thyromegaly  no cervical lymphadenopathy  Pulmonary/Chest: Bi basal crepts+ - no wheezes, rales or rhonchi, normal air movement, no respiratory distress  Cardiovascular: normal rate, regular rhythm, normal S1 and S2, esm+ murmurs, rubs, clicks, or gallops, no carotid bruits  Abdomen: soft, non-tender, non-distended, normal bowel sounds, no masses or organomegaly  Extremities: no cyanosis, clubbing or edema  Musculoskeletal: normal range of motion, no joint swelling, deformity or tenderness  Integumentary: No rashes, no abnormal skin lesions, no petechiae  Neurologic: reflexes normal and symmetric, no cranial nerve deficit  Psych:  Orientation, sensorium, mood normal            Lines: PICC  Needle tracks++   Rt gluteal area pain jerri DRAIN_   Lower leg multiple skin lesions from IVDA++     Data Review:    CBC:   Lab Results   Component Value Date    WBC 6.2 09/19/2022    HGB 6.5 (LL) 09/19/2022    HCT 19.4 (LL) 09/19/2022    MCV 84.8 09/19/2022     09/19/2022     RENAL:   Lab Results   Component Value Date    CREATININE 3.1 (H) 09/19/2022    BUN 46 (H) 09/19/2022     09/19/2022    K 4.3 09/19/2022    K 4.3 09/19/2022     09/19/2022    CO2 16 (L) 09/19/2022     SED RATE:   Lab Results   Component Value Date/Time    SEDRATE 51 07/23/2019 09:59 PM     CK:   Lab Results   Component Value Date/Time    CKTOTAL 10 09/18/2022 04:55 PM     CRP: No results found for: CRP  Hepatic Function Panel:   Lab Results   Component Value Date/Time    ALKPHOS 83 09/19/2022 05:51 AM    ALT 11 09/19/2022 05:51 AM    AST 13 09/19/2022 05:51 AM    PROT 5.1 09/19/2022 05:51 AM    PROT 6.5 07/28/2012 11:20 PM    BILITOT 0.3 09/19/2022 05:51 AM    BILIDIR <0.2 09/19/2022 05:51 AM    IBILI see below 09/19/2022 05:51 AM    LABALBU 1.8 09/19/2022 05:51 AM     UA:  Lab Results   Component Value Date/Time    COLORU Yellow 09/18/2022 04:36 PM    CLARITYU CLOUDY 09/18/2022 04:36 PM    GLUCOSEU Negative 09/18/2022 04:36 PM    GLUCOSEU NEGATIVE 07/31/2010 02:59 PM    BILIRUBINUR Negative 09/18/2022 04:36 PM    BILIRUBINUR NEGATIVE 07/31/2010 02:59 PM    KETUA Negative 09/18/2022 04:36 PM    SPECGRAV 1.008 09/18/2022 04:36 PM    BLOODU LARGE 09/18/2022 04:36 PM    PHUR 5.0 09/18/2022 04:36 PM    PROTEINU 100 09/18/2022 04:36 PM    UROBILINOGEN 0.2 09/18/2022 04:36 PM    NITRU Negative 09/18/2022 04:36 PM    LEUKOCYTESUR MODERATE 09/18/2022 04:36 PM    LABMICR YES 09/18/2022 04:36 PM    URINETYPE NotGiven 09/18/2022 04:36 PM      Urine Microscopic:   Lab Results   Component Value Date/Time    BACTERIA None Seen 09/18/2022 04:36 PM    COMU see below 09/07/2022 01:55 AM    HYALCAST 6 09/18/2022 04:36 PM    WBCUA 98 09/18/2022 04:36 PM    RBCUA 14 09/18/2022 04:36 PM    EPIU 1 09/18/2022 04:36 PM     Urine Reflex to Culture:   Lab Results   Component Value Date/Time    URRFLXCULT Yes 09/12/2022 04:16 PM      Summary   Severe eccentric tricuspid regurgitation. Small, mobile tricuspid valve vegetation noted measuring 0.9cm x 0.4cm.       Signature      ------------------------------------------------------------------   Electronically signed by MD Belinda MartinesInterpreting   physician) on 09/16/2022 at 04:29 PM   ------------------------------------------------------------------    MICRO: cultures reviewed and updated by me   Blood Culture:          MRSA DNA Probe, Nasal [[id number]] Collected: 09/13/22 1220   Order Status: Completed Specimen: Nares Updated: 09/14/22 1131    MRSA SCREEN RT-PCR --    Negative  MRSA DNA not detected. Normal Range: Not detected    Narrative:     ORDER#: [id number]                          ORDERED BY: Billie Quinn   SOURCE: Nares                              COLLECTED:  09/13/22 12:20   ANTIBIOTICS AT ANABELLA.:                      RECEIVED :  09/13/22 12:46   Blood Culture 1 [[id number]] (Abnormal) Collected: 09/12/22 1616   Order Status: Completed Specimen: Blood Updated: 09/14/22 0939    Blood Culture, Routine -- Abnormal     Gram stain Aerobic bottle:   Gram positive cocci in clusters   resembling Staphylococcus   Information to follow   and   Gram negative rods   Information to follow   Gram stain Anaerobic bottle:   Gram positive cocci in clusters   resembling Staphylococcus   Information to follow    Abnormal     Organism Staph aureus MRSA DNA Detected Abnormal     Blood Culture, Routine -- Abnormal     CONTACT PRECAUTIONS INDICATED   See additional report for complete BCID panel. mecA/C gene and MREJ gene Detected. Abnormal     Organism Serratia marcescens DNA Detected Abnormal     Blood Culture, Routine See additional report for complete BCID panel.     Organism Staph aureus MRSA Abnormal     Blood Culture, Routine -- Abnormal     POSITIVE for   Sensitivity to follow   CONTACT PRECAUTIONS INDICATED   Isolated two of two sets    Abnormal     Organism Serratia marcescens Abnormal     Blood Culture, Routine --    POSITIVE for   Sensitivity to follow   Isolated one of two sets    Narrative:     ORDER#: [id number]                          ORDERED BY: Segundo Soto   SOURCE: Blood                              COLLECTED:  09/12/22 16:16   ANTIBIOTICS AT [facility]BELLA.:                      RECEIVED :  09/12/22 16:32   CALL  [place]  [facility] tel. [phone],   Microbiology results called to and read back by Ramona Polk RN,   09/13/2022 08:24, by [facility]   Microbiology results called to and read back by [facility] [employer],   09/13/2022 07:05, by Helena Moore   If child <=2 yrs old please draw pediatric bottle. ~Blood Culture 1   Culture, Blood 2 [[id number]] Collected: 09/14/22 0525   Order Status: Sent Specimen: Blood Updated: 09/14/22 0600   Culture, Blood 1 [[id number]] Collected: 09/14/22 0436   Order Status: Sent Specimen: Blood Updated: 09/14/22 0441   Culture, Urine [[id number]] Collected: 09/12/22 1616   Order Status: Completed Specimen: Urine, clean catch Updated: 09/13/22 2245    Urine Culture, Routine No growth at 18 to 36 hours   Narrative:     ORDER#: [id number]                          ORDERED BY: Jairo Curry   SOURCE: Urine Clean Catch                  COLLECTED:  09/12/22 16:16   ANTIBIOTICS AT ANABELLA.:                      RECEIVED :  09/12/22 19:35   Culture, Blood 2 [[id number]] (Abnormal) Collected: 09/12/22 2012   Order Status: Completed Specimen: Blood Updated: 09/13/22 1416    Culture, Blood 2 -- Abnormal     Gram stain Aerobic bottle:   Gram positive cocci in clusters   resembling Staphylococcus   Information to follow   Gram stain Anaerobic bottle:   Gram positive cocci in clusters   resembling Staphylococcus   Information to follow    Abnormal    Narrative:     ORDER#: [id number]                          ORDERED BY: Jairo Curry   SOURCE: Blood                              COLLECTED:  09/12/22 20:12   ANTIBIOTICS AT A[facility]BELLA.:                      RECEIVED :  09/12/22 20:21   CALL  [place]  [facility] tel. [phone],   Previous panic on this admission - call not needed per SOP, 09/13/2022 10:56,   by Helena Moore   If child <=2 yrs old please draw pediatric bottle. ~Blood Culture #2   Strep Pneumoniae Antigen [[id number]] Collected: 09/12/22 1900   Order Status: Completed Specimen: Urine, clean catch Updated: 09/13/22 1054    STREP PNEUMONIAE ANTIGEN, URINE --    Presumptive Negative   Presumptive negative suggests no current or recent   pneumococcal infection.  Infection due to Strep pneumoniae cannot be ruled out since the antigen present in the sample   may be below the detection limit of the test.   Normal Range:Presumptive Negative    Narrative:     ORDER#: [id number]                          ORDERED BY: Clair Forte   SOURCE: Urine Clean Catch                  COLLECTED:  09/12/22 19:00   ANTIBIOTICS AT ANABELLA.:                      RECEIVED :  09/13/22 07:04   Legionella antigen, urine [[id number]] Collected: 09/12/22 1900   Order Status: Completed Specimen: Urine, clean catch Updated: 09/13/22 1047    L. pneumophila Serogp 1 Ur Ag --    Presumptive Negative   No Legionella pneumophila serogroup 1 antigens detected. A negative result does not exclude infection with   Legionella pneumophila serogroup 1 nor does it rule out   other microbial-caused respiratory infections or   disease caused by other serogroups of   Legionella pneumophila. Normal Range: Presumptive Negative    Narrative:     ORDER#: [id number]                          ORDERED BY: KATIE FLETCHER   SOURCE: Urine Clean Catch                  COLLECTED:  09/12/22 19:00   ANTIBIOTICS AT ANABELLA.:                      RECEIVED :  09/13/22 07:04   Culture, Blood, PCR ID Panel [[id number]] Collected: 09/12/22 1616   Order Status: Completed Updated: 09/13/22 0707    Report SEE IMAGE   Narrative:     Johanne Spears  [id number] tel. [phone],   Microbiology results called to and read back by Cisco Juarez,   09/13/2022 07:05, by Raul Oreilly   Culture, Blood 2 [[id number]] Collected: 09/12/22 0000   Order Status: Canceled Specimen: Blood    Culture, Blood 1 [[id number]] Collected: 09/12/22 0000   Order Status: Canceled Specimen: Blood      Lab Results   Component Value Date/Time    BC No Growth after 4 days of incubation. 09/14/2022 04:36 AM    BLOODCULT2 No Growth after 4 days of incubation. 09/14/2022 05:25 AM      Collected: 09/16/22 1210   Order Status: Completed Specimen:  Body Fluid from Abscess Updated: 09/19/22 0615    Gram Stain Result 4+ WBC's (Polymorphonuclear)   2+ Gram positive cocci   1+ Gram negative rods    Abnormal     Anaerobic Culture --    Anaerobic culture further report to follow   No anaerobes isolated so far, Further report to follow     Organism Serratia marcescens Abnormal     WOUND/ABSCESS Light growth    Organism Staph aureus MRSA Abnormal     WOUND/ABSCESS -- Abnormal     Moderate growth   CONTACT PRECAUTIONS INDICATED    Abnormal    Narrative:     ORDER#: [id number]                          ORDERED BY: RAMILA MARTINES   SOURCE: Abscess Rt gluteal abscess         COLLECTED:  09/16/22 12:10   ANTIBIOTICS AT ANABELLA.:                      RECEIVED :  09/16/22 12:32   [facility]  [place]  [facility] [phone],   Previous panic on this admission - call not needed per SOP, 09/17/2022 13:04,      Susceptibility    Staph aureus mrsa (3)    Antibiotic Interpretation Microscan  Method Status    ceFAZolin Resistant >16 mcg/mL BACTERIAL SUSCEPTIBILITY PANEL BY YA     clindamycin Sensitive <=0.5 mcg/mL BACTERIAL SUSCEPTIBILITY PANEL BY YA     erythromycin Resistant >4 mcg/mL BACTERIAL SUSCEPTIBILITY PANEL BY YA     oxacillin Resistant >2 mcg/mL BACTERIAL SUSCEPTIBILITY PANEL BY YA     tetracycline Sensitive <=4 mcg/mL BACTERIAL SUSCEPTIBILITY PANEL BY YA     trimethoprim-sulfamethoxazole Sensitive 1/19 mcg/mL BACTERIAL SUSCEPTIBILITY PANEL BY YA     vancomycin Sensitive 2 mcg/mL BACTERIAL SUSCEPTIBILITY PANEL BY YA       Serratia marcescens (4)    Antibiotic Interpretation Microscan  Method Status    amoxicillin-clavulanate Resistant >16/8 mcg/mL BACTERIAL SUSCEPTIBILITY PANEL BY YA     ampicillin Resistant >16 mcg/mL BACTERIAL SUSCEPTIBILITY PANEL BY YA     ampicillin-sulbactam Resistant 16/8 mcg/mL BACTERIAL SUSCEPTIBILITY PANEL BY YA     ceFAZolin Resistant >16 mcg/mL BACTERIAL SUSCEPTIBILITY PANEL BY YA     cefepime Sensitive <=2 mcg/mL BACTERIAL SUSCEPTIBILITY PANEL BY YA     cefTRIAXone Sensitive <=1 mcg/mL BACTERIAL SUSCEPTIBILITY PANEL BY YA     cefuroxime Resistant >16 mcg/mL BACTERIAL SUSCEPTIBILITY PANEL BY YA     ciprofloxacin Sensitive <=1 mcg/mL BACTERIAL SUSCEPTIBILITY PANEL BY YA     ertapenem Sensitive <=0.5 mcg/mL BACTERIAL SUSCEPTIBILITY PANEL BY YA     gentamicin Sensitive <=4 mcg/mL BACTERIAL SUSCEPTIBILITY PANEL BY YA     meropenem Sensitive <=1 mcg/mL BACTERIAL SUSCEPTIBILITY PANEL BY YA     piperacillin-tazobactam Sensitive <=16 mcg/mL BACTERIAL SUSCEPTIBILITY PANEL BY YA     trimethoprim-sulfamethoxazole Sensitive <=2/38 mcg/mL BACTERIAL SUSCEPTIBILITY PANEL BY YA      Condensed View       Respiratory Culture:  Lab Results   Component Value Date/Time    LABGRAM  09/16/2022 12:10 PM     4+ WBC's (Polymorphonuclear)  2+ Gram positive cocci  1+ Gram negative rods       AFB:No results found for: AFBSMEAR  Viral Culture:  Lab Results   Component Value Date/Time    COVID19 Not Detected 09/07/2022 12:00 AM    COVID19 Not Detected 07/20/2022 11:20 PM     Urine Culture:   No results for input(s): LABURIN in the last 72 hours. Summary   Normal left ventricle size, wall thickness, and systolic function with an   estimated ejection fraction of 60-65%. No regional wall motion abnormalities   are seen. Normal diastolic function. Normal right ventricular size and function. Mobile vegetation on tricuspid valve suggestive of endocarditis. Moderate tricuspid regurgitation. The right atrium is mildly dilated. Signature      ------------------------------------------------------------------   Electronically signed by Emily Ortega MD   (Interpreting physician) on 09/13/2022 at 04:15 PM   ------------------------------------------------------------------    IMAGING:    US RENAL COMPLETE   Final Result   Unremarkable ultrasound of the kidneys and urinary bladder. No renal   atrophy, hydronephrosis or abnormal echotexture.          CT ABSCESS DRAINAGE W CATH PLACEMENT S&I   Final Result   Successful CT guided placement of a 10 Lao drainage catheter in the right   iliacus abscess. CT GUIDED NEEDLE PLACEMENT   Final Result   Successful CT guided placement of a 10 Lao drainage catheter in the right   iliacus abscess. MRI LUMBAR SPINE W WO CONTRAST   Final Result   Partially visualized septic arthritis/osteomyelitis of the right sacroiliac   joint, with a large adjacent retroperitoneal abscess extending into the right   pelvic sidewall. Small intramuscular abscess also present within the left psoas muscle. Epidural phlegmon/abscess of the sacral canal.      Pelvic ascites. The findings were sent to the Radiology Results [address] at 7:23   pm on 9/15/2022 to be communicated to a licensed caregiver. VL Extremity Venous Bilateral   Final Result      CT HEAD WO CONTRAST   Final Result   No acute intracranial abnormality. CT CHEST WO CONTRAST   Final Result   Diffuse in numeral bilateral pulmonary nodules many of which are cavitary   becoming coalescent at the right lung apex however diffusely throughout the   bilateral lungs consistent of septic emboli with small to moderate right   pleural effusion      Partially visualized portions of the upper abdomen reveal hepatosplenomegaly         XR CHEST PORTABLE   Final Result   Multifocal patchy airspace disease and cavitary nodules. The appearance is   suggestive of septic emboli in this clinical setting. Follow-up recommended to assure resolution.                All the pertinent images and reports for the current Hospitalization were reviewed by me     Scheduled Meds:   heparin (porcine)  5,000 Units SubCUTAneous 3 times per day    ceftaroline fosamil (TEFLARO) IVPB  300 mg IntraVENous q8h    lidocaine 1 % injection  5 mL IntraDERmal Once    sodium chloride flush  5-40 mL IntraVENous 2 times per day    linezolid  600 mg IntraVENous Q12H       Continuous Infusions:   sodium chloride      sodium chloride      sodium chloride 50 mL/hr at 09/18/22 1501       PRN Meds:  LORazepam, sodium chloride, chlorhexidine, sodium chloride flush, sodium chloride, hydrOXYzine pamoate, naloxone, morphine, calcium carbonate, promethazine, promethazine **OR** ondansetron, acetaminophen **OR** acetaminophen, perflutren lipid microspheres      Assessment:     Patient Active Problem List   Diagnosis    Tear of medial cartilage or meniscus of knee, current    Plica syndrome    Boxer's metacarpal fracture, neck, closed    Chondromalacia of patella    Degeneration of lumbar or lumbosacral intervertebral disc    Varicose veins of lower extremity    Intractable nausea and vomiting    Septicemia ([facility] [address]    IVDU (intravenous drug user)    MRSA bacteremia    Sepsis due to methicillin resistant Staphylococcus aureus (MRSA) without acute organ dysfunction ([facility] [address]    Bacterial infection due to Serratia    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 WBC count is elevated high fever from ongoing bloodstream infection. Given IV drug abuse suspect endocarditis transthoracic echocardiogram with TV Vegetation    Trend WBC and repeat Blood cx NGTD    Will need placement to complete IV ABX    Will ask Cardiology eval for possible angiovac given the vegetation ? Rt gluteal and lower Lumbar area pain MRI L spine with abscess, septic joint     S/p IR aspiration of the abscess cx MRSA and serratia noted    Unfortunately kidney function continues to decline nephrology is now following.   IV antibiotics have been adjusted    Will need a long course of antibiotic given the MRSA bacteremia and septic arthritis        Labs, Microbiology, Radiology and all the pertinent results from current hospitalization and  care every where were reviewed  by me as a part of the evaluation   Plan:   Cont  IV Ceftaroline - 300 mg q x 8 HRS will cover MRSA and Serratia  Cont  IV Linezolid x 600 mg q 12 HRS   Repeat Blood cx in process   NGTD  TTE Abnormal with vegetation   Will need IV abx  Watch for complications  HIV -ve and Hepatitis screen Hep C+Ve  Cardiology consult  STEFANO completed  d/w Dr. Kishan Donahue is small for angiovac  would be able to treat with IV abx   MRI L spine noted very abnormal s/p ID abscess dot  Daptomycin not effective in LUNGS and Vancomycin resulted in MARYAN   Needs placement          Discussed with patient/Family and Nursing   Risk of Complications/Morbidity: High      Illness(es)/ Infection present that pose threat to bodily function. There is potential for severe exacerbation of infection/side effects of treatment. Therapy requires intensive monitoring for antimicrobial agent toxicity. Discussed with patient/Family and Nursing staff     Thanks for allowing me to participate in your patient's care and please call me with any questions or concerns.     Jorje Carreon MD  Infectious Disease  [facility] Physician  Phone: [phone]   Fax : [phone]

## 2022-09-19 NOTE — PROGRESS NOTES
Occupational Therapy    [facility]  [id number]  [id number]      Attempted to see for OT follow up session this am with PT. Patient noted to have a hemoglobin of 6.5. Will hold therapy this date. Will attempt to see later as schedule allows. If discharged prior to next OT session please see last daily note for discharge status.     Electronically signed by Tammy Gillis [id number] on 9/19/2022 at 8:29 AM

## 2022-09-20 ENCOUNTER — APPOINTMENT (OUTPATIENT)
Dept: INTERVENTIONAL RADIOLOGY/VASCULAR | Age: 42
DRG: 720 | End: 2022-09-20
Payer: MEDICAID

## 2022-09-20 LAB
ALBUMIN SERPL-MCNC: 2 G/DL (ref 3.4–5)
ALP BLD-CCNC: 93 U/L (ref 40–129)
ALT SERPL-CCNC: 12 U/L (ref 10–40)
ANION GAP SERPL CALCULATED.3IONS-SCNC: 15 MMOL/L (ref 3–16)
AST SERPL-CCNC: 13 U/L (ref 15–37)
BASOPHILS ABSOLUTE: 0.1 K/UL (ref 0–0.2)
BASOPHILS RELATIVE PERCENT: 0.8 %
BILIRUB SERPL-MCNC: 0.3 MG/DL (ref 0–1)
BILIRUBIN DIRECT: <0.2 MG/DL (ref 0–0.3)
BILIRUBIN, INDIRECT: ABNORMAL MG/DL (ref 0–1)
BUN BLDV-MCNC: 53 MG/DL (ref 7–20)
CALCIUM IONIZED: 1.08 MMOL/L (ref 1.12–1.32)
CALCIUM SERPL-MCNC: 7.4 MG/DL (ref 8.3–10.6)
CHLORIDE BLD-SCNC: 106 MMOL/L (ref 99–110)
CO2: 17 MMOL/L (ref 21–32)
CREAT SERPL-MCNC: 3.9 MG/DL (ref 0.6–1.1)
EOSINOPHILS ABSOLUTE: 0.1 K/UL (ref 0–0.6)
EOSINOPHILS RELATIVE PERCENT: 1 %
GFR AFRICAN AMERICAN: 15
GFR NON-AFRICAN AMERICAN: 13
GLUCOSE BLD-MCNC: 87 MG/DL (ref 70–99)
HCT VFR BLD CALC: 21.2 % (ref 36–48)
HCT VFR BLD CALC: 23.2 % (ref 36–48)
HEMOGLOBIN: 7.2 G/DL (ref 12–16)
HEMOGLOBIN: 7.9 G/DL (ref 12–16)
LIPASE: 33 U/L (ref 13–60)
LYMPHOCYTES ABSOLUTE: 1 K/UL (ref 1–5.1)
LYMPHOCYTES RELATIVE PERCENT: 13.8 %
MCH RBC QN AUTO: 28.5 PG (ref 26–34)
MCHC RBC AUTO-ENTMCNC: 34 G/DL (ref 31–36)
MCV RBC AUTO: 84 FL (ref 80–100)
MONOCYTES ABSOLUTE: 0.4 K/UL (ref 0–1.3)
MONOCYTES RELATIVE PERCENT: 6.1 %
NEUTROPHILS ABSOLUTE: 5.7 K/UL (ref 1.7–7.7)
NEUTROPHILS RELATIVE PERCENT: 78.3 %
PDW BLD-RTO: 16.4 % (ref 12.4–15.4)
PH VENOUS: 7.38 (ref 7.35–7.45)
PHOSPHORUS: 7.1 MG/DL (ref 2.5–4.9)
PLATELET # BLD: 376 K/UL (ref 135–450)
PMV BLD AUTO: 7.6 FL (ref 5–10.5)
POTASSIUM SERPL-SCNC: 4.6 MMOL/L (ref 3.5–5.1)
PRO-BNP: ABNORMAL PG/ML (ref 0–124)
RBC # BLD: 2.76 M/UL (ref 4–5.2)
SODIUM BLD-SCNC: 138 MMOL/L (ref 136–145)
TOTAL PROTEIN: 5.5 G/DL (ref 6.4–8.2)
WBC # BLD: 7.2 K/UL (ref 4–11)

## 2022-09-20 PROCEDURE — 83690 ASSAY OF LIPASE: CPT

## 2022-09-20 PROCEDURE — 90935 HEMODIALYSIS ONE EVALUATION: CPT

## 2022-09-20 PROCEDURE — 80076 HEPATIC FUNCTION PANEL: CPT

## 2022-09-20 PROCEDURE — 2580000003 HC RX 258: Performed by: INTERNAL MEDICINE

## 2022-09-20 PROCEDURE — 6370000000 HC RX 637 (ALT 250 FOR IP): Performed by: INTERNAL MEDICINE

## 2022-09-20 PROCEDURE — 0JHD3XZ INSERTION OF TUNNELED VASCULAR ACCESS DEVICE INTO RIGHT UPPER ARM SUBCUTANEOUS TISSUE AND FASCIA, PERCUTANEOUS APPROACH: ICD-10-PCS | Performed by: STUDENT IN AN ORGANIZED HEALTH CARE EDUCATION/TRAINING PROGRAM

## 2022-09-20 PROCEDURE — 5A1D70Z PERFORMANCE OF URINARY FILTRATION, INTERMITTENT, LESS THAN 6 HOURS PER DAY: ICD-10-PCS | Performed by: STUDENT IN AN ORGANIZED HEALTH CARE EDUCATION/TRAINING PROGRAM

## 2022-09-20 PROCEDURE — 83880 ASSAY OF NATRIURETIC PEPTIDE: CPT

## 2022-09-20 PROCEDURE — 77001 FLUOROGUIDE FOR VEIN DEVICE: CPT

## 2022-09-20 PROCEDURE — 6360000002 HC RX W HCPCS: Performed by: INTERNAL MEDICINE

## 2022-09-20 PROCEDURE — 36556 INSERT NON-TUNNEL CV CATH: CPT

## 2022-09-20 PROCEDURE — 94760 N-INVAS EAR/PLS OXIMETRY 1: CPT

## 2022-09-20 PROCEDURE — 2500000003 HC RX 250 WO HCPCS: Performed by: INTERNAL MEDICINE

## 2022-09-20 PROCEDURE — 2060000000 HC ICU INTERMEDIATE R&B

## 2022-09-20 PROCEDURE — 76937 US GUIDE VASCULAR ACCESS: CPT

## 2022-09-20 PROCEDURE — 85025 COMPLETE CBC W/AUTO DIFF WBC: CPT

## 2022-09-20 PROCEDURE — 85018 HEMOGLOBIN: CPT

## 2022-09-20 PROCEDURE — 80069 RENAL FUNCTION PANEL: CPT

## 2022-09-20 PROCEDURE — 2709999900 IR NONTUNNELED VASCULAR CATHETER > 5 YEARS

## 2022-09-20 PROCEDURE — 6360000002 HC RX W HCPCS: Performed by: STUDENT IN AN ORGANIZED HEALTH CARE EDUCATION/TRAINING PROGRAM

## 2022-09-20 PROCEDURE — 99233 SBSQ HOSP IP/OBS HIGH 50: CPT | Performed by: INTERNAL MEDICINE

## 2022-09-20 PROCEDURE — 82330 ASSAY OF CALCIUM: CPT

## 2022-09-20 PROCEDURE — 85014 HEMATOCRIT: CPT

## 2022-09-20 RX ORDER — FENTANYL CITRATE 50 UG/ML
INJECTION, SOLUTION INTRAMUSCULAR; INTRAVENOUS DAILY PRN
Status: COMPLETED | OUTPATIENT
Start: 2022-09-20 | End: 2022-09-20

## 2022-09-20 RX ORDER — HEPARIN SODIUM 1000 [USP'U]/ML
2400 INJECTION, SOLUTION INTRAVENOUS; SUBCUTANEOUS PRN
Status: DISCONTINUED | OUTPATIENT
Start: 2022-09-20 | End: 2022-10-01 | Stop reason: HOSPADM

## 2022-09-20 RX ADMIN — MORPHINE SULFATE 0.5 MG: 2 INJECTION, SOLUTION INTRAMUSCULAR; INTRAVENOUS at 18:48

## 2022-09-20 RX ADMIN — FENTANYL CITRATE 50 MCG: 50 INJECTION INTRAMUSCULAR; INTRAVENOUS at 14:37

## 2022-09-20 RX ADMIN — CEFTAROLINE FOSAMIL 200 MG: 600 POWDER, FOR SOLUTION INTRAVENOUS at 22:12

## 2022-09-20 RX ADMIN — HEPARIN SODIUM 5000 UNITS: 5000 INJECTION INTRAVENOUS; SUBCUTANEOUS at 22:14

## 2022-09-20 RX ADMIN — ANTACID TABLETS 500 MG: 500 TABLET, CHEWABLE ORAL at 08:40

## 2022-09-20 RX ADMIN — MORPHINE SULFATE 0.5 MG: 2 INJECTION, SOLUTION INTRAMUSCULAR; INTRAVENOUS at 14:13

## 2022-09-20 RX ADMIN — SODIUM BICARBONATE: 84 INJECTION, SOLUTION INTRAVENOUS at 22:25

## 2022-09-20 RX ADMIN — FENTANYL CITRATE 50 MCG: 50 INJECTION INTRAMUSCULAR; INTRAVENOUS at 14:43

## 2022-09-20 RX ADMIN — SODIUM CHLORIDE 25 ML: 9 INJECTION, SOLUTION INTRAVENOUS at 22:12

## 2022-09-20 RX ADMIN — ANTACID TABLETS 500 MG: 500 TABLET, CHEWABLE ORAL at 22:34

## 2022-09-20 RX ADMIN — CEFTAROLINE FOSAMIL 300 MG: 600 POWDER, FOR SOLUTION INTRAVENOUS at 12:24

## 2022-09-20 RX ADMIN — CEFTAROLINE FOSAMIL 300 MG: 600 POWDER, FOR SOLUTION INTRAVENOUS at 04:22

## 2022-09-20 RX ADMIN — SODIUM CHLORIDE, PRESERVATIVE FREE 10 ML: 5 INJECTION INTRAVENOUS at 08:41

## 2022-09-20 RX ADMIN — SODIUM CHLORIDE, PRESERVATIVE FREE 10 ML: 5 INJECTION INTRAVENOUS at 22:19

## 2022-09-20 RX ADMIN — HEPARIN SODIUM 5000 UNITS: 5000 INJECTION INTRAVENOUS; SUBCUTANEOUS at 06:28

## 2022-09-20 RX ADMIN — SODIUM BICARBONATE 1300 MG: 650 TABLET ORAL at 22:14

## 2022-09-20 RX ADMIN — HEPARIN SODIUM 2400 UNITS: 1000 INJECTION INTRAVENOUS; SUBCUTANEOUS at 21:43

## 2022-09-20 RX ADMIN — EPOETIN ALFA-EPBX 10000 UNITS: 10000 INJECTION, SOLUTION INTRAVENOUS; SUBCUTANEOUS at 20:43

## 2022-09-20 RX ADMIN — MORPHINE SULFATE 0.5 MG: 2 INJECTION, SOLUTION INTRAMUSCULAR; INTRAVENOUS at 08:46

## 2022-09-20 RX ADMIN — SODIUM BICARBONATE 1300 MG: 650 TABLET ORAL at 08:32

## 2022-09-20 RX ADMIN — LORAZEPAM 1 MG: 1 TABLET ORAL at 22:34

## 2022-09-20 RX ADMIN — LINEZOLID 600 MG: 600 INJECTION, SOLUTION INTRAVENOUS at 23:47

## 2022-09-20 RX ADMIN — LORAZEPAM 1 MG: 1 TABLET ORAL at 06:28

## 2022-09-20 RX ADMIN — LINEZOLID 600 MG: 600 INJECTION, SOLUTION INTRAVENOUS at 10:27

## 2022-09-20 ASSESSMENT — PAIN SCALES - GENERAL
PAINLEVEL_OUTOF10: 10
PAINLEVEL_OUTOF10: 7
PAINLEVEL_OUTOF10: 7

## 2022-09-20 ASSESSMENT — PAIN DESCRIPTION - DESCRIPTORS
DESCRIPTORS: ACHING
DESCRIPTORS: ACHING

## 2022-09-20 ASSESSMENT — PAIN - FUNCTIONAL ASSESSMENT: PAIN_FUNCTIONAL_ASSESSMENT: ACTIVITIES ARE NOT PREVENTED

## 2022-09-20 ASSESSMENT — PAIN DESCRIPTION - LOCATION
LOCATION: LEG
LOCATION: BACK;LEG

## 2022-09-20 ASSESSMENT — PAIN DESCRIPTION - ORIENTATION: ORIENTATION: LEFT;RIGHT

## 2022-09-20 NOTE — PROGRESS NOTES
Pt alert and orientated x4. Pt still complaining of 7/10 pain, currently being managed by PRN pain meds (refer to eMAR). Blood transfusion complete. Pt tolerated transfusion well. Denies any further needs at this time.      Electronically signed by Corinne Krebs, RN on 9/20/2022 at 12:43 AM

## 2022-09-20 NOTE — BRIEF OP NOTE
Brief OP Note    Pre-operative Diagnosis: Renal failure. Need for hemodialysis    Post-operative Diagnosis: Same    Procedure: US and fluoro guided RIJ vascath placement    Anesthesia: Local    Surgeons/Assistants: Dr. Maren Nobles    Estimated Blood Loss: less than 5ml     Complications: None    Specimens: Was Not Obtained    Findings: RIJ vascath with tip in the upper right atrium. Aspirated, flushed and HD lumens locked with heparin. Okay to use.      Alysia Murphy MD

## 2022-09-20 NOTE — PROGRESS NOTES
Page IR for plan for KRYSTLE drain patient has questions about KRYSTLE drain  Page Nephrology regarding plan for Dialysis, patient family wants to discuss  RN communication was made

## 2022-09-20 NOTE — PROGRESS NOTES
Progress Note    Admit Date: 9/12/2022         Subjective and Overnight Events: seen at bedside, discussed in detail with patient and her mother,  patient has no CP, SOB but complains of back pain    Objective:   Vitals: BP (!) 144/87   Pulse 76   Temp 97.9 °F (36.6 °C) (Axillary)   Resp 29   Ht 5' 8\" (1.727 m)   Wt 159 lb 9.8 oz (72.4 kg)   SpO2 90%   BMI 24.27 kg/m²   BP (!) 144/87   Pulse 76   Temp 97.9 °F (36.6 °C) (Axillary)   Resp 29   Ht 5' 8\" (1.727 m)   Wt 159 lb 9.8 oz (72.4 kg)   SpO2 90%   BMI 24.27 kg/m²     General appearance: NAD, on RA  HEENT:  Conjunctivae/corneas clear. Neck: Supple, with full range of motion. Respiratory:  Normal respiratory effort. Clear to auscultation, bilaterally without Rales/Wheezes/Rhonchi. Cardiovascular: has murmur  Abdomen: Soft, non-tender, non-distended, normal bowel sounds. Musculoskeletal: No cyanosis or edema bilaterally  Neurologic:  without any focal sensory/motor deficits. grossly non-focal.  Psychiatric: Alert and oriented, Normal mood  Peripheral Pulses: +2 palpable, equal bilaterally     Data:     Scheduled Medications:    ceftaroline fosamil (TEFLARO) IVPB  200 mg IntraVENous q8h    heparin (porcine)  5,000 Units SubCUTAneous 3 times per day    sodium bicarbonate  1,300 mg Oral TID    lidocaine 1 % injection  5 mL IntraDERmal Once    sodium chloride flush  5-40 mL IntraVENous 2 times per day    linezolid  600 mg IntraVENous Q12H      PRN Medications: LORazepam, sodium chloride, chlorhexidine, sodium chloride flush, sodium chloride, hydrOXYzine pamoate, naloxone, morphine, calcium carbonate, promethazine, promethazine **OR** ondansetron, acetaminophen **OR** acetaminophen, perflutren lipid microspheres  Diet: ADULT DIET;  Regular  ADULT ORAL NUTRITION SUPPLEMENT; Breakfast, Lunch, Dinner; Standard High Calorie/High Protein Oral Supplement    Continuous Infusions:   sodium bicarbonate infusion 50 mL/hr at 09/19/22 2321    sodium chloride      sodium chloride           Intake/Output Summary (Last 24 hours) at 9/20/2022 1749  Last data filed at 9/20/2022 6239  Gross per 24 hour   Intake 417.92 ml   Output 400 ml   Net 17.92 ml         CBC:   Recent Labs     09/19/22  0551 09/20/22  0039 09/20/22  0630   WBC 6.2  --  7.2   HGB 6.5* 7.2* 7.9*     --  376       BMP:  Recent Labs     09/19/22  0551 09/20/22  0630    138   K 4.3  4.3 4.6    106   CO2 16* 17*   BUN 46* 53*   CREATININE 3.1* 3.9*   GLUCOSE 97 87       ABGs: No results found for: PHART, PO2ART, [id number]    Assessment/plan     Patient Active Problem List:     Tear of medial cartilage or meniscus of knee, current     Plica syndrome     Boxer's metacarpal fracture, neck, closed     Chondromalacia of patella     Degeneration of lumbar or lumbosacral intervertebral disc     Varicose veins of lower extremity     Intractable nausea and vomiting     Septicemia (HCC)     IVDU (intravenous drug user)     MRSA bacteremia     Sepsis due to methicillin resistant Staphylococcus aureus (MRSA) without acute organ dysfunction (HCC)     Bacterial infection due to Serratia     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 According to the patient's sister patient has been feeling sick for past few days, not feeling well. Patient was noticed to have fevers as well as chills.      Assessment  Sepsis POA - endocarditis +/- cavitary PNA - MRSA/Serratia bacteremia   Pulmonary septic emboli, cavitary nodules  Endocarditis  History of IV drug abuse  ADHD  Anemia    Plan  Continue atbx as per ID   Endocarditis  + cardiology -  no angiovac recommended, recs to continue IV abx   Nephrology concerned about Vancomycin toxicity, plan for IR catheter for Dialysis   Consulted hematology, r/u hemolysis, ordered haptoglobin - elevated  Added ensure with meals as she has low po intake   Pain control    DW with RN and Patient, and mother at the bedside    Full Hermelinda Aguayo MD

## 2022-09-20 NOTE — PLAN OF CARE
Problem: Discharge Planning  Goal: Discharge to home or other facility with appropriate resources  Outcome: Progressing     Problem: Safety - Adult  Goal: Free from fall injury  Outcome: Progressing     Problem: Pain  Goal: Verbalizes/displays adequate comfort level or baseline comfort level  Outcome: Progressing     Problem: ABCDS Injury Assessment  Goal: Absence of physical injury  Outcome: Progressing     Problem: Skin/Tissue Integrity  Goal: Absence of new skin breakdown  Description: 1. Monitor for areas of redness and/or skin breakdown  2. Assess vascular access sites hourly  3. Every 4-6 hours minimum:  Change oxygen saturation probe site  4. Every 4-6 hours:  If on nasal continuous positive airway pressure, respiratory therapy assess nares and determine need for appliance change or resting period.   Outcome: Progressing     Problem: Nutrition Deficit:  Goal: Optimize nutritional status  Outcome: Progressing

## 2022-09-20 NOTE — PROGRESS NOTES
Infectious Disease Follow up Notes  Admit Date: 9/12/2022  Hospital Day: 9    Antibiotics :   IV Vancomycin d/c due to MARYAN  IV Linezolid  IV Ceftaroline      CHIEF COMPLAINT:     Sepsis  MRSA bacteremia  Serratia Bacteremia  Endocarditis  IVDA  TV Vegetation   Rt gluteal abscess     Subjective interval History :  40 y. o.woman with a history of IV drug abuse, ADHD, back pain, depression admitted to the hospital secondary to fever chills fatigue back pain. Patient family noted she was unable to get up from the bed for the past 3 days secondary to fevers and not feeling well. Admission labs indicate creatinine 1.5 procalcitonin elevated to 6.6, WBC elevated 15.5 hemoglobin 9.4 bilirubin 1.3 AST 42, blood cultures from admission positive for MRSA as well as Serratia. T-max 103.8 on admission. CT chest with diffuse innumerable bilateral pulmonary nodules consistent with cavitation and septic emboli. Given the presentation concern is for endocarditis secondary to IV drug abuse. Patient not much interactive during normalization some of the history is provided by her daughters,       Interval History : Has ongoing right gluteal pain unfortunately kidney function worsening nephrology following. S/P hd LINE PLacement by IR and Hb low s/p PRBC and WBC trend down family at bed side   -   Past Medical History:    Past Medical History:   Diagnosis Date    ADHD (attention deficit hyperactivity disorder)     Arthritis     Back pain     Depression     Migraine     Neutrophilia 9/15/2022       Past Surgical History:    Past Surgical History:   Procedure Laterality Date    KNEE ARTHROSCOPY  10-5-10    PARTIAL HYSTERECTOMY (CERVIX NOT REMOVED)      TUBAL LIGATION  2004       Current Medications:    No outpatient medications have been marked as taking for the 9/12/22 encounter [facility] Encounter).        Allergies:  Ultram [tramadol hcl], Robaxin [methocarbamol], and Penicillins    Immunizations :   Immunization History   Administered Date(s) Administered    Tdap (Boostrix, Adacel) 02/11/2015       Social History:    Social History     Tobacco Use    Smoking status: Every Day     Packs/day: 0.50     Years: 2.00     Pack years: 1.00     Types: Cigarettes    Smokeless tobacco: Never   Substance Use Topics    Alcohol use: Yes     Comment: occas    Drug use: No     Social History     Tobacco Use   Smoking Status Every Day    Packs/day: 0.50    Years: 2.00    Pack years: 1.00    Types: Cigarettes   Smokeless Tobacco Never      Family History   Problem Relation Age of Onset    Breast Cancer Maternal Grandmother 45    Arthritis Other     Asthma Other     Cancer Other     Diabetes Other     Seizures Other     Stroke Other           REVIEW OF SYSTEMS:      Constitutional:  fevers,++  chills +=, night sweats  Eyes:  negative for blurred vision, eye discharge, visual disturbance   HEENT:  negative for hearing loss, ear drainage,nasal congestion  Respiratory:  negative for cough, shortness of breath or hemoptysis   Cardiovascular:  negative for chest pain, palpitations, syncope  Gastrointestinal:  negative for nausea, vomiting, diarrhea, constipation, abdominal pain  Genitourinary:  negative for frequency, dysuria, urinary incontinence, hematuria  Hematologic/Lymphatic:  negative for easy bruising, bleeding and lymphadenopathy  Allergic/Immunologic:  negative for recurrent infections, angioedema, anaphylaxis   Endocrine:  negative for weight changes, polyuria, polydipsia and polyphagia  Musculoskeletal:  Rt hip and lower back pain ++   pain, swelling, decreased range of motion  Integumentary: No rashes, skin lesions  Neurological:  negative for headaches, slurred speech, unilateral weakness  Psychiatric: negative for hallucinations,confusion,agitation.                 PHYSICAL EXAM:      Vitals:    BP (!) 146/92   Pulse 72   Temp 98.4 °F (36.9 °C) (Oral)   Resp (!) 35 Value Date/Time    ALKPHOS 93 09/20/2022 06:30 AM    ALT 12 09/20/2022 06:30 AM    AST 13 09/20/2022 06:30 AM    PROT 5.5 09/20/2022 06:30 AM    PROT 6.5 07/28/2012 11:20 PM    BILITOT 0.3 09/20/2022 06:30 AM    BILIDIR <0.2 09/20/2022 06:30 AM    IBILI see below 09/20/2022 06:30 AM    LABALBU 2.0 09/20/2022 06:30 AM     UA:  Lab Results   Component Value Date/Time    COLORU Yellow 09/18/2022 04:36 PM    CLARITYU CLOUDY 09/18/2022 04:36 PM    GLUCOSEU Negative 09/18/2022 04:36 PM    GLUCOSEU NEGATIVE 07/31/2010 02:59 PM    BILIRUBINUR Negative 09/18/2022 04:36 PM    BILIRUBINUR NEGATIVE 07/31/2010 02:59 PM    KETUA Negative 09/18/2022 04:36 PM    SPECGRAV 1.008 09/18/2022 04:36 PM    BLOODU LARGE 09/18/2022 04:36 PM    PHUR 5.0 09/18/2022 04:36 PM    PROTEINU 100 09/18/2022 04:36 PM    UROBILINOGEN 0.2 09/18/2022 04:36 PM    NITRU Negative 09/18/2022 04:36 PM    LEUKOCYTESUR MODERATE 09/18/2022 04:36 PM    LABMICR YES 09/18/2022 04:36 PM    URINETYPE NotGiven 09/18/2022 04:36 PM      Urine Microscopic:   Lab Results   Component Value Date/Time    BACTERIA None Seen 09/18/2022 04:36 PM    COMU see below 09/07/2022 01:55 AM    HYALCAST 6 09/18/2022 04:36 PM    WBCUA 98 09/18/2022 04:36 PM    RBCUA 14 09/18/2022 04:36 PM    EPIU 1 09/18/2022 04:36 PM     Urine Reflex to Culture:   Lab Results   Component Value Date/Time    URRFLXCULT Yes 09/12/2022 04:16 PM      Summary   Severe eccentric tricuspid regurgitation. Small, mobile tricuspid valve vegetation noted measuring 0.9cm x 0.4cm.       Signature      ------------------------------------------------------------------   Electronically signed by Michelle Levi MD (Interpreting   physician) on 09/16/2022 at 04:29 PM   ------------------------------------------------------------------    MICRO: cultures reviewed and updated by me   Blood Culture:          MRSA DNA Probe, Nasal [[id number]] Collected: 09/13/22 1220   Order Status: Completed Specimen: Nares Updated: 09/14/22 1131    MRSA SCREEN RT-PCR --    Negative  MRSA DNA not detected. Normal Range: Not detected    Narrative:     ORDER#: [id number]                          ORDERED BY: Cristian Barajas   SOURCE: Nares                              COLLECTED:  09/13/22 12:20   ANTIBIOTICS AT ANABELLA.:                      RECEIVED :  09/13/22 12:46   Blood Culture 1 [[id number]] (Abnormal) Collected: 09/12/22 1616   Order Status: Completed Specimen: Blood Updated: 09/14/22 0939    Blood Culture, Routine -- Abnormal     Gram stain Aerobic bottle:   Gram positive cocci in clusters   resembling Staphylococcus   Information to follow   and   Gram negative rods   Information to follow   Gram stain Anaerobic bottle:   Gram positive cocci in clusters   resembling Staphylococcus   Information to follow    Abnormal     Organism Staph aureus MRSA DNA Detected Abnormal     Blood Culture, Routine -- Abnormal     CONTACT PRECAUTIONS INDICATED   See additional report for complete BCID panel. mecA/C gene and MREJ gene Detected. Abnormal     Organism Serratia marcescens DNA Detected Abnormal     Blood Culture, Routine See additional report for complete BCID panel.     Organism Staph aureus MRSA Abnormal     Blood Culture, Routine -- Abnormal     POSITIVE for   Sensitivity to follow   CONTACT PRECAUTIONS INDICATED   Isolated two of two sets    Abnormal     Organism Serratia marcescens Abnormal     Blood Culture, Routine --    POSITIVE for   Sensitivity to follow   Isolated one of two sets    Narrative:     ORDER#: [id number]                          ORDERED BY: Rosio Mcbride   SOURCE: Blood                              COLLECTED:  09/12/22 16:16   ANTIBIOTICS AT [facility]BELLA.:                      RECEIVED :  09/12/22 16:32   CALL  [facility]  [facility] tel. [phone],   Microbiology results called to and read back by Babak Mcelroy RN,   09/13/2022 08:24, by [facility]   Microbiology results called to and read back by Lucina Campos,   09/13/2022 07:05, by Pat Mendoza   If child <=2 yrs old please draw pediatric bottle. ~Blood Culture 1   Culture, Blood 2 [[id number]] Collected: 09/14/22 0525   Order Status: Sent Specimen: Blood Updated: 09/14/22 0600   Culture, Blood 1 [[id number]] Collected: 09/14/22 0436   Order Status: Sent Specimen: Blood Updated: 09/14/22 0441   Culture, Urine [[id number]] Collected: 09/12/22 1616   Order Status: Completed Specimen: Urine, clean catch Updated: 09/13/22 2245    Urine Culture, Routine No growth at 18 to 36 hours   Narrative:     ORDER#: [id number]                          ORDERED BY: Lacho Castañeda   SOURCE: Urine Clean Catch                  COLLECTED:  09/12/22 16:16   ANTIBIOTICS AT [facility]BELLA.:                      RECEIVED :  09/12/22 19:35   Culture, Blood 2 [[id number]] (Abnormal) Collected: 09/12/22 2012   Order Status: Completed Specimen: Blood Updated: 09/13/22 1416    Culture, Blood 2 -- Abnormal     Gram stain Aerobic bottle:   Gram positive cocci in clusters   resembling Staphylococcus   Information to follow   Gram stain Anaerobic bottle:   Gram positive cocci in clusters   resembling Staphylococcus   Information to follow    Abnormal    Narrative:     ORDER#: [id number]                          ORDERED BY: Lacho Castañeda   SOURCE: Blood                              COLLECTED:  09/12/22 20:12   ANTIBIOTICS AT A[facility]LLA.:                      RECEIVED :  09/12/22 20:21   CALL  [facility]  [facility] tel. [phone],   Previous panic on this admission - call not needed per SOP, 09/13/2022 10:56,   by Pat Mendoza   If child <=2 yrs old please draw pediatric bottle. ~Blood Culture #2   Strep Pneumoniae Antigen [[id number]] Collected: 09/12/22 1900   Order Status: Completed Specimen: Urine, clean catch Updated: 09/13/22 1054    STREP PNEUMONIAE ANTIGEN, URINE --    Presumptive Negative   Presumptive negative suggests no current or recent   pneumococcal infection.  Infection due to Strep pneumoniae   cannot be ruled out since the antigen present in the sample may be below the detection limit of the test.   Normal Range:Presumptive Negative    Narrative:     ORDER#: [id number]                          ORDERED BY: Hossein Webster   SOURCE: Urine Clean Catch                  COLLECTED:  09/12/22 19:00   ANTIBIOTICS AT [facility]LLA.:                      RECEIVED :  09/13/22 07:04   Legionella antigen, urine [[id number]] Collected: 09/12/22 1900   Order Status: Completed Specimen: Urine, clean catch Updated: 09/13/22 1047    L. pneumophila Serogp 1 Ur Ag --    Presumptive Negative   No Legionella pneumophila serogroup 1 antigens detected. A negative result does not exclude infection with   Legionella pneumophila serogroup 1 nor does it rule out   other microbial-caused respiratory infections or   disease caused by other serogroups of   Legionella pneumophila. Normal Range: Presumptive Negative    Narrative:     ORDER#: [id number]                          ORDERED BY: KATIE FLETCHER   SOURCE: Urine Clean Catch                  COLLECTED:  09/12/22 19:00   ANTIBIOTICS AT [facility]LLA.:                      RECEIVED :  09/13/22 07:04   Culture, Blood, PCR ID Panel [[id number]] Collected: 09/12/22 1616   Order Status: Completed Updated: 09/13/22 0707    Report SEE IMAGE   Narrative:     Germán Omalley  [id number] tel. [phone],   Microbiology results called to and read back by Raphael Queen,   09/13/2022 07:05, by Rikki Greene   Culture, Blood 2 [[id number]] Collected: 09/12/22 0000   Order Status: Canceled Specimen: Blood    Culture, Blood 1 [[id number]] Collected: 09/12/22 0000   Order Status: Canceled Specimen: Blood      Lab Results   Component Value Date/Time    BC No Growth after 4 days of incubation. 09/14/2022 04:36 AM    BLOODCULT2 No Growth after 4 days of incubation. 09/14/2022 05:25 AM      Collected: 09/16/22 1210   Order Status: Completed Specimen:  Body Fluid from Abscess Updated: 09/19/22 0615    Gram Stain Result 4+ WBC's (Polymorphonuclear)   2+ Gram positive cocci   1+ Gram negative rods    Abnormal     Anaerobic Culture --    Anaerobic culture further report to follow   No anaerobes isolated so far, Further report to follow     Organism Serratia marcescens Abnormal     WOUND/ABSCESS Light growth    Organism Staph aureus MRSA Abnormal     WOUND/ABSCESS -- Abnormal     Moderate growth   CONTACT PRECAUTIONS INDICATED    Abnormal    Narrative:     ORDER#: [id number]                          ORDERED BY: RAMILA MARTINES   SOURCE: Abscess Rt gluteal abscess         COLLECTED:  09/16/22 12:10   ANTIBIOTICS AT [facility]BELLA.:                      RECEIVED :  09/16/22 12:32   CALL  [place]  [facility] tel. [phone],   Previous panic on this admission - call not needed per SOP, 09/17/2022 13:04,      Susceptibility    Staph aureus mrsa (3)    Antibiotic Interpretation Microscan  Method Status    ceFAZolin Resistant >16 mcg/mL BACTERIAL SUSCEPTIBILITY PANEL BY YA     clindamycin Sensitive <=0.5 mcg/mL BACTERIAL SUSCEPTIBILITY PANEL BY YA     erythromycin Resistant >4 mcg/mL BACTERIAL SUSCEPTIBILITY PANEL BY AY     oxacillin Resistant >2 mcg/mL BACTERIAL SUSCEPTIBILITY PANEL BY YA     tetracycline Sensitive <=4 mcg/mL BACTERIAL SUSCEPTIBILITY PANEL BY YA     trimethoprim-sulfamethoxazole Sensitive 1/19 mcg/mL BACTERIAL SUSCEPTIBILITY PANEL BY YA     vancomycin Sensitive 2 mcg/mL BACTERIAL SUSCEPTIBILITY PANEL BY YA       Serratia marcescens (4)    Antibiotic Interpretation Microscan  Method Status    amoxicillin-clavulanate Resistant >16/8 mcg/mL BACTERIAL SUSCEPTIBILITY PANEL BY YA     ampicillin Resistant >16 mcg/mL BACTERIAL SUSCEPTIBILITY PANEL BY YA     ampicillin-sulbactam Resistant 16/8 mcg/mL BACTERIAL SUSCEPTIBILITY PANEL BY YA     ceFAZolin Resistant >16 mcg/mL BACTERIAL SUSCEPTIBILITY PANEL BY YA     cefepime Sensitive <=2 mcg/mL BACTERIAL SUSCEPTIBILITY PANEL BY YA     cefTRIAXone Sensitive <=1 mcg/mL BACTERIAL SUSCEPTIBILITY PANEL BY YA     cefuroxime Resistant >16 mcg/mL BACTERIAL SUSCEPTIBILITY PANEL BY YA     ciprofloxacin Sensitive <=1 mcg/mL BACTERIAL SUSCEPTIBILITY PANEL BY YA     ertapenem Sensitive <=0.5 mcg/mL BACTERIAL SUSCEPTIBILITY PANEL BY YA     gentamicin Sensitive <=4 mcg/mL BACTERIAL SUSCEPTIBILITY PANEL BY YA     meropenem Sensitive <=1 mcg/mL BACTERIAL SUSCEPTIBILITY PANEL BY YA     piperacillin-tazobactam Sensitive <=16 mcg/mL BACTERIAL SUSCEPTIBILITY PANEL BY YA     trimethoprim-sulfamethoxazole Sensitive <=2/38 mcg/mL BACTERIAL SUSCEPTIBILITY PANEL BY YA      Condensed View       Respiratory Culture:  Lab Results   Component Value Date/Time    LABGRAM  09/16/2022 12:10 PM     4+ WBC's (Polymorphonuclear)  2+ Gram positive cocci  1+ Gram negative rods       AFB:No results found for: AFBSMEAR  Viral Culture:  Lab Results   Component Value Date/Time    COVID19 Not Detected 09/07/2022 12:00 AM    COVID19 Not Detected 07/20/2022 11:20 PM     Urine Culture:   No results for input(s): LABURIN in the last 72 hours. Summary   Normal left ventricle size, wall thickness, and systolic function with an   estimated ejection fraction of 60-65%. No regional wall motion abnormalities   are seen. Normal diastolic function. Normal right ventricular size and function. Mobile vegetation on tricuspid valve suggestive of endocarditis. Moderate tricuspid regurgitation. The right atrium is mildly dilated. Signature      ------------------------------------------------------------------   Electronically signed by Kike Springer MD   (Interpreting physician) on 09/13/2022 at 04:15 PM   ------------------------------------------------------------------    IMAGING:    US RENAL COMPLETE   Final Result   Unremarkable ultrasound of the kidneys and urinary bladder. No renal   atrophy, hydronephrosis or abnormal echotexture.          CT ABSCESS DRAINAGE W CATH PLACEMENT S&I   Final Result   Successful CT guided placement of a 10 [place] drainage catheter in the right iliacus abscess. CT GUIDED NEEDLE PLACEMENT   Final Result   Successful CT guided placement of a 10 [place] drainage catheter in the right   iliacus abscess. MRI LUMBAR SPINE W WO CONTRAST   Final Result   Partially visualized septic arthritis/osteomyelitis of the right sacroiliac   joint, with a large adjacent retroperitoneal abscess extending into the right   pelvic sidewall. Small intramuscular abscess also present within the left psoas muscle. Epidural phlegmon/abscess of the sacral canal.      Pelvic ascites. The findings were sent to the Radiology Results [address] at 7:23   pm on 9/15/2022 to be communicated to a licensed caregiver. VL Extremity Venous Bilateral   Final Result      CT HEAD WO CONTRAST   Final Result   No acute intracranial abnormality. CT CHEST WO CONTRAST   Final Result   Diffuse in numeral bilateral pulmonary nodules many of which are cavitary   becoming coalescent at the right lung apex however diffusely throughout the   bilateral lungs consistent of septic emboli with small to moderate right   pleural effusion      Partially visualized portions of the upper abdomen reveal hepatosplenomegaly         XR CHEST PORTABLE   Final Result   Multifocal patchy airspace disease and cavitary nodules. The appearance is   suggestive of septic emboli in this clinical setting. Follow-up recommended to assure resolution.          IR TUNNELED CVC PLACE WO SQ PORT/PUMP > 5 YEARS    (Results Pending)         All the pertinent images and reports for the current Hospitalization were reviewed by me     Scheduled Meds:   heparin (porcine)  5,000 Units SubCUTAneous 3 times per day    ceftaroline fosamil (TEFLARO) IVPB  300 mg IntraVENous q8h    sodium bicarbonate  1,300 mg Oral TID    lidocaine 1 % injection  5 mL IntraDERmal Once    sodium chloride flush  5-40 mL IntraVENous 2 times per day    linezolid  600 mg IntraVENous Q12H       Continuous Infusions:   sodium bicarbonate infusion 50 mL/hr at 09/19/22 2321    sodium chloride      sodium chloride         PRN Meds:  LORazepam, sodium chloride, chlorhexidine, sodium chloride flush, sodium chloride, hydrOXYzine pamoate, naloxone, morphine, calcium carbonate, promethazine, promethazine **OR** ondansetron, acetaminophen **OR** acetaminophen, perflutren lipid microspheres      Assessment:     Patient Active Problem List   Diagnosis    Tear of medial cartilage or meniscus of knee, current    Plica syndrome    Boxer's metacarpal fracture, neck, closed    Chondromalacia of patella    Degeneration of lumbar or lumbosacral intervertebral disc    Varicose veins of lower extremity    Intractable nausea and vomiting    Septicemia ([facility] [address]    IVDU (intravenous drug user)    MRSA bacteremia    Sepsis due to methicillin resistant Staphylococcus aureus (MRSA) without acute organ dysfunction ([facility] [address]    Bacterial infection due to Serratia    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 WBC count is elevated high fever from ongoing bloodstream infection. Given IV drug abuse suspect endocarditis transthoracic echocardiogram with TV Vegetation    Trend WBC and repeat Blood cx NGTD    Will need placement to complete IV ABX    Will ask Cardiology eval for possible angiovac given the vegetation ?     Rt gluteal and lower Lumbar area pain MRI L spine with abscess, septic joint     S/p IR aspiration of the abscess cx MRSA and serratia noted    Unfortunately kidney function continues to decline nephrology is now following. IV antibiotics have been adjusted    Will need a long course of antibiotic given the MRSA bacteremia and septic arthritis    HD line placed due to worsening Kidney function -         Labs, Microbiology, Radiology and all the pertinent results from current hospitalization and  care every where were reviewed  by me as a part of the evaluation   Plan:   Cont  IV Ceftaroline - 300 mg q x 8 HRS will cover MRSA and Serratia  Cont  IV Linezolid x 600 mg q 12 HRS   Repeat Blood cx in process   NGTD  TTE Abnormal with vegetation   Will need IV abx  Watch for complications  HIV -ve and Hepatitis screen Hep C+Ve  Cardiology consult  STEFANO completed  d/w Dr. Kirk Nicely is small for angiovac  would be able to treat with IV abx   MRI L spine noted very abnormal s/p ID abscess dot  Daptomycin not effective in LUNGS and Vancomycin resulted in MARYAN   Needs placement          Discussed with patient/Family and Nursing   Risk of Complications/Morbidity: High      Illness(es)/ Infection present that pose threat to bodily function. There is potential for severe exacerbation of infection/side effects of treatment. Therapy requires intensive monitoring for antimicrobial agent toxicity. Discussed with patient/Family and Nursing staff     Thanks for allowing me to participate in your patient's care and please call me with any questions or concerns.     Beryle Spike MD  Infectious Disease  [facility] ([facility]) Physician  Phone: [phone]   Fax : [phone]

## 2022-09-20 NOTE — PROGRESS NOTES
Department of Internal Medicine  Nephrology Progress Note        41 y/o WF with h/o depression, IVDA and back pain admitted with feeling weak, fatigued and pain in her back She was seen in ER on 9/7/22 with back apin and diagnosed with UTI Received toradol and was discharged on Motrin and Cefdinir Urine CX grew klebsiella pan senstive However she continued to feel poorly with back pain, and fatigue   Had fever to 101 on admission Started on Vancomycin and Cefepime Cr was 1.5 mg on admission improved to 1.1 mg but has increased to 1.8 mg now [facility] from admission growing MRSA and Serratia CT chest with cavitation and septic emboli  She reports decreased UOP no dysuria hematuria  Has been taking Ibuprofen 800 mg TID prior to admission  MRI showed OM of R SI joint and large retroperitoneal abscess   Underwent drainage tube placement . Events noted , labs reviewed . S/p Tx . REVIEW OF SYSTEMS:  No CP/SOB or GEIGER . feels weak. tired  . ROS limited due to pt factor  . No Family at bed side     Physical Exam:    VITALS:  BP (!) 146/92   Pulse 72   Temp 98.4 °F (36.9 °C) (Oral)   Resp (!) 35   Ht 5' 8\" (1.727 m)   Wt 159 lb 9.8 oz (72.4 kg)   SpO2 95%   BMI 24.27 kg/m²   24HR INTAKE/OUTPUT:    Intake/Output Summary (Last 24 hours) at 9/20/2022 1116  Last data filed at 9/20/2022 4864  Gross per 24 hour   Intake 447.92 ml   Output 465 ml   Net -17.08 ml         Constitutional:  Doing well  Respiratory:  Decrease BS at  bases   Gastrointestinal:  + tenderness.   Normal Bowel Sounds  Cardiovascular:  S1, S2 RRR   Edema:  +  edema    DATA:    CBC:  Lab Results   Component Value Date/Time    WBC 7.2 09/20/2022 06:30 AM    RBC 2.76 09/20/2022 06:30 AM    HGB 7.9 09/20/2022 06:30 AM    HCT 23.2 09/20/2022 06:30 AM    MCV 84.0 09/20/2022 06:30 AM    MCH 28.5 09/20/2022 06:30 AM    MCHC 34.0 09/20/2022 06:30 AM    RDW 16.4 09/20/2022 06:30 AM     09/20/2022 06:30 AM    MPV 7.6 09/20/2022 06:30 AM     CMP:  Lab Results   Component Value Date/Time     09/20/2022 06:30 AM    K 4.6 09/20/2022 06:30 AM    K 4.3 09/19/2022 05:51 AM     09/20/2022 06:30 AM    CO2 17 09/20/2022 06:30 AM    BUN 53 09/20/2022 06:30 AM    CREATININE 3.9 09/20/2022 06:30 AM    GFRAA 15 09/20/2022 06:30 AM    GFRAA >60 07/28/2012 11:20 PM    AGRATIO 0.4 09/12/2022 03:44 PM    LABGLOM 13 09/20/2022 06:30 AM    GLUCOSE 87 09/20/2022 06:30 AM    PROT 5.5 09/20/2022 06:30 AM    PROT 6.5 07/28/2012 11:20 PM    CALCIUM 7.4 09/20/2022 06:30 AM    BILITOT 0.3 09/20/2022 06:30 AM    ALKPHOS 93 09/20/2022 06:30 AM    AST 13 09/20/2022 06:30 AM    ALT 12 09/20/2022 06:30 AM      Hepatic Function Panel:   Lab Results   Component Value Date/Time    ALKPHOS 93 09/20/2022 06:30 AM    ALT 12 09/20/2022 06:30 AM    AST 13 09/20/2022 06:30 AM    PROT 5.5 09/20/2022 06:30 AM    PROT 6.5 07/28/2012 11:20 PM    BILITOT 0.3 09/20/2022 06:30 AM    BILIDIR <0.2 09/20/2022 06:30 AM    IBILI see below 09/20/2022 06:30 AM      Phosphorus:   Lab Results   Component Value Date/Time    PHOS 7.1 09/20/2022 06:30 AM       ASSESSMENT:  Principal Problem:    Intractable nausea and vomiting  Active Problems:    Septicemia (HCC)    IVDU (intravenous drug user)    MRSA bacteremia    Sepsis due to methicillin resistant Staphylococcus aureus (MRSA) without acute organ dysfunction (HCC)    Bacterial infection due to Serratia    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 *      PLAN  Assessment/  1-MARYAN suspect Vancomycin toxicity Vanc random level 24 yesterday correlates with the rise in Cr although, SIRS can be contributing Cannot r/o post infectious GN   Gradual decline in renal function , IVF adjusted  . Will have IR place vas cath and HD today .  Long [place] pt about RRT and dialysis  she understands and is willing to pursue . 2-MRSA and serratia bacteremia with retroperitoneal abscess  septic pulmonary emboli and possible TV endocarditis  3-IVDA   4 Anemia  Hb  noted . S/p Tx .   5 Hypocalcemia with severe Hypoalbuminemia , on Tums  . Recheck Ionized Ca   Plan dw pt and   nurse . Tot\al time spent 20 mins .      Ca Ramsey MD, FACP

## 2022-09-20 NOTE — CARE COORDINATION
Hepatitis lab orders and Covid test ordered for potential Dialysis placement in the community.     New Hemodialysis Placement  Needs:   ordered Hep B Core Ab  ordered Hep B S Ab  ordered Hep B S Ag  9/12/22 Chest X ray   ordered COVID test      #[id number]  Electronically signed by Ida Halsted, RN on 9/20/2022 at 11:40 AM

## 2022-09-20 NOTE — CONSULTS
[address] [address]                                  CONSULTATION    PATIENT NAME: Nessa Lemus                  :        1980  MED REC NO:   [id number]                          ROOM:       1914  ACCOUNT NO:   [de-identified]                           ADMIT DATE: 2022  PROVIDER:     Carmen Irizarry MD    CONSULT DATE:  2022    HEMATOLOGY CONSULTATION    CONSULTING PROVIDER:  Yamile Aldana MD    REASON FOR CONSULTATION:  Anemia. HISTORY OF PRESENT ILLNESS:  The patient is a 20-year-old female with  history of IV drug abuse, who presented to the hospital with generalized  weakness and fatigue and fevers. She was ultimately found to have  multiple issues including multiple septic emboli in her lungs and has  been worked up for endocarditis. Infectious Disease is following very  closely. A TTE did show a vegetation and was quite abnormal.  She also  had an MRI of her L-spine that was quite abnormal and she underwent an  incision and drainage to that. She has been anemic. Her hemoglobin has  fluctuated quite a bit. She has received a couple of transfusions, with  her last one being yesterday. Her hemoglobin today is 7.9. Her white  blood cell count and platelet count are normal.  She denies any recent  melena or hematochezia. PAST MEDICAL HISTORY:  1.  IV drug abuse. 2.  ADHD. 3.  Depression. 4.  Migraines. PAST SURGICAL HISTORY:  1. Knee arthroscopy. 2.  Partial hysterectomy. FAMILY HISTORY:  There are no blood disorders that she is aware of. SOCIAL HISTORY:  She is single. She smokes one half to one pack her day  and has done so for many years. She does drink, but recently stopped. She also uses IV drugs, but recently stopped. MEDICATIONS:  Linezolid 600 mg IV daily. ALLERGIES:  She is allergic to Donnel Folds, which cause unknown reactions.     REVIEW OF SYSTEMS:  He denies any recent fever, chills, sweats,  shortness of breath, chest pain, headaches, any new bone aches,  dysphagia, odynophagia, diarrhea, constipation, hemoptysis, hematemesis,  change in vision/hearing/smell/taste, weakness, neuropathy, skin rashes,  productive cough, urinary or bowel prolapse or incontinence, petechiae,  purpura, skin rashes, pruritus, hallucinations, nasal congestion or  drainage, seizures, strokes, syncope, depression, anxiety, suicidal  ideations, melena, or hematochezia. She has mild-to-moderate of fatigue  and moderate generalized weakness and chronic back pain. Her 10-system  review of systems is otherwise negative. PHYSICAL EXAMINATION:  GENERAL:  She is no acute distress. VITAL SIGNS:  She is afebrile with normal vital signs. HEENT:  Her pupils are round and reactive to light and accommodation. Extraocular muscles are intact. NECK:  She has no jugular venous distention. No thyromegaly. Her  oropharynx is clear. She has no carotid bruits. She has no palpable  lymphadenopathy. CHEST:  Clear to auscultation bilaterally. CARDIOVASCULAR:  Regular rate and rhythm. ABDOMEN:  Nondistended and nontender with bowel sounds x4. No  hepatosplenomegaly. EXTREMITIES:  She has no peripheral clubbing, cyanosis, or edema. NEUROLOGIC:  Significant for generalized weakness. LABORATORY DATA:  Her white blood cell count is 7.2, hemoglobin is 7.9,  and platelets of 032. ASSESSMENT AND PLAN:  Anemia. Her iron studies drawn earlier on this  admission were consistent with acute inflammation with a very high  ferritin. This is likely due to her endocarditis and her spinal  abscess. These infections are likely causing bone marrow suppression. She is also on Linezolid and has been on multiple other antibiotics that  cause bone marrow suppression. I will check for B12 and folate  deficiency. I will check an SPEP and light chain analysis to rule out  myeloma.   I will check a haptoglobin to rule out hemolysis. I will  defer on a bone marrow biopsy for now. If her hemoglobin does not  improve as her infections are improving, then she may need a bone marrow  biopsy down the road. Thank you for the consultation. I will follow closely.         Jackelyn Sy MD    D: 09/20/2022 10:27:43       T: 09/20/2022 14:04:10     [email or web address]  Job#: [id number]     Doc#: [id number]    CC:

## 2022-09-20 NOTE — PROGRESS NOTES
Family is requesting to speak with MD to answer their questions. Lani Rosas MD paged to come to room. Thanks!     Electronically signed by Dwayne Cali RN on 9/20/2022 at 12:10 PM

## 2022-09-21 LAB
ALBUMIN SERPL-MCNC: 2.1 G/DL (ref 3.4–5)
ALP BLD-CCNC: 106 U/L (ref 40–129)
ALT SERPL-CCNC: 16 U/L (ref 10–40)
ANAEROBIC CULTURE: ABNORMAL
ANION GAP SERPL CALCULATED.3IONS-SCNC: 15 MMOL/L (ref 3–16)
AST SERPL-CCNC: 19 U/L (ref 15–37)
BILIRUB SERPL-MCNC: 0.3 MG/DL (ref 0–1)
BILIRUBIN DIRECT: <0.2 MG/DL (ref 0–0.3)
BILIRUBIN, INDIRECT: ABNORMAL MG/DL (ref 0–1)
BUN BLDV-MCNC: 41 MG/DL (ref 7–20)
CALCIUM SERPL-MCNC: 7.6 MG/DL (ref 8.3–10.6)
CHLORIDE BLD-SCNC: 103 MMOL/L (ref 99–110)
CO2: 19 MMOL/L (ref 21–32)
CREAT SERPL-MCNC: 3.5 MG/DL (ref 0.6–1.1)
FOLATE: 6.79 NG/ML (ref 4.78–24.2)
GFR AFRICAN AMERICAN: 17
GFR NON-AFRICAN AMERICAN: 14
GLUCOSE BLD-MCNC: 83 MG/DL (ref 70–99)
GRAM STAIN RESULT: ABNORMAL
HAPTOGLOBIN: 349 MG/DL (ref 30–200)
HCT VFR BLD CALC: 24.2 % (ref 36–48)
HEMOGLOBIN: 8.1 G/DL (ref 12–16)
MAGNESIUM: 1.9 MG/DL (ref 1.8–2.4)
MCH RBC QN AUTO: 28 PG (ref 26–34)
MCHC RBC AUTO-ENTMCNC: 33.3 G/DL (ref 31–36)
MCV RBC AUTO: 84 FL (ref 80–100)
ORGANISM: ABNORMAL
ORGANISM: ABNORMAL
PDW BLD-RTO: 16.1 % (ref 12.4–15.4)
PHOSPHORUS: 5.9 MG/DL (ref 2.5–4.9)
PLATELET # BLD: 342 K/UL (ref 135–450)
PMV BLD AUTO: 7.2 FL (ref 5–10.5)
POTASSIUM SERPL-SCNC: 4.4 MMOL/L (ref 3.5–5.1)
RBC # BLD: 2.88 M/UL (ref 4–5.2)
SODIUM BLD-SCNC: 137 MMOL/L (ref 136–145)
TOTAL PROTEIN: 5.8 G/DL (ref 6.4–8.2)
URIC ACID, SERUM: 4.9 MG/DL (ref 2.6–6)
VITAMIN B-12: 1661 PG/ML (ref 211–911)
WBC # BLD: 7.6 K/UL (ref 4–11)
WOUND/ABSCESS: ABNORMAL
WOUND/ABSCESS: ABNORMAL

## 2022-09-21 PROCEDURE — 83010 ASSAY OF HAPTOGLOBIN QUANT: CPT

## 2022-09-21 PROCEDURE — 80076 HEPATIC FUNCTION PANEL: CPT

## 2022-09-21 PROCEDURE — 6360000002 HC RX W HCPCS: Performed by: INTERNAL MEDICINE

## 2022-09-21 PROCEDURE — 86706 HEP B SURFACE ANTIBODY: CPT

## 2022-09-21 PROCEDURE — 6370000000 HC RX 637 (ALT 250 FOR IP): Performed by: INTERNAL MEDICINE

## 2022-09-21 PROCEDURE — 84165 PROTEIN E-PHORESIS SERUM: CPT

## 2022-09-21 PROCEDURE — 85027 COMPLETE CBC AUTOMATED: CPT

## 2022-09-21 PROCEDURE — 80069 RENAL FUNCTION PANEL: CPT

## 2022-09-21 PROCEDURE — 2580000003 HC RX 258: Performed by: INTERNAL MEDICINE

## 2022-09-21 PROCEDURE — 99233 SBSQ HOSP IP/OBS HIGH 50: CPT | Performed by: INTERNAL MEDICINE

## 2022-09-21 PROCEDURE — 83735 ASSAY OF MAGNESIUM: CPT

## 2022-09-21 PROCEDURE — 82746 ASSAY OF FOLIC ACID SERUM: CPT

## 2022-09-21 PROCEDURE — 97110 THERAPEUTIC EXERCISES: CPT

## 2022-09-21 PROCEDURE — 94760 N-INVAS EAR/PLS OXIMETRY 1: CPT

## 2022-09-21 PROCEDURE — 84550 ASSAY OF BLOOD/URIC ACID: CPT

## 2022-09-21 PROCEDURE — 2060000000 HC ICU INTERMEDIATE R&B

## 2022-09-21 PROCEDURE — 80074 ACUTE HEPATITIS PANEL: CPT

## 2022-09-21 PROCEDURE — 82607 VITAMIN B-12: CPT

## 2022-09-21 PROCEDURE — 90935 HEMODIALYSIS ONE EVALUATION: CPT

## 2022-09-21 PROCEDURE — 84155 ASSAY OF PROTEIN SERUM: CPT

## 2022-09-21 PROCEDURE — 2500000003 HC RX 250 WO HCPCS: Performed by: INTERNAL MEDICINE

## 2022-09-21 PROCEDURE — 86704 HEP B CORE ANTIBODY TOTAL: CPT

## 2022-09-21 RX ORDER — SEVELAMER CARBONATE 800 MG/1
800 TABLET, FILM COATED ORAL
Status: DISCONTINUED | OUTPATIENT
Start: 2022-09-21 | End: 2022-10-01 | Stop reason: HOSPADM

## 2022-09-21 RX ORDER — MORPHINE SULFATE 2 MG/ML
1 INJECTION, SOLUTION INTRAMUSCULAR; INTRAVENOUS EVERY 4 HOURS PRN
Status: DISCONTINUED | OUTPATIENT
Start: 2022-09-21 | End: 2022-09-24

## 2022-09-21 RX ADMIN — MORPHINE SULFATE 0.5 MG: 2 INJECTION, SOLUTION INTRAMUSCULAR; INTRAVENOUS at 01:35

## 2022-09-21 RX ADMIN — MORPHINE SULFATE 1 MG: 2 INJECTION, SOLUTION INTRAMUSCULAR; INTRAVENOUS at 15:41

## 2022-09-21 RX ADMIN — MORPHINE SULFATE 0.5 MG: 2 INJECTION, SOLUTION INTRAMUSCULAR; INTRAVENOUS at 07:01

## 2022-09-21 RX ADMIN — SODIUM BICARBONATE 1300 MG: 650 TABLET ORAL at 20:19

## 2022-09-21 RX ADMIN — CEFTAROLINE FOSAMIL 200 MG: 600 POWDER, FOR SOLUTION INTRAVENOUS at 05:53

## 2022-09-21 RX ADMIN — SODIUM CHLORIDE, PRESERVATIVE FREE 10 ML: 5 INJECTION INTRAVENOUS at 08:23

## 2022-09-21 RX ADMIN — SODIUM CHLORIDE, PRESERVATIVE FREE 10 ML: 5 INJECTION INTRAVENOUS at 20:23

## 2022-09-21 RX ADMIN — CEFTAROLINE FOSAMIL 200 MG: 600 POWDER, FOR SOLUTION INTRAVENOUS at 15:40

## 2022-09-21 RX ADMIN — SODIUM BICARBONATE 1300 MG: 650 TABLET ORAL at 08:22

## 2022-09-21 RX ADMIN — LINEZOLID 600 MG: 600 INJECTION, SOLUTION INTRAVENOUS at 09:49

## 2022-09-21 RX ADMIN — MORPHINE SULFATE 1 MG: 2 INJECTION, SOLUTION INTRAMUSCULAR; INTRAVENOUS at 11:26

## 2022-09-21 RX ADMIN — LORAZEPAM 1 MG: 1 TABLET ORAL at 08:22

## 2022-09-21 RX ADMIN — HEPARIN SODIUM 5000 UNITS: 5000 INJECTION INTRAVENOUS; SUBCUTANEOUS at 05:56

## 2022-09-21 RX ADMIN — ANTACID TABLETS 500 MG: 500 TABLET, CHEWABLE ORAL at 07:00

## 2022-09-21 RX ADMIN — SODIUM CHLORIDE 25 ML: 9 INJECTION, SOLUTION INTRAVENOUS at 05:53

## 2022-09-21 RX ADMIN — CEFTAROLINE FOSAMIL 200 MG: 600 POWDER, FOR SOLUTION INTRAVENOUS at 23:20

## 2022-09-21 RX ADMIN — MORPHINE SULFATE 1 MG: 2 INJECTION, SOLUTION INTRAMUSCULAR; INTRAVENOUS at 20:19

## 2022-09-21 RX ADMIN — LINEZOLID 600 MG: 600 INJECTION, SOLUTION INTRAVENOUS at 21:08

## 2022-09-21 RX ADMIN — HEPARIN SODIUM 5000 UNITS: 5000 INJECTION INTRAVENOUS; SUBCUTANEOUS at 21:04

## 2022-09-21 RX ADMIN — HEPARIN SODIUM 2400 UNITS: 1000 INJECTION INTRAVENOUS; SUBCUTANEOUS at 14:22

## 2022-09-21 RX ADMIN — SODIUM BICARBONATE: 84 INJECTION, SOLUTION INTRAVENOUS at 20:49

## 2022-09-21 RX ADMIN — ANTACID TABLETS 500 MG: 500 TABLET, CHEWABLE ORAL at 20:19

## 2022-09-21 ASSESSMENT — PAIN DESCRIPTION - DESCRIPTORS
DESCRIPTORS: ACHING;DISCOMFORT
DESCRIPTORS: DISCOMFORT
DESCRIPTORS: ACHING;DISCOMFORT

## 2022-09-21 ASSESSMENT — PAIN DESCRIPTION - ORIENTATION
ORIENTATION: LOWER

## 2022-09-21 ASSESSMENT — PAIN SCALES - GENERAL
PAINLEVEL_OUTOF10: 8
PAINLEVEL_OUTOF10: 7
PAINLEVEL_OUTOF10: 8

## 2022-09-21 ASSESSMENT — PAIN DESCRIPTION - LOCATION
LOCATION: BACK

## 2022-09-21 ASSESSMENT — PAIN - FUNCTIONAL ASSESSMENT
PAIN_FUNCTIONAL_ASSESSMENT: PREVENTS OR INTERFERES SOME ACTIVE ACTIVITIES AND ADLS

## 2022-09-21 ASSESSMENT — PAIN SCALES - WONG BAKER
WONGBAKER_NUMERICALRESPONSE: 2
WONGBAKER_NUMERICALRESPONSE: 0

## 2022-09-21 NOTE — PLAN OF CARE
Problem: Discharge Planning  Goal: Discharge to home or other facility with appropriate resources  9/21/2022 0049 by Lm Lynch RN  Outcome: Progressing     Problem: Safety - Adult  Goal: Free from fall injury  9/21/2022 0049 by Lm Lynch RN  Outcome: Progressing     Problem: Pain  Goal: Verbalizes/displays adequate comfort level or baseline comfort level  9/21/2022 0049 by Lm Lynch RN  Outcome: Progressing     Problem: ABCDS Injury Assessment  Goal: Absence of physical injury  9/21/2022 0049 by Lm Lynch RN  Outcome: Progressing  Flowsheets (Taken 9/21/2022 0048)  Absence of Physical Injury: Implement safety measures based on patient assessment     Problem: Skin/Tissue Integrity  Goal: Absence of new skin breakdown  Description: 1. Monitor for areas of redness and/or skin breakdown  2. Assess vascular access sites hourly  3. Every 4-6 hours minimum:  Change oxygen saturation probe site  4. Every 4-6 hours:  If on nasal continuous positive airway pressure, respiratory therapy assess nares and determine need for appliance change or resting period.   9/21/2022 0049 by Lm Lynch RN  Outcome: Progressing     Problem: Nutrition Deficit:  Goal: Optimize nutritional status  9/21/2022 0049 by Lm Lynch RN  Outcome: Progressing

## 2022-09-21 NOTE — FLOWSHEET NOTE
Treatment time: 3 hours  Net UF: 1500 ml    Pre weight: 72.5 kg   Post weight: 71 kg  EDW: TBD kg    Access used: Right IJ, NTDC  Access function: Good with  ml/min    Medications or blood products given: no    Regular outpatient schedule: MARYAN, 2nd HD tx    Summary of response to treatment: Pt tolerated ok with HD, vital signs stable, repositioned few times for pt's comforts during HD, HD completed in full, heparin dwell in NTDC, capped and clamped. Reported to primary nurse for pain following up. Copy of dialysis treatment record placed in chart, to be scanned into EMR.    09/21/22 1150 09/21/22 1505   Vital Signs   BP (!) 148/93 (!) 161/100   Temp 98.6 °F (37 °C) 98.2 °F (36.8 °C)   Heart Rate 80 77   Resp 20 16   Weight 159 lb 13.3 oz (72.5 kg) 156 lb 8.4 oz (71 kg)   Weight Method Bed scale Bed scale   Percent Weight Change 1.54 -2.07   Treatment   Time On 1158  --    Treatment Goal 1400  --    Technical Checks   Dialysis Machine No. [id number]  --    RO Machine Number [id number]  --    Dialyzer Lot No. [id number]  --    Tubing Lot Number [id number]  --    All Connections Secure Yes  --    NS Bag Yes  --    Saline Line Double Clamped Yes  --    Dialyzer F-160  --    Prime Volume (mL) 300 mL  --    ICEBOAT I;C;E;B;O;A;T  --    Second Clinician Verifying Francisco Etienne  --    RO Machine Log Sheet Completed Yes  --    Machine Alarm Self Test Completed; Passed  --    Air Foam Detector Tested;Proper Function;pH Reading  --    Extracorporeal Circuit Tested for Integrity Yes  --    Machine Conductivity 13.8  --    Manual Conductivity 13.7  --    Machine Ph 7.4  --    Manual Ph 7.4  --    Bleach Test (Neg) Yes  --    Bath Temperature 96.8 °F (36 °C)  --    Treatment Initiation   Dialyze Hours 180  --    Treatment  Initiation Universal Precautions maintained;Lines secured to patient; Connections secured;Prime given;Venous Parameters set; Arterial Parameters set; Air foam detector engaged; Hemosafe Device; Dialysate;Saline line double clamped; Hemo-Safe Applied;Dialyzer;F160  --    Dialysis Bath   K+ (Potassium) 3  --    Ca+ (Calcium) 2.5  --    Na+ (Sodium) 138  --    HCO3 (Bicarb) 35  --

## 2022-09-21 NOTE — PROGRESS NOTES
Physical Therapy  Facility/Department: [facility] 5W PROGRESSIVE CARE  Daily Treatment Note  NAME: Rosalinda Yuan  : 1980  MRN: [id number]    Date of Service: 2022    Discharge Recommendations:  Patient would benefit from continued therapy after discharge, Subacute/Skilled Nursing Facility   PT Equipment Recommendations  Other: defer to next level of care    Patient Diagnosis(es): The primary encounter diagnosis was Septicemia [facility]). A diagnosis of IVDU (intravenous drug user) was also pertinent to this visit. Assessment   Assessment: Pt able to complete bed mobility with Mod A, and stand to walker with Min A. Activity tolerance limited by pain and weakness, but endurance appears to be slightly improved. Recommend continued therapy at low-moderate frequency to maximize strength, endurance, balance, and independence with transfers and ambulation. Rosalinda Yuan scored a 16/24 on the AM-PAC short mobility form. Current research shows that an AM-PAC score of 17 or less is typically not associated with a discharge to the patient's home setting. Based on the patient's AM-PAC score and their current functional mobility deficits, it is recommended that the patient have 3-5 sessions per week of Physical Therapy at d/c to increase the patient's independence. Please see assessment section for further patient specific details. If patient discharges prior to next session this note will serve as a discharge summary. Please see below for the latest assessment towards goals.      Activity Tolerance: Patient limited by endurance  Other: defer to next level of care     Plan    Plan  Plan: 3-5 times per week  Current Treatment Recommendations: Functional mobility training;Balance training;Gait training;Stair training;Transfer training;Home exercise program;Equipment evaluation, education, & procurement     Restrictions  Restrictions/Precautions  Restrictions/Precautions: Fall Risk     Subjective Subjective  Subjective: Pt c/o pain in R hip. Able to participate with encouragement. Objective     Bed Mobility Training  Bed Mobility Training: Yes  Supine to Sit: Moderate assistance  Sit to Supine: Minimum assistance    Balance  Sitting: Intact  Standing: Intact  Transfer Training  Transfer Training: Yes  Overall Level of Assistance: Minimum assistance  Sit to Stand: Minimum assistance  Stand to Sit: Minimum assistance     PT Exercises  Exercise Treatment: Static stand with walker support, 2 x 1 min., CGA. Supine:  RLE heel slide AAROM x 10, hip ABD/ADD AAROM x 5, limited d/t pain. Instructed pt to complete bent-knee fallout, and RLE ROM as able. Safety Devices  Type of Devices: All fall risk precautions in place;Call light within reach; Bed alarm in place; Left in bed  Restraints  Restraints Initially in Place: No       Goals  Short Term Goals  Time Frame for Short term goals: by discharge  Short term goal 1: bed mob mod A  Short term goal 2: transfers mod A  Short term goal 3: progress to gait when able  Patient Goals   Patient goals : pt did not state a goal    Education  Patient Education  Education Given To: Patient  Education Provided: Role of Therapy;Home Exercise Program  Education Provided Comments: reviewed call light and not getting up without assist  Education Method: Verbal;Demonstration  Education Outcome: Verbalized understanding;Continued education needed    Therapy Time   Individual Concurrent Group Co-treatment   Time In 0955         Time Out 1010         Minutes 15         Timed Code Treatment Minutes: Jun Maki PT   Electronically signed by Martha Marin PT [id number] on 9/21/2022 at 10:14 AM

## 2022-09-21 NOTE — PROGRESS NOTES
Hematology Oncology Daily Progress Note    Admit Date: 9/12/2022  Hospital day several    Subjective:     Patient has complaints of ongoing back pain--denies sob/cp. Medication side effects: none    Scheduled Meds:   ceftaroline fosamil (TEFLARO) IVPB  200 mg IntraVENous q8h    heparin (porcine)  5,000 Units SubCUTAneous 3 times per day    sodium bicarbonate  1,300 mg Oral TID    lidocaine 1 % injection  5 mL IntraDERmal Once    sodium chloride flush  5-40 mL IntraVENous 2 times per day    linezolid  600 mg IntraVENous Q12H     Continuous Infusions:   sodium bicarbonate infusion 50 mL/hr at 09/20/22 2225    sodium chloride      sodium chloride 25 mL (09/21/22 0553)     PRN Meds:heparin (porcine), LORazepam, sodium chloride, chlorhexidine, sodium chloride flush, sodium chloride, hydrOXYzine pamoate, naloxone, morphine, calcium carbonate, promethazine, promethazine **OR** ondansetron, acetaminophen **OR** acetaminophen, perflutren lipid microspheres    Review of Systems  Pertinent items are noted in HPI. REVIEW OF SYSTEMS:         Constitutional: Denies fever, sweats, weight loss     Eyes: No visual changes or diplopia. No scleral icterus. ENT: No Headaches, hearing loss or vertigo. No mouth sores or sore throat. Cardiovascular: No chest pain, dyspnea on exertion, palpitations or loss of consciousness. Respiratory: No cough or wheezing, no sputum production. No hemoptysis. .    Gastrointestinal: No abdominal pain, appetite loss, blood in stools. No change in bowel habits. Genitourinary: No dysuria, trouble voiding, or hematuria. Musculoskeletal:  Generalized weakness. No joint complaints. Integumentary: No rash or pruritis. Neurological: No headache, diplopia. No change in gait, balance, or coordination. No paresthesias. Endocrine: No temperature intolerance. No excessive thirst, fluid intake, or urination.    Hematologic/Lymphatic: No abnormal bruising or ecchymoses, blood clots or swollen lymph nodes. Allergic/Immunologic: No nasal congestion or hives. Objective:     Patient Vitals for the past 8 hrs:   BP Temp Temp src Pulse Resp SpO2 Weight   09/21/22 0701 -- -- -- -- 28 -- --   09/21/22 0530 (!) 151/97 98.7 °F (37.1 °C) Oral 73 (!) 31 93 % --   09/21/22 0319 -- -- -- -- -- -- 157 lb 6.5 oz (71.4 kg)   09/21/22 0205 -- -- -- -- 28 -- --   09/21/22 0135 -- -- -- -- 30 -- --   09/21/22 0108 (!) 143/91 98.7 °F (37.1 °C) Oral 82 18 91 % --     I/O last 3 completed shifts: In: 877.9 [P.O.:120; Blood:357.9]  Out: 1950 [Urine:525; Drains:25]  No intake/output data recorded.     BP (!) 151/97   Pulse 73   Temp 98.7 °F (37.1 °C) (Oral)   Resp 28   Ht 5' 8\" (1.727 m)   Wt 157 lb 6.5 oz (71.4 kg)   SpO2 93%   BMI 23.93 kg/m²     General Appearance:    Alert, cooperative, no distress, appears stated age   Head:    Normocephalic, without obvious abnormality, atraumatic   Eyes:    PERRL, conjunctiva/corneas clear, EOM's intact, fundi     benign, both eyes        Ears:    Normal TM's and external ear canals, both ears   Nose:   Nares normal, septum midline, mucosa normal, no drainage    or sinus tenderness   Throat:   Lips, mucosa, and tongue normal; teeth and gums normal   Neck:   Supple, symmetrical, trachea midline, no adenopathy;        thyroid:  No enlargement/tenderness/nodules; no carotid    bruit or JVD   Back:     Symmetric, no curvature, ROM normal, no CVA tenderness   Lungs:     Clear to auscultation bilaterally, respirations unlabored   Chest wall:    No tenderness or deformity   Heart:    Regular rate and rhythm, S1 and S2 normal, no murmur, rub   or gallop   Abdomen:     Soft, non-tender, bowel sounds active all four quadrants,     no masses, no organomegaly           Extremities:   Extremities normal, atraumatic, no cyanosis or edema   Pulses:   2+ and symmetric all extremities   Skin:   Skin color, texture, turgor normal, no rashes or lesions   Lymph nodes:   Cervical, supraclavicular, and axillary nodes normal   Neurologic:   CNII-XII intact. 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 *      Plan:     1. Anemia. Her iron studies are consistent with acute inflammation with a very high ferritin. This is likely due to her endocarditis and spinal abscess. These infections are likely causing bone marrow suppression. She is also on linezolid and has been on multiple other antibiotics that cause bone marrow suppression. The labs that I ordered yesterday were not sent. I will reorder them.         Electronically signed by Blas Sanchez MD on 9/21/2022 at 7:55 AM

## 2022-09-21 NOTE — FLOWSHEET NOTE
Treatment time: 2 hrs    Net UF: 1.5 liters    Pre weight: 72.8 kg (bed scale)  Post weight: 71.3 kg (bed scale)  EDW: TBD    Access used: [facility]  Access function: No issues noted.  per orders. Medications or blood products given: Retacrit    Regular outpatient schedule: TBD    Summary of response to treatment: Pt tolerated first treatment. No issues noted.      Copy of dialysis treatment record placed in chart, to be scanned into EMR.        09/20/22 1941 09/20/22 2150   Treatment   Treatment Number 1  --    Time On 1941  --    Time Off  --  2141   Vital Signs   BP (!) 156/91 (!) 147/93   Heart Rate 86 76   Weight 160 lb 7.9 oz (72.8 kg) 157 lb 3 oz (71.3 kg)   Weight Method Bed scale Bed scale   Post-Hemodialysis Assessment   Blood Volume Processed (Liters)  --  23.8 l/min   Duration of Treatment (minutes)  --  120 minutes   NET Removed (ml)  --  1000   Dialysis Bath   K+ (Potassium) 3  --    Ca+ (Calcium) 2.5  --    Na+ (Sodium) 138  --    HCO3 (Bicarb) 35  --

## 2022-09-21 NOTE — PROGRESS NOTES
Infectious Disease Follow up Notes  Admit Date: 9/12/2022  Hospital Day: 10    Antibiotics :   IV Linezolid  IV Ceftaroline      CHIEF COMPLAINT:     Sepsis  MRSA bacteremia  Serratia Bacteremia  Endocarditis  IVDA  TV Vegetation   Rt gluteal abscess     Subjective interval History :  40 y. o.woman with a history of IV drug abuse, ADHD, back pain, depression admitted to the hospital secondary to fever chills fatigue back pain. Patient family noted she was unable to get up from the bed for the past 3 days secondary to fevers and not feeling well. Admission labs indicate creatinine 1.5 procalcitonin elevated to 6.6, WBC elevated 15.5 hemoglobin 9.4 bilirubin 1.3 AST 42, blood cultures from admission positive for MRSA as well as Serratia. T-max 103.8 on admission. CT chest with diffuse innumerable bilateral pulmonary nodules consistent with cavitation and septic emboli. Given the presentation concern is for endocarditis secondary to IV drug abuse. Patient not much interactive during normalization some of the history is provided by her daughters,       Interval History : Fever seems to be trending down has ongoing right gluteal and right hip pain. KRYSTLE drain in place. Was able to complete hemodialysis for acute kidney injury. Creatinine still remains elevated. Tolerating antibiotic therapy okay. -   Past Medical History:    Past Medical History:   Diagnosis Date    ADHD (attention deficit hyperactivity disorder)     Arthritis     Back pain     Depression     Migraine     Neutrophilia 9/15/2022       Past Surgical History:    Past Surgical History:   Procedure Laterality Date    IR INSERT NON TUNNELED CV CATH LESS THAN 5 YEARS Right 09/20/2022    Kaiden Herrera; MAKENZIE access; 15cm; Dr. Anabella Jimenez    IR NONTUNNELED VASCULAR CATHETER  9/20/2022    IR NONTUNNELED VASCULAR CATHETER 9/20/2022 [facility] SPECIAL PROCEDURES    KNEE ARTHROSCOPY  10/05/2010 PARTIAL HYSTERECTOMY (CERVIX NOT REMOVED)      TUBAL LIGATION  01/01/2004       Current Medications:    No outpatient medications have been marked as taking for the 9/12/22 encounter [facility] Encounter).        Allergies:  Ultram [tramadol hcl], Robaxin [methocarbamol], and Penicillins    Immunizations :   Immunization History   Administered Date(s) Administered    Tdap (Boostrix, Adacel) 02/11/2015       Social History:    Social History     Tobacco Use    Smoking status: Every Day     Packs/day: 0.50     Years: 2.00     Pack years: 1.00     Types: Cigarettes    Smokeless tobacco: Never   Substance Use Topics    Alcohol use: Yes     Comment: occas    Drug use: No     Social History     Tobacco Use   Smoking Status Every Day    Packs/day: 0.50    Years: 2.00    Pack years: 1.00    Types: Cigarettes   Smokeless Tobacco Never      Family History   Problem Relation Age of Onset    Breast Cancer Maternal Grandmother 45    Arthritis Other     Asthma Other     Cancer Other     Diabetes Other     Seizures Other     Stroke Other           REVIEW OF SYSTEMS:      Constitutional:  fevers,++  chills +=, night sweats  Eyes:  negative for blurred vision, eye discharge, visual disturbance   HEENT:  negative for hearing loss, ear drainage,nasal congestion  Respiratory:  negative for cough, shortness of breath or hemoptysis   Cardiovascular:  negative for chest pain, palpitations, syncope  Gastrointestinal:  negative for nausea, vomiting, diarrhea, constipation, abdominal pain  Genitourinary:  negative for frequency, dysuria, urinary incontinence, hematuria  Hematologic/Lymphatic:  negative for easy bruising, bleeding and lymphadenopathy  Allergic/Immunologic:  negative for recurrent infections, angioedema, anaphylaxis   Endocrine:  negative for weight changes, polyuria, polydipsia and polyphagia  Musculoskeletal:  Rt hip and lower back pain ++   pain, swelling, decreased range of motion  Integumentary: No rashes, skin lesions  Neurological:  negative for headaches, slurred speech, unilateral weakness  Psychiatric: negative for hallucinations,confusion,agitation.                 PHYSICAL EXAM:      Vitals:    BP (!) 148/93   Pulse 80   Temp 98.6 °F (37 °C)   Resp 20   Ht 5' 8\" (1.727 m)   Wt 159 lb 13.3 oz (72.5 kg)   SpO2 94%   BMI 24.30 kg/m²        General Appearance: alert,in  acute distress, ++ pallor, no icterus  poor skin hygiene   Skin: warm and dry, no rash or erythema  Head: normocephalic and atraumatic  Eyes: pupils equal, round, and reactive to light, conjunctivae normal  ENT: tympanic membrane, external ear and ear canal normal bilaterally, nose without deformity, nasal mucosa and turbinates normal without polyps  Neck: supple and non-tender without mass, no thyromegaly  no cervical lymphadenopathy  Pulmonary/Chest: Bi basal crepts+ - no wheezes, rales or rhonchi, normal air movement, no respiratory distress  Cardiovascular: normal rate, regular rhythm, normal S1 and S2, esm+ murmurs, rubs, clicks, or gallops, no carotid bruits  Abdomen: soft, non-tender, non-distended, normal bowel sounds, no masses or organomegaly  Extremities: no cyanosis, clubbing or edema  Musculoskeletal: normal range of motion, no joint swelling, deformity or tenderness  Integumentary: No rashes, no abnormal skin lesions, no petechiae  Neurologic: reflexes normal and symmetric, no cranial nerve deficit  Psych:  Orientation, sensorium, mood normal            Lines: PICC  Needle tracks++   Rt gluteal area pain jerri DRAIN_   Lower leg multiple skin lesions from IVDA++   HD line in place     Data Review:    CBC:   Lab Results   Component Value Date    WBC 7.6 09/21/2022    HGB 8.1 (L) 09/21/2022    HCT 24.2 (L) 09/21/2022    MCV 84.0 09/21/2022     09/21/2022     RENAL:   Lab Results   Component Value Date    CREATININE 3.9 (H) 09/20/2022    BUN 53 (H) 09/20/2022     09/20/2022    K 4.6 09/20/2022     09/20/2022    CO2 17 (L) 09/20/2022     SED RATE:   Lab Results   Component Value Date/Time    SEDRATE 51 07/23/2019 09:59 PM     CK:   Lab Results   Component Value Date/Time    CKTOTAL 10 09/18/2022 04:55 PM     CRP: No results found for: CRP  Hepatic Function Panel:   Lab Results   Component Value Date/Time    ALKPHOS 93 09/20/2022 06:30 AM    ALT 12 09/20/2022 06:30 AM    AST 13 09/20/2022 06:30 AM    PROT 5.5 09/20/2022 06:30 AM    PROT 6.5 07/28/2012 11:20 PM    BILITOT 0.3 09/20/2022 06:30 AM    BILIDIR <0.2 09/20/2022 06:30 AM    IBILI see below 09/20/2022 06:30 AM    LABALBU 2.0 09/20/2022 06:30 AM     UA:  Lab Results   Component Value Date/Time    COLORU Yellow 09/18/2022 04:36 PM    CLARITYU CLOUDY 09/18/2022 04:36 PM    GLUCOSEU Negative 09/18/2022 04:36 PM    GLUCOSEU NEGATIVE 07/31/2010 02:59 PM    BILIRUBINUR Negative 09/18/2022 04:36 PM    BILIRUBINUR NEGATIVE 07/31/2010 02:59 PM    KETUA Negative 09/18/2022 04:36 PM    SPECGRAV 1.008 09/18/2022 04:36 PM    BLOODU LARGE 09/18/2022 04:36 PM    PHUR 5.0 09/18/2022 04:36 PM    PROTEINU 100 09/18/2022 04:36 PM    UROBILINOGEN 0.2 09/18/2022 04:36 PM    NITRU Negative 09/18/2022 04:36 PM    LEUKOCYTESUR MODERATE 09/18/2022 04:36 PM    LABMICR YES 09/18/2022 04:36 PM    URINETYPE NotGiven 09/18/2022 04:36 PM      Urine Microscopic:   Lab Results   Component Value Date/Time    BACTERIA None Seen 09/18/2022 04:36 PM    COMU see below 09/07/2022 01:55 AM    HYALCAST 6 09/18/2022 04:36 PM    WBCUA 98 09/18/2022 04:36 PM    RBCUA 14 09/18/2022 04:36 PM    EPIU 1 09/18/2022 04:36 PM     Urine Reflex to Culture:   Lab Results   Component Value Date/Time    URRFLXCULT Yes 09/12/2022 04:16 PM      Summary   Severe eccentric tricuspid regurgitation. Small, mobile tricuspid valve vegetation noted measuring 0.9cm x 0.4cm.       Signature      ------------------------------------------------------------------   Electronically signed by Ele Curry MD (Interpreting   physician) on 09/16/2022 at 04:29 PM   ------------------------------------------------------------------    MICRO: cultures reviewed and updated by me   Blood Culture:          MRSA DNA Probe, Nasal [[id number]] Collected: 09/13/22 1220   Order Status: Completed Specimen: Nares Updated: 09/14/22 1131    MRSA SCREEN RT-PCR --    Negative  MRSA DNA not detected. Normal Range: Not detected    Narrative:     ORDER#: [id number]                          ORDERED BY: Mandi Joseph   SOURCE: Nares                              COLLECTED:  09/13/22 12:20   ANTIBIOTICS AT ANABELLA.:                      RECEIVED :  09/13/22 12:46   Blood Culture 1 [[id number]] (Abnormal) Collected: 09/12/22 1616   Order Status: Completed Specimen: Blood Updated: 09/14/22 0939    Blood Culture, Routine -- Abnormal     Gram stain Aerobic bottle:   Gram positive cocci in clusters   resembling Staphylococcus   Information to follow   and   Gram negative rods   Information to follow   Gram stain Anaerobic bottle:   Gram positive cocci in clusters   resembling Staphylococcus   Information to follow    Abnormal     Organism Staph aureus MRSA DNA Detected Abnormal     Blood Culture, Routine -- Abnormal     CONTACT PRECAUTIONS INDICATED   See additional report for complete BCID panel. mecA/C gene and MREJ gene Detected. Abnormal     Organism Serratia marcescens DNA Detected Abnormal     Blood Culture, Routine See additional report for complete BCID panel.     Organism Staph aureus MRSA Abnormal     Blood Culture, Routine -- Abnormal     POSITIVE for   Sensitivity to follow   CONTACT PRECAUTIONS INDICATED   Isolated two of two sets    Abnormal     Organism Serratia marcescens Abnormal     Blood Culture, Routine --    POSITIVE for   Sensitivity to follow   Isolated one of two sets    Narrative:     ORDER#: [id number]                          ORDERED BY: Yany Burns   SOURCE: Blood                              COLLECTED:  09/12/22 16:16   ANTIBIOTICS AT ANABELLA.: RECEIVED :  09/12/22 16:32   CALL  [place]  [id number] Tona [facility] [phone],   Microbiology results called to and read back by Jamal Kern RN,   09/13/2022 08:24, by [facility]   Microbiology results called to and read back by Joaquim Li Pharm,   09/13/2022 07:05, by [facility]   If child <=2 yrs old please draw pediatric bottle. ~Blood Culture 1   Culture, Blood 2 [[id number]] Collected: 09/14/22 0525   Order Status: Sent Specimen: Blood Updated: 09/14/22 0600   Culture, Blood 1 [[id number]] Collected: 09/14/22 0436   Order Status: Sent Specimen: Blood Updated: 09/14/22 0441   Culture, Urine [[id number]] Collected: 09/12/22 1616   Order Status: Completed Specimen: Urine, clean catch Updated: 09/13/22 2245    Urine Culture, Routine No growth at 18 to 36 hours   Narrative:     ORDER#: [id number]                          ORDERED BY: Monik Bean   SOURCE: Urine Clean Catch                  COLLECTED:  09/12/22 16:16   ANTIBIOTICS AT ANABELLA.:                      RECEIVED :  09/12/22 19:35   Culture, Blood 2 [[id number]] (Abnormal) Collected: 09/12/22 2012   Order Status: Completed Specimen: Blood Updated: 09/13/22 1416    Culture, Blood 2 -- Abnormal     Gram stain Aerobic bottle:   Gram positive cocci in clusters   resembling Staphylococcus   Information to follow   Gram stain Anaerobic bottle:   Gram positive cocci in clusters   resembling Staphylococcus   Information to follow    Abnormal    Narrative:     ORDER#: [id number]                          ORDERED BY: Monik Bean   SOURCE: Blood                              COLLECTED:  09/12/22 20:12   ANTIBIOTICS AT ANABELLA.:                      RECEIVED :  09/12/22 20:21   CALL  [facility]  [id number] tel. [phone],   Previous panic on this admission - call not needed per SOP, 09/13/2022 10:56,   by [facility]   If child <=2 yrs old please draw pediatric bottle. ~Blood Culture #2   Strep Pneumoniae Antigen [[id number]] Collected: 09/12/22 1900   Order Status: Completed Specimen: Urine, clean catch Updated: 09/13/22 1054    STREP PNEUMONIAE ANTIGEN, URINE --    Presumptive Negative   Presumptive negative suggests no current or recent   pneumococcal infection. Infection due to Strep pneumoniae   cannot be ruled out since the antigen present in the sample   may be below the detection limit of the test.   Normal Range:Presumptive Negative    Narrative:     ORDER#: [id number]                          ORDERED BY: Francisco Daly   SOURCE: Urine Clean Catch                  COLLECTED:  09/12/22 19:00   ANTIBIOTICS AT ANABELLA.:                      RECEIVED :  09/13/22 07:04   Legionella antigen, urine [[id number]] Collected: 09/12/22 1900   Order Status: Completed Specimen: Urine, clean catch Updated: 09/13/22 1047    L. pneumophila Serogp 1 Ur Ag --    Presumptive Negative   No Legionella pneumophila serogroup 1 antigens detected. A negative result does not exclude infection with   Legionella pneumophila serogroup 1 nor does it rule out   other microbial-caused respiratory infections or   disease caused by other serogroups of   Legionella pneumophila. Normal Range: Presumptive Negative    Narrative:     ORDER#: [id number]                          ORDERED BY: KATIE FLETCHER   SOURCE: Urine Clean Catch                  COLLECTED:  09/12/22 19:00   ANTIBIOTICS AT ANABELLA.:                      RECEIVED :  09/13/22 07:04   Culture, Blood, PCR ID Panel [[id number]] Collected: 09/12/22 1616   Order Status: Completed Updated: 09/13/22 0707    Report SEE IMAGE   Narrative:     Ralph ROACH tel. [phone],   Microbiology results called to and read back by Jai Castro,   09/13/2022 07:05, by Alcira Banerjee   Culture, Blood 2 [[id number]] Collected: 09/12/22 0000   Order Status: Canceled Specimen: Blood    Culture, Blood 1 [[id number]] Collected: 09/12/22 0000   Order Status: Canceled Specimen: Blood      Lab Results   Component Value Date/Time    BC No Growth after 4 days of incubation.  09/14/2022 04:36 AM BLOODCULT2 No Growth after 4 days of incubation. 09/14/2022 05:25 AM      Collected: 09/16/22 1210   Order Status: Completed Specimen:  Body Fluid from Abscess Updated: 09/19/22 0615    Gram Stain Result 4+ WBC's (Polymorphonuclear)   2+ Gram positive cocci   1+ Gram negative rods    Abnormal     Anaerobic Culture --    Anaerobic culture further report to follow   No anaerobes isolated so far, Further report to follow     Organism Serratia marcescens Abnormal     WOUND/ABSCESS Light growth    Organism Staph aureus MRSA Abnormal     WOUND/ABSCESS -- Abnormal     Moderate growth   CONTACT PRECAUTIONS INDICATED    Abnormal    Narrative:     ORDER#: [id number]                          ORDERED BY: RAMILA MARTINES   SOURCE: Abscess Rt gluteal abscess         COLLECTED:  09/16/22 12:10   ANTIBIOTICS AT ANABELLA.:                      RECEIVED :  09/16/22 12:32   CALL  [facility]  SKK5 tel. [phone],   Previous panic on this admission - call not needed per SOP, 09/17/2022 13:04,      Susceptibility    Staph aureus mrsa (3)    Antibiotic Interpretation Microscan  Method Status    ceFAZolin Resistant >16 mcg/mL BACTERIAL SUSCEPTIBILITY PANEL BY YA     clindamycin Sensitive <=0.5 mcg/mL BACTERIAL SUSCEPTIBILITY PANEL BY YA     erythromycin Resistant >4 mcg/mL BACTERIAL SUSCEPTIBILITY PANEL BY YA     oxacillin Resistant >2 mcg/mL BACTERIAL SUSCEPTIBILITY PANEL BY YA     tetracycline Sensitive <=4 mcg/mL BACTERIAL SUSCEPTIBILITY PANEL BY YA     trimethoprim-sulfamethoxazole Sensitive 1/19 mcg/mL BACTERIAL SUSCEPTIBILITY PANEL BY YA     vancomycin Sensitive 2 mcg/mL BACTERIAL SUSCEPTIBILITY PANEL BY YA       Serratia marcescens (4)    Antibiotic Interpretation Microscan  Method Status    amoxicillin-clavulanate Resistant >16/8 mcg/mL BACTERIAL SUSCEPTIBILITY PANEL BY YA     ampicillin Resistant >16 mcg/mL BACTERIAL SUSCEPTIBILITY PANEL BY YA     ampicillin-sulbactam Resistant 16/8 mcg/mL BACTERIAL SUSCEPTIBILITY PANEL BY YA ceFAZolin Resistant >16 mcg/mL BACTERIAL SUSCEPTIBILITY PANEL BY YA     cefepime Sensitive <=2 mcg/mL BACTERIAL SUSCEPTIBILITY PANEL BY YA     cefTRIAXone Sensitive <=1 mcg/mL BACTERIAL SUSCEPTIBILITY PANEL BY YA     cefuroxime Resistant >16 mcg/mL BACTERIAL SUSCEPTIBILITY PANEL BY YA     ciprofloxacin Sensitive <=1 mcg/mL BACTERIAL SUSCEPTIBILITY PANEL BY YA     ertapenem Sensitive <=0.5 mcg/mL BACTERIAL SUSCEPTIBILITY PANEL BY YA     gentamicin Sensitive <=4 mcg/mL BACTERIAL SUSCEPTIBILITY PANEL BY YA     meropenem Sensitive <=1 mcg/mL BACTERIAL SUSCEPTIBILITY PANEL BY YA     piperacillin-tazobactam Sensitive <=16 mcg/mL BACTERIAL SUSCEPTIBILITY PANEL BY YA     trimethoprim-sulfamethoxazole Sensitive <=2/38 mcg/mL BACTERIAL SUSCEPTIBILITY PANEL BY YA      Condensed View       Respiratory Culture:  Lab Results   Component Value Date/Time    LABGRAM  09/16/2022 12:10 PM     4+ WBC's (Polymorphonuclear)  2+ Gram positive cocci  1+ Gram negative rods       AFB:No results found for: AFBSMEAR  Viral Culture:  Lab Results   Component Value Date/Time    COVID19 Not Detected 09/07/2022 12:00 AM    COVID19 Not Detected 07/20/2022 11:20 PM     Urine Culture:   No results for input(s): LABURIN in the last 72 hours. Summary   Normal left ventricle size, wall thickness, and systolic function with an   estimated ejection fraction of 60-65%. No regional wall motion abnormalities   are seen. Normal diastolic function. Normal right ventricular size and function. Mobile vegetation on tricuspid valve suggestive of endocarditis. Moderate tricuspid regurgitation. The right atrium is mildly dilated.       Signature      ------------------------------------------------------------------   Electronically signed by Kike Springer MD   (Interpreting physician) on 09/13/2022 at 04:15 PM   ------------------------------------------------------------------    IMAGING:    IR NONTUNNELED VASCULAR CATHETER > 5 YEARS Final Result   Successful ultrasound and fluoroscopy guided non-tunneled Trialysis catheter   placement. US RENAL COMPLETE   Final Result   Unremarkable ultrasound of the kidneys and urinary bladder. No renal   atrophy, hydronephrosis or abnormal echotexture. CT ABSCESS DRAINAGE W CATH PLACEMENT S&I   Final Result   Successful CT guided placement of a 10 Pakistani drainage catheter in the right   iliacus abscess. CT GUIDED NEEDLE PLACEMENT   Final Result   Successful CT guided placement of a 10 Pakistani drainage catheter in the right   iliacus abscess. MRI LUMBAR SPINE W WO CONTRAST   Final Result   Partially visualized septic arthritis/osteomyelitis of the right sacroiliac   joint, with a large adjacent retroperitoneal abscess extending into the right   pelvic sidewall. Small intramuscular abscess also present within the left psoas muscle. Epidural phlegmon/abscess of the sacral canal.      Pelvic ascites. The findings were sent to the Radiology Results [address] at 7:23   pm on 9/15/2022 to be communicated to a licensed caregiver. VL Extremity Venous Bilateral   Final Result      CT HEAD WO CONTRAST   Final Result   No acute intracranial abnormality. CT CHEST WO CONTRAST   Final Result   Diffuse in numeral bilateral pulmonary nodules many of which are cavitary   becoming coalescent at the right lung apex however diffusely throughout the   bilateral lungs consistent of septic emboli with small to moderate right   pleural effusion      Partially visualized portions of the upper abdomen reveal hepatosplenomegaly         XR CHEST PORTABLE   Final Result   Multifocal patchy airspace disease and cavitary nodules. The appearance is   suggestive of septic emboli in this clinical setting. Follow-up recommended to assure resolution.          IR TUNNELED CVC PLACE WO SQ PORT/PUMP > 5 YEARS    (Results Pending)         All the pertinent images and reports for the current Hospitalization were reviewed by me     Scheduled Meds:   sevelamer  800 mg Oral TID WC    ceftaroline fosamil (TEFLARO) IVPB  200 mg IntraVENous q8h    heparin (porcine)  5,000 Units SubCUTAneous 3 times per day    sodium bicarbonate  1,300 mg Oral TID    lidocaine 1 % injection  5 mL IntraDERmal Once    sodium chloride flush  5-40 mL IntraVENous 2 times per day    linezolid  600 mg IntraVENous Q12H       Continuous Infusions:   sodium bicarbonate infusion 50 mL/hr at 09/20/22 2225    sodium chloride      sodium chloride 25 mL (09/21/22 0566)       PRN Meds:  morphine, heparin (porcine), LORazepam, sodium chloride, chlorhexidine, sodium chloride flush, sodium chloride, hydrOXYzine pamoate, naloxone, calcium carbonate, promethazine, promethazine **OR** ondansetron, acetaminophen **OR** acetaminophen, perflutren lipid microspheres      Assessment:     Patient Active Problem List   Diagnosis    Tear of medial cartilage or meniscus of knee, current    Plica syndrome    Boxer's metacarpal fracture, neck, closed    Chondromalacia of patella    Degeneration of lumbar or lumbosacral intervertebral disc    Varicose veins of lower extremity    Intractable nausea and vomiting    Septicemia (HCC)    IVDU (intravenous drug user)    MRSA bacteremia    Sepsis due to methicillin resistant Staphylococcus aureus (MRSA) without acute organ dysfunction ([address]    Bacterial infection due to Serratia    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 WBC count is elevated high fever from ongoing bloodstream infection. Given IV drug abuse suspect endocarditis transthoracic echocardiogram with TV Vegetation    Trend WBC and repeat Blood cx NGTD    Will need placement to complete IV ABX    Will ask Cardiology eval for possible angiovac given the vegetation ? Rt gluteal and lower Lumbar area pain MRI L spine with abscess, septic joint     S/p IR aspiration of the abscess cx MRSA and serratia noted    Unfortunately kidney function continues to decline nephrology is now following. IV antibiotics have been adjusted    Will need a long course of antibiotic given the MRSA bacteremia and septic arthritis    HD line placed due to worsening Kidney function -completed hemodialysis for session without any issues. Unfortunately creatinine still remains elevated. Labs, Microbiology, Radiology and all the pertinent results from current hospitalization and  care every where were reviewed  by me as a part of the evaluation   Plan:   Cont  IV Ceftaroline - 200 mg q x 8 HRS will cover MRSA and Serratia  Cont  IV Linezolid x 600 mg q 12 HRS   Repeat Blood cx in process   NGTD  TTE Abnormal with vegetation   Will need IV abx  Watch for complications  HIV -ve and Hepatitis screen Hep C+Ve  Cardiology consult  STEFANO completed  d/w Dr. Harper Nicholas is small for angiovac  would be able to treat with IV abx   MRI L spine noted very abnormal s/p ID abscess dot  Daptomycin not effective in LUNGS and Vancomycin resulted in MARYAN   Needs placement   Await kidney function recovery to decide on adjusting the long-term antibiotics         Discussed with patient/Family and Nursing   Risk of Complications/Morbidity: High      Illness(es)/ Infection present that pose threat to bodily function. There is potential for severe exacerbation of infection/side effects of treatment. Therapy requires intensive monitoring for antimicrobial agent toxicity.      Discussed with patient/Family and Nursing staff     Thanks for allowing me to participate in your patient's care and please call me with any questions or concerns.     Fang Silver MD  Infectious Disease  [facility] Physician  Phone: [phone]   Fax : [phone]

## 2022-09-21 NOTE — PROGRESS NOTES
Occupational Therapy  Pt out of room to dialysis. Will follow and attempt as schedule permits. Refer to the last note for status if pt is discharged prior to the next session.   Maryse NASH/SRAVANTHI,[id number]

## 2022-09-21 NOTE — PROGRESS NOTES
Department of Internal Medicine  Nephrology Progress Note        39 y/o WF with h/o depression, IVDA and back pain admitted with feeling weak, fatigued and pain in her back She was seen in ER on 9/7/22 with back apin and diagnosed with UTI Received toradol and was discharged on Motrin and Cefdinir Urine CX grew klebsiella pan senstive However she continued to feel poorly with back pain, and fatigue   Had fever to 101 on admission Started on Vancomycin and Cefepime Cr was 1.5 mg on admission improved to 1.1 mg but has increased to 1.8 mg now [facility] from admission growing MRSA and Serratia CT chest with cavitation and septic emboli  She reports decreased UOP no dysuria hematuria  Has been taking Ibuprofen 800 mg TID prior to admission  MRI showed OM of R SI joint and large retroperitoneal abscess   Underwent drainage tube placement . Events noted , labs reviewed . S  REVIEW OF SYSTEMS:  No CP/SOB or GEIGER . feels weak. tired  . C/o back pain   ROS limited due to pt factor  . Family at bed side     Physical Exam:    VITALS:  BP (!) 151/97   Pulse 77   Temp 98.7 °F (37.1 °C) (Oral)   Resp (!) 32   Ht 5' 8\" (1.727 m)   Wt 157 lb 6.5 oz (71.4 kg)   SpO2 94%   BMI 23.93 kg/m²   24HR INTAKE/OUTPUT:    Intake/Output Summary (Last 24 hours) at 9/21/2022 1118  Last data filed at 9/20/2022 2355  Gross per 24 hour   Intake 460 ml   Output 1550 ml   Net -1090 ml         Constitutional:  Doing well  Respiratory:  Decrease BS at  bases   Gastrointestinal:  + tenderness.   Normal Bowel Sounds  Cardiovascular:  S1, S2 RRR   Edema:  +  edema    DATA:    CBC:  Lab Results   Component Value Date/Time    WBC 7.6 09/21/2022 05:54 AM    RBC 2.88 09/21/2022 05:54 AM    HGB 8.1 09/21/2022 05:54 AM    HCT 24.2 09/21/2022 05:54 AM    MCV 84.0 09/21/2022 05:54 AM    MCH 28.0 09/21/2022 05:54 AM    MCHC 33.3 09/21/2022 05:54 AM    RDW 16.1 09/21/2022 05:54 AM     09/21/2022 05:54 AM    MPV 7.2 09/21/2022 05:54 AM CMP:  Lab Results   Component Value Date/Time     09/20/2022 06:30 AM    K 4.6 09/20/2022 06:30 AM    K 4.3 09/19/2022 05:51 AM     09/20/2022 06:30 AM    CO2 17 09/20/2022 06:30 AM    BUN 53 09/20/2022 06:30 AM    CREATININE 3.9 09/20/2022 06:30 AM    GFRAA 15 09/20/2022 06:30 AM    GFRAA >60 07/28/2012 11:20 PM    AGRATIO 0.4 09/12/2022 03:44 PM    LABGLOM 13 09/20/2022 06:30 AM    GLUCOSE 87 09/20/2022 06:30 AM    PROT 5.5 09/20/2022 06:30 AM    PROT 6.5 07/28/2012 11:20 PM    CALCIUM 7.4 09/20/2022 06:30 AM    BILITOT 0.3 09/20/2022 06:30 AM    ALKPHOS 93 09/20/2022 06:30 AM    AST 13 09/20/2022 06:30 AM    ALT 12 09/20/2022 06:30 AM      Hepatic Function Panel:   Lab Results   Component Value Date/Time    ALKPHOS 93 09/20/2022 06:30 AM    ALT 12 09/20/2022 06:30 AM    AST 13 09/20/2022 06:30 AM    PROT 5.5 09/20/2022 06:30 AM    PROT 6.5 07/28/2012 11:20 PM    BILITOT 0.3 09/20/2022 06:30 AM    BILIDIR <0.2 09/20/2022 06:30 AM    IBILI see below 09/20/2022 06:30 AM      Phosphorus:   Lab Results   Component Value Date/Time    PHOS 7.1 09/20/2022 06:30 AM       ASSESSMENT:  Principal Problem:    Intractable nausea and vomiting  Active Problems:    Septicemia (HCC)    IVDU (intravenous drug user)    MRSA bacteremia    Sepsis due to methicillin resistant Staphylococcus aureus (MRSA) without acute organ dysfunction (HCC)    Bacterial infection due to Serratia    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 *      PLAN  Assessment/  1-MARYAN suspect Vancomycin toxicity Vanc random level 24 yesterday correlates with the rise in Cr although, SIRS can be contributing Cannot r/o post infectious GN   1st HD 9/20 , next  HD today . Will need TDC and Bx if no recovery  .    2-MRSA and serratia bacteremia with retroperitoneal abscess  septic pulmonary emboli and possible TV endocarditis  3-IVDA   4 Anemia  Hb  noted . S/p Tx .   5 Hypocalcemia with severe Hypoalbuminemia   Add binders.    Plan dw pt , nurse  and  family     Poppy Rowland MD, VUJP

## 2022-09-22 LAB
ALBUMIN SERPL-MCNC: 1.7 G/DL (ref 3.4–5)
ANION GAP SERPL CALCULATED.3IONS-SCNC: 12 MMOL/L (ref 3–16)
BUN BLDV-MCNC: 30 MG/DL (ref 7–20)
CALCIUM SERPL-MCNC: 7.3 MG/DL (ref 8.3–10.6)
CHLORIDE BLD-SCNC: 100 MMOL/L (ref 99–110)
CO2: 25 MMOL/L (ref 21–32)
CREAT SERPL-MCNC: 3 MG/DL (ref 0.6–1.1)
FOLATE: 6.22 NG/ML (ref 4.78–24.2)
GFR AFRICAN AMERICAN: 21
GFR NON-AFRICAN AMERICAN: 17
GLUCOSE BLD-MCNC: 91 MG/DL (ref 70–99)
HAPTOGLOBIN: 339 MG/DL (ref 30–200)
HAV IGM SER IA-ACNC: ABNORMAL
HBV SURFACE AB TITR SER: 33.93 MIU/ML
HCT VFR BLD CALC: 25 % (ref 36–48)
HEMOGLOBIN: 8.4 G/DL (ref 12–16)
HEPATITIS B CORE IGM ANTIBODY: ABNORMAL
HEPATITIS B SURFACE ANTIGEN INTERPRETATION: ABNORMAL
HEPATITIS C ANTIBODY INTERPRETATION: REACTIVE
MCH RBC QN AUTO: 28.2 PG (ref 26–34)
MCHC RBC AUTO-ENTMCNC: 33.8 G/DL (ref 31–36)
MCV RBC AUTO: 83.5 FL (ref 80–100)
PDW BLD-RTO: 16.2 % (ref 12.4–15.4)
PHOSPHORUS: 5.2 MG/DL (ref 2.5–4.9)
PLATELET # BLD: 290 K/UL (ref 135–450)
PMV BLD AUTO: 7.7 FL (ref 5–10.5)
POTASSIUM SERPL-SCNC: 4.2 MMOL/L (ref 3.5–5.1)
RBC # BLD: 3 M/UL (ref 4–5.2)
SODIUM BLD-SCNC: 137 MMOL/L (ref 136–145)
VITAMIN B-12: 1348 PG/ML (ref 211–911)
WBC # BLD: 8 K/UL (ref 4–11)

## 2022-09-22 PROCEDURE — 83010 ASSAY OF HAPTOGLOBIN QUANT: CPT

## 2022-09-22 PROCEDURE — 6370000000 HC RX 637 (ALT 250 FOR IP): Performed by: INTERNAL MEDICINE

## 2022-09-22 PROCEDURE — 90935 HEMODIALYSIS ONE EVALUATION: CPT

## 2022-09-22 PROCEDURE — 99233 SBSQ HOSP IP/OBS HIGH 50: CPT | Performed by: INTERNAL MEDICINE

## 2022-09-22 PROCEDURE — 94760 N-INVAS EAR/PLS OXIMETRY 1: CPT

## 2022-09-22 PROCEDURE — 2580000003 HC RX 258: Performed by: INTERNAL MEDICINE

## 2022-09-22 PROCEDURE — 82607 VITAMIN B-12: CPT

## 2022-09-22 PROCEDURE — 6360000002 HC RX W HCPCS: Performed by: INTERNAL MEDICINE

## 2022-09-22 PROCEDURE — 2500000003 HC RX 250 WO HCPCS: Performed by: INTERNAL MEDICINE

## 2022-09-22 PROCEDURE — 85027 COMPLETE CBC AUTOMATED: CPT

## 2022-09-22 PROCEDURE — 83883 ASSAY NEPHELOMETRY NOT SPEC: CPT

## 2022-09-22 PROCEDURE — 80069 RENAL FUNCTION PANEL: CPT

## 2022-09-22 PROCEDURE — 97530 THERAPEUTIC ACTIVITIES: CPT

## 2022-09-22 PROCEDURE — 82746 ASSAY OF FOLIC ACID SERUM: CPT

## 2022-09-22 PROCEDURE — 2060000000 HC ICU INTERMEDIATE R&B

## 2022-09-22 PROCEDURE — 97530 THERAPEUTIC ACTIVITIES: CPT | Performed by: PHYSICAL THERAPIST

## 2022-09-22 RX ADMIN — HEPARIN SODIUM 2400 UNITS: 1000 INJECTION INTRAVENOUS; SUBCUTANEOUS at 10:06

## 2022-09-22 RX ADMIN — SODIUM BICARBONATE: 84 INJECTION, SOLUTION INTRAVENOUS at 19:42

## 2022-09-22 RX ADMIN — LINEZOLID 600 MG: 600 INJECTION, SOLUTION INTRAVENOUS at 12:25

## 2022-09-22 RX ADMIN — SODIUM BICARBONATE 1300 MG: 650 TABLET ORAL at 15:51

## 2022-09-22 RX ADMIN — LORAZEPAM 1 MG: 1 TABLET ORAL at 15:51

## 2022-09-22 RX ADMIN — SODIUM CHLORIDE, PRESERVATIVE FREE 10 ML: 5 INJECTION INTRAVENOUS at 12:35

## 2022-09-22 RX ADMIN — HEPARIN SODIUM 5000 UNITS: 5000 INJECTION INTRAVENOUS; SUBCUTANEOUS at 05:31

## 2022-09-22 RX ADMIN — SODIUM CHLORIDE 25 ML: 9 INJECTION, SOLUTION INTRAVENOUS at 20:52

## 2022-09-22 RX ADMIN — SODIUM CHLORIDE 25 ML: 9 INJECTION, SOLUTION INTRAVENOUS at 22:23

## 2022-09-22 RX ADMIN — SODIUM BICARBONATE 1300 MG: 650 TABLET ORAL at 12:34

## 2022-09-22 RX ADMIN — MORPHINE SULFATE 1 MG: 2 INJECTION, SOLUTION INTRAMUSCULAR; INTRAVENOUS at 12:17

## 2022-09-22 RX ADMIN — MORPHINE SULFATE 1 MG: 2 INJECTION, SOLUTION INTRAMUSCULAR; INTRAVENOUS at 03:43

## 2022-09-22 RX ADMIN — LINEZOLID 600 MG: 600 INJECTION, SOLUTION INTRAVENOUS at 22:24

## 2022-09-22 RX ADMIN — SODIUM BICARBONATE 1300 MG: 650 TABLET ORAL at 20:45

## 2022-09-22 RX ADMIN — SEVELAMER CARBONATE 800 MG: 800 TABLET, FILM COATED ORAL at 12:35

## 2022-09-22 RX ADMIN — CEFTAROLINE FOSAMIL 200 MG: 600 POWDER, FOR SOLUTION INTRAVENOUS at 06:36

## 2022-09-22 RX ADMIN — MORPHINE SULFATE 1 MG: 2 INJECTION, SOLUTION INTRAMUSCULAR; INTRAVENOUS at 20:45

## 2022-09-22 RX ADMIN — ONDANSETRON 4 MG: 2 INJECTION INTRAMUSCULAR; INTRAVENOUS at 12:35

## 2022-09-22 RX ADMIN — CEFTAROLINE FOSAMIL 200 MG: 600 POWDER, FOR SOLUTION INTRAVENOUS at 20:53

## 2022-09-22 RX ADMIN — HEPARIN SODIUM 5000 UNITS: 5000 INJECTION INTRAVENOUS; SUBCUTANEOUS at 12:33

## 2022-09-22 RX ADMIN — LORAZEPAM 1 MG: 1 TABLET ORAL at 05:31

## 2022-09-22 RX ADMIN — HEPARIN SODIUM 5000 UNITS: 5000 INJECTION INTRAVENOUS; SUBCUTANEOUS at 22:19

## 2022-09-22 RX ADMIN — CEFTAROLINE FOSAMIL 200 MG: 600 POWDER, FOR SOLUTION INTRAVENOUS at 15:40

## 2022-09-22 RX ADMIN — ANTACID TABLETS 500 MG: 500 TABLET, CHEWABLE ORAL at 12:35

## 2022-09-22 RX ADMIN — SODIUM CHLORIDE, PRESERVATIVE FREE 10 ML: 5 INJECTION INTRAVENOUS at 20:46

## 2022-09-22 ASSESSMENT — PAIN DESCRIPTION - ORIENTATION
ORIENTATION: LOWER

## 2022-09-22 ASSESSMENT — PAIN - FUNCTIONAL ASSESSMENT
PAIN_FUNCTIONAL_ASSESSMENT: PREVENTS OR INTERFERES WITH MANY ACTIVE NOT PASSIVE ACTIVITIES
PAIN_FUNCTIONAL_ASSESSMENT: PREVENTS OR INTERFERES SOME ACTIVE ACTIVITIES AND ADLS
PAIN_FUNCTIONAL_ASSESSMENT: PREVENTS OR INTERFERES SOME ACTIVE ACTIVITIES AND ADLS

## 2022-09-22 ASSESSMENT — PAIN SCALES - WONG BAKER
WONGBAKER_NUMERICALRESPONSE: 2
WONGBAKER_NUMERICALRESPONSE: 2

## 2022-09-22 ASSESSMENT — PAIN DESCRIPTION - DESCRIPTORS
DESCRIPTORS: ACHING
DESCRIPTORS: ACHING;DISCOMFORT
DESCRIPTORS: ACHING;DISCOMFORT

## 2022-09-22 ASSESSMENT — PAIN SCALES - GENERAL
PAINLEVEL_OUTOF10: 9
PAINLEVEL_OUTOF10: 9
PAINLEVEL_OUTOF10: 7
PAINLEVEL_OUTOF10: 7

## 2022-09-22 ASSESSMENT — PAIN DESCRIPTION - LOCATION
LOCATION: BACK

## 2022-09-22 ASSESSMENT — PAIN DESCRIPTION - PAIN TYPE: TYPE: ACUTE PAIN

## 2022-09-22 ASSESSMENT — PAIN DESCRIPTION - FREQUENCY: FREQUENCY: CONTINUOUS

## 2022-09-22 NOTE — PROGRESS NOTES
Department of Internal Medicine  Nephrology Progress Note        39 y/o WF with h/o depression, IVDA and back pain admitted with feeling weak, fatigued and pain in her back She was seen in ER on 9/7/22 with back apin and diagnosed with UTI Received toradol and was discharged on Motrin and Cefdinir Urine CX grew klebsiella pan senstive However she continued to feel poorly with back pain, and fatigue   Had fever to 101 on admission Started on Vancomycin and Cefepime Cr was 1.5 mg on admission improved to 1.1 mg but has increased to 1.8 mg now [facility] from admission growing MRSA and Serratia CT chest with cavitation and septic emboli  She reports decreased UOP no dysuria hematuria  Has been taking Ibuprofen 800 mg TID prior to admission  MRI showed OM of R SI joint and large retroperitoneal abscess   Underwent drainage tube placement . Events noted , labs reviewed . Seen post HD. Tx went well. REVIEW OF SYSTEMS:  No CP/SOB or GEIGER . feels weak. tired  . C/o back pain   ROS limited due to pt factor  . Family at bed side     Physical Exam:    VITALS:  BP (!) 147/93   Pulse 73   Temp 98.4 °F (36.9 °C)   Resp 18   Ht 5' 8\" (1.727 m)   Wt 164 lb 3.9 oz (74.5 kg)   SpO2 94%   BMI 24.97 kg/m²   24HR INTAKE/OUTPUT:    Intake/Output Summary (Last 24 hours) at 9/22/2022 1140  Last data filed at 9/22/2022 1034  Gross per 24 hour   Intake 0 ml   Output 117.5 ml   Net -117.5 ml         Constitutional:  Doing well  Respiratory:  Decrease BS at  bases   Gastrointestinal:  + tenderness.   Normal Bowel Sounds  Cardiovascular:  S1, S2 RRR   Edema:  +  edema    DATA:    CBC:  Lab Results   Component Value Date/Time    WBC 8.0 09/22/2022 06:38 AM    RBC 3.00 09/22/2022 06:38 AM    HGB 8.4 09/22/2022 06:38 AM    HCT 25.0 09/22/2022 06:38 AM    MCV 83.5 09/22/2022 06:38 AM    MCH 28.2 09/22/2022 06:38 AM    MCHC 33.8 09/22/2022 06:38 AM    RDW 16.2 09/22/2022 06:38 AM     09/22/2022 06:38 AM    MPV 7.7 09/22/2022 06:38 AM     CMP:  Lab Results   Component Value Date/Time     09/22/2022 06:38 AM    K 4.2 09/22/2022 06:38 AM    K 4.3 09/19/2022 05:51 AM     09/22/2022 06:38 AM    CO2 25 09/22/2022 06:38 AM    BUN 30 09/22/2022 06:38 AM    CREATININE 3.0 09/22/2022 06:38 AM    GFRAA 21 09/22/2022 06:38 AM    GFRAA >60 07/28/2012 11:20 PM    AGRATIO 0.4 09/12/2022 03:44 PM    LABGLOM 17 09/22/2022 06:38 AM    GLUCOSE 91 09/22/2022 06:38 AM    PROT 5.6 09/21/2022 07:36 PM    PROT 6.5 07/28/2012 11:20 PM    CALCIUM 7.3 09/22/2022 06:38 AM    BILITOT 0.3 09/21/2022 05:54 AM    ALKPHOS 106 09/21/2022 05:54 AM    AST 19 09/21/2022 05:54 AM    ALT 16 09/21/2022 05:54 AM      Hepatic Function Panel:   Lab Results   Component Value Date/Time    ALKPHOS 106 09/21/2022 05:54 AM    ALT 16 09/21/2022 05:54 AM    AST 19 09/21/2022 05:54 AM    PROT 5.6 09/21/2022 07:36 PM    PROT 6.5 07/28/2012 11:20 PM    BILITOT 0.3 09/21/2022 05:54 AM    BILIDIR <0.2 09/21/2022 05:54 AM    IBILI see below 09/21/2022 05:54 AM      Phosphorus:   Lab Results   Component Value Date/Time    PHOS 5.2 09/22/2022 06:38 AM       ASSESSMENT:  Principal Problem:    Intractable nausea and vomiting  Active Problems:    Septicemia ([address]    IVDU (intravenous drug user)    MRSA bacteremia    Sepsis due to methicillin resistant Staphylococcus aureus (MRSA) without acute organ dysfunction ([address]    Bacterial infection due to Serratia    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 *      PLAN  Assessment/  1-MARYAN suspect Vancomycin toxicity Vanc random level 24 yesterday correlates with the rise in Cr although, SIRS can be contributing Cannot r/o post infectious GN   1st HD 9/20    HD as ordered  , DW adjusted . Will need TDC and Bx if no recovery  .    2-MRSA and serratia bacteremia with retroperitoneal abscess  septic pulmonary emboli and possible TV endocarditis  3-IVDA   4 Anemia  Hb  noted . S/p Tx . Epo with HD.   5 Hypocalcemia with severe Hypoalbuminemia   Add binders.    Plan dw pt , nurse  and  family     Sharmila Bennett MD, CJZE

## 2022-09-22 NOTE — PROGRESS NOTES
Infectious Disease Follow up Notes  Admit Date: 9/12/2022  Hospital Day: 11    Antibiotics :   IV Linezolid  IV Ceftaroline      CHIEF COMPLAINT:     Sepsis  MRSA bacteremia  Serratia Bacteremia  Endocarditis  IVDA  TV Vegetation   Rt gluteal abscess     Subjective interval History :  40 y. o.woman with a history of IV drug abuse, ADHD, back pain, depression admitted to the hospital secondary to fever chills fatigue back pain. Patient family noted she was unable to get up from the bed for the past 3 days secondary to fevers and not feeling well. Admission labs indicate creatinine 1.5 procalcitonin elevated to 6.6, WBC elevated 15.5 hemoglobin 9.4 bilirubin 1.3 AST 42, blood cultures from admission positive for MRSA as well as Serratia. T-max 103.8 on admission. CT chest with diffuse innumerable bilateral pulmonary nodules consistent with cavitation and septic emboli. Given the presentation concern is for endocarditis secondary to IV drug abuse.   Patient not much interactive during normalization some of the history is provided by her daughters,       Interval History :on going Rt leg pain and KRYSTLE drain in place HD line in place completed HD and not making much urine yet mother at bed side had several Questions able to answer and she will need long course of IV abx d/w pt         Past Medical History:    Past Medical History:   Diagnosis Date    ADHD (attention deficit hyperactivity disorder)     Arthritis     Back pain     Depression     Migraine     Neutrophilia 9/15/2022       Past Surgical History:    Past Surgical History:   Procedure Laterality Date    IR INSERT NON TUNNELED CV CATH LESS THAN 5 YEARS Right 09/20/2022    Reagan Haong; MAKENZIE access; 15cm; Dr. Kyle Camp    IR NONTUNNELED VASCULAR CATHETER  9/20/2022    IR NONTUNNELED VASCULAR CATHETER 9/20/2022 [facility] PROCEDURES    KNEE ARTHROSCOPY  10/05/2010    PARTIAL HYSTERECTOMY (CERVIX NOT REMOVED)      TUBAL LIGATION  01/01/2004       Current Medications:    No outpatient medications have been marked as taking for the 9/12/22 encounter [facility] HOSPITAL Encounter).        Allergies:  Ultram [tramadol hcl], Robaxin [methocarbamol], and Penicillins    Immunizations :   Immunization History   Administered Date(s) Administered    Tdap (Boostrix, Adacel) 02/11/2015       Social History:    Social History     Tobacco Use    Smoking status: Every Day     Packs/day: 0.50     Years: 2.00     Pack years: 1.00     Types: Cigarettes    Smokeless tobacco: Never   Substance Use Topics    Alcohol use: Yes     Comment: occas    Drug use: No     Social History     Tobacco Use   Smoking Status Every Day    Packs/day: 0.50    Years: 2.00    Pack years: 1.00    Types: Cigarettes   Smokeless Tobacco Never      Family History   Problem Relation Age of Onset    Breast Cancer Maternal Grandmother 45    Arthritis Other     Asthma Other     Cancer Other     Diabetes Other     Seizures Other     Stroke Other           REVIEW OF SYSTEMS:      Constitutional:  fevers,++  chills +=, night sweats  Eyes:  negative for blurred vision, eye discharge, visual disturbance   HEENT:  negative for hearing loss, ear drainage,nasal congestion  Respiratory:  negative for cough, shortness of breath or hemoptysis   Cardiovascular:  negative for chest pain, palpitations, syncope  Gastrointestinal:  negative for nausea, vomiting, diarrhea, constipation, abdominal pain  Genitourinary:  negative for frequency, dysuria, urinary incontinence, hematuria  Hematologic/Lymphatic:  negative for easy bruising, bleeding and lymphadenopathy  Allergic/Immunologic:  negative for recurrent infections, angioedema, anaphylaxis   Endocrine:  negative for weight changes, polyuria, polydipsia and polyphagia  Musculoskeletal:  Rt hip and lower back pain ++   pain, swelling, decreased range of motion  Integumentary: No rashes, skin lesions  Neurological:  negative for headaches, slurred speech, unilateral weakness  Psychiatric: negative for hallucinations,confusion,agitation.                 PHYSICAL EXAM:      Vitals:    BP (!) 146/91   Pulse 81   Temp 98.3 °F (36.8 °C) (Oral)   Resp 18   Ht 5' 8\" (1.727 m)   Wt 164 lb 3.9 oz (74.5 kg)   SpO2 94%   BMI 24.97 kg/m²        General Appearance: alert,in  acute distress, ++ pallor, no icterus  poor skin hygiene   Skin: warm and dry, no rash or erythema  Head: normocephalic and atraumatic  Eyes: pupils equal, round, and reactive to light, conjunctivae normal  ENT: tympanic membrane, external ear and ear canal normal bilaterally, nose without deformity, nasal mucosa and turbinates normal without polyps  Neck: supple and non-tender without mass, no thyromegaly  no cervical lymphadenopathy  Pulmonary/Chest: Bi basal crepts+ - no wheezes, rales or rhonchi, normal air movement, no respiratory distress  Cardiovascular: normal rate, regular rhythm, normal S1 and S2, esm+ murmurs, rubs, clicks, or gallops, no carotid bruits  Abdomen: soft, non-tender, non-distended, normal bowel sounds, no masses or organomegaly  Extremities: no cyanosis, clubbing or edema  Musculoskeletal: normal range of motion, no joint swelling, deformity or tenderness  Integumentary: No rashes, no abnormal skin lesions, no petechiae  Neurologic: reflexes normal and symmetric, no cranial nerve deficit  Psych:  Orientation, sensorium, mood normal            Lines: PICC  Needle tracks++   Rt gluteal area pain jerri DRAIN_   Lower leg multiple skin lesions from IVDA++   HD line in place     Data Review:    CBC:   Lab Results   Component Value Date    WBC 8.0 09/22/2022    HGB 8.4 (L) 09/22/2022    HCT 25.0 (L) 09/22/2022    MCV 83.5 09/22/2022     09/22/2022     RENAL:   Lab Results   Component Value Date    CREATININE 3.0 (H) 09/22/2022    BUN 30 (H) 09/22/2022     09/22/2022    K 4.2 09/22/2022     09/22/2022    CO2 25 09/22/2022     SED RATE: Lab Results   Component Value Date/Time    SEDRATE 51 07/23/2019 09:59 PM     CK:   Lab Results   Component Value Date/Time    CKTOTAL 10 09/18/2022 04:55 PM     CRP: No results found for: CRP  Hepatic Function Panel:   Lab Results   Component Value Date/Time    ALKPHOS 106 09/21/2022 05:54 AM    ALT 16 09/21/2022 05:54 AM    AST 19 09/21/2022 05:54 AM    PROT 5.6 09/21/2022 07:36 PM    PROT 6.5 07/28/2012 11:20 PM    BILITOT 0.3 09/21/2022 05:54 AM    BILIDIR <0.2 09/21/2022 05:54 AM    IBILI see below 09/21/2022 05:54 AM    LABALBU 1.7 09/22/2022 06:38 AM     UA:  Lab Results   Component Value Date/Time    COLORU Yellow 09/18/2022 04:36 PM    CLARITYU CLOUDY 09/18/2022 04:36 PM    GLUCOSEU Negative 09/18/2022 04:36 PM    GLUCOSEU NEGATIVE 07/31/2010 02:59 PM    BILIRUBINUR Negative 09/18/2022 04:36 PM    BILIRUBINUR NEGATIVE 07/31/2010 02:59 PM    KETUA Negative 09/18/2022 04:36 PM    SPECGRAV 1.008 09/18/2022 04:36 PM    BLOODU LARGE 09/18/2022 04:36 PM    PHUR 5.0 09/18/2022 04:36 PM    PROTEINU 100 09/18/2022 04:36 PM    UROBILINOGEN 0.2 09/18/2022 04:36 PM    NITRU Negative 09/18/2022 04:36 PM    LEUKOCYTESUR MODERATE 09/18/2022 04:36 PM    LABMICR YES 09/18/2022 04:36 PM    URINETYPE NotGiven 09/18/2022 04:36 PM      Urine Microscopic:   Lab Results   Component Value Date/Time    BACTERIA None Seen 09/18/2022 04:36 PM    COMU see below 09/07/2022 01:55 AM    HYALCAST 6 09/18/2022 04:36 PM    WBCUA 98 09/18/2022 04:36 PM    RBCUA 14 09/18/2022 04:36 PM    EPIU 1 09/18/2022 04:36 PM     Urine Reflex to Culture:   Lab Results   Component Value Date/Time    URRFLXCULT Yes 09/12/2022 04:16 PM      Summary   Severe eccentric tricuspid regurgitation. Small, mobile tricuspid valve vegetation noted measuring 0.9cm x 0.4cm.       Signature      ------------------------------------------------------------------   Electronically signed by Mony Vásquez MD (Interpreting   physician) on 09/16/2022 at 04:29 PM ------------------------------------------------------------------    MICRO: cultures reviewed and updated by me   Blood Culture:          MRSA DNA Probe, Nasal [[id number]] Collected: 09/13/22 1220   Order Status: Completed Specimen: Nares Updated: 09/14/22 1131    MRSA SCREEN RT-PCR --    Negative  MRSA DNA not detected. Normal Range: Not detected    Narrative:     ORDER#: [id number]                          ORDERED BY: Grazyna Klein   SOURCE: Nares                              COLLECTED:  09/13/22 12:20   ANTIBIOTICS AT A[facility]BELLA.:                      RECEIVED :  09/13/22 12:46   Blood Culture 1 [[id number]] (Abnormal) Collected: 09/12/22 1616   Order Status: Completed Specimen: Blood Updated: 09/14/22 0939    Blood Culture, Routine -- Abnormal     Gram stain Aerobic bottle:   Gram positive cocci in clusters   resembling Staphylococcus   Information to follow   and   Gram negative rods   Information to follow   Gram stain Anaerobic bottle:   Gram positive cocci in clusters   resembling Staphylococcus   Information to follow    Abnormal     Organism Staph aureus MRSA DNA Detected Abnormal     Blood Culture, Routine -- Abnormal     CONTACT PRECAUTIONS INDICATED   See additional report for complete BCID panel. mecA/C gene and MREJ gene Detected. Abnormal     Organism Serratia marcescens DNA Detected Abnormal     Blood Culture, Routine See additional report for complete BCID panel.     Organism Staph aureus MRSA Abnormal     Blood Culture, Routine -- Abnormal     POSITIVE for   Sensitivity to follow   CONTACT PRECAUTIONS INDICATED   Isolated two of two sets    Abnormal     Organism Serratia marcescens Abnormal     Blood Culture, Routine --    POSITIVE for   Sensitivity to follow   Isolated one of two sets    Narrative:     ORDER#: [id number]                          ORDERED BY: Jossue Salomon   SOURCE: Blood                              COLLECTED:  09/12/22 16:16   ANTIBIOTICS AT [facility]LLA.:                      RECEIVED :  09/12/22 16:32   CALL  [facility]  MARIBEL Coronado  [phone],   Microbiology results called to and read back by Stephanie Ramsey RN,   09/13/2022 08:24, by [facility]   Microbiology results called to and read back by [facility],   09/13/2022 07:05, by [facility]   If child <=2 yrs old please draw pediatric bottle. ~Blood Culture 1   Culture, Blood 2 [[id number]] Collected: 09/14/22 0525   Order Status: Sent Specimen: Blood Updated: 09/14/22 0600   Culture, Blood 1 [[id number]] Collected: 09/14/22 0436   Order Status: Sent Specimen: Blood Updated: 09/14/22 0441   Culture, Urine [[id number]] Collected: 09/12/22 1616   Order Status: Completed Specimen: Urine, clean catch Updated: 09/13/22 2245    Urine Culture, Routine No growth at 18 to 36 hours   Narrative:     ORDER#: [id number]                          ORDERED BY: Frankey Easterly   SOURCE: Urine Clean Catch                  COLLECTED:  09/12/22 16:16   ANTIBIOTICS AT A[facility]BELLA.:                      RECEIVED :  09/12/22 19:35   Culture, Blood 2 [[id number]] (Abnormal) Collected: 09/12/22 2012   Order Status: Completed Specimen: Blood Updated: 09/13/22 1416    Culture, Blood 2 -- Abnormal     Gram stain Aerobic bottle:   Gram positive cocci in clusters   resembling Staphylococcus   Information to follow   Gram stain Anaerobic bottle:   Gram positive cocci in clusters   resembling Staphylococcus   Information to follow    Abnormal    Narrative:     ORDER#: [id number]                          ORDERED BY: Frankey Easterly   SOURCE: Blood                              COLLECTED:  09/12/22 20:12   ANTIBIOTICS AT [facility]LLA.:                      RECEIVED :  09/12/22 20:21   CALL  [facility]  [facility] tel. [phone],   Previous panic on this admission - call not needed per SOP, 09/13/2022 10:56,   by [facility]   If child <=2 yrs old please draw pediatric bottle. ~Blood Culture #2   Strep Pneumoniae Antigen [[id number]] Collected: 09/12/22 1900   Order Status: Completed Specimen: Urine, clean catch Updated: 09/13/22 1054    STREP PNEUMONIAE ANTIGEN, URINE --    Presumptive Negative   Presumptive negative suggests no current or recent   pneumococcal infection. Infection due to Strep pneumoniae   cannot be ruled out since the antigen present in the sample   may be below the detection limit of the test.   Normal Range:Presumptive Negative    Narrative:     ORDER#: [id number]                          ORDERED BY: Cristian Barajas   SOURCE: Urine Clean Catch                  COLLECTED:  09/12/22 19:00   ANTIBIOTICS AT [facility]LLA.:                      RECEIVED :  09/13/22 07:04   Legionella antigen, urine [[id number]] Collected: 09/12/22 1900   Order Status: Completed Specimen: Urine, clean catch Updated: 09/13/22 1047    L. pneumophila Serogp 1 Ur Ag --    Presumptive Negative   No Legionella pneumophila serogroup 1 antigens detected. A negative result does not exclude infection with   Legionella pneumophila serogroup 1 nor does it rule out   other microbial-caused respiratory infections or   disease caused by other serogroups of   Legionella pneumophila. Normal Range: Presumptive Negative    Narrative:     ORDER#: [id number]                          ORDERED BY: KATIE FLETCHER   SOURCE: Urine Clean Catch                  COLLECTED:  09/12/22 19:00   ANTIBIOTICS AT [facility]LLA.:                      RECEIVED :  09/13/22 07:04   Culture, Blood, PCR ID Panel [[id number]] Collected: 09/12/22 1616   Order Status: Completed Updated: 09/13/22 0707    Report SEE IMAGE   Narrative:     Janna Caldwell  CTT5P tel. [phone],   Microbiology results called to and read back by Bandar Nino,   09/13/2022 07:05, by Laura Frank   Culture, Blood 2 [[id number]] Collected: 09/12/22 0000   Order Status: Canceled Specimen: Blood    Culture, Blood 1 [[id number]] Collected: 09/12/22 0000   Order Status: Canceled Specimen: Blood      Lab Results   Component Value Date/Time    BC No Growth after 4 days of incubation.  09/14/2022 04:36 AM    BLOODCULT2 No Growth after 4 days of incubation. 09/14/2022 05:25 AM      Collected: 09/16/22 1210   Order Status: Completed Specimen:  Body Fluid from Abscess Updated: 09/19/22 0615    Gram Stain Result 4+ WBC's (Polymorphonuclear)   2+ Gram positive cocci   1+ Gram negative rods    Abnormal     Anaerobic Culture --    Anaerobic culture further report to follow   No anaerobes isolated so far, Further report to follow     Organism Serratia marcescens Abnormal     WOUND/ABSCESS Light growth    Organism Staph aureus MRSA Abnormal     WOUND/ABSCESS -- Abnormal     Moderate growth   CONTACT PRECAUTIONS INDICATED    Abnormal    Narrative:     ORDER#: [id number]                          ORDERED BY: RAMILA MARTINES   SOURCE: Abscess Rt gluteal abscess         COLLECTED:  09/16/22 12:10   ANTIBIOTICS AT [facility]BELLA.:                      RECEIVED :  09/16/22 12:32   CALL  [facility]  [facility] tel. [phone],   Previous panic on this admission - call not needed per SOP, 09/17/2022 13:04,      Susceptibility    Staph aureus mrsa (3)    Antibiotic Interpretation Microscan  Method Status    ceFAZolin Resistant >16 mcg/mL BACTERIAL SUSCEPTIBILITY PANEL BY YA     clindamycin Sensitive <=0.5 mcg/mL BACTERIAL SUSCEPTIBILITY PANEL BY YA     erythromycin Resistant >4 mcg/mL BACTERIAL SUSCEPTIBILITY PANEL BY YA     oxacillin Resistant >2 mcg/mL BACTERIAL SUSCEPTIBILITY PANEL BY YA     tetracycline Sensitive <=4 mcg/mL BACTERIAL SUSCEPTIBILITY PANEL BY YA     trimethoprim-sulfamethoxazole Sensitive 1/19 mcg/mL BACTERIAL SUSCEPTIBILITY PANEL BY YA     vancomycin Sensitive 2 mcg/mL BACTERIAL SUSCEPTIBILITY PANEL BY YA       Serratia marcescens (4)    Antibiotic Interpretation Microscan  Method Status    amoxicillin-clavulanate Resistant >16/8 mcg/mL BACTERIAL SUSCEPTIBILITY PANEL BY YA     ampicillin Resistant >16 mcg/mL BACTERIAL SUSCEPTIBILITY PANEL BY YA     ampicillin-sulbactam Resistant 16/8 mcg/mL BACTERIAL SUSCEPTIBILITY PANEL BY YA     ceFAZolin Resistant >16 mcg/mL BACTERIAL SUSCEPTIBILITY PANEL BY YA     cefepime Sensitive <=2 mcg/mL BACTERIAL SUSCEPTIBILITY PANEL BY YA     cefTRIAXone Sensitive <=1 mcg/mL BACTERIAL SUSCEPTIBILITY PANEL BY YA     cefuroxime Resistant >16 mcg/mL BACTERIAL SUSCEPTIBILITY PANEL BY YA     ciprofloxacin Sensitive <=1 mcg/mL BACTERIAL SUSCEPTIBILITY PANEL BY YA     ertapenem Sensitive <=0.5 mcg/mL BACTERIAL SUSCEPTIBILITY PANEL BY YA     gentamicin Sensitive <=4 mcg/mL BACTERIAL SUSCEPTIBILITY PANEL BY YA     meropenem Sensitive <=1 mcg/mL BACTERIAL SUSCEPTIBILITY PANEL BY YA     piperacillin-tazobactam Sensitive <=16 mcg/mL BACTERIAL SUSCEPTIBILITY PANEL BY YA     trimethoprim-sulfamethoxazole Sensitive <=2/38 mcg/mL BACTERIAL SUSCEPTIBILITY PANEL BY YA      Condensed View       Respiratory Culture:  Lab Results   Component Value Date/Time    LABGRAM  09/16/2022 12:10 PM     4+ WBC's (Polymorphonuclear)  2+ Gram positive cocci  1+ Gram negative rods       AFB:No results found for: AFBSMEAR  Viral Culture:  Lab Results   Component Value Date/Time    COVID19 Not Detected 09/07/2022 12:00 AM    COVID19 Not Detected 07/20/2022 11:20 PM     Urine Culture:   No results for input(s): LABURIN in the last 72 hours. Summary   Normal left ventricle size, wall thickness, and systolic function with an   estimated ejection fraction of 60-65%. No regional wall motion abnormalities   are seen. Normal diastolic function. Normal right ventricular size and function. Mobile vegetation on tricuspid valve suggestive of endocarditis. Moderate tricuspid regurgitation. The right atrium is mildly dilated.       Signature      ------------------------------------------------------------------   Electronically signed by Lizeth Waddell MD   (Interpreting physician) on 09/13/2022 at 04:15 PM   ------------------------------------------------------------------    IMAGING:    IR NONTUNNELED VASCULAR CATHETER > 5 YEARS   Final Result Successful ultrasound and fluoroscopy guided non-tunneled Trialysis catheter   placement. US RENAL COMPLETE   Final Result   Unremarkable ultrasound of the kidneys and urinary bladder. No renal   atrophy, hydronephrosis or abnormal echotexture. CT ABSCESS DRAINAGE W CATH PLACEMENT S&I   Final Result   Successful CT guided placement of a 10 English drainage catheter in the right   iliacus abscess. CT GUIDED NEEDLE PLACEMENT   Final Result   Successful CT guided placement of a 10 English drainage catheter in the right   iliacus abscess. MRI LUMBAR SPINE W WO CONTRAST   Final Result   Partially visualized septic arthritis/osteomyelitis of the right sacroiliac   joint, with a large adjacent retroperitoneal abscess extending into the right   pelvic sidewall. Small intramuscular abscess also present within the left psoas muscle. Epidural phlegmon/abscess of the sacral canal.      Pelvic ascites. The findings were sent to the Radiology Results [address] at 7:23   pm on 9/15/2022 to be communicated to a licensed caregiver. VL Extremity Venous Bilateral   Final Result      CT HEAD WO CONTRAST   Final Result   No acute intracranial abnormality. CT CHEST WO CONTRAST   Final Result   Diffuse in numeral bilateral pulmonary nodules many of which are cavitary   becoming coalescent at the right lung apex however diffusely throughout the   bilateral lungs consistent of septic emboli with small to moderate right   pleural effusion      Partially visualized portions of the upper abdomen reveal hepatosplenomegaly         XR CHEST PORTABLE   Final Result   Multifocal patchy airspace disease and cavitary nodules. The appearance is   suggestive of septic emboli in this clinical setting. Follow-up recommended to assure resolution.          IR TUNNELED CVC PLACE WO SQ PORT/PUMP > 5 YEARS    (Results Pending)         All the pertinent images and reports for the current Hospitalization were reviewed by me     Scheduled Meds:   sevelamer  800 mg Oral TID WC    ceftaroline fosamil (TEFLARO) IVPB  200 mg IntraVENous q8h    heparin (porcine)  5,000 Units SubCUTAneous 3 times per day    sodium bicarbonate  1,300 mg Oral TID    lidocaine 1 % injection  5 mL IntraDERmal Once    sodium chloride flush  5-40 mL IntraVENous 2 times per day    linezolid  600 mg IntraVENous Q12H       Continuous Infusions:   sodium bicarbonate infusion 50 mL/hr at 09/21/22 2049    sodium chloride      sodium chloride 25 mL (09/21/22 0553)       PRN Meds:  morphine, heparin (porcine), LORazepam, sodium chloride, chlorhexidine, sodium chloride flush, sodium chloride, hydrOXYzine pamoate, naloxone, calcium carbonate, promethazine, promethazine **OR** ondansetron, acetaminophen **OR** acetaminophen, perflutren lipid microspheres      Assessment:     Patient Active Problem List   Diagnosis    Tear of medial cartilage or meniscus of knee, current    Plica syndrome    Boxer's metacarpal fracture, neck, closed    Chondromalacia of patella    Degeneration of lumbar or lumbosacral intervertebral disc    Varicose veins of lower extremity    Intractable nausea and vomiting    Septicemia (HCC)    IVDU (intravenous drug user)    MRSA bacteremia    Sepsis due to methicillin resistant Staphylococcus aureus (MRSA) without acute organ dysfunction ([address]    Bacterial infection due to Serratia    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   WBC count is elevated high fever from ongoing bloodstream infection. Given IV drug abuse suspect endocarditis transthoracic echocardiogram with TV Vegetation    Trend WBC and repeat Blood cx NGTD    Will need placement to complete IV ABX        Rt gluteal and lower Lumbar area pain MRI L spine with abscess, septic joint     S/p IR aspiration of the abscess cx MRSA and serratia noted    Unfortunately kidney function continues to decline nephrology is now following. IV antibiotics have been adjusted    Will need a long course of antibiotic given the MRSA bacteremia and septic arthritis    HD line placed due to worsening Kidney function -completed hemodialysis for session without any issues. Unfortunately creatinine still remains elevated. May need repeat MRI L spine in future         Labs, Microbiology, Radiology and all the pertinent results from current hospitalization and  care every where were reviewed  by me as a part of the evaluation   Plan:   Cont  IV Ceftaroline - 200 mg q x 8 HRS will cover MRSA and Serratia  Cont  IV Linezolid x 600 mg q 12 HRS   Repeat Blood cx in process   NGTD  TTE Abnormal with vegetation   Will need IV abx  Watch for complications  HIV -ve and Hepatitis screen Hep C+Ve  Cardiology consult  STEFANO completed  d/w Dr. Aliza Canas is small for angiovac  would be able to treat with IV abx   MRI L spine noted very abnormal s/p ID abscess dot  Daptomycin not effective in LUNGS and Vancomycin resulted in MARYAN   Needs placement   Await kidney function recovery to decide on adjusting the long-term antibiotics         Discussed with patient/Family and Nursing d/w mother at bed side   Risk of Complications/Morbidity: High      Illness(es)/ Infection present that pose threat to bodily function. There is potential for severe exacerbation of infection/side effects of treatment. Therapy requires intensive monitoring for antimicrobial agent toxicity.      Discussed with patient/Family and Nursing staff     Thanks for allowing me to participate in your patient's care and please call me with any questions or concerns.     Gracy Townsend MD  Infectious Disease  [facility]) Physician  Phone: [phone]   Fax : [phone]

## 2022-09-22 NOTE — PROGRESS NOTES
Occupational Therapy  Facility/Department: [address] [facility]  Occupational Therapy Initial Assessment    Name: Jason Cotter  : 1980  MRN: [id number]  Date of Service: 2022    Discharge Recommendations:  Patient would benefit from continued therapy after discharge, 3-5 sessions per week  OT Equipment Recommendations  Other: [facility]   Jason Cotter scored a 15/24 on the AM-PAC ADL Inpatient form. Current research shows that an AM-PAC score of 17 or less is typically not associated with a discharge to the patient's home setting. Based on the patient's AM-PAC score and their current ADL deficits, it is recommended that the patient have 3-5 sessions per week of Occupational Therapy at d/c to increase the patient's independence. Please see assessment section for further patient specific details. If patient discharges prior to next session this note will serve as a discharge summary. Please see below for the latest assessment towards goals. Patient Diagnosis(es): The primary encounter diagnosis was Septicemia ([facility] [address] A diagnosis of IVDU (intravenous drug user) was also pertinent to this visit. Past Medical History:  has a past medical history of ADHD (attention deficit hyperactivity disorder), Arthritis, Back pain, Depression, Migraine, and Neutrophilia. Past Surgical History:  has a past surgical history that includes Partial hysterectomy; Knee arthroscopy (10/05/2010); Tubal ligation (2004); IR INSERT NON TUNNELED CV CATH <5 YEARS (Right, 2022); and IR NONTUNNELED VASCULAR CATHETER > 5 YEARS (2022). Assessment   Performance deficits / Impairments: Decreased functional mobility ; Decreased safe awareness;Decreased balance;Decreased ADL status; Decreased high-level IADLs;Decreased endurance;Decreased strength  Assessment: Discussed with OTR: Today, Pt needing Min A x1 for bed mob.  Pt required Min A x 2 for transfers and to take a few steps bed > chair with RW, Min A x1, with assist of another for lines. . Pt limited by significant pain. Anticipate pt will require up to max A for LB ADLs. Pt is functioning below baseline and benefit from skilled therapy. Prognosis: Good  History: 39 y/o female admitted 9/12/2022 with Sepsis and Pulmonary septic emboli, cavitary nodules. Pt with history of IV drug abuse. PTA pt lives at home with her parents and was independent with ADLs and functional mobility  REQUIRES OT FOLLOW-UP: Yes  Activity Tolerance  Activity Tolerance: Patient limited by fatigue;Patient limited by pain  Activity Tolerance Comments: RLE pain        Plan   Plan  Times per Week: 3-5  Current Treatment Recommendations: Strengthening, Balance training, Functional mobility training, Endurance training, Self-Care / ADL, Safety education & training, Gait training, Equipment evaluation, education, & procurement, Patient/Caregiver education & training     Restrictions  Restrictions/Precautions  Restrictions/Precautions: Fall Risk  Position Activity Restriction  Other position/activity restrictions: KRYSTLE drain for large retroperitoneal abcess    Subjective   General  Chart Reviewed: Yes, Orders, Progress Notes, Labs  Patient assessed for rehabilitation services?: Yes  Additional Pertinent Hx: Per H&P: \"40year-old female with past medical history of drug abuse, ADHD, who presents to the hospital due to feeling fatigue, pain in her legs as well as back, she has a history of IV drug abuse, per patient she has been sober for now, patient family is also on the bedside who also contributed to the patient history. According to the patient's sister patient has been feeling sick for past few days, not feeling well. Patient was noticed to have fevers as well as chills. \" MRI showed OM of R SI joint and large retroperitoneal abscess. 9/16 Underwent drainage tube placement .   Family / Caregiver Present: Yes (mother)  Referring Practitioner:  Noel  Subjective  Subjective: Pt met BS, in bed. Pt agreeable to OT. Pt c/o R LE pain (not rated). General Comment  Comments: Per RN ok to see for therapy despite low H/H. Social/Functional History  Social/Functional History  Lives With: Family (mom and dad)  Type of Home: House  Home Layout: Two level, Laundry in basement (one story and a basement)  Home Access: Stairs to enter with rails  Entrance Stairs - Number of Steps: 10-12 with rail  Entrance Stairs - Rails: Both  Bathroom Shower/Tub: Tub/Shower unit  Bathroom Toilet: Standard  Bathroom Equipment: Shower chair  Home Equipment:  (no DME)  Has the patient had two or more falls in the past year or any fall with injury in the past year?: Yes (fall about a week ago)  ADL Assistance: Independent  Homemaking Assistance: Independent  Ambulation Assistance: Independent (no AD)  Transfer Assistance: Independent  Active : No  Occupation: Unemployed       Objective       ADL  Additional Comments: Anticipate pt will be max A for LB bathing/dressing and toileting, min A for UB bathing/dressing and grooming based on balance and pain tolerance observed     Activity Tolerance  Activity Tolerance: Patient limited by endurance  Bed mobility  Supine to Sit: Minimal assistance (HOB elevated)  Sit to Supine: Unable to assess  Transfers  Sit to stand: Minimal assistance;2 Person assistance  Stand to sit: Minimal assistance;2 Person assistance  Transfer Comments: at bed and to recliner, x2 assist, cues for hand placement  Vision  Vision: Within Functional Limits  Hearing  Hearing: Within functional limits  Cognition  Overall Cognitive Status: [facility]  Cognition Comment: flat affect; poor judgment but appears cognitively [facility]  Orientation  Overall Orientation Status: Within Functional Limits          Functional Mobility Circuit Training: Min A x1 with assist of another for lines, bed to recliner with RW.   Static Sitting Balance Exercises: SBA at EOB  Education Given To: Patient  Education Provided: Role of Therapy;Plan of Care;Transfer Training  Education Method: Demonstration;Verbal  Barriers to Learning: None  Education Outcome: Verbalized understanding;Demonstrated understanding;Continued education needed      Safety Devices  Type of Devices: All fall risk precautions in place;Call light within reach; Left in chair;Chair alarm in place;Gait belt     AM-PAC Score     AM-PAC Inpatient Daily Activity Raw Score: 15 (09/22/22 1508)  AM-PAC Inpatient ADL T-Scale Score : 34.69 (09/22/22 1508)  ADL Inpatient CMS 0-100% Score: 56.46 (09/22/22 1508)  ADL Inpatient CMS G-Code Modifier : CK (09/22/22 1508)    Goals  Short Term Goals  Time Frame for Short term goals: Prior to DC.  Status: goals ongoing  Short Term Goal 1: Pt will complete ADL transfers with supervision  Short Term Goal 2: Pt will complete functional mobility with supervision  Short Term Goal 3: Pt will tolerate standing > 3 min for functional task with supervision  Short Term Goal 4: Pt will complete toileting with min A  Short Term Goal 5: Pt will complete UE exercises in all plane to inc strength endurance for ADLs  Patient Goals   Patient goals : to feel better       Therapy Time   Individual Concurrent Group Co-treatment   Time In 1446         Time Out 1511         Minutes 25               Maryse NASH/SRAVANTHI,515

## 2022-09-22 NOTE — PROGRESS NOTES
Treatment time: 3 hours     Net UF: 1.5 L     Pre weight: 82.1kg   Post weight: 80.6kg   EDW: TBD, MARYAN     Access used: left neck   Access function: well, tolerated 300 BFR     Medications or blood products given: Heparin dwells     Regular outpatient schedule: MARYAN     Summary of response to treatment: tolerated well. Pt had HD today. Will not have HD tomorrow.      Copy of dialysis treatment record placed in chart, to be scanned into EMR.     09/22/22 0803 09/22/22 0808 09/22/22 1109   Treatment   Time On 0803  --   --    Vital Signs   BP  --  (!) 147/93 (!) 168/96   Temp  --  98.4 °F (36.9 °C) 98 °F (36.7 °C)   Heart Rate  --  78 71   Resp  --  18  --    Weight  --  181 lb (82.1 kg) 177 lb 11.1 oz (80.6 kg)   Dialysis Bath   K+ (Potassium)  --  3  --    Ca+ (Calcium)  --  2.5  --    Na+ (Sodium)  --  138  --    HCO3 (Bicarb)  --  35  --

## 2022-09-22 NOTE — PROGRESS NOTES
Occupational Therapy  Pt out of room to dialysis. Will follow and attempt later as schedule permits.   Maryse NASH/SRAVANTHI,515

## 2022-09-22 NOTE — PROGRESS NOTES
Progress Note    Admit Date: 9/12/2022         Subjective and Overnight Events: seen at bedside, resting in bed, discussed in detail with patient,   patient has no CP, SOB, N/V    Objective:   Vitals: BP (!) 142/88   Pulse 92   Temp 98.4 °F (36.9 °C) (Oral)   Resp 29   Ht 5' 8\" (1.727 m)   Wt 156 lb 8.4 oz (71 kg)   SpO2 91%   BMI 23.80 kg/m²   BP (!) 142/88   Pulse 92   Temp 98.4 °F (36.9 °C) (Oral)   Resp 29   Ht 5' 8\" (1.727 m)   Wt 156 lb 8.4 oz (71 kg)   SpO2 91%   BMI 23.80 kg/m²     General appearance: NAD, on RA, lying in bed  HEENT:  Conjunctivae/corneas clear. Neck: Supple, with full range of motion. Respiratory:  Normal respiratory effort. Clear to auscultation, bilaterally without Rales/Wheezes/Rhonchi. Cardiovascular: has murmur  Abdomen: Soft, non-tender, non-distended, normal bowel sounds. Musculoskeletal: No cyanosis or edema bilaterally  Neurologic:  without any focal sensory/motor deficits. grossly non-focal.  Psychiatric: Alert and oriented, Normal mood  Peripheral Pulses: +2 palpable, equal bilaterally     Data:     Scheduled Medications:    sevelamer  800 mg Oral TID WC    ceftaroline fosamil (TEFLARO) IVPB  200 mg IntraVENous q8h    heparin (porcine)  5,000 Units SubCUTAneous 3 times per day    sodium bicarbonate  1,300 mg Oral TID    lidocaine 1 % injection  5 mL IntraDERmal Once    sodium chloride flush  5-40 mL IntraVENous 2 times per day    linezolid  600 mg IntraVENous Q12H      PRN Medications: morphine, heparin (porcine), LORazepam, sodium chloride, chlorhexidine, sodium chloride flush, sodium chloride, hydrOXYzine pamoate, naloxone, calcium carbonate, promethazine, promethazine **OR** ondansetron, acetaminophen **OR** acetaminophen, perflutren lipid microspheres  Diet: ADULT DIET;  Regular  ADULT ORAL NUTRITION SUPPLEMENT; Breakfast, Lunch, Dinner; Standard High Calorie/High Protein Oral Supplement    Continuous Infusions:   sodium bicarbonate infusion 50 mL/hr at 09/21/22 2049    sodium chloride      sodium chloride 25 mL (09/21/22 0553)         Intake/Output Summary (Last 24 hours) at 9/21/2022 2238  Last data filed at 9/21/2022 2025  Gross per 24 hour   Intake 60 ml   Output 377.5 ml   Net -317.5 ml         CBC:   Recent Labs     09/20/22  0630 09/21/22  0554   WBC 7.2 7.6   HGB 7.9* 8.1*    342       BMP:  Recent Labs     09/20/22  0630 09/21/22  0554    137   K 4.6 4.4    103   CO2 17* 19*   BUN 53* 41*   CREATININE 3.9* 3.5*   GLUCOSE 87 83       ABGs: No results found for: PHART, PO2ART, UBA0IPB    Assessment/plan     Patient Active Problem List:     Tear of medial cartilage or meniscus of knee, current     Plica syndrome     Boxer's metacarpal fracture, neck, closed     Chondromalacia of patella     Degeneration of lumbar or lumbosacral intervertebral disc     Varicose veins of lower extremity     Intractable nausea and vomiting     Septicemia (HCC)     IVDU (intravenous drug user)     MRSA bacteremia     Sepsis due to methicillin resistant Staphylococcus aureus (MRSA) without acute organ dysfunction (HCC)     Bacterial infection due to Serratia     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 According to the patient's sister patient has been feeling sick for past few days, not feeling well. Patient was noticed to have fevers as well as chills.      Assessment  Sepsis POA - endocarditis +/- cavitary PNA - MRSA/Serratia bacteremia   Pulmonary septic emboli, cavitary nodules  Endocarditis  History of IV drug abuse  ADHD  Anemia    Plan  Continue atbx as per ID   Endocarditis  + cardiology -  no angiovac recommended, recs to continue IV abx   Nephrology concerned about Vancomycin toxicity, plan for IR catheter for Dialysis   Consulted hematology, r/u hemolysis, ordered haptoglobin - elevated  Added ensure with meals as she has low po intake   Pain control    Continue IV Abx  Continue HD per nephrology  Pain control    Full Code    Timothy Hartmann MD

## 2022-09-22 NOTE — PROGRESS NOTES
Physical Therapy  Facility/Department: [facility] 9A PROGRESSIVE CARE  Physical Therapy Treatment note    Name: Gauri Carnes  : 1980  MRN: [id number]  Date of Service: 2022    Discharge Recommendations:  Patient would benefit from continued therapy after discharge (3-5x/wk)   PT Equipment Recommendations  Other: defer to next level of care    Gauri Carnes scored a 16/24 on the AM-PAC short mobility form. Current research shows that an AM-PAC score of 17 or less is typically not associated with a discharge to the patient's home setting. Based on the patient's AM-PAC score and their current functional mobility deficits, it is recommended that the patient have 3-5 sessions per week of Physical Therapy at d/c to increase the patient's independence. Please see assessment section for further patient specific details. If patient discharges prior to next session this note will serve as a discharge summary. Please see below for the latest assessment towards goals. Patient Diagnosis(es): The primary encounter diagnosis was Septicemia ([facility] [place] [address] A diagnosis of IVDU (intravenous drug user) was also pertinent to this visit. Past Medical History:  has a past medical history of ADHD (attention deficit hyperactivity disorder), Arthritis, Back pain, Depression, Migraine, and Neutrophilia. Past Surgical History:  has a past surgical history that includes Partial hysterectomy; Knee arthroscopy (10/05/2010); Tubal ligation (2004); IR INSERT NON TUNNELED CV CATH <5 YEARS (Right, 2022); and IR NONTUNNELED VASCULAR CATHETER > 5 YEARS (2022).     Assessment   Body Structures, Functions, Activity Limitations Requiring Skilled Therapeutic Intervention: Decreased functional mobility   Assessment: pt making some progress in therapy however pt still very weak and has poor endurance; pt was able to complete bed mob with min A and min A of 2 to stand with walker in front and min A with assist of another to manage lines while taking a few steps to BS chair with RW; pt is a high fall risk and not safe to return directly home at discharge; recommend continued therapy 3-5x/wk to address deficits to assist pt in regaining her max potential  Therapy Prognosis: Fair  Decision Making: High Complexity  Clinical Presentation: evolving  Barriers to Learning: pain  Requires PT Follow-Up: Yes  Activity Tolerance  Activity Tolerance: Patient limited by endurance     Plan   Plan  Plan: 3-5 times per week  Current Treatment Recommendations: Functional mobility training, Balance training, Gait training, Stair training, Transfer training, Home exercise program, Equipment evaluation, education, & procurement  Safety Devices  Type of Devices: All fall risk precautions in place, Call light within reach, Left in chair, Chair alarm in place, Gait belt  Restraints  Restraints Initially in Place: No     Restrictions  Restrictions/Precautions  Restrictions/Precautions: Fall Risk  Position Activity Restriction  Other position/activity restrictions: KRYSTLE drain for large retroperitoneal abcess     Subjective   General  Chart Reviewed: Yes  Patient assessed for rehabilitation services?: Yes  Additional Pertinent Hx: per Dr Tu Melo note: \"40year-old female with past medical history of drug abuse, ADHD, who presents to the hospital due to feeling fatigue, pain in her legs as well as back, she has a history of IV drug abuse, per patient she has been sober for now, patient family is also on the bedside who also contributed to the patient history. According to the patient's sister patient has been feeling sick for past few days, not feeling well. Patient was noticed to have fevers as well as chills. \" 9-16 s/p CT-guided drain placement for R iliacus abcess  Response To Previous Treatment: Patient with no complaints from previous session.   Family / Caregiver Present: No  Referring Practitioner: Dr Maximo Bennett  Referral Date : 09/12/22  Follows Commands: Within Functional Limits (needs extra time and cues)  Subjective  Subjective: pt agreeable to getting up to chair but declined to attempt further amb         Social/Functional History  Social/Functional History  Lives With: Family (mom and dad)  Type of Home: House  Home Layout: Two level, Laundry in basement (one story and a basement)  Home Access: Stairs to enter with rails  Entrance Stairs - Number of Steps: 10-12 with rail  Entrance Stairs - Rails: Both  Bathroom Shower/Tub: Tub/Shower unit  Bathroom Toilet: Standard  Bathroom Equipment: Shower chair  Home Equipment:  (no DME)  Has the patient had two or more falls in the past year or any fall with injury in the past year?: Yes (fall about a week ago)  ADL Assistance: Independent  Homemaking Assistance: Independent  Ambulation Assistance: Independent (no AD)  Transfer Assistance: Independent  Active : No  Occupation: Unemployed  Vision/Hearing  Vision  Vision: Within Functional Limits  Hearing  Hearing: Within functional limits    Cognition   Orientation  Overall Orientation Status: Within Functional Limits  Cognition  Overall Cognitive Status: WFL  Cognition Comment: flat affect; poor judgment but appears cognitively WFL     Objective   Heart Rate: 81  Heart Rate Source: Monitor  BP: (!) 146/91  BP Location: Left upper arm  BP Method: Automatic  Patient Position: Semi fowlers  MAP (Calculated): 109.33  Resp: 18  SpO2: 94 %  O2 Device: None (Room air)                             Bed mobility  Supine to Sit: Minimal assistance (HOB elevated)  Sit to Supine: Unable to assess  Bed Mobility Comments: pt up in chair at end of session  Transfers  Sit to Stand: Minimal Assistance;2 Person Assistance  Stand to sit: Minimal Assistance;2 Person Assistance  Ambulation  Surface: level tile  Device: Rolling Walker  Assistance: Minimal assistance (with assist of another for lines)  Quality of Gait: slow small steps, slightly unsteady  Distance: 3' to [facility] chair  Comments: pt positioned in chair for comfort with all needs in reach and LEs elevated;  blankets given to pt and encouraged her to try and stay up for a few hours; mother assisted with encouraging pt to eat and assisted with feeding     Balance  Comments: SBA for sitting EOB; CGA/min A for static standing with RW           OutComes Score                                                  AM-PAC Score  AM-PAC Inpatient Mobility Raw Score : 12 (09/19/22 1509)  AM-PAC Inpatient T-Scale Score : 35.33 (09/19/22 1509)  Mobility Inpatient CMS 0-100% Score: 68.66 (09/19/22 1509)  Mobility Inpatient CMS G-Code Modifier : CL (09/19/22 1509)          Tinneti Score       Goals  Short Term Goals  Time Frame for Short term goals: by discharge  Short term goal 1: bed mob mod A  Short term goal 2: transfers mod A  Short term goal 3: progress to gait when able  Patient Goals   Patient goals : pt did not state a goal       Education  Patient Education  Education Given To: Patient  Education Provided Comments: reviewed call light and not getting up without assist; encouraged pt to sit up for a few hours to increase her endurance  Education Method: Verbal  Education Outcome: Verbalized understanding;Continued education needed      Therapy Time   Individual Concurrent Group Co-treatment   Time In 1445         Time Out 1514         Minutes 29                 CHLOÉ ALMAZAN, PT   Electronically signed by CHLOÉ ALMAZAN PT on 9/22/2022 at 3:17 PM

## 2022-09-22 NOTE — PROGRESS NOTES
Progress Note    Admit Date: 9/12/2022         Subjective and Overnight Events:  No acute events overnight  Patient Mother is in room, discussed POC  Patient in Dialysis    Objective:   Vitals: /85   Pulse 96   Temp 98.3 °F (36.8 °C) (Oral)   Resp 18   Ht 5' 8\" (1.727 m)   Wt 177 lb 11.1 oz (80.6 kg)   SpO2 95%   BMI 27.02 kg/m²   /85   Pulse 96   Temp 98.3 °F (36.8 °C) (Oral)   Resp 18   Ht 5' 8\" (1.727 m)   Wt 177 lb 11.1 oz (80.6 kg)   SpO2 95%   BMI 27.02 kg/m²       General appearance: No apparent distress,   HEENT:  Conjunctivae/corneas clear. Neck: Supple, with full range of motion. Respiratory:  Normal respiratory effort. Clear to auscultation, bilaterally without Rales/Wheezes/Rhonchi. Cardiovascular: Regular rate and rhythm with normal S1/S2 without murmurs or rubs  Abdomen: Soft, non-tender, non-distended, normal bowel sounds. Musculoskeletal: No cyanosis or edema bilaterally  Neurologic:  without any focal sensory/motor deficits. grossly non-focal.  Psychiatric: Alert and oriented, Normal mood  Peripheral Pulses: +2 palpable, equal bilaterally       Data:     Scheduled Medications:    sevelamer  800 mg Oral TID WC    ceftaroline fosamil (TEFLARO) IVPB  200 mg IntraVENous q8h    heparin (porcine)  5,000 Units SubCUTAneous 3 times per day    sodium bicarbonate  1,300 mg Oral TID    lidocaine 1 % injection  5 mL IntraDERmal Once    sodium chloride flush  5-40 mL IntraVENous 2 times per day    linezolid  600 mg IntraVENous Q12H      PRN Medications: morphine, heparin (porcine), LORazepam, sodium chloride, chlorhexidine, sodium chloride flush, sodium chloride, hydrOXYzine pamoate, naloxone, calcium carbonate, promethazine, promethazine **OR** ondansetron, acetaminophen **OR** acetaminophen, perflutren lipid microspheres  Diet: ADULT DIET;  Regular  ADULT ORAL NUTRITION SUPPLEMENT; Breakfast, Lunch, Dinner; Standard High Calorie/High Protein Oral Supplement  Diet NPO    Continuous Infusions:   sodium bicarbonate infusion 50 mL/hr at 09/21/22 2049    sodium chloride      sodium chloride 25 mL (09/21/22 0553)         Intake/Output Summary (Last 24 hours) at 9/22/2022 1853  Last data filed at 9/22/2022 1034  Gross per 24 hour   Intake 0 ml   Output 50 ml   Net -50 ml         CBC:   Recent Labs     09/21/22  0554 09/22/22  0638   WBC 7.6 8.0   HGB 8.1* 8.4*    290       BMP:  Recent Labs     09/21/22  0554 09/22/22  0638    137   K 4.4 4.2    100   CO2 19* 25   BUN 41* 30*   CREATININE 3.5* 3.0*   GLUCOSE 83 91       ABGs: No results found for: PHART, PO2ART, [id number]    Assessment/plan     Patient Active Problem List:     Tear of medial cartilage or meniscus of knee, current     Plica syndrome     Boxer's metacarpal fracture, neck, closed     Chondromalacia of patella     Degeneration of lumbar or lumbosacral intervertebral disc     Varicose veins of lower extremity     Intractable nausea and vomiting     Septicemia (HCC)     IVDU (intravenous drug user)     MRSA bacteremia     Sepsis due to methicillin resistant Staphylococcus aureus (MRSA) without acute organ dysfunction (HCC)     Bacterial infection due to Serratia     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 According to the patient's sister patient has been feeling sick for past few days, not feeling well. Patient was noticed to have fevers as well as chills.      Assessment  Sepsis POA - endocarditis +/- cavitary PNA - MRSA/Serratia bacteremia   Pulmonary septic emboli, cavitary nodules  Endocarditis  History of IV drug abuse  ADHD  Anemia    Plan  Continue atbx as per ID   Endocarditis  + cardiology -  no angiovac recommended, recs to continue IV abx   Nephrology concerned about Vancomycin toxicity, plan for IR catheter for Dialysis   Consulted hematology, r/u hemolysis, ordered haptoglobin - elevated  Added ensure with meals as she has low po intake   Pain control    Continue IV Abx  Continue HD per nephrology  Pain control  Will need longterm Abx  Patient has Drain by IR, IR service to determine timing of drain removal    Full Code    Koki Ovalle MD

## 2022-09-22 NOTE — PROGRESS NOTES
Comprehensive Nutrition Assessment    Type and Reason for Visit:  Reassess    Nutrition Recommendations/Plan:   Regular diet   Continue Ensure TID     Malnutrition Assessment:  Malnutrition Status: At risk for malnutrition (Comment) (09/19/22 1540)    Context:  Acute Illness     Findings of the 6 clinical characteristics of malnutrition:  Energy Intake:  50% or less of estimated energy requirements for 5 or more days  Weight Loss:  Unable to assess (fluid shifts, diuretic use)     Body Fat Loss:  Unable to assess     Muscle Mass Loss:  Unable to assess    Fluid Accumulation:  No significant fluid accumulation     Strength:  Not Performed    Nutrition Assessment:    Follow-up. Continues on HD. Family at bedside, pt sleeping. Reports pt continues with decreased appetite. Family is bringing in some food. Ensure TID ordered, pt is drinking some of these. Will continue current interventions. Nutrition Related Findings:    LBM 9/21 Wound Type: None       Current Nutrition Intake & Therapies:    Average Meal Intake: 1-25%, 26-50%  Average Supplements Intake: %  ADULT DIET; Regular  ADULT ORAL NUTRITION SUPPLEMENT; Breakfast, Lunch, Dinner; Standard High Calorie/High Protein Oral Supplement    Anthropometric Measures:  Height: 5' 8\" (172.7 cm)  Ideal Body Weight (IBW): 140 lbs (64 kg)    Admission Body Weight: 141 lb (64 kg)  Current Body Weight: 164 lb (74.4 kg), 107.9 % IBW. Weight Source: Bed Scale  Current BMI (kg/m2): 24.9                          BMI Categories: Normal Weight (BMI 18.5-24. 9)    Estimated Daily Nutrient Needs:        Energy (kcal/day): 3529-3753 (25-30 x ABW 69 kg)     Protein (g/day):  (1.2-1.5 x ABW 69 kg)  Method Used for Fluid Requirements: 1 ml/kcal  Fluid (ml/day):      Nutrition Diagnosis:   Inadequate oral intake related to inadequate protein-energy intake as evidenced by intake 0-25%, intake 26-50%    Nutrition Interventions:   Food and/or Nutrient Delivery: Continue Current Diet, Continue Oral Nutrition Supplement  Nutrition Education/Counseling: No recommendation at this time  Coordination of Nutrition Care: Continue to monitor while inpatient       Goals:  Previous Goal Met: Progressing toward Goal(s) (slowly)  Goals: PO intake 50% or greater       Nutrition Monitoring and Evaluation:   Behavioral-Environmental Outcomes: None Identified  Food/Nutrient Intake Outcomes: Food and Nutrient Intake, Supplement Intake  Physical Signs/Symptoms Outcomes: Biochemical Data, Constipation, Diarrhea, Nausea or Vomiting, Fluid Status or Edema, Skin, Weight    Discharge Planning:     Too soon to determine     Jennifer Kelly, [address] [place] [address],   Contact: [phone]

## 2022-09-23 ENCOUNTER — APPOINTMENT (OUTPATIENT)
Dept: INTERVENTIONAL RADIOLOGY/VASCULAR | Age: 42
DRG: 720 | End: 2022-09-23
Payer: MEDICAID

## 2022-09-23 LAB
ALBUMIN SERPL-MCNC: 1.8 G/DL (ref 3.1–4.9)
ALBUMIN SERPL-MCNC: 2.1 G/DL (ref 3.4–5)
ALPHA-1-GLOBULIN: 0.3 G/DL (ref 0.2–0.4)
ALPHA-2-GLOBULIN: 0.9 G/DL (ref 0.4–1.1)
ANION GAP SERPL CALCULATED.3IONS-SCNC: 9 MMOL/L (ref 3–16)
BASOPHILS ABSOLUTE: 0 K/UL (ref 0–0.2)
BASOPHILS RELATIVE PERCENT: 0.6 %
BETA GLOBULIN: 0.8 G/DL (ref 0.9–1.6)
BUN BLDV-MCNC: 20 MG/DL (ref 7–20)
CALCIUM SERPL-MCNC: 7.3 MG/DL (ref 8.3–10.6)
CHLORIDE BLD-SCNC: 96 MMOL/L (ref 99–110)
CO2: 29 MMOL/L (ref 21–32)
CREAT SERPL-MCNC: 2.7 MG/DL (ref 0.6–1.1)
CULTURE TRANSFUSION REACTION: NORMAL
EOSINOPHILS ABSOLUTE: 0.1 K/UL (ref 0–0.6)
EOSINOPHILS RELATIVE PERCENT: 1.4 %
GAMMA GLOBULIN: 1.8 G/DL (ref 0.6–1.8)
GFR AFRICAN AMERICAN: 23
GFR NON-AFRICAN AMERICAN: 19
GLUCOSE BLD-MCNC: 90 MG/DL (ref 70–99)
HCT VFR BLD CALC: 23.1 % (ref 36–48)
HEMOGLOBIN: 7.8 G/DL (ref 12–16)
HEPATITIS B CORE TOTAL ANTIBODY: NEGATIVE
KAPPA, FREE LIGHT CHAINS, SERUM: 156.05 MG/L (ref 3.3–19.4)
KAPPA/LAMBDA RATIO: 0.76 (ref 0.26–1.65)
KAPPA/LAMBDA TEST COMMENT: ABNORMAL
LAMBDA, FREE LIGHT CHAINS, SERUM: 204.41 MG/L (ref 5.71–26.3)
LYMPHOCYTES ABSOLUTE: 1 K/UL (ref 1–5.1)
LYMPHOCYTES RELATIVE PERCENT: 13.6 %
MCH RBC QN AUTO: 28.3 PG (ref 26–34)
MCHC RBC AUTO-ENTMCNC: 33.6 G/DL (ref 31–36)
MCV RBC AUTO: 84.1 FL (ref 80–100)
MONOCYTES ABSOLUTE: 0.5 K/UL (ref 0–1.3)
MONOCYTES RELATIVE PERCENT: 6.1 %
NEUTROPHILS ABSOLUTE: 6 K/UL (ref 1.7–7.7)
NEUTROPHILS RELATIVE PERCENT: 78.3 %
PDW BLD-RTO: 16.1 % (ref 12.4–15.4)
PHOSPHORUS: 4.3 MG/DL (ref 2.5–4.9)
PLATELET # BLD: 233 K/UL (ref 135–450)
PMV BLD AUTO: 7.1 FL (ref 5–10.5)
POTASSIUM SERPL-SCNC: 3.7 MMOL/L (ref 3.5–5.1)
RBC # BLD: 2.75 M/UL (ref 4–5.2)
SODIUM BLD-SCNC: 134 MMOL/L (ref 136–145)
SPE/IFE INTERPRETATION: NORMAL
TOTAL PROTEIN: 5.6 G/DL (ref 6.4–8.2)
WBC # BLD: 7.6 K/UL (ref 4–11)

## 2022-09-23 PROCEDURE — 84166 PROTEIN E-PHORESIS/URINE/CSF: CPT

## 2022-09-23 PROCEDURE — 99233 SBSQ HOSP IP/OBS HIGH 50: CPT | Performed by: INTERNAL MEDICINE

## 2022-09-23 PROCEDURE — 85025 COMPLETE CBC W/AUTO DIFF WBC: CPT

## 2022-09-23 PROCEDURE — 94760 N-INVAS EAR/PLS OXIMETRY 1: CPT

## 2022-09-23 PROCEDURE — 2709999900 IR REPLACE TUNNELED CVC W SQ PORT SAME ACCESS

## 2022-09-23 PROCEDURE — 77001 FLUOROGUIDE FOR VEIN DEVICE: CPT

## 2022-09-23 PROCEDURE — 2500000003 HC RX 250 WO HCPCS

## 2022-09-23 PROCEDURE — 80069 RENAL FUNCTION PANEL: CPT

## 2022-09-23 PROCEDURE — 6360000002 HC RX W HCPCS: Performed by: RADIOLOGY

## 2022-09-23 PROCEDURE — 6360000002 HC RX W HCPCS: Performed by: INTERNAL MEDICINE

## 2022-09-23 PROCEDURE — 84156 ASSAY OF PROTEIN URINE: CPT

## 2022-09-23 PROCEDURE — 99152 MOD SED SAME PHYS/QHP 5/>YRS: CPT

## 2022-09-23 PROCEDURE — 6370000000 HC RX 637 (ALT 250 FOR IP): Performed by: INTERNAL MEDICINE

## 2022-09-23 PROCEDURE — 2580000003 HC RX 258: Performed by: INTERNAL MEDICINE

## 2022-09-23 PROCEDURE — 36558 INSERT TUNNELED CV CATH: CPT

## 2022-09-23 PROCEDURE — 2060000000 HC ICU INTERMEDIATE R&B

## 2022-09-23 RX ORDER — FENTANYL CITRATE 50 UG/ML
INJECTION, SOLUTION INTRAMUSCULAR; INTRAVENOUS DAILY PRN
Status: COMPLETED | OUTPATIENT
Start: 2022-09-23 | End: 2022-09-23

## 2022-09-23 RX ORDER — MIDAZOLAM HYDROCHLORIDE 1 MG/ML
INJECTION INTRAMUSCULAR; INTRAVENOUS DAILY PRN
Status: COMPLETED | OUTPATIENT
Start: 2022-09-23 | End: 2022-09-23

## 2022-09-23 RX ADMIN — CEFTAROLINE FOSAMIL 200 MG: 600 POWDER, FOR SOLUTION INTRAVENOUS at 05:25

## 2022-09-23 RX ADMIN — SODIUM BICARBONATE 1300 MG: 650 TABLET ORAL at 10:06

## 2022-09-23 RX ADMIN — MORPHINE SULFATE 1 MG: 2 INJECTION, SOLUTION INTRAMUSCULAR; INTRAVENOUS at 05:21

## 2022-09-23 RX ADMIN — SODIUM CHLORIDE 25 ML: 9 INJECTION, SOLUTION INTRAVENOUS at 05:24

## 2022-09-23 RX ADMIN — SODIUM CHLORIDE, PRESERVATIVE FREE 10 ML: 5 INJECTION INTRAVENOUS at 20:15

## 2022-09-23 RX ADMIN — LORAZEPAM 1 MG: 1 TABLET ORAL at 04:41

## 2022-09-23 RX ADMIN — MIDAZOLAM 1 MG: 1 INJECTION INTRAMUSCULAR; INTRAVENOUS at 08:41

## 2022-09-23 RX ADMIN — SODIUM CHLORIDE, PRESERVATIVE FREE 10 ML: 5 INJECTION INTRAVENOUS at 10:15

## 2022-09-23 RX ADMIN — MORPHINE SULFATE 1 MG: 2 INJECTION, SOLUTION INTRAMUSCULAR; INTRAVENOUS at 11:30

## 2022-09-23 RX ADMIN — SEVELAMER CARBONATE 800 MG: 800 TABLET, FILM COATED ORAL at 17:55

## 2022-09-23 RX ADMIN — NEPHROCAP 1 MG: 1 CAP ORAL at 11:30

## 2022-09-23 RX ADMIN — MORPHINE SULFATE 1 MG: 2 INJECTION, SOLUTION INTRAMUSCULAR; INTRAVENOUS at 16:03

## 2022-09-23 RX ADMIN — FENTANYL CITRATE 50 MCG: 50 INJECTION INTRAMUSCULAR; INTRAVENOUS at 08:42

## 2022-09-23 RX ADMIN — CEFTAROLINE FOSAMIL 200 MG: 600 POWDER, FOR SOLUTION INTRAVENOUS at 22:14

## 2022-09-23 RX ADMIN — SEVELAMER CARBONATE 800 MG: 800 TABLET, FILM COATED ORAL at 10:06

## 2022-09-23 RX ADMIN — LINEZOLID 600 MG: 600 INJECTION, SOLUTION INTRAVENOUS at 22:03

## 2022-09-23 RX ADMIN — CEFTAROLINE FOSAMIL 200 MG: 600 POWDER, FOR SOLUTION INTRAVENOUS at 15:07

## 2022-09-23 RX ADMIN — ONDANSETRON 4 MG: 2 INJECTION INTRAMUSCULAR; INTRAVENOUS at 01:02

## 2022-09-23 RX ADMIN — MORPHINE SULFATE 1 MG: 2 INJECTION, SOLUTION INTRAMUSCULAR; INTRAVENOUS at 20:14

## 2022-09-23 RX ADMIN — FENTANYL CITRATE 50 MCG: 50 INJECTION INTRAMUSCULAR; INTRAVENOUS at 08:48

## 2022-09-23 RX ADMIN — ANTACID TABLETS 500 MG: 500 TABLET, CHEWABLE ORAL at 22:08

## 2022-09-23 RX ADMIN — LINEZOLID 600 MG: 600 INJECTION, SOLUTION INTRAVENOUS at 10:15

## 2022-09-23 RX ADMIN — MIDAZOLAM 1 MG: 1 INJECTION INTRAMUSCULAR; INTRAVENOUS at 08:48

## 2022-09-23 RX ADMIN — ACETAMINOPHEN 650 MG: 325 TABLET ORAL at 04:28

## 2022-09-23 RX ADMIN — HEPARIN SODIUM 5000 UNITS: 5000 INJECTION INTRAVENOUS; SUBCUTANEOUS at 15:05

## 2022-09-23 RX ADMIN — HEPARIN SODIUM 5000 UNITS: 5000 INJECTION INTRAVENOUS; SUBCUTANEOUS at 22:00

## 2022-09-23 RX ADMIN — SEVELAMER CARBONATE 800 MG: 800 TABLET, FILM COATED ORAL at 15:05

## 2022-09-23 RX ADMIN — SODIUM CHLORIDE 25 ML: 9 INJECTION, SOLUTION INTRAVENOUS at 10:14

## 2022-09-23 RX ADMIN — LORAZEPAM 1 MG: 1 TABLET ORAL at 22:00

## 2022-09-23 RX ADMIN — ANTACID TABLETS 500 MG: 500 TABLET, CHEWABLE ORAL at 02:29

## 2022-09-23 RX ADMIN — MORPHINE SULFATE 1 MG: 2 INJECTION, SOLUTION INTRAMUSCULAR; INTRAVENOUS at 00:54

## 2022-09-23 RX ADMIN — SODIUM CHLORIDE 25 ML: 9 INJECTION, SOLUTION INTRAVENOUS at 22:12

## 2022-09-23 ASSESSMENT — PAIN DESCRIPTION - DESCRIPTORS
DESCRIPTORS: ACHING;DISCOMFORT
DESCRIPTORS: ACHING
DESCRIPTORS: ACHING;DISCOMFORT

## 2022-09-23 ASSESSMENT — PAIN SCALES - GENERAL
PAINLEVEL_OUTOF10: 7
PAINLEVEL_OUTOF10: 8

## 2022-09-23 ASSESSMENT — PAIN DESCRIPTION - ORIENTATION
ORIENTATION: LOWER
ORIENTATION: RIGHT
ORIENTATION: LOWER
ORIENTATION: LOWER

## 2022-09-23 ASSESSMENT — PAIN DESCRIPTION - LOCATION
LOCATION: BACK

## 2022-09-23 ASSESSMENT — PAIN - FUNCTIONAL ASSESSMENT
PAIN_FUNCTIONAL_ASSESSMENT: PREVENTS OR INTERFERES SOME ACTIVE ACTIVITIES AND ADLS

## 2022-09-23 ASSESSMENT — PAIN SCALES - WONG BAKER
WONGBAKER_NUMERICALRESPONSE: 2

## 2022-09-23 NOTE — PROGRESS NOTES
Department of Internal Medicine  Nephrology Progress Note        39 y/o WF with h/o depression, IVDA and back pain admitted with feeling weak, fatigued and pain in her back She was seen in ER on 9/7/22 with back apin and diagnosed with UTI Received toradol and was discharged on Motrin and Cefdinir Urine CX grew klebsiella pan senstive However she continued to feel poorly with back pain, and fatigue   Had fever to 101 on admission Started on Vancomycin and Cefepime Cr was 1.5 mg on admission improved to 1.1 mg but has increased to 1.8 mg now [facility] from admission growing MRSA and Serratia CT chest with cavitation and septic emboli  She reports decreased UOP no dysuria hematuria  Has been taking Ibuprofen 800 mg TID prior to admission  MRI showed OM of R SI joint and large retroperitoneal abscess   Underwent drainage tube placement . Events noted , labs reviewed . S/p TDC placement  . REVIEW OF SYSTEMS:  No CP/SOB or GEIGER . feels weak. tired  . C/o back pain   ROS limited due to pt factor  . No Family at bed side     Physical Exam:    VITALS:  BP (!) 132/94   Pulse 86   Temp 98.4 °F (36.9 °C) (Oral)   Resp 23 Comment: LOC = alert; c/o back pain  Ht 5' 8\" (1.727 m)   Wt 164 lb 0.4 oz (74.4 kg)   SpO2 100%   BMI 24.94 kg/m²   24HR INTAKE/OUTPUT:    Intake/Output Summary (Last 24 hours) at 9/23/2022 1209  Last data filed at 9/23/2022 4834  Gross per 24 hour   Intake --   Output 225 ml   Net -225 ml         Constitutional:  Doing well  Respiratory:  Decrease BS at  bases   Gastrointestinal:  + tenderness. Normal Bowel Sounds  Cardiovascular:  S1, S2 RRR   Edema:  +  edema  Acc Rt TDC .     DATA:    CBC:  Lab Results   Component Value Date/Time    WBC 7.6 09/23/2022 05:26 AM    RBC 2.75 09/23/2022 05:26 AM    HGB 7.8 09/23/2022 05:26 AM    HCT 23.1 09/23/2022 05:26 AM    MCV 84.1 09/23/2022 05:26 AM    MCH 28.3 09/23/2022 05:26 AM    MCHC 33.6 09/23/2022 05:26 AM    RDW 16.1 09/23/2022 05:26 AM     09/23/2022 05:26 AM    MPV 7.1 09/23/2022 05:26 AM     CMP:  Lab Results   Component Value Date/Time     09/23/2022 05:26 AM    K 3.7 09/23/2022 05:26 AM    K 4.3 09/19/2022 05:51 AM    CL 96 09/23/2022 05:26 AM    CO2 29 09/23/2022 05:26 AM    BUN 20 09/23/2022 05:26 AM    CREATININE 2.7 09/23/2022 05:26 AM    GFRAA 23 09/23/2022 05:26 AM    GFRAA >60 07/28/2012 11:20 PM    AGRATIO 0.4 09/12/2022 03:44 PM    LABGLOM 19 09/23/2022 05:26 AM    GLUCOSE 90 09/23/2022 05:26 AM    PROT 5.6 09/21/2022 07:36 PM    PROT 6.5 07/28/2012 11:20 PM    CALCIUM 7.3 09/23/2022 05:26 AM    BILITOT 0.3 09/21/2022 05:54 AM    ALKPHOS 106 09/21/2022 05:54 AM    AST 19 09/21/2022 05:54 AM    ALT 16 09/21/2022 05:54 AM      Hepatic Function Panel:   Lab Results   Component Value Date/Time    ALKPHOS 106 09/21/2022 05:54 AM    ALT 16 09/21/2022 05:54 AM    AST 19 09/21/2022 05:54 AM    PROT 5.6 09/21/2022 07:36 PM    PROT 6.5 07/28/2012 11:20 PM    BILITOT 0.3 09/21/2022 05:54 AM    BILIDIR <0.2 09/21/2022 05:54 AM    IBILI see below 09/21/2022 05:54 AM      Phosphorus:   Lab Results   Component Value Date/Time    PHOS 4.3 09/23/2022 05:26 AM       ASSESSMENT:  Principal Problem:    Intractable nausea and vomiting  Active Problems:    Septicemia (HCC)    IVDU (intravenous drug user)    MRSA bacteremia    Sepsis due to methicillin resistant Staphylococcus aureus (MRSA) without acute organ dysfunction ([facility] [address]    Bacterial infection due to Serratia    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 *      PLAN  Assessment/  1-MARYAN suspect Vancomycin toxicity /ATN/ infectious GN . 1st HD 9/20    HD as ordered  , DW adjusted . S./p [facility] [place], operated by [facility]  9/23 . Will need Kidney Bx next wk .    2-MRSA and serratia bacteremia with retroperitoneal abscess  septic pulmonary emboli and possible TV

## 2022-09-23 NOTE — PROGRESS NOTES
Infectious Disease Follow up Notes  Admit Date: 9/12/2022  Hospital Day: 12    Antibiotics :   IV Linezolid  IV Ceftaroline      CHIEF COMPLAINT:     Sepsis  MRSA bacteremia  Serratia Bacteremia  Endocarditis  IVDA  TV Vegetation   Rt gluteal abscess     Subjective interval History :  40 y. o.woman with a history of IV drug abuse, ADHD, back pain, depression admitted to the hospital secondary to fever chills fatigue back pain. Patient family noted she was unable to get up from the bed for the past 3 days secondary to fevers and not feeling well. Admission labs indicate creatinine 1.5 procalcitonin elevated to 6.6, WBC elevated 15.5 hemoglobin 9.4 bilirubin 1.3 AST 42, blood cultures from admission positive for MRSA as well as Serratia. T-max 103.8 on admission. CT chest with diffuse innumerable bilateral pulmonary nodules consistent with cavitation and septic emboli. Given the presentation concern is for endocarditis secondary to IV drug abuse. Patient not much interactive during normalization some of the history is provided by her daughters,       Interval History :on going Rt leg pain and KRYSTLE drain in place HD line replaced to tunneled line and , complains of ongoing right lower back pain tolerating antibiotic therapy okay awaiting renal recovery creatinine still remains elevated making some urine.     Past Medical History:    Past Medical History:   Diagnosis Date    ADHD (attention deficit hyperactivity disorder)     Arthritis     Back pain     Depression     Migraine     Neutrophilia 9/15/2022       Past Surgical History:    Past Surgical History:   Procedure Laterality Date    IR INSERT NON TUNNELED CV CATH LESS THAN 5 YEARS Right 09/20/2022    Franny Kearns; MAKENZIE access; 15cm; Dr. Jenny Bee    IR NONTUNNELED VASCULAR CATHETER  09/20/2022    IR NONTUNNELED VASCULAR CATHETER 9/20/2022 [facility] SPECIAL PROCEDURES    KNEE ARTHROSCOPY 10/05/2010    PARTIAL HYSTERECTOMY (CERVIX NOT REMOVED)      TUBAL LIGATION  01/01/2004    TUNNELED VENOUS CATHETER PLACEMENT Right 09/23/2022    Permacath; RIJ access; 19cm; Dr. Claudio Hopkins       Current Medications:    No outpatient medications have been marked as taking for the 9/12/22 encounter [facility] Encounter).        Allergies:  Ultram [tramadol hcl], Robaxin [methocarbamol], and Penicillins    Immunizations :   Immunization History   Administered Date(s) Administered    Tdap (Boostrix, Adacel) 02/11/2015       Social History:    Social History     Tobacco Use    Smoking status: Every Day     Packs/day: 0.50     Years: 2.00     Pack years: 1.00     Types: Cigarettes    Smokeless tobacco: Never   Substance Use Topics    Alcohol use: Yes     Comment: occas    Drug use: No     Social History     Tobacco Use   Smoking Status Every Day    Packs/day: 0.50    Years: 2.00    Pack years: 1.00    Types: Cigarettes   Smokeless Tobacco Never      Family History   Problem Relation Age of Onset    Breast Cancer Maternal Grandmother 45    Arthritis Other     Asthma Other     Cancer Other     Diabetes Other     Seizures Other     Stroke Other           REVIEW OF SYSTEMS:      Constitutional:  fevers,  chills +=, night sweats  Eyes:  negative for blurred vision, eye discharge, visual disturbance   HEENT:  negative for hearing loss, ear drainage,nasal congestion  Respiratory:  negative for cough, shortness of breath or hemoptysis   Cardiovascular:  negative for chest pain, palpitations, syncope  Gastrointestinal:  negative for nausea, vomiting, diarrhea, constipation, abdominal pain  Genitourinary:  negative for frequency, dysuria, urinary incontinence, hematuria  Hematologic/Lymphatic:  negative for easy bruising, bleeding and lymphadenopathy  Allergic/Immunologic:  negative for recurrent infections, angioedema, anaphylaxis   Endocrine:  negative for weight changes, polyuria, polydipsia and polyphagia  Musculoskeletal:  Rt hip and lower back pain ++   pain, swelling, decreased range of motion  Integumentary: No rashes, skin lesions  Neurological:  negative for headaches, slurred speech, unilateral weakness  Psychiatric: negative for hallucinations,confusion,agitation.                 PHYSICAL EXAM:      Vitals:    BP (!) 132/94   Pulse 86   Temp 98.4 °F (36.9 °C) (Oral)   Resp 23 Comment: LOC = alert; c/o back pain  Ht 5' 8\" (1.727 m)   Wt 164 lb 0.4 oz (74.4 kg)   SpO2 100%   BMI 24.94 kg/m²        General Appearance: alert,in  acute distress, ++ pallor, no icterus  poor skin hygiene   Skin: warm and dry, no rash or erythema  Head: normocephalic and atraumatic  Eyes: pupils equal, round, and reactive to light, conjunctivae normal  ENT: tympanic membrane, external ear and ear canal normal bilaterally, nose without deformity, nasal mucosa and turbinates normal without polyps  Neck: supple and non-tender without mass, no thyromegaly  no cervical lymphadenopathy  Pulmonary/Chest: Bi basal crepts+ - no wheezes, rales or rhonchi, normal air movement, no respiratory distress  Cardiovascular: normal rate, regular rhythm, normal S1 and S2, esm+ murmurs, rubs, clicks, or gallops, no carotid bruits  Abdomen: soft, non-tender, non-distended, normal bowel sounds, no masses or organomegaly  Extremities: no cyanosis, clubbing or edema  Musculoskeletal: normal range of motion, no joint swelling, deformity or tenderness  Integumentary: No rashes, no abnormal skin lesions, no petechiae  Neurologic: reflexes normal and symmetric, no cranial nerve deficit  Psych:  Orientation, sensorium, mood normal            Lines: PICC  Needle tracks++   Rt gluteal area pain jerri DRAIN_   Lower leg multiple skin lesions from IVDA++   HD line in place     Data Review:    CBC:   Lab Results   Component Value Date    WBC 7.6 09/23/2022    HGB 7.8 (L) 09/23/2022    HCT 23.1 (L) 09/23/2022    MCV 84.1 09/23/2022     09/23/2022     RENAL:   Lab Results   Component Value Date    CREATININE 2.7 (H) 09/23/2022    BUN 20 09/23/2022     (L) 09/23/2022    K 3.7 09/23/2022    CL 96 (L) 09/23/2022    CO2 29 09/23/2022     SED RATE:   Lab Results   Component Value Date/Time    SEDRATE 51 07/23/2019 09:59 PM     CK:   Lab Results   Component Value Date/Time    CKTOTAL 10 09/18/2022 04:55 PM     CRP: No results found for: CRP  Hepatic Function Panel:   Lab Results   Component Value Date/Time    ALKPHOS 106 09/21/2022 05:54 AM    ALT 16 09/21/2022 05:54 AM    AST 19 09/21/2022 05:54 AM    PROT 5.6 09/21/2022 07:36 PM    PROT 6.5 07/28/2012 11:20 PM    BILITOT 0.3 09/21/2022 05:54 AM    BILIDIR <0.2 09/21/2022 05:54 AM    IBILI see below 09/21/2022 05:54 AM    LABALBU 2.1 09/23/2022 05:26 AM     UA:  Lab Results   Component Value Date/Time    COLORU Yellow 09/18/2022 04:36 PM    CLARITYU CLOUDY 09/18/2022 04:36 PM    GLUCOSEU Negative 09/18/2022 04:36 PM    GLUCOSEU NEGATIVE 07/31/2010 02:59 PM    BILIRUBINUR Negative 09/18/2022 04:36 PM    BILIRUBINUR NEGATIVE 07/31/2010 02:59 PM    KETUA Negative 09/18/2022 04:36 PM    SPECGRAV 1.008 09/18/2022 04:36 PM    BLOODU LARGE 09/18/2022 04:36 PM    PHUR 5.0 09/18/2022 04:36 PM    PROTEINU 100 09/18/2022 04:36 PM    UROBILINOGEN 0.2 09/18/2022 04:36 PM    NITRU Negative 09/18/2022 04:36 PM    LEUKOCYTESUR MODERATE 09/18/2022 04:36 PM    LABMICR YES 09/18/2022 04:36 PM    URINETYPE NotGiven 09/18/2022 04:36 PM      Urine Microscopic:   Lab Results   Component Value Date/Time    BACTERIA None Seen 09/18/2022 04:36 PM    COMU see below 09/07/2022 01:55 AM    HYALCAST 6 09/18/2022 04:36 PM    WBCUA 98 09/18/2022 04:36 PM    RBCUA 14 09/18/2022 04:36 PM    EPIU 1 09/18/2022 04:36 PM     Urine Reflex to Culture:   Lab Results   Component Value Date/Time    URRFLXCULT Yes 09/12/2022 04:16 PM      Summary   Severe eccentric tricuspid regurgitation. Small, mobile tricuspid valve vegetation noted measuring 0.9cm x 0.4cm.       Signature ------------------------------------------------------------------   Electronically signed by Mony Vásquez MD (Interpreting   physician) on 09/16/2022 at 04:29 PM   ------------------------------------------------------------------    MICRO: cultures reviewed and updated by me   Blood Culture:          MRSA DNA Probe, Nasal [[id number]] Collected: 09/13/22 1220   Order Status: Completed Specimen: Nares Updated: 09/14/22 1131    MRSA SCREEN RT-PCR --    Negative  MRSA DNA not detected. Normal Range: Not detected    Narrative:     ORDER#: [id number]                          ORDERED BY: Dennise De Santiago   SOURCE: Nares                              COLLECTED:  09/13/22 12:20   ANTIBIOTICS AT ANABELLA.:                      RECEIVED :  09/13/22 12:46   Blood Culture 1 [[id number]] (Abnormal) Collected: 09/12/22 1616   Order Status: Completed Specimen: Blood Updated: 09/14/22 0939    Blood Culture, Routine -- Abnormal     Gram stain Aerobic bottle:   Gram positive cocci in clusters   resembling Staphylococcus   Information to follow   and   Gram negative rods   Information to follow   Gram stain Anaerobic bottle:   Gram positive cocci in clusters   resembling Staphylococcus   Information to follow    Abnormal     Organism Staph aureus MRSA DNA Detected Abnormal     Blood Culture, Routine -- Abnormal     CONTACT PRECAUTIONS INDICATED   See additional report for complete BCID panel. mecA/C gene and MREJ gene Detected. Abnormal     Organism Serratia marcescens DNA Detected Abnormal     Blood Culture, Routine See additional report for complete BCID panel.     Organism Staph aureus MRSA Abnormal     Blood Culture, Routine -- Abnormal     POSITIVE for   Sensitivity to follow   CONTACT PRECAUTIONS INDICATED   Isolated two of two sets    Abnormal     Organism Serratia marcescens Abnormal     Blood Culture, Routine --    POSITIVE for   Sensitivity to follow   Isolated one of two sets    Narrative:     ORDER#: [id number] ORDERED BY: Segundo Soto   SOURCE: Blood                              COLLECTED:  09/12/22 16:16   ANTIBIOTICS AT ANABELLA.:                      RECEIVED :  09/12/22 16:32   CALL  [place]  [facility] tel. [phone],   Microbiology results called to and read back by Ramona Polk RN,   09/13/2022 08:24, by [facility]   Microbiology results called to and read back by Darius Bianchi Pharm,   09/13/2022 07:05, by [facility]   If child <=2 yrs old please draw pediatric bottle. ~Blood Culture 1   Culture, Blood 2 [[id number]] Collected: 09/14/22 0525   Order Status: Sent Specimen: Blood Updated: 09/14/22 0600   Culture, Blood 1 [[id number]] Collected: 09/14/22 0436   Order Status: Sent Specimen: Blood Updated: 09/14/22 0441   Culture, Urine [[id number]] Collected: 09/12/22 1616   Order Status: Completed Specimen: Urine, clean catch Updated: 09/13/22 2245    Urine Culture, Routine No growth at 18 to 36 hours   Narrative:     ORDER#: [id number]                          ORDERED BY: Segundo Soto   SOURCE: Urine Clean Catch                  COLLECTED:  09/12/22 16:16   ANTIBIOTICS AT ANABELLA.:                      RECEIVED :  09/12/22 19:35   Culture, Blood 2 [[id number]] (Abnormal) Collected: 09/12/22 2012   Order Status: Completed Specimen: Blood Updated: 09/13/22 1416    Culture, Blood 2 -- Abnormal     Gram stain Aerobic bottle:   Gram positive cocci in clusters   resembling Staphylococcus   Information to follow   Gram stain Anaerobic bottle:   Gram positive cocci in clusters   resembling Staphylococcus   Information to follow    Abnormal    Narrative:     ORDER#: [id number]                          ORDERED BY: Segundo Soto   SOURCE: Blood                              COLLECTED:  09/12/22 20:12   ANTIBIOTICS AT ANABELLA.:                      RECEIVED :  09/12/22 20:21   CALL  [place]  [facility] tel. [phone],   Previous panic on this admission - call not needed per SOP, 09/13/2022 10:56,   by [facility]   If child <=2 yrs old please draw pediatric bottle. ~Blood Culture #2   Strep Pneumoniae Antigen [[id number]] Collected: 09/12/22 1900   Order Status: Completed Specimen: Urine, clean catch Updated: 09/13/22 1054    STREP PNEUMONIAE ANTIGEN, URINE --    Presumptive Negative   Presumptive negative suggests no current or recent   pneumococcal infection. Infection due to Strep pneumoniae   cannot be ruled out since the antigen present in the sample   may be below the detection limit of the test.   Normal Range:Presumptive Negative    Narrative:     ORDER#: [id number]                          ORDERED BY: Bernarde People   SOURCE: Urine Clean Catch                  COLLECTED:  09/12/22 19:00   ANTIBIOTICS AT ANABELLA.:                      RECEIVED :  09/13/22 07:04   Legionella antigen, urine [[id number]] Collected: 09/12/22 1900   Order Status: Completed Specimen: Urine, clean catch Updated: 09/13/22 1047    L. pneumophila Serogp 1 Ur Ag --    Presumptive Negative   No Legionella pneumophila serogroup 1 antigens detected. A negative result does not exclude infection with   Legionella pneumophila serogroup 1 nor does it rule out   other microbial-caused respiratory infections or   disease caused by other serogroups of   Legionella pneumophila.    Normal Range: Presumptive Negative    Narrative:     ORDER#: [id number]                          ORDERED BY: Keerthi People   SOURCE: Urine Clean Catch                  COLLECTED:  09/12/22 19:00   ANTIBIOTICS AT ANABELLA.:                      RECEIVED :  09/13/22 07:04   Culture, Blood, PCR ID Panel [[id number]] Collected: 09/12/22 1616   Order Status: Completed Updated: 09/13/22 0707    Report SEE IMAGE   Narrative:     Alecia Farnsworth  [id number] tel. [phone],   Microbiology results called to and read back by Myrna Webber   09/13/2022 07:05, by Jcarlos Herr   Culture, Blood 2 [[id number]] Collected: 09/12/22 0000   Order Status: Canceled Specimen: Blood    Culture, Blood 1 [[id number]] Collected: 09/12/22 0000   Order Status: Canceled Specimen: Blood      Lab Results   Component Value Date/Time    BC No Growth after 4 days of incubation. 09/14/2022 04:36 AM    BLOODCULT2 No Growth after 4 days of incubation. 09/14/2022 05:25 AM      Collected: 09/16/22 1210   Order Status: Completed Specimen:  Body Fluid from Abscess Updated: 09/19/22 0615    Gram Stain Result 4+ WBC's (Polymorphonuclear)   2+ Gram positive cocci   1+ Gram negative rods    Abnormal     Anaerobic Culture --    Anaerobic culture further report to follow   No anaerobes isolated so far, Further report to follow     Organism Serratia marcescens Abnormal     WOUND/ABSCESS Light growth    Organism Staph aureus MRSA Abnormal     WOUND/ABSCESS -- Abnormal     Moderate growth   CONTACT PRECAUTIONS INDICATED    Abnormal    Narrative:     ORDER#: [id number]                          ORDERED BY: RAMILA MARTINES   SOURCE: Abscess Rt gluteal abscess         COLLECTED:  09/16/22 12:10   ANTIBIOTICS AT ANABELLA.:                      RECEIVED :  09/16/22 12:32   CALL  [place]  [facility] tel. [phone],   Previous panic on this admission - call not needed per SOP, 09/17/2022 13:04,      Susceptibility    Staph aureus mrsa (3)    Antibiotic Interpretation Microscan  Method Status    ceFAZolin Resistant >16 mcg/mL BACTERIAL SUSCEPTIBILITY PANEL BY YA     clindamycin Sensitive <=0.5 mcg/mL BACTERIAL SUSCEPTIBILITY PANEL BY YA     erythromycin Resistant >4 mcg/mL BACTERIAL SUSCEPTIBILITY PANEL BY YA     oxacillin Resistant >2 mcg/mL BACTERIAL SUSCEPTIBILITY PANEL BY YA     tetracycline Sensitive <=4 mcg/mL BACTERIAL SUSCEPTIBILITY PANEL BY YA     trimethoprim-sulfamethoxazole Sensitive 1/19 mcg/mL BACTERIAL SUSCEPTIBILITY PANEL BY YA     vancomycin Sensitive 2 mcg/mL BACTERIAL SUSCEPTIBILITY PANEL BY YA       Serratia marcescens (4)    Antibiotic Interpretation Microscan  Method Status    amoxicillin-clavulanate Resistant >16/8 mcg/mL BACTERIAL SUSCEPTIBILITY PANEL BY YA     ampicillin Resistant >16 mcg/mL BACTERIAL SUSCEPTIBILITY PANEL BY YA     ampicillin-sulbactam Resistant 16/8 mcg/mL BACTERIAL SUSCEPTIBILITY PANEL BY YA     ceFAZolin Resistant >16 mcg/mL BACTERIAL SUSCEPTIBILITY PANEL BY YA     cefepime Sensitive <=2 mcg/mL BACTERIAL SUSCEPTIBILITY PANEL BY YA     cefTRIAXone Sensitive <=1 mcg/mL BACTERIAL SUSCEPTIBILITY PANEL BY YA     cefuroxime Resistant >16 mcg/mL BACTERIAL SUSCEPTIBILITY PANEL BY YA     ciprofloxacin Sensitive <=1 mcg/mL BACTERIAL SUSCEPTIBILITY PANEL BY YA     ertapenem Sensitive <=0.5 mcg/mL BACTERIAL SUSCEPTIBILITY PANEL BY YA     gentamicin Sensitive <=4 mcg/mL BACTERIAL SUSCEPTIBILITY PANEL BY YA     meropenem Sensitive <=1 mcg/mL BACTERIAL SUSCEPTIBILITY PANEL BY YA     piperacillin-tazobactam Sensitive <=16 mcg/mL BACTERIAL SUSCEPTIBILITY PANEL BY YA     trimethoprim-sulfamethoxazole Sensitive <=2/38 mcg/mL BACTERIAL SUSCEPTIBILITY PANEL BY YA      Condensed View       Respiratory Culture:  Lab Results   Component Value Date/Time    LABGRAM  09/16/2022 12:10 PM     4+ WBC's (Polymorphonuclear)  2+ Gram positive cocci  1+ Gram negative rods       AFB:No results found for: AFBSMEAR  Viral Culture:  Lab Results   Component Value Date/Time    COVID19 Not Detected 09/07/2022 12:00 AM    COVID19 Not Detected 07/20/2022 11:20 PM     Urine Culture:   No results for input(s): LABURIN in the last 72 hours. Summary   Normal left ventricle size, wall thickness, and systolic function with an   estimated ejection fraction of 60-65%. No regional wall motion abnormalities   are seen. Normal diastolic function. Normal right ventricular size and function. Mobile vegetation on tricuspid valve suggestive of endocarditis. Moderate tricuspid regurgitation. The right atrium is mildly dilated.       Signature      ------------------------------------------------------------------   Electronically signed by Hallie Sifuentes MD   (Interpreting physician) on 09/13/2022 at 04:15 PM   ------------------------------------------------------------------    IMAGING:    IR REPLACE TUNNELED CVC W SQ PORT SAME ACCESS   Final Result   Successful conversion of a non tunneled hemodialysis catheter for a new 19   centimeter tunneled hemodialysis catheter. IR NONTUNNELED VASCULAR CATHETER > 5 YEARS   Final Result   Successful ultrasound and fluoroscopy guided non-tunneled Trialysis catheter   placement. US RENAL COMPLETE   Final Result   Unremarkable ultrasound of the kidneys and urinary bladder. No renal   atrophy, hydronephrosis or abnormal echotexture. CT ABSCESS DRAINAGE W CATH PLACEMENT S&I   Final Result   Successful CT guided placement of a 10 Wolof drainage catheter in the right   iliacus abscess. CT GUIDED NEEDLE PLACEMENT   Final Result   Successful CT guided placement of a 10 Wolof drainage catheter in the right   iliacus abscess. MRI LUMBAR SPINE W WO CONTRAST   Final Result   Partially visualized septic arthritis/osteomyelitis of the right sacroiliac   joint, with a large adjacent retroperitoneal abscess extending into the right   pelvic sidewall. Small intramuscular abscess also present within the left psoas muscle. Epidural phlegmon/abscess of the sacral canal.      Pelvic ascites. The findings were sent to the Radiology Results [address] at 7:23   pm on 9/15/2022 to be communicated to a licensed caregiver. VL Extremity Venous Bilateral   Final Result      CT HEAD WO CONTRAST   Final Result   No acute intracranial abnormality.          CT CHEST WO CONTRAST   Final Result   Diffuse in numeral bilateral pulmonary nodules many of which are cavitary   becoming coalescent at the right lung apex however diffusely throughout the   bilateral lungs consistent of septic emboli with small to moderate right   pleural effusion      Partially visualized portions of the upper abdomen reveal hepatosplenomegaly XR CHEST PORTABLE   Final Result   Multifocal patchy airspace disease and cavitary nodules. The appearance is   suggestive of septic emboli in this clinical setting. Follow-up recommended to assure resolution.                All the pertinent images and reports for the current Hospitalization were reviewed by me     Scheduled Meds:   b complex-C-folic acid  1 capsule Oral Daily    sevelamer  800 mg Oral TID WC    ceftaroline fosamil (TEFLARO) IVPB  200 mg IntraVENous q8h    heparin (porcine)  5,000 Units SubCUTAneous 3 times per day    lidocaine 1 % injection  5 mL IntraDERmal Once    sodium chloride flush  5-40 mL IntraVENous 2 times per day    linezolid  600 mg IntraVENous Q12H       Continuous Infusions:   sodium chloride      sodium chloride 25 mL (09/23/22 1014)       PRN Meds:  morphine, heparin (porcine), LORazepam, sodium chloride, chlorhexidine, sodium chloride flush, sodium chloride, hydrOXYzine pamoate, naloxone, calcium carbonate, promethazine, promethazine **OR** ondansetron, acetaminophen **OR** acetaminophen, perflutren lipid microspheres      Assessment:     Patient Active Problem List   Diagnosis    Tear of medial cartilage or meniscus of knee, current    Plica syndrome    Boxer's metacarpal fracture, neck, closed    Chondromalacia of patella    Degeneration of lumbar or lumbosacral intervertebral disc    Varicose veins of lower extremity    Intractable nausea and vomiting    Septicemia (HCC)    IVDU (intravenous drug user)    MRSA bacteremia    Sepsis due to methicillin resistant Staphylococcus aureus (MRSA) without acute organ dysfunction ([facility] [address]    Bacterial infection due to Serratia    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 WBC count is elevated high fever from ongoing bloodstream infection. Given IV drug abuse suspect endocarditis transthoracic echocardiogram with TV Vegetation    Trend WBC and repeat Blood cx NGTD    Will need placement to complete IV ABX        Rt gluteal and lower Lumbar area pain MRI L spine with abscess, septic joint     S/p IR aspiration of the abscess cx MRSA and serratia noted    Unfortunately kidney function continues to decline nephrology is now following. IV antibiotics have been adjusted    Will need a long course of antibiotic given the MRSA bacteremia and septic arthritis    HD line placed due to worsening Kidney function -completed hemodialysis for session without any issues. Unfortunately creatinine still remains elevated.     May need repeat MRI L spine in future         Labs, Microbiology, Radiology and all the pertinent results from current hospitalization and  care every where were reviewed  by me as a part of the evaluation   Plan:   Cont  IV Ceftaroline - 200 mg q x 8 HRS will cover MRSA and Serratia adjusted to GFR  Change to oral  Linezolid x 600 mg q 12 HRS   Repeat Blood cx    NGTD  TTE Abnormal with vegetation   Will need IV abx  Watch for complications  HIV -ve and Hepatitis screen Hep C+Ve  Cardiology consult  STEFANO completed  d/w Dr. Charlena Osler is small for angiovac  would be able to treat with IV abx   MRI L spine noted very abnormal s/p ID abscess dot  Daptomycin not effective in LUNGS and Vancomycin resulted in MARYAN -   Needs placement   Await kidney function recovery to decide on adjusting the long-term antibiotics  May need repeat MRI once clinically better          Discussed with patient/Family and Nursing   Risk of Complications/Morbidity: High Illness(es)/ Infection present that pose threat to bodily function. There is potential for severe exacerbation of infection/side effects of treatment. Therapy requires intensive monitoring for antimicrobial agent toxicity. Discussed with patient/Family and Nursing staff     Thanks for allowing me to participate in your patient's care and please call me with any questions or concerns.     Roderick Joshua MD  Infectious Disease  [facility] ([facility]) Physician  Phone: [phone]   Fax : [phone]

## 2022-09-23 NOTE — BRIEF OP NOTE
Brief Postoperative Note    Kit Angulo  YOB: 1980  [id number]    Pre-operative Diagnosis: MARYAN     Post-operative Diagnosis: Same    Procedure: Conversion of a Non-tunneled Hemodialysis Catheter to a Tunneled Hemodialysis Catheter    Anesthesia: Moderate Sedation    Surgeons: Karly Farias MD    Estimated Blood Loss: Less than 5 mL    Complications: None    Specimens: Was Not Obtained    Findings: Successful conversion of a right IJ non-tunneled hemodialysis catheter to a tunneled hemodialysis catheter.     Electronically signed by Karly Farias MD on 9/23/2022 at 9:00 AM

## 2022-09-23 NOTE — PROGRESS NOTES
Hematology Oncology Daily Progress Note    Admit Date: 9/12/2022  Hospital day several    Subjective:     Patient has complaints of ongoing back pain--denies sob/cp. Medication side effects: none    Scheduled Meds:   sevelamer  800 mg Oral TID WC    ceftaroline fosamil (TEFLARO) IVPB  200 mg IntraVENous q8h    heparin (porcine)  5,000 Units SubCUTAneous 3 times per day    sodium bicarbonate  1,300 mg Oral TID    lidocaine 1 % injection  5 mL IntraDERmal Once    sodium chloride flush  5-40 mL IntraVENous 2 times per day    linezolid  600 mg IntraVENous Q12H     Continuous Infusions:   sodium bicarbonate infusion 50 mL/hr at 09/22/22 1942    sodium chloride      sodium chloride 25 mL (09/23/22 0524)     PRN Meds:morphine, heparin (porcine), LORazepam, sodium chloride, chlorhexidine, sodium chloride flush, sodium chloride, hydrOXYzine pamoate, naloxone, calcium carbonate, promethazine, promethazine **OR** ondansetron, acetaminophen **OR** acetaminophen, perflutren lipid microspheres    Review of Systems  Pertinent items are noted in HPI. REVIEW OF SYSTEMS:         Constitutional: Denies fever, sweats, weight loss     Eyes: No visual changes or diplopia. No scleral icterus. ENT: No Headaches, hearing loss or vertigo. No mouth sores or sore throat. Cardiovascular: No chest pain, dyspnea on exertion, palpitations or loss of consciousness. Respiratory: No cough or wheezing, no sputum production. No hemoptysis. .    Gastrointestinal: No abdominal pain, appetite loss, blood in stools. No change in bowel habits. Genitourinary: No dysuria, trouble voiding, or hematuria. Musculoskeletal:  Generalized weakness. No joint complaints. Integumentary: No rash or pruritis. Neurological: No headache, diplopia. No change in gait, balance, or coordination. No paresthesias. Endocrine: No temperature intolerance. No excessive thirst, fluid intake, or urination.    Hematologic/Lymphatic: No abnormal bruising or ecchymoses, blood clots or swollen lymph nodes. Allergic/Immunologic: No nasal congestion or hives. Objective:     Patient Vitals for the past 8 hrs:   BP Temp Temp src Pulse Resp SpO2 Weight   09/23/22 0909 -- -- -- -- -- 100 % --   09/23/22 0854 (!) 147/101 -- -- 74 23 100 % --   09/23/22 0849 (!) 151/100 -- -- 79 30 100 % --   09/23/22 0844 (!) 157/102 -- -- 80 15 100 % --   09/23/22 0840 (!) 162/99 -- -- -- -- -- --   09/23/22 0839 -- -- -- 80 25 98 % --   09/23/22 0823 -- -- -- -- -- 95 % --   09/23/22 0551 -- -- -- -- 23 -- --   09/23/22 0521 -- -- -- -- 26 -- --   09/23/22 0343 130/82 98.6 °F (37 °C) Oral -- -- 92 % 164 lb 0.4 oz (74.4 kg)   09/23/22 0124 -- -- -- -- 25 -- --     I/O last 3 completed shifts: In: 0   Out: 285 [Urine:250; Drains:35]  No intake/output data recorded.     BP (!) 147/101 Comment: LOC = alert; c/o back pain  Pulse 74 Comment: LOC = alert; c/o back pain  Temp 98.6 °F (37 °C) (Oral)   Resp 23 Comment: LOC = alert; c/o back pain  Ht 5' 8\" (1.727 m)   Wt 164 lb 0.4 oz (74.4 kg)   SpO2 100%   BMI 24.94 kg/m²     General Appearance:    Alert, cooperative, no distress, appears stated age   Head:    Normocephalic, without obvious abnormality, atraumatic   Eyes:    PERRL, conjunctiva/corneas clear, EOM's intact, fundi     benign, both eyes        Ears:    Normal TM's and external ear canals, both ears   Nose:   Nares normal, septum midline, mucosa normal, no drainage    or sinus tenderness   Throat:   Lips, mucosa, and tongue normal; teeth and gums normal   Neck:   Supple, symmetrical, trachea midline, no adenopathy;        thyroid:  No enlargement/tenderness/nodules; no carotid    bruit or JVD   Back:     Symmetric, no curvature, ROM normal, no CVA tenderness   Lungs:     Clear to auscultation bilaterally, respirations unlabored   Chest wall:    No tenderness or deformity   Heart:    Regular rate and rhythm, S1 and S2 normal, no murmur, rub   or gallop   Abdomen:     Soft, non-tender, bowel sounds active all four quadrants,     no masses, no organomegaly           Extremities:   Extremities normal, atraumatic, no cyanosis or edema   Pulses:   2+ and symmetric all extremities   Skin:   Skin color, texture, turgor normal, no rashes or lesions   Lymph nodes:   Cervical, supraclavicular, and axillary nodes normal   Neurologic:   CNII-XII intact. 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 *      Plan:     1. Anemia. Her anemia is likely multifactorial.  Her iron studies are consistent with acute inflammation with a very high ferritin. This is likely due to her endocarditis and spinal abscess. These infections are likely causing bone marrow suppression. She is also on linezolid and has been on multiple antibiotics that can cause bone marrow suppression. She also has acute kidney injury. Hopefully nephrology is ordering erythropoietin with her hemodialysis. Transfuse as needed.         Electronically signed by Laneta Gosselin, MD on 9/23/2022 at 9:11 AM

## 2022-09-23 NOTE — PROGRESS NOTES
Progress Note    Admit Date: 9/12/2022         Subjective and Overnight Events:  No acute events overnight  Patient Mother is in room, discussed with pt and mother at bedside  Has no complaints today    Objective:   Vitals: BP (!) 132/94   Pulse 86   Temp 98.4 °F (36.9 °C) (Oral)   Resp 23 Comment: LOC = alert; c/o back pain  Ht 5' 8\" (1.727 m)   Wt 164 lb 0.4 oz (74.4 kg)   SpO2 100%   BMI 24.94 kg/m²   BP (!) 132/94   Pulse 86   Temp 98.4 °F (36.9 °C) (Oral)   Resp 23 Comment: LOC = alert; c/o back pain  Ht 5' 8\" (1.727 m)   Wt 164 lb 0.4 oz (74.4 kg)   SpO2 100%   BMI 24.94 kg/m²       General appearance: NAD, on RA  HEENT:  Conjunctivae/corneas clear. Neck: Supple, with full range of motion. Respiratory:  Normal respiratory effort. Clear to auscultation, bilaterally without Rales/Wheezes/Rhonchi. Cardiovascular: Regular rate and rhythm with normal S1/S2 without murmurs or rubs  Abdomen: Soft, non-tender, non-distended, normal bowel sounds. Musculoskeletal: No cyanosis or edema bilaterally  Neurologic:  without any focal sensory/motor deficits.  grossly non-focal.  Psychiatric: Alert and oriented, Normal mood  Peripheral Pulses: +2 palpable, equal bilaterally       Data:     Scheduled Medications:    b complex-C-folic acid  1 capsule Oral Daily    sevelamer  800 mg Oral TID WC    ceftaroline fosamil (TEFLARO) IVPB  200 mg IntraVENous q8h    heparin (porcine)  5,000 Units SubCUTAneous 3 times per day    lidocaine 1 % injection  5 mL IntraDERmal Once    sodium chloride flush  5-40 mL IntraVENous 2 times per day    linezolid  600 mg IntraVENous Q12H      PRN Medications: morphine, heparin (porcine), LORazepam, sodium chloride, chlorhexidine, sodium chloride flush, sodium chloride, hydrOXYzine pamoate, naloxone, calcium carbonate, promethazine, promethazine **OR** ondansetron, acetaminophen **OR** acetaminophen, perflutren lipid microspheres  Diet: ADULT DIET; Regular    Continuous Infusions:   sodium chloride      sodium chloride 25 mL (09/23/22 1014)         Intake/Output Summary (Last 24 hours) at 9/23/2022 1826  Last data filed at 9/23/2022 2934  Gross per 24 hour   Intake --   Output 215 ml   Net -215 ml         CBC:   Recent Labs     09/22/22  0638 09/23/22  0526   WBC 8.0 7.6   HGB 8.4* 7.8*    233       BMP:  Recent Labs     09/22/22  0638 09/23/22  0526    134*   K 4.2 3.7    96*   CO2 25 29   BUN 30* 20   CREATININE 3.0* 2.7*   GLUCOSE 91 90       ABGs: No results found for: PHART, PO2ART, RPS4KHE    Assessment/plan     Patient Active Problem List:     Tear of medial cartilage or meniscus of knee, current     Plica syndrome     Boxer's metacarpal fracture, neck, closed     Chondromalacia of patella     Degeneration of lumbar or lumbosacral intervertebral disc     Varicose veins of lower extremity     Intractable nausea and vomiting     Septicemia (HCC)     IVDU (intravenous drug user)     MRSA bacteremia     Sepsis due to methicillin resistant Staphylococcus aureus (MRSA) without acute organ dysfunction (HCC)     Bacterial infection due to Serratia     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 According to the patient's sister patient has been feeling sick for past few days, not feeling well. Patient was noticed to have fevers as well as chills.      Assessment  Sepsis POA - endocarditis +/- cavitary PNA - MRSA/Serratia bacteremia   Pulmonary septic emboli, cavitary nodules  Endocarditis  History of IV drug abuse  ADHD  Anemia    Plan  Continue atbx as per ID   Endocarditis  + cardiology -  no angiovac recommended, recs to continue IV abx   Nephrology concerned about Vancomycin toxicity, plan for IR catheter for Dialysis   Consulted hematology, r/u hemolysis, ordered haptoglobin - elevated  Added ensure with meals as she has low po intake   Pain control  Continue IV Abx  Continue HD per nephrology  Pain control  Will need longterm Abx  Patient has Drain by IR, IR service to determine timing of drain removal    Full Code    Continue IV abx, dc 1-2 days , pending Abx plan and HD setup at discharge    Lizett Almanza MD

## 2022-09-23 NOTE — CARE COORDINATION
Discharge Planning:   Met with patient at bedside. Discussed discharge plan. Patient requested SNF with onsite dialysis. Patient reports no preference and is agreeable to referrals placed with [facility], [address], [address], [address], and [facility], [place]. The Plan for Transition of Care is related to the following treatment goals: skilled nursing facility    The Patient was provided with a choice of provider and agrees   with the discharge plan. [x] Yes [] No    Freedom of choice list was provided with basic dialogue that supports the patient's individualized plan of care/goals, treatment preferences and shares the quality data associated with the providers. [x] Yes [] No    Denied: Jame Ware 0086: Reviewing patient.  Representative Geovanna Landon #[phone]    JAROD Carballo, STEVEN, Social Work/Case Management   [phone]  Electronically signed by JAROD Carballo, STEVEN on 9/23/2022 at 2:26 PM

## 2022-09-23 NOTE — PRE SEDATION
Sedation Pre-Procedure Note    Patient Name: Kalee Real   YOB: 1980  Room/Bed: G1P-2802/0687-54  Medical Record Number: [id number]  Date: 9/23/2022   Time: 8:59 AM       Indication:  Conversion of non-tunneled HD catheter to tunneled HD catheter. Consent: I have discussed with the patient and/or the patient representative the indication, alternatives, and the possible risks and/or complications of the planned procedure and the anesthesia methods. The patient and/or patient representative appear to understand and agree to proceed. Vital Signs:   Vitals:    09/23/22 0854   BP: (!) 147/101   Pulse: 74   Resp: 23   Temp:    SpO2: 100%       Past Medical History:   has a past medical history of ADHD (attention deficit hyperactivity disorder), Arthritis, Back pain, Depression, Migraine, and Neutrophilia. Past Surgical History:   has a past surgical history that includes Partial hysterectomy; Knee arthroscopy (10/05/2010); Tubal ligation (01/01/2004); IR INSERT NON TUNNELED CV CATH <5 YEARS (Right, 09/20/2022); IR NONTUNNELED VASCULAR CATHETER > 5 YEARS (09/20/2022); and Tunneled venous catheter placement (Right, 09/23/2022).     Medications:   Scheduled Meds:    sevelamer  800 mg Oral TID WC    ceftaroline fosamil (TEFLARO) IVPB  200 mg IntraVENous q8h    heparin (porcine)  5,000 Units SubCUTAneous 3 times per day    sodium bicarbonate  1,300 mg Oral TID    lidocaine 1 % injection  5 mL IntraDERmal Once    sodium chloride flush  5-40 mL IntraVENous 2 times per day    linezolid  600 mg IntraVENous Q12H     Continuous Infusions:    sodium bicarbonate infusion 50 mL/hr at 09/22/22 1942    sodium chloride      sodium chloride 25 mL (09/23/22 0524)     PRN Meds: morphine, heparin (porcine), LORazepam, sodium chloride, chlorhexidine, sodium chloride flush, sodium chloride, hydrOXYzine pamoate, naloxone, calcium carbonate, promethazine, promethazine **OR** ondansetron, acetaminophen **OR** acetaminophen, perflutren lipid microspheres  Home Meds:   Prior to Admission medications    Medication Sig Start Date End Date Taking? Authorizing Provider   ibuprofen (ADVIL;MOTRIN) 800 MG tablet Take 1 tablet by mouth every 8 hours as needed for Pain 9/7/22   Analy Nichole DO     Coumadin Use Last 7 Days:  no  Antiplatelet drug therapy use last 7 days: no  Other anticoagulant use last 7 days: no  Additional Medication Information:  n/a      Pre-Sedation Documentation and Exam:   I have reviewed the patient's history and review of systems.     Mallampati Airway Assessment:  Mallampati Class II - (soft palate, fauces & uvula are visible)    Prior History of Anesthesia Complications:   none    ASA Classification:  Class 3    Sedation/ Anesthesia Plan:   intravenous sedation    Medications Planned:   midazolam (Versed) intravenously and fentanyl intravenously    Patient is an appropriate candidate for plan of sedation: yes    Electronically signed by Briseida Barrett MD on 9/23/2022 at 8:59 AM

## 2022-09-24 LAB
ALBUMIN SERPL-MCNC: 2.2 G/DL (ref 3.4–5)
ANION GAP SERPL CALCULATED.3IONS-SCNC: 11 MMOL/L (ref 3–16)
BUN BLDV-MCNC: 25 MG/DL (ref 7–20)
CALCIUM SERPL-MCNC: 7.8 MG/DL (ref 8.3–10.6)
CHLORIDE BLD-SCNC: 94 MMOL/L (ref 99–110)
CO2: 28 MMOL/L (ref 21–32)
CREAT SERPL-MCNC: 3.3 MG/DL (ref 0.6–1.1)
GFR AFRICAN AMERICAN: 19
GFR NON-AFRICAN AMERICAN: 15
GLUCOSE BLD-MCNC: 86 MG/DL (ref 70–99)
HCT VFR BLD CALC: 23.3 % (ref 36–48)
HEMOGLOBIN: 7.8 G/DL (ref 12–16)
MCH RBC QN AUTO: 28.2 PG (ref 26–34)
MCHC RBC AUTO-ENTMCNC: 33.8 G/DL (ref 31–36)
MCV RBC AUTO: 83.4 FL (ref 80–100)
PDW BLD-RTO: 15.7 % (ref 12.4–15.4)
PHOSPHORUS: 5.5 MG/DL (ref 2.5–4.9)
PLATELET # BLD: 195 K/UL (ref 135–450)
PMV BLD AUTO: 7.1 FL (ref 5–10.5)
POTASSIUM SERPL-SCNC: 3.9 MMOL/L (ref 3.5–5.1)
PROTEIN PROTEIN: 0.8 G/DL
PROTEIN PROTEIN: 800 MG/DL
RBC # BLD: 2.79 M/UL (ref 4–5.2)
SODIUM BLD-SCNC: 133 MMOL/L (ref 136–145)
WBC # BLD: 7.7 K/UL (ref 4–11)

## 2022-09-24 PROCEDURE — 6370000000 HC RX 637 (ALT 250 FOR IP): Performed by: NURSE PRACTITIONER

## 2022-09-24 PROCEDURE — 6370000000 HC RX 637 (ALT 250 FOR IP): Performed by: INTERNAL MEDICINE

## 2022-09-24 PROCEDURE — 2580000003 HC RX 258: Performed by: INTERNAL MEDICINE

## 2022-09-24 PROCEDURE — 6360000002 HC RX W HCPCS: Performed by: INTERNAL MEDICINE

## 2022-09-24 PROCEDURE — 2060000000 HC ICU INTERMEDIATE R&B

## 2022-09-24 PROCEDURE — 99233 SBSQ HOSP IP/OBS HIGH 50: CPT | Performed by: INTERNAL MEDICINE

## 2022-09-24 PROCEDURE — 94760 N-INVAS EAR/PLS OXIMETRY 1: CPT

## 2022-09-24 PROCEDURE — 85027 COMPLETE CBC AUTOMATED: CPT

## 2022-09-24 PROCEDURE — 90935 HEMODIALYSIS ONE EVALUATION: CPT

## 2022-09-24 PROCEDURE — 80069 RENAL FUNCTION PANEL: CPT

## 2022-09-24 RX ORDER — MORPHINE SULFATE 2 MG/ML
1.5 INJECTION, SOLUTION INTRAMUSCULAR; INTRAVENOUS EVERY 4 HOURS PRN
Status: DISCONTINUED | OUTPATIENT
Start: 2022-09-24 | End: 2022-09-29

## 2022-09-24 RX ORDER — LINEZOLID 600 MG/1
600 TABLET, FILM COATED ORAL 2 TIMES DAILY
Status: DISCONTINUED | OUTPATIENT
Start: 2022-09-24 | End: 2022-09-28

## 2022-09-24 RX ADMIN — SEVELAMER CARBONATE 800 MG: 800 TABLET, FILM COATED ORAL at 12:09

## 2022-09-24 RX ADMIN — NEPHROCAP 1 MG: 1 CAP ORAL at 12:09

## 2022-09-24 RX ADMIN — MORPHINE SULFATE 1 MG: 2 INJECTION, SOLUTION INTRAMUSCULAR; INTRAVENOUS at 12:04

## 2022-09-24 RX ADMIN — MORPHINE SULFATE 1.5 MG: 2 INJECTION, SOLUTION INTRAMUSCULAR; INTRAVENOUS at 21:53

## 2022-09-24 RX ADMIN — CEFTAROLINE FOSAMIL 200 MG: 600 POWDER, FOR SOLUTION INTRAVENOUS at 06:35

## 2022-09-24 RX ADMIN — HEPARIN SODIUM 5000 UNITS: 5000 INJECTION INTRAVENOUS; SUBCUTANEOUS at 21:54

## 2022-09-24 RX ADMIN — CEFTAROLINE FOSAMIL 200 MG: 600 POWDER, FOR SOLUTION INTRAVENOUS at 15:41

## 2022-09-24 RX ADMIN — MORPHINE SULFATE 1.5 MG: 2 INJECTION, SOLUTION INTRAMUSCULAR; INTRAVENOUS at 16:51

## 2022-09-24 RX ADMIN — HEPARIN SODIUM 5000 UNITS: 5000 INJECTION INTRAVENOUS; SUBCUTANEOUS at 06:36

## 2022-09-24 RX ADMIN — HYDROXYZINE PAMOATE 25 MG: 25 CAPSULE ORAL at 05:02

## 2022-09-24 RX ADMIN — ACETAMINOPHEN 650 MG: 325 TABLET ORAL at 15:38

## 2022-09-24 RX ADMIN — LORAZEPAM 1 MG: 1 TABLET ORAL at 12:09

## 2022-09-24 RX ADMIN — SEVELAMER CARBONATE 800 MG: 800 TABLET, FILM COATED ORAL at 16:51

## 2022-09-24 RX ADMIN — LINEZOLID 600 MG: 600 TABLET, FILM COATED ORAL at 15:37

## 2022-09-24 RX ADMIN — PROMETHAZINE HYDROCHLORIDE 12.5 MG: 25 TABLET ORAL at 05:01

## 2022-09-24 RX ADMIN — LINEZOLID 600 MG: 600 TABLET, FILM COATED ORAL at 21:57

## 2022-09-24 RX ADMIN — MORPHINE SULFATE 1 MG: 2 INJECTION, SOLUTION INTRAMUSCULAR; INTRAVENOUS at 03:02

## 2022-09-24 RX ADMIN — SODIUM CHLORIDE, PRESERVATIVE FREE 10 ML: 5 INJECTION INTRAVENOUS at 12:04

## 2022-09-24 RX ADMIN — ANTACID TABLETS 500 MG: 500 TABLET, CHEWABLE ORAL at 12:09

## 2022-09-24 RX ADMIN — ACETAMINOPHEN 650 MG: 325 TABLET ORAL at 06:41

## 2022-09-24 RX ADMIN — HEPARIN SODIUM 5000 UNITS: 5000 INJECTION INTRAVENOUS; SUBCUTANEOUS at 15:39

## 2022-09-24 RX ADMIN — ONDANSETRON 4 MG: 2 INJECTION INTRAMUSCULAR; INTRAVENOUS at 12:07

## 2022-09-24 RX ADMIN — CEFTAROLINE FOSAMIL 200 MG: 600 POWDER, FOR SOLUTION INTRAVENOUS at 21:58

## 2022-09-24 RX ADMIN — HYDROXYZINE PAMOATE 25 MG: 25 CAPSULE ORAL at 15:37

## 2022-09-24 ASSESSMENT — PAIN SCALES - GENERAL
PAINLEVEL_OUTOF10: 6
PAINLEVEL_OUTOF10: 7
PAINLEVEL_OUTOF10: 3
PAINLEVEL_OUTOF10: 7
PAINLEVEL_OUTOF10: 7
PAINLEVEL_OUTOF10: 8

## 2022-09-24 ASSESSMENT — PAIN DESCRIPTION - ORIENTATION
ORIENTATION: RIGHT
ORIENTATION: LOWER
ORIENTATION: LOWER
ORIENTATION: RIGHT

## 2022-09-24 ASSESSMENT — PAIN DESCRIPTION - LOCATION
LOCATION: HEAD
LOCATION: BACK
LOCATION: FLANK
LOCATION: BACK
LOCATION: LEG;HIP

## 2022-09-24 ASSESSMENT — PAIN DESCRIPTION - PAIN TYPE: TYPE: ACUTE PAIN;CHRONIC PAIN

## 2022-09-24 ASSESSMENT — PAIN DESCRIPTION - FREQUENCY: FREQUENCY: CONTINUOUS

## 2022-09-24 ASSESSMENT — PAIN DESCRIPTION - DESCRIPTORS
DESCRIPTORS: ACHING

## 2022-09-24 ASSESSMENT — PAIN DESCRIPTION - ONSET: ONSET: ON-GOING

## 2022-09-24 NOTE — PROGRESS NOTES
Infectious Disease Follow up Notes  Admit Date: 9/12/2022  Hospital Day: 13    Antibiotics :   IV Linezolid  IV Ceftaroline      CHIEF COMPLAINT:     Sepsis  MRSA bacteremia  Serratia Bacteremia  Endocarditis  IVDA  TV Vegetation   Rt gluteal abscess     Subjective interval History :  40 y. o.woman with a history of IV drug abuse, ADHD, back pain, depression admitted to the hospital secondary to fever chills fatigue back pain. Patient family noted she was unable to get up from the bed for the past 3 days secondary to fevers and not feeling well. Admission labs indicate creatinine 1.5 procalcitonin elevated to 6.6, WBC elevated 15.5 hemoglobin 9.4 bilirubin 1.3 AST 42, blood cultures from admission positive for MRSA as well as Serratia. T-max 103.8 on admission. CT chest with diffuse innumerable bilateral pulmonary nodules consistent with cavitation and septic emboli. Given the presentation concern is for endocarditis secondary to IV drug abuse.   Patient not much interactive during normalization some of the history is provided by her daughters,       Interval History : KRYSTLE drain in place and tolerating IV abx ok and HD line in place creat still high on going back dominga and some cough -    Past Medical History:    Past Medical History:   Diagnosis Date    ADHD (attention deficit hyperactivity disorder)     Arthritis     Back pain     Depression     Migraine     Neutrophilia 9/15/2022       Past Surgical History:    Past Surgical History:   Procedure Laterality Date    IR INSERT NON TUNNELED CV CATH LESS THAN 5 YEARS Right 09/20/2022    Lennox Pucker; RIJ access; 15cm; Dr. Kash Escoto    IR NONTUNNELED VASCULAR CATHETER  09/20/2022    IR NONTUNNELED VASCULAR CATHETER 9/20/2022 [facility] PROCEDURES    KNEE ARTHROSCOPY  10/05/2010    PARTIAL HYSTERECTOMY (CERVIX NOT REMOVED)      TUBAL LIGATION  01/01/2004    TUNNELED VENOUS CATHETER PLACEMENT Right 09/23/2022    Permacath; RIJ access; 19cm; Dr. Logan Springer       Current Medications:    No outpatient medications have been marked as taking for the 9/12/22 encounter [facility] HOSPITAL Encounter).        Allergies:  Ultram [tramadol hcl], Robaxin [methocarbamol], and Penicillins    Immunizations :   Immunization History   Administered Date(s) Administered    Tdap (Boostrix, Adacel) 02/11/2015       Social History:    Social History     Tobacco Use    Smoking status: Every Day     Packs/day: 0.50     Years: 2.00     Pack years: 1.00     Types: Cigarettes    Smokeless tobacco: Never   Substance Use Topics    Alcohol use: Yes     Comment: occas    Drug use: No     Social History     Tobacco Use   Smoking Status Every Day    Packs/day: 0.50    Years: 2.00    Pack years: 1.00    Types: Cigarettes   Smokeless Tobacco Never      Family History   Problem Relation Age of Onset    Breast Cancer Maternal Grandmother 45    Arthritis Other     Asthma Other     Cancer Other     Diabetes Other     Seizures Other     Stroke Other           REVIEW OF SYSTEMS:      Constitutional:  fevers,  chills +=, night sweats  Eyes:  negative for blurred vision, eye discharge, visual disturbance   HEENT:  negative for hearing loss, ear drainage,nasal congestion  Respiratory:  negative for cough, shortness of breath or hemoptysis   Cardiovascular:  negative for chest pain, palpitations, syncope  Gastrointestinal:  negative for nausea, vomiting, diarrhea, constipation, abdominal pain  Genitourinary:  negative for frequency, dysuria, urinary incontinence, hematuria  Hematologic/Lymphatic:  negative for easy bruising, bleeding and lymphadenopathy  Allergic/Immunologic:  negative for recurrent infections, angioedema, anaphylaxis   Endocrine:  negative for weight changes, polyuria, polydipsia and polyphagia  Musculoskeletal:  Rt hip and lower back pain ++   pain, swelling, decreased range of motion  Integumentary: No rashes, skin lesions  Neurological: negative for headaches, slurred speech, unilateral weakness  Psychiatric: negative for hallucinations,confusion,agitation.                 PHYSICAL EXAM:      Vitals:    BP (!) 167/108   Pulse 80   Temp 98.9 °F (37.2 °C)   Resp 18   Ht 5' 8\" (1.727 m)   Wt 160 lb 15 oz (73 kg)   SpO2 96%   BMI 24.47 kg/m²        General Appearance: alert,in  acute distress, ++ pallor, no icterus  poor skin hygiene   Skin: warm and dry, no rash or erythema  Head: normocephalic and atraumatic  Eyes: pupils equal, round, and reactive to light, conjunctivae normal  ENT: tympanic membrane, external ear and ear canal normal bilaterally, nose without deformity, nasal mucosa and turbinates normal without polyps  Neck: supple and non-tender without mass, no thyromegaly  no cervical lymphadenopathy  Pulmonary/Chest: Bi basal crepts+ - no wheezes, rales or rhonchi, normal air movement, no respiratory distress  Cardiovascular: normal rate, regular rhythm, normal S1 and S2, esm+ murmurs, rubs, clicks, or gallops, no carotid bruits  Abdomen: soft, non-tender, non-distended, normal bowel sounds, no masses or organomegaly  Extremities: no cyanosis, clubbing or edema  Musculoskeletal: normal range of motion, no joint swelling, deformity or tenderness  Integumentary: No rashes, no abnormal skin lesions, no petechiae  Neurologic: reflexes normal and symmetric, no cranial nerve deficit  Psych:  Orientation, sensorium, mood normal            Lines: PICC  Needle tracks++   Rt gluteal area pain jerri DRAIN_   Lower leg multiple skin lesions from IVDA++   HD line in place     Data Review:    CBC:   Lab Results   Component Value Date    WBC 7.7 09/24/2022    HGB 7.8 (L) 09/24/2022    HCT 23.3 (L) 09/24/2022    MCV 83.4 09/24/2022     09/24/2022     RENAL:   Lab Results   Component Value Date    CREATININE 3.3 (H) 09/24/2022    BUN 25 (H) 09/24/2022     (L) 09/24/2022    K 3.9 09/24/2022    CL 94 (L) 09/24/2022    CO2 28 09/24/2022     SED RATE:   Lab Results   Component Value Date/Time    SEDRATE 51 07/23/2019 09:59 PM     CK:   Lab Results   Component Value Date/Time    CKTOTAL 10 09/18/2022 04:55 PM     CRP: No results found for: CRP  Hepatic Function Panel:   Lab Results   Component Value Date/Time    ALKPHOS 106 09/21/2022 05:54 AM    ALT 16 09/21/2022 05:54 AM    AST 19 09/21/2022 05:54 AM    PROT 5.6 09/21/2022 07:36 PM    PROT 6.5 07/28/2012 11:20 PM    BILITOT 0.3 09/21/2022 05:54 AM    BILIDIR <0.2 09/21/2022 05:54 AM    IBILI see below 09/21/2022 05:54 AM    LABALBU 2.2 09/24/2022 06:40 AM     UA:  Lab Results   Component Value Date/Time    COLORU Yellow 09/18/2022 04:36 PM    CLARITYU CLOUDY 09/18/2022 04:36 PM    GLUCOSEU Negative 09/18/2022 04:36 PM    GLUCOSEU NEGATIVE 07/31/2010 02:59 PM    BILIRUBINUR Negative 09/18/2022 04:36 PM    BILIRUBINUR NEGATIVE 07/31/2010 02:59 PM    KETUA Negative 09/18/2022 04:36 PM    SPECGRAV 1.008 09/18/2022 04:36 PM    BLOODU LARGE 09/18/2022 04:36 PM    PHUR 5.0 09/18/2022 04:36 PM    PROTEINU 100 09/18/2022 04:36 PM    UROBILINOGEN 0.2 09/18/2022 04:36 PM    NITRU Negative 09/18/2022 04:36 PM    LEUKOCYTESUR MODERATE 09/18/2022 04:36 PM    LABMICR YES 09/18/2022 04:36 PM    URINETYPE NotGiven 09/18/2022 04:36 PM      Urine Microscopic:   Lab Results   Component Value Date/Time    BACTERIA None Seen 09/18/2022 04:36 PM    COMU see below 09/07/2022 01:55 AM    HYALCAST 6 09/18/2022 04:36 PM    WBCUA 98 09/18/2022 04:36 PM    RBCUA 14 09/18/2022 04:36 PM    EPIU 1 09/18/2022 04:36 PM     Urine Reflex to Culture:   Lab Results   Component Value Date/Time    URRFLXCULT Yes 09/12/2022 04:16 PM      Summary   Severe eccentric tricuspid regurgitation. Small, mobile tricuspid valve vegetation noted measuring 0.9cm x 0.4cm.       Signature      ------------------------------------------------------------------   Electronically signed by Maddy Hendricks MD (Interpreting   physician) on 09/16/2022 at 04:29 PM ------------------------------------------------------------------    MICRO: cultures reviewed and updated by me   Blood Culture:          MRSA DNA Probe, Nasal [[id number]] Collected: 09/13/22 1220   Order Status: Completed Specimen: Nares Updated: 09/14/22 1131    MRSA SCREEN RT-PCR --    Negative  MRSA DNA not detected. Normal Range: Not detected    Narrative:     ORDER#: [id number]                          ORDERED BY: Hossein Webster   SOURCE: Nares                              COLLECTED:  09/13/22 12:20   ANTIBIOTICS AT ANABELLA.:                      RECEIVED :  09/13/22 12:46   Blood Culture 1 [[id number]] (Abnormal) Collected: 09/12/22 1616   Order Status: Completed Specimen: Blood Updated: 09/14/22 0939    Blood Culture, Routine -- Abnormal     Gram stain Aerobic bottle:   Gram positive cocci in clusters   resembling Staphylococcus   Information to follow   and   Gram negative rods   Information to follow   Gram stain Anaerobic bottle:   Gram positive cocci in clusters   resembling Staphylococcus   Information to follow    Abnormal     Organism Staph aureus MRSA DNA Detected Abnormal     Blood Culture, Routine -- Abnormal     CONTACT PRECAUTIONS INDICATED   See additional report for complete BCID panel. mecA/C gene and MREJ gene Detected. Abnormal     Organism Serratia marcescens DNA Detected Abnormal     Blood Culture, Routine See additional report for complete BCID panel.     Organism Staph aureus MRSA Abnormal     Blood Culture, Routine -- Abnormal     POSITIVE for   Sensitivity to follow   CONTACT PRECAUTIONS INDICATED   Isolated two of two sets    Abnormal     Organism Serratia marcescens Abnormal     Blood Culture, Routine --    POSITIVE for   Sensitivity to follow   Isolated one of two sets    Narrative:     ORDER#: [id number]                          ORDERED BY: Jacquelynne Kayser   SOURCE: Blood                              COLLECTED:  09/12/22 16:16   ANTIBIOTICS AT [facility]A.:                      RECEIVED :  09/12/22 16:32   CALL  [facility]  [id number] Yolande Barreto [phone],   Microbiology results called to and read back by Enrique Flores RN,   09/13/2022 08:24, by [facility]   Microbiology results called to and read back by Rene Campos,   09/13/2022 07:05, by [facility]   If child <=2 yrs old please draw pediatric bottle. ~Blood Culture 1   Culture, Blood 2 [[id number]] Collected: 09/14/22 0525   Order Status: Sent Specimen: Blood Updated: 09/14/22 0600   Culture, Blood 1 [[id number]] Collected: 09/14/22 0436   Order Status: Sent Specimen: Blood Updated: 09/14/22 0441   Culture, Urine [[id number]] Collected: 09/12/22 1616   Order Status: Completed Specimen: Urine, clean catch Updated: 09/13/22 2245    Urine Culture, Routine No growth at 18 to 36 hours   Narrative:     ORDER#: [id number]                          ORDERED BY: Serena Whitlock   SOURCE: Urine Clean Catch                  COLLECTED:  09/12/22 16:16   ANTIBIOTICS AT [facility]LLA.:                      RECEIVED :  09/12/22 19:35   Culture, Blood 2 [[id number]] (Abnormal) Collected: 09/12/22 2012   Order Status: Completed Specimen: Blood Updated: 09/13/22 1416    Culture, Blood 2 -- Abnormal     Gram stain Aerobic bottle:   Gram positive cocci in clusters   resembling Staphylococcus   Information to follow   Gram stain Anaerobic bottle:   Gram positive cocci in clusters   resembling Staphylococcus   Information to follow    Abnormal    Narrative:     ORDER#: [id number]                          ORDERED BY: Serena Whitlock   SOURCE: Blood                              COLLECTED:  09/12/22 20:12   ANTIBIOTICS AT [facility]LLA.:                      RECEIVED :  09/12/22 20:21   CALL  [facility]  [id number] tel. [phone],   Previous panic on this admission - call not needed per SOP, 09/13/2022 10:56,   by [facility]   If child <=2 yrs old please draw pediatric bottle. ~Blood Culture #2   Strep Pneumoniae Antigen [[id number]] Collected: 09/12/22 1900   Order Status: Completed Specimen: Urine, clean catch Updated: 09/13/22 1054    STREP PNEUMONIAE ANTIGEN, URINE --    Presumptive Negative   Presumptive negative suggests no current or recent   pneumococcal infection. Infection due to Strep pneumoniae   cannot be ruled out since the antigen present in the sample   may be below the detection limit of the test.   Normal Range:Presumptive Negative    Narrative:     ORDER#: [id number]                          ORDERED BY: Grazyna Klein   SOURCE: Urine Clean Catch                  COLLECTED:  09/12/22 19:00   ANTIBIOTICS AT [facility]LLA.:                      RECEIVED :  09/13/22 07:04   Legionella antigen, urine [[id number]] Collected: 09/12/22 1900   Order Status: Completed Specimen: Urine, clean catch Updated: 09/13/22 1047    L. pneumophila Serogp 1 Ur Ag --    Presumptive Negative   No Legionella pneumophila serogroup 1 antigens detected. A negative result does not exclude infection with   Legionella pneumophila serogroup 1 nor does it rule out   other microbial-caused respiratory infections or   disease caused by other serogroups of   Legionella pneumophila. Normal Range: Presumptive Negative    Narrative:     ORDER#: [id number]                          ORDERED BY: KATIE FLETCHER   SOURCE: Urine Clean Catch                  COLLECTED:  09/12/22 19:00   ANTIBIOTICS AT [facility]LLA.:                      RECEIVED :  09/13/22 07:04   Culture, Blood, PCR ID Panel [[id number]] Collected: 09/12/22 1616   Order Status: Completed Updated: 09/13/22 0707    Report SEE IMAGE   Narrative:     Zeferino Hawk  [id number] tel. [phone],   Microbiology results called to and read back by Bijan Reynolds,   09/13/2022 07:05, by Raul Banda   Culture, Blood 2 [[id number]] Collected: 09/12/22 0000   Order Status: Canceled Specimen: Blood    Culture, Blood 1 [[id number]] Collected: 09/12/22 0000   Order Status: Canceled Specimen: Blood      Lab Results   Component Value Date/Time    BC No Growth after 4 days of incubation.  09/14/2022 04:36 AM    BLOODCULT2 No Growth after 4 days of incubation. 09/14/2022 05:25 AM      Collected: 09/16/22 1210   Order Status: Completed Specimen:  Body Fluid from Abscess Updated: 09/19/22 0615    Gram Stain Result 4+ WBC's (Polymorphonuclear)   2+ Gram positive cocci   1+ Gram negative rods    Abnormal     Anaerobic Culture --    Anaerobic culture further report to follow   No anaerobes isolated so far, Further report to follow     Organism Serratia marcescens Abnormal     WOUND/ABSCESS Light growth    Organism Staph aureus MRSA Abnormal     WOUND/ABSCESS -- Abnormal     Moderate growth   CONTACT PRECAUTIONS INDICATED    Abnormal    Narrative:     ORDER#: [id number]                          ORDERED BY: RAMILA MARTINES   SOURCE: Abscess Rt gluteal abscess         COLLECTED:  09/16/22 12:10   ANTIBIOTICS AT [facility]LLA.:                      RECEIVED :  09/16/22 12:32   CALL  [facility]  [email or web address] tel. [phone],   Previous panic on this admission - call not needed per SOP, 09/17/2022 13:04,      Susceptibility    Staph aureus mrsa (3)    Antibiotic Interpretation Microscan  Method Status    ceFAZolin Resistant >16 mcg/mL BACTERIAL SUSCEPTIBILITY PANEL BY YA     clindamycin Sensitive <=0.5 mcg/mL BACTERIAL SUSCEPTIBILITY PANEL BY YA     erythromycin Resistant >4 mcg/mL BACTERIAL SUSCEPTIBILITY PANEL BY YA     oxacillin Resistant >2 mcg/mL BACTERIAL SUSCEPTIBILITY PANEL BY YA     tetracycline Sensitive <=4 mcg/mL BACTERIAL SUSCEPTIBILITY PANEL BY YA     trimethoprim-sulfamethoxazole Sensitive 1/19 mcg/mL BACTERIAL SUSCEPTIBILITY PANEL BY YA     vancomycin Sensitive 2 mcg/mL BACTERIAL SUSCEPTIBILITY PANEL BY YA       Serratia marcescens (4)    Antibiotic Interpretation Microscan  Method Status    amoxicillin-clavulanate Resistant >16/8 mcg/mL BACTERIAL SUSCEPTIBILITY PANEL BY YA     ampicillin Resistant >16 mcg/mL BACTERIAL SUSCEPTIBILITY PANEL BY YA     ampicillin-sulbactam Resistant 16/8 mcg/mL BACTERIAL SUSCEPTIBILITY PANEL BY YA     ceFAZolin Resistant >16 mcg/mL BACTERIAL SUSCEPTIBILITY PANEL BY YA     cefepime Sensitive <=2 mcg/mL BACTERIAL SUSCEPTIBILITY PANEL BY YA     cefTRIAXone Sensitive <=1 mcg/mL BACTERIAL SUSCEPTIBILITY PANEL BY YA     cefuroxime Resistant >16 mcg/mL BACTERIAL SUSCEPTIBILITY PANEL BY YA     ciprofloxacin Sensitive <=1 mcg/mL BACTERIAL SUSCEPTIBILITY PANEL BY YA     ertapenem Sensitive <=0.5 mcg/mL BACTERIAL SUSCEPTIBILITY PANEL BY YA     gentamicin Sensitive <=4 mcg/mL BACTERIAL SUSCEPTIBILITY PANEL BY YA     meropenem Sensitive <=1 mcg/mL BACTERIAL SUSCEPTIBILITY PANEL BY YA     piperacillin-tazobactam Sensitive <=16 mcg/mL BACTERIAL SUSCEPTIBILITY PANEL BY YA     trimethoprim-sulfamethoxazole Sensitive <=2/38 mcg/mL BACTERIAL SUSCEPTIBILITY PANEL BY YA      Condensed View       Respiratory Culture:  Lab Results   Component Value Date/Time    LABGRAM  09/16/2022 12:10 PM     4+ WBC's (Polymorphonuclear)  2+ Gram positive cocci  1+ Gram negative rods       AFB:No results found for: AFBSMEAR  Viral Culture:  Lab Results   Component Value Date/Time    COVID19 Not Detected 09/07/2022 12:00 AM    COVID19 Not Detected 07/20/2022 11:20 PM     Urine Culture:   No results for input(s): LABURIN in the last 72 hours. Summary   Normal left ventricle size, wall thickness, and systolic function with an   estimated ejection fraction of 60-65%. No regional wall motion abnormalities   are seen. Normal diastolic function. Normal right ventricular size and function. Mobile vegetation on tricuspid valve suggestive of endocarditis. Moderate tricuspid regurgitation. The right atrium is mildly dilated.       Signature      ------------------------------------------------------------------   Electronically signed by Mayela Doran MD   (Interpreting physician) on 09/13/2022 at 04:15 PM   ------------------------------------------------------------------    IMAGING:    IR REPLACE TUNNELED CVC W SQ PORT SAME ACCESS   Final Result Successful conversion of a non tunneled hemodialysis catheter for a new 19   centimeter tunneled hemodialysis catheter. IR NONTUNNELED VASCULAR CATHETER > 5 YEARS   Final Result   Successful ultrasound and fluoroscopy guided non-tunneled Trialysis catheter   placement. US RENAL COMPLETE   Final Result   Unremarkable ultrasound of the kidneys and urinary bladder. No renal   atrophy, hydronephrosis or abnormal echotexture. CT ABSCESS DRAINAGE W CATH PLACEMENT S&I   Final Result   Successful CT guided placement of a 10 Ukrainian drainage catheter in the right   iliacus abscess. CT GUIDED NEEDLE PLACEMENT   Final Result   Successful CT guided placement of a 10 [place] drainage catheter in the right   iliacus abscess. MRI LUMBAR SPINE W WO CONTRAST   Final Result   Partially visualized septic arthritis/osteomyelitis of the right sacroiliac   joint, with a large adjacent retroperitoneal abscess extending into the right   pelvic sidewall. Small intramuscular abscess also present within the left psoas muscle. Epidural phlegmon/abscess of the sacral canal.      Pelvic ascites. The findings were sent to the Radiology Results [address] at 7:23   pm on 9/15/2022 to be communicated to a licensed caregiver. VL Extremity Venous Bilateral   Final Result      CT HEAD WO CONTRAST   Final Result   No acute intracranial abnormality. CT CHEST WO CONTRAST   Final Result   Diffuse in numeral bilateral pulmonary nodules many of which are cavitary   becoming coalescent at the right lung apex however diffusely throughout the   bilateral lungs consistent of septic emboli with small to moderate right   pleural effusion      Partially visualized portions of the upper abdomen reveal hepatosplenomegaly         XR CHEST PORTABLE   Final Result   Multifocal patchy airspace disease and cavitary nodules.   The appearance is   suggestive of septic emboli in this clinical setting. Follow-up recommended to assure resolution.                All the pertinent images and reports for the current Hospitalization were reviewed by me     Scheduled Meds:   b complex-C-folic acid  1 capsule Oral Daily    sevelamer  800 mg Oral TID WC    ceftaroline fosamil (TEFLARO) IVPB  200 mg IntraVENous q8h    heparin (porcine)  5,000 Units SubCUTAneous 3 times per day    lidocaine 1 % injection  5 mL IntraDERmal Once    sodium chloride flush  5-40 mL IntraVENous 2 times per day    linezolid  600 mg IntraVENous Q12H       Continuous Infusions:   sodium chloride      sodium chloride 25 mL (09/23/22 2212)       PRN Meds:  morphine, heparin (porcine), LORazepam, sodium chloride, chlorhexidine, sodium chloride flush, sodium chloride, hydrOXYzine pamoate, naloxone, calcium carbonate, promethazine, promethazine **OR** ondansetron, acetaminophen **OR** acetaminophen, perflutren lipid microspheres      Assessment:     Patient Active Problem List   Diagnosis    Tear of medial cartilage or meniscus of knee, current    Plica syndrome    Boxer's metacarpal fracture, neck, closed    Chondromalacia of patella    Degeneration of lumbar or lumbosacral intervertebral disc    Varicose veins of lower extremity    Intractable nausea and vomiting    Septicemia (HCC)    IVDU (intravenous drug user)    MRSA bacteremia    Sepsis due to methicillin resistant Staphylococcus aureus (MRSA) without acute organ dysfunction ([address]    Bacterial infection due to Serratia    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 WBC count is elevated high fever from ongoing bloodstream infection. Given IV drug abuse suspect endocarditis transthoracic echocardiogram with TV Vegetation    Trend WBC and repeat Blood cx NGTD    Will need placement to complete IV ABX        Rt gluteal and lower Lumbar area pain MRI L spine with abscess, septic joint     S/p IR aspiration of the abscess cx MRSA and serratia noted    Unfortunately kidney function continues to decline nephrology is now following. IV antibiotics have been adjusted    Will need a long course of antibiotic given the MRSA bacteremia and septic arthritis    HD line placed due to worsening Kidney function -completed hemodialysis for session without any issues. Unfortunately creatinine still remains elevated. May need repeat MRI L spine in future     Check CRP and ESR tomorrow -         Labs, Microbiology, Radiology and all the pertinent results from current hospitalization and  care every where were reviewed  by me as a part of the evaluation   Plan:   Cont  IV Ceftaroline - 200 mg q x 8 HRS will cover MRSA and Serratia adjusted to GFR  Cont  oral  Linezolid x 600 mg q 12 HRS   Repeat Blood cx    NGTD  TTE Abnormal with vegetation   Will need IV abx  Watch for complications  HIV -ve and Hepatitis screen Hep C+Ve  Cardiology consult  STEFANO completed  d/w Dr. Ashia Hoskins is small for angiovac  would be able to treat with IV abx   MRI L spine noted very abnormal s/p ID abscess dot  Daptomycin not effective in LUNGS and Vancomycin resulted in MARYAN -   Needs placement   Await kidney function recovery to decide on adjusting the long-term antibiotics  May need repeat MRI once clinically better          Discussed with patient/Family and Nursing   Risk of Complications/Morbidity: High      Illness(es)/ Infection present that pose threat to bodily function.    There is potential for severe exacerbation of infection/side effects of treatment. Therapy requires intensive monitoring for antimicrobial agent toxicity. Discussed with patient/Family and Nursing staff     Thanks for allowing me to participate in your patient's care and please call me with any questions or concerns.     Simran Harris MD  Infectious Disease  [facility]) Physician  Phone: [phone]   Fax : [phone]

## 2022-09-24 NOTE — PLAN OF CARE
Problem: Discharge Planning  Goal: Discharge to home or other facility with appropriate resources  Outcome: Progressing  Flowsheets (Taken 9/24/2022 0012)  Discharge to home or other facility with appropriate resources:   Identify barriers to discharge with patient and caregiver   Arrange for needed discharge resources and transportation as appropriate   Identify discharge learning needs (meds, wound care, etc)   Arrange for interpreters to assist at discharge as needed  Note:  involved with multiple requests out to SNF's     Problem: Safety - Adult  Goal: Free from fall injury  Outcome: Progressing  Flowsheets (Taken 9/24/2022 0012)  Free From Fall Injury: Instruct family/caregiver on patient safety     Problem: Pain  Goal: Verbalizes/displays adequate comfort level or baseline comfort level  Outcome: Progressing  Flowsheets  Taken 9/24/2022 0012 by Job Diaz RN  Verbalizes/displays adequate comfort level or baseline comfort level:   Encourage patient to monitor pain and request assistance   Assess pain using appropriate pain scale   Administer analgesics based on type and severity of pain and evaluate response   Implement non-pharmacological measures as appropriate and evaluate response  Taken 9/23/2022 1134 by Vanessa MondayBLAISE  Verbalizes/displays adequate comfort level or baseline comfort level: Encourage patient to monitor pain and request assistance     Problem: ABCDS Injury Assessment  Goal: Absence of physical injury  Outcome: Progressing  Flowsheets (Taken 9/24/2022 0012)  Absence of Physical Injury: Implement safety measures based on patient assessment

## 2022-09-24 NOTE — PROGRESS NOTES
Progress Note    Admit Date: 9/12/2022         Subjective and Overnight Events:  No acute events overnight  Has no complaints today  Complains of back pain and requesting to increase her morphine dose but do not have any associated radiation of pain or urinary incontinance    Objective:   Vitals: BP (!) 140/94   Pulse 86   Temp 98.4 °F (36.9 °C) (Oral)   Resp 18   Ht 5' 8\" (1.727 m)   Wt 160 lb 15 oz (73 kg)   SpO2 97%   BMI 24.47 kg/m²   BP (!) 140/94   Pulse 86   Temp 98.4 °F (36.9 °C) (Oral)   Resp 18   Ht 5' 8\" (1.727 m)   Wt 160 lb 15 oz (73 kg)   SpO2 97%   BMI 24.47 kg/m²       General appearance: NAD, on RA, lying in bed  HEENT:  Conjunctivae/corneas clear. Neck: Supple, with full range of motion. Respiratory:  Normal respiratory effort. Clear to auscultation, bilaterally without Rales/Wheezes/Rhonchi. Cardiovascular: Regular rate and rhythm with normal S1/S2 without murmurs or rubs  Abdomen: Soft, non-tender, non-distended, normal bowel sounds. Musculoskeletal: No cyanosis or edema bilaterally  Neurologic:  without any focal sensory/motor deficits.  grossly non-focal.  Psychiatric: Alert and oriented, Normal mood  Peripheral Pulses: +2 palpable, equal bilaterally       Data:     Scheduled Medications:    linezolid  600 mg Oral BID    b complex-C-folic acid  1 capsule Oral Daily    sevelamer  800 mg Oral TID WC    ceftaroline fosamil (TEFLARO) IVPB  200 mg IntraVENous q8h    heparin (porcine)  5,000 Units SubCUTAneous 3 times per day    lidocaine 1 % injection  5 mL IntraDERmal Once    sodium chloride flush  5-40 mL IntraVENous 2 times per day      PRN Medications: morphine, heparin (porcine), LORazepam, sodium chloride, chlorhexidine, sodium chloride flush, sodium chloride, hydrOXYzine pamoate, naloxone, calcium carbonate, promethazine, promethazine **OR** ondansetron, acetaminophen **OR** acetaminophen, perflutren lipid microspheres  Diet: ADULT DIET; Regular    Continuous Infusions:   sodium chloride      sodium chloride 25 mL (09/23/22 2212)         Intake/Output Summary (Last 24 hours) at 9/24/2022 1721  Last data filed at 9/24/2022 1547  Gross per 24 hour   Intake 608 ml   Output 2723 ml   Net -2115 ml         CBC:   Recent Labs     09/23/22  0526 09/24/22  0640   WBC 7.6 7.7   HGB 7.8* 7.8*    195       BMP:  Recent Labs     09/23/22  0526 09/24/22  0640   * 133*   K 3.7 3.9   CL 96* 94*   CO2 29 28   BUN 20 25*   CREATININE 2.7* 3.3*   GLUCOSE 90 86       ABGs: No results found for: PHART, PO2ART, [id number]    Assessment/plan     Patient Active Problem List:     Tear of medial cartilage or meniscus of knee, current     Plica syndrome     Boxer's metacarpal fracture, neck, closed     Chondromalacia of patella     Degeneration of lumbar or lumbosacral intervertebral disc     Varicose veins of lower extremity     Intractable nausea and vomiting     Septicemia (HCC)     IVDU (intravenous drug user)     MRSA bacteremia     Sepsis due to methicillin resistant Staphylococcus aureus (MRSA) without acute organ dysfunction (HCC)     Bacterial infection due to Serratia     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 According to the patient's sister patient has been feeling sick for past few days, not feeling well. Patient was noticed to have fevers as well as chills.      Assessment  Sepsis POA - endocarditis +/- cavitary PNA - MRSA/Serratia bacteremia   Pulmonary septic emboli, cavitary nodules  Endocarditis  History of IV drug abuse  ADHD  Anemia    Plan  Continue atbx as per ID - d/w ID, plan to continue Abx inpt  Endocarditis  + cardiology -  no angiovac recommended, recs to continue IV abx   Nephrology concerned about Vancomycin toxicity, plan for IR catheter for Dialysis   Consulted hematology, r/u hemolysis, ordered haptoglobin - elevated  Added ensure with meals as she has low po intake   Pain control  Continue IV Abx  Continue HD per nephrology  Pain control  Will need longterm Abx  Patient has Drain by IR, IR service to determine timing of drain removal    Full Code    Continue IV abx, D/w ID, plan for inpt abx for now, await improvement in renal function, plan for renal Bx next week      Santiago Kamara MD

## 2022-09-24 NOTE — FLOWSHEET NOTE
Called into stating, \" I need some help. \" Was laying to left side. Reports feeling nauseated and coughing, dry heaving. PO anxiety and nausea medication given per PRN orders. Camilo Rai RN

## 2022-09-24 NOTE — PROGRESS NOTES
[facility]  Phone: [phone]  Fax: [phone]  [facility]. [facility]         41 y/o WF with h/o depression, IVDA and back pain admitted with feeling weak, fatigued and pain in her back She was seen in ER on 9/7/22 with back apin and diagnosed with UTI Received toradol and was discharged on Motrin and Cefdinir Urine CX grew klebsiella pan senstive However she continued to feel poorly with back pain, and fatigue   Had fever to 101 on admission Started on Vancomycin and Cefepime Cr was 1.5 mg on admission improved to 1.1 mg but has increased to 1.8 mg now [facility] from admission growing MRSA and Serratia CT chest with cavitation and septic emboli  She reports decreased UOP no dysuria hematuria  Has been taking Ibuprofen 800 mg TID prior to admission  MRI showed OM of R SI joint and large retroperitoneal abscess   Underwent drainage tube placement . Events noted , labs reviewed . HD completed 1.5 L removed    REVIEW OF SYSTEMS:  No CP/SOB or GEIGER + nauseated earlier this AM    No Family at bed side     Physical Exam:    VITALS:  BP (!) 140/94   Pulse 86   Temp 98.4 °F (36.9 °C) (Oral)   Resp 18   Ht 5' 8\" (1.727 m)   Wt 160 lb 15 oz (73 kg)   SpO2 97%   BMI 24.47 kg/m²   24HR INTAKE/OUTPUT:    Intake/Output Summary (Last 24 hours) at 9/24/2022 1659  Last data filed at 9/24/2022 1547  Gross per 24 hour   Intake 608 ml   Output 2723 ml   Net -2115 ml       Constitutional:  Doing well  Respiratory:  Decrease BS at  bases   Gastrointestinal:  + tenderness. Normal Bowel Sounds  Cardiovascular:  S1, S2 RRR   Edema:  +  edema  Acc Rt TDC .     DATA:    CBC:  Lab Results   Component Value Date/Time    WBC 7.7 09/24/2022 06:40 AM    RBC 2.79 09/24/2022 06:40 AM    HGB 7.8 09/24/2022 06:40 AM    HCT 23.3 09/24/2022 06:40 AM    MCV 83.4 09/24/2022 06:40 AM    MCH 28.2 09/24/2022 06:40 AM    MCHC 33.8 09/24/2022 06:40 AM    RDW 15.7 09/24/2022 06:40 AM     09/24/2022 06:40 AM    MPV 7.1 09/24/2022 06:40 AM     CMP:  Lab Results   Component Value Date/Time     09/24/2022 06:40 AM    K 3.9 09/24/2022 06:40 AM    K 4.3 09/19/2022 05:51 AM    CL 94 09/24/2022 06:40 AM    CO2 28 09/24/2022 06:40 AM    BUN 25 09/24/2022 06:40 AM    CREATININE 3.3 09/24/2022 06:40 AM    GFRAA 19 09/24/2022 06:40 AM    GFRAA >60 07/28/2012 11:20 PM    AGRATIO 0.4 09/12/2022 03:44 PM    LABGLOM 15 09/24/2022 06:40 AM    GLUCOSE 86 09/24/2022 06:40 AM    PROT 5.6 09/21/2022 07:36 PM    PROT 6.5 07/28/2012 11:20 PM    CALCIUM 7.8 09/24/2022 06:40 AM    BILITOT 0.3 09/21/2022 05:54 AM    ALKPHOS 106 09/21/2022 05:54 AM    AST 19 09/21/2022 05:54 AM    ALT 16 09/21/2022 05:54 AM      Hepatic Function Panel:   Lab Results   Component Value Date/Time    ALKPHOS 106 09/21/2022 05:54 AM    ALT 16 09/21/2022 05:54 AM    AST 19 09/21/2022 05:54 AM    PROT 5.6 09/21/2022 07:36 PM    PROT 6.5 07/28/2012 11:20 PM    BILITOT 0.3 09/21/2022 05:54 AM    BILIDIR <0.2 09/21/2022 05:54 AM    IBILI see below 09/21/2022 05:54 AM      Phosphorus:   Lab Results   Component Value Date/Time    PHOS 5.5 09/24/2022 06:40 AM       ASSESSMENT:  Principal Problem:    Intractable nausea and vomiting  Active Problems:    Septicemia (HCC)    IVDU (intravenous drug user)    MRSA bacteremia    Sepsis due to methicillin resistant Staphylococcus aureus (MRSA) without acute organ dysfunction ([address]    Bacterial infection due to Serratia    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 *      PLAN  Assessment/  1-MARYAN suspect Vancomycin toxicity /ATN/ infectious GN . 1st HD 9/20    HD completed 1.5 L removed  S./p [facility]  9/23 . Will need Kidney Bx next wk .    2-MRSA and serratia bacteremia with retroperitoneal abscess  septic pulmonary emboli and possible TV endocarditis  3-IVDA   4 Anemia  Hb stable S/p Tx .  Epo with HD.   5 Hypocalcemia with severe Hypoalbuminemia improving PO4 at goal        Janell Goldsmith MD, Bette Greene

## 2022-09-24 NOTE — FLOWSHEET NOTE
Treatment time: 3 hr    Net UF: 1.5 liters     Pre weight: 74.4 kg   Post weight: 73 kg   EDW: tbd     Access used: R TDC  Access function: good     Medications or blood products given: none     Regular outpatient schedule: MARYAN     Summary of response to treatment: pt tolerated tx well. Post VSS.  New dressing applied     Copy of dialysis treatment record placed in chart, to be scanned into EMR.      09/24/22 0754 09/24/22 1100   Vital Signs   BP (!) 158/106 (!) 167/108   Temp 98 °F (36.7 °C) 98.9 °F (37.2 °C)   Heart Rate 79 75   Weight 164 lb 0.4 oz (74.4 kg) 160 lb 15 oz (73 kg)   Weight Method Bed scale Bed scale   Post-Hemodialysis Assessment   Hemodialysis Intake (ml)  --  488 ml   Hemodialysis Output (ml)  --  1988 ml   NET Removed (ml)  --  1500   Tolerated Treatment  --  Good

## 2022-09-25 LAB
C-REACTIVE PROTEIN: 49.8 MG/L (ref 0–5.1)
SEDIMENTATION RATE, ERYTHROCYTE: 23 MM/HR (ref 0–20)

## 2022-09-25 PROCEDURE — 2060000000 HC ICU INTERMEDIATE R&B

## 2022-09-25 PROCEDURE — 6370000000 HC RX 637 (ALT 250 FOR IP): Performed by: INTERNAL MEDICINE

## 2022-09-25 PROCEDURE — 6370000000 HC RX 637 (ALT 250 FOR IP): Performed by: NURSE PRACTITIONER

## 2022-09-25 PROCEDURE — 36415 COLL VENOUS BLD VENIPUNCTURE: CPT

## 2022-09-25 PROCEDURE — 6360000002 HC RX W HCPCS: Performed by: INTERNAL MEDICINE

## 2022-09-25 PROCEDURE — 86140 C-REACTIVE PROTEIN: CPT

## 2022-09-25 PROCEDURE — 99233 SBSQ HOSP IP/OBS HIGH 50: CPT | Performed by: INTERNAL MEDICINE

## 2022-09-25 PROCEDURE — 2580000003 HC RX 258: Performed by: INTERNAL MEDICINE

## 2022-09-25 PROCEDURE — 85652 RBC SED RATE AUTOMATED: CPT

## 2022-09-25 RX ADMIN — HYDROXYZINE PAMOATE 25 MG: 25 CAPSULE ORAL at 10:38

## 2022-09-25 RX ADMIN — SODIUM CHLORIDE, PRESERVATIVE FREE 10 ML: 5 INJECTION INTRAVENOUS at 10:32

## 2022-09-25 RX ADMIN — MORPHINE SULFATE 1.5 MG: 2 INJECTION, SOLUTION INTRAMUSCULAR; INTRAVENOUS at 01:07

## 2022-09-25 RX ADMIN — CEFTAROLINE FOSAMIL 200 MG: 600 POWDER, FOR SOLUTION INTRAVENOUS at 16:12

## 2022-09-25 RX ADMIN — ACETAMINOPHEN 650 MG: 325 TABLET ORAL at 10:37

## 2022-09-25 RX ADMIN — NEPHROCAP 1 MG: 1 CAP ORAL at 10:38

## 2022-09-25 RX ADMIN — HEPARIN SODIUM 5000 UNITS: 5000 INJECTION INTRAVENOUS; SUBCUTANEOUS at 21:30

## 2022-09-25 RX ADMIN — LORAZEPAM 1 MG: 1 TABLET ORAL at 02:23

## 2022-09-25 RX ADMIN — HEPARIN SODIUM 5000 UNITS: 5000 INJECTION INTRAVENOUS; SUBCUTANEOUS at 05:32

## 2022-09-25 RX ADMIN — LINEZOLID 600 MG: 600 TABLET, FILM COATED ORAL at 10:38

## 2022-09-25 RX ADMIN — ANTACID TABLETS 500 MG: 500 TABLET, CHEWABLE ORAL at 10:38

## 2022-09-25 RX ADMIN — SEVELAMER CARBONATE 800 MG: 800 TABLET, FILM COATED ORAL at 10:37

## 2022-09-25 RX ADMIN — MORPHINE SULFATE 1.5 MG: 2 INJECTION, SOLUTION INTRAMUSCULAR; INTRAVENOUS at 21:31

## 2022-09-25 RX ADMIN — CEFTAROLINE FOSAMIL 200 MG: 600 POWDER, FOR SOLUTION INTRAVENOUS at 05:33

## 2022-09-25 RX ADMIN — LORAZEPAM 1 MG: 1 TABLET ORAL at 10:37

## 2022-09-25 RX ADMIN — CEFTAROLINE FOSAMIL 200 MG: 600 POWDER, FOR SOLUTION INTRAVENOUS at 21:33

## 2022-09-25 RX ADMIN — LINEZOLID 600 MG: 600 TABLET, FILM COATED ORAL at 21:30

## 2022-09-25 RX ADMIN — MORPHINE SULFATE 1.5 MG: 2 INJECTION, SOLUTION INTRAMUSCULAR; INTRAVENOUS at 15:54

## 2022-09-25 RX ADMIN — MORPHINE SULFATE 1.5 MG: 2 INJECTION, SOLUTION INTRAMUSCULAR; INTRAVENOUS at 10:33

## 2022-09-25 RX ADMIN — ACETAMINOPHEN 650 MG: 325 TABLET ORAL at 21:41

## 2022-09-25 RX ADMIN — MORPHINE SULFATE 1.5 MG: 2 INJECTION, SOLUTION INTRAMUSCULAR; INTRAVENOUS at 06:30

## 2022-09-25 RX ADMIN — ONDANSETRON 4 MG: 2 INJECTION INTRAMUSCULAR; INTRAVENOUS at 10:38

## 2022-09-25 RX ADMIN — HEPARIN SODIUM 5000 UNITS: 5000 INJECTION INTRAVENOUS; SUBCUTANEOUS at 15:45

## 2022-09-25 ASSESSMENT — PAIN DESCRIPTION - ONSET
ONSET: ON-GOING

## 2022-09-25 ASSESSMENT — PAIN SCALES - WONG BAKER
WONGBAKER_NUMERICALRESPONSE: 0

## 2022-09-25 ASSESSMENT — PAIN - FUNCTIONAL ASSESSMENT
PAIN_FUNCTIONAL_ASSESSMENT: PREVENTS OR INTERFERES SOME ACTIVE ACTIVITIES AND ADLS
PAIN_FUNCTIONAL_ASSESSMENT: ACTIVITIES ARE NOT PREVENTED
PAIN_FUNCTIONAL_ASSESSMENT: PREVENTS OR INTERFERES SOME ACTIVE ACTIVITIES AND ADLS
PAIN_FUNCTIONAL_ASSESSMENT: ACTIVITIES ARE NOT PREVENTED

## 2022-09-25 ASSESSMENT — PAIN DESCRIPTION - LOCATION
LOCATION: HEAD
LOCATION: BACK

## 2022-09-25 ASSESSMENT — PAIN DESCRIPTION - DESCRIPTORS
DESCRIPTORS: ACHING

## 2022-09-25 ASSESSMENT — PAIN DESCRIPTION - ORIENTATION
ORIENTATION: LOWER
ORIENTATION: INNER

## 2022-09-25 ASSESSMENT — PAIN SCALES - GENERAL
PAINLEVEL_OUTOF10: 6
PAINLEVEL_OUTOF10: 2
PAINLEVEL_OUTOF10: 8
PAINLEVEL_OUTOF10: 0
PAINLEVEL_OUTOF10: 8
PAINLEVEL_OUTOF10: 6
PAINLEVEL_OUTOF10: 7

## 2022-09-25 ASSESSMENT — PAIN DESCRIPTION - PAIN TYPE
TYPE: ACUTE PAIN;CHRONIC PAIN
TYPE: ACUTE PAIN

## 2022-09-25 ASSESSMENT — PAIN DESCRIPTION - FREQUENCY
FREQUENCY: CONTINUOUS
FREQUENCY: CONTINUOUS

## 2022-09-25 NOTE — PROGRESS NOTES
Hematology Oncology Daily Progress Note    Admit Date: 9/12/2022  Hospital day several    Subjective:     Patient has complaints of back pain and gen weakness--denies sob/cp. Medication side effects: none    Scheduled Meds:   linezolid  600 mg Oral BID    b complex-C-folic acid  1 capsule Oral Daily    sevelamer  800 mg Oral TID     ceftaroline fosamil (TEFLARO) IVPB  200 mg IntraVENous q8h    heparin (porcine)  5,000 Units SubCUTAneous 3 times per day    lidocaine 1 % injection  5 mL IntraDERmal Once    sodium chloride flush  5-40 mL IntraVENous 2 times per day     Continuous Infusions:   sodium chloride      sodium chloride 25 mL (09/23/22 2212)     PRN Meds:morphine, heparin (porcine), LORazepam, sodium chloride, chlorhexidine, sodium chloride flush, sodium chloride, hydrOXYzine pamoate, naloxone, calcium carbonate, promethazine, promethazine **OR** ondansetron, acetaminophen **OR** acetaminophen, perflutren lipid microspheres    Review of Systems  Pertinent items are noted in HPI. REVIEW OF SYSTEMS:         Constitutional: Denies fever, sweats, weight loss     Eyes: No visual changes or diplopia. No scleral icterus. ENT: No Headaches, hearing loss or vertigo. No mouth sores or sore throat. Cardiovascular: No chest pain, dyspnea on exertion, palpitations or loss of consciousness. Respiratory: No cough or wheezing, no sputum production. No hemoptysis. .    Gastrointestinal: No abdominal pain, appetite loss, blood in stools. No change in bowel habits. Genitourinary: No dysuria, trouble voiding, or hematuria. Musculoskeletal:  Generalized weakness. No joint complaints. Integumentary: No rash or pruritis. Neurological: No headache, diplopia. No change in gait, balance, or coordination. No paresthesias. Endocrine: No temperature intolerance. No excessive thirst, fluid intake, or urination.    Hematologic/Lymphatic: No abnormal bruising or ecchymoses, blood clots or swollen lymph nodes. Allergic/Immunologic: No nasal congestion or hives. Objective:     Patient Vitals for the past 8 hrs:   Resp Weight   09/25/22 0700 16 --   09/25/22 0551 -- 164 lb 10.9 oz (74.7 kg)     I/O last 3 completed shifts:   In: 1 [P.O.:360; IV Piggyback:50]  Out: 0730 [Urine:1000; Drains:35]  I/O this shift:  In: 120 [P.O.:120]  Out: -     BP (!) 146/99   Pulse 95   Temp 98.8 °F (37.1 °C) (Oral)   Resp 16   Ht 5' 8\" (1.727 m)   Wt 164 lb 10.9 oz (74.7 kg)   SpO2 96%   BMI 25.04 kg/m²     General Appearance:    Alert, cooperative, no distress, appears stated age   Head:    Normocephalic, without obvious abnormality, atraumatic   Eyes:    PERRL, conjunctiva/corneas clear, EOM's intact, fundi     benign, both eyes        Ears:    Normal TM's and external ear canals, both ears   Nose:   Nares normal, septum midline, mucosa normal, no drainage    or sinus tenderness   Throat:   Lips, mucosa, and tongue normal; teeth and gums normal   Neck:   Supple, symmetrical, trachea midline, no adenopathy;        thyroid:  No enlargement/tenderness/nodules; no carotid    bruit or JVD   Back:     Symmetric, no curvature, ROM normal, no CVA tenderness   Lungs:     Clear to auscultation bilaterally, respirations unlabored   Chest wall:    No tenderness or deformity   Heart:    Regular rate and rhythm, S1 and S2 normal, no murmur, rub   or gallop   Abdomen:     Soft, non-tender, bowel sounds active all four quadrants,     no masses, no organomegaly           Extremities:   Extremities normal, atraumatic, no cyanosis or edema   Pulses:   2+ and symmetric all extremities   Skin:   Skin color, texture, turgor normal, no rashes or lesions   Lymph nodes:   Cervical, supraclavicular, and axillary nodes normal   Neurologic:   Stable       Data ReviewCBC:   Lab Results   Component Value Date/Time    WBC 7.7 09/24/2022 06:40 AM    RBC 2.79 09/24/2022 06:40 AM       Assessment:     Principal Problem:    Intractable nausea and vomiting  Active Problems:    Septicemia ([facility] [address]    IVDU (intravenous drug user)    MRSA bacteremia    Sepsis due to methicillin resistant Staphylococcus aureus (MRSA) without acute organ dysfunction ([address]    Bacterial infection due to Serratia    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 *      Plan:     1. Anemia. Her anemia is likely multifactorial.  Her iron studies are consistent with acute inflammation with a very high ferritin. This is likely due to her endocarditis and spinal abscess. These infections are likely causing bone marrow suppression. She is also on linezolid and has been on multiple antibiotics that can cause bone marrow suppression. She also has acute kidney injury. Hopefully nephrology is ordering erythropoietin with her hemodialysis. Transfuse as needed.           Electronically signed by Otilia Cuevas MD on 9/25/2022 at 1:00 PM

## 2022-09-25 NOTE — PROGRESS NOTES
[facility]  Phone: [phone]  Fax: [phone]  [facility]. [facility]         39 y/o WF with h/o depression, IVDA and back pain admitted with feeling weak, fatigued and pain in her back She was seen in ER on 9/7/22 with back apin and diagnosed with UTI Received toradol and was discharged on Motrin and Cefdinir Urine CX grew klebsiella pan senstive However she continued to feel poorly with back pain, and fatigue   Had fever to 101 on admission Started on Vancomycin and Cefepime Cr was 1.5 mg on admission improved to 1.1 mg but has increased to 1.8 mg now [facility] from admission growing MRSA and Serratia CT chest with cavitation and septic emboli  She reports decreased UOP no dysuria hematuria  Has been taking Ibuprofen 800 mg TID prior to admission  MRI showed OM of R SI joint and large retroperitoneal abscess   Underwent drainage tube placement . Events noted , labs reviewed . Feels OK  UOP 1000 ml/24    REVIEW OF SYSTEMS:  No CP/SOB or GEIGER not taking much PO    No Family at bed side     Physical Exam:    VITALS:  BP (!) 160/100   Pulse 85   Temp 98.6 °F (37 °C) (Oral)   Resp 20   Ht 5' 8\" (1.727 m)   Wt 164 lb 10.9 oz (74.7 kg)   SpO2 95%   BMI 25.04 kg/m²   24HR INTAKE/OUTPUT:    Intake/Output Summary (Last 24 hours) at 9/25/2022 1649  Last data filed at 9/25/2022 0958  Gross per 24 hour   Intake 410 ml   Output 300 ml   Net 110 ml       Constitutional:  awake   Respiratory:  Decrease BS at  bases   Gastrointestinal:  + tenderness. Normal Bowel Sounds  Cardiovascular:  S1, S2 RRR   Edema:  +  edema  Acc Rt TDC .     DATA:    CBC:  Lab Results   Component Value Date/Time    WBC 7.7 09/24/2022 06:40 AM    RBC 2.79 09/24/2022 06:40 AM    HGB 7.8 09/24/2022 06:40 AM    HCT 23.3 09/24/2022 06:40 AM    MCV 83.4 09/24/2022 06:40 AM    MCH 28.2 09/24/2022 06:40 AM    MCHC 33.8 09/24/2022 06:40 AM    RDW 15.7 09/24/2022 06:40 AM     09/24/2022 06:40 AM    MPV 7.1 09/24/2022 06:40 AM CMP:  Lab Results   Component Value Date/Time     09/24/2022 06:40 AM    K 3.9 09/24/2022 06:40 AM    K 4.3 09/19/2022 05:51 AM    CL 94 09/24/2022 06:40 AM    CO2 28 09/24/2022 06:40 AM    BUN 25 09/24/2022 06:40 AM    CREATININE 3.3 09/24/2022 06:40 AM    GFRAA 19 09/24/2022 06:40 AM    GFRAA >60 07/28/2012 11:20 PM    AGRATIO 0.4 09/12/2022 03:44 PM    LABGLOM 15 09/24/2022 06:40 AM    GLUCOSE 86 09/24/2022 06:40 AM    PROT 5.6 09/21/2022 07:36 PM    PROT 6.5 07/28/2012 11:20 PM    CALCIUM 7.8 09/24/2022 06:40 AM    BILITOT 0.3 09/21/2022 05:54 AM    ALKPHOS 106 09/21/2022 05:54 AM    AST 19 09/21/2022 05:54 AM    ALT 16 09/21/2022 05:54 AM      Hepatic Function Panel:   Lab Results   Component Value Date/Time    ALKPHOS 106 09/21/2022 05:54 AM    ALT 16 09/21/2022 05:54 AM    AST 19 09/21/2022 05:54 AM    PROT 5.6 09/21/2022 07:36 PM    PROT 6.5 07/28/2012 11:20 PM    BILITOT 0.3 09/21/2022 05:54 AM    BILIDIR <0.2 09/21/2022 05:54 AM    IBILI see below 09/21/2022 05:54 AM      Phosphorus:   Lab Results   Component Value Date/Time    PHOS 5.5 09/24/2022 06:40 AM       ASSESSMENT:  Principal Problem:    Intractable nausea and vomiting  Active Problems:    Septicemia (HCC)    IVDU (intravenous drug user)    MRSA bacteremia    Sepsis due to methicillin resistant Staphylococcus aureus (MRSA) without acute organ dysfunction ([facility] [address]    Bacterial infection due to Serratia    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 *      PLAN  Assessment/  1-MARYAN suspect Vancomycin toxicity /ATN/ infectious GN . 1st HD 9/20  UOP 1000 ml/24 Monitor for recovery   S./p [facility]  9/23 . Will need Kidney Bx   2-MRSA and serratia bacteremia with retroperitoneal abscess  septic pulmonary emboli and possible TV endocarditis  3-IVDA   4 Anemia  Hb stable 7.8 gm S/p Tx . Epo with HD.   5 Hypocalcemia with severe Hypoalbuminemia improving PO4 at goal        Stephanie Caro MD, [address]

## 2022-09-25 NOTE — PROGRESS NOTES
Infectious Disease Follow up Notes  Admit Date: 9/12/2022  Hospital Day: 14    Antibiotics :   IV Linezolid  IV Ceftaroline      CHIEF COMPLAINT:     Sepsis  MRSA bacteremia  Serratia Bacteremia  Endocarditis  IVDA  TV Vegetation   Rt gluteal abscess     Subjective interval History :  40 y. o.woman with a history of IV drug abuse, ADHD, back pain, depression admitted to the hospital secondary to fever chills fatigue back pain. Patient family noted she was unable to get up from the bed for the past 3 days secondary to fevers and not feeling well. Admission labs indicate creatinine 1.5 procalcitonin elevated to 6.6, WBC elevated 15.5 hemoglobin 9.4 bilirubin 1.3 AST 42, blood cultures from admission positive for MRSA as well as Serratia. T-max 103.8 on admission. CT chest with diffuse innumerable bilateral pulmonary nodules consistent with cavitation and septic emboli. Given the presentation concern is for endocarditis secondary to IV drug abuse.   Patient not much interactive during normalization some of the history is provided by her daughters,       Interval History : KRYSTLE drain in place working ok  and Rt gluteal pain going down and tolerating IV abx ok and HD line in place creat still high on going back dominga  will check inflammatory markers in follow up      Past Medical History:    Past Medical History:   Diagnosis Date    ADHD (attention deficit hyperactivity disorder)     Arthritis     Back pain     Depression     Migraine     Neutrophilia 9/15/2022       Past Surgical History:    Past Surgical History:   Procedure Laterality Date    IR INSERT NON TUNNELED CV CATH LESS THAN 5 YEARS Right 09/20/2022    Adelita Boeck; RIJ access; 15cm; Dr. Wong Whittaker    IR NONTUNNELED VASCULAR CATHETER  09/20/2022    IR NONTUNNELED VASCULAR CATHETER 9/20/2022 [facility] SPECIAL PROCEDURES    KNEE ARTHROSCOPY  10/05/2010    PARTIAL HYSTERECTOMY (CERVIX NOT REMOVED) TUBAL LIGATION  01/01/2004    TUNNELED VENOUS CATHETER PLACEMENT Right 09/23/2022    Permacath; RIJ access; 19cm; Dr. Bria Fowler       Current Medications:    No outpatient medications have been marked as taking for the 9/12/22 encounter [facility] Encounter).        Allergies:  Ultram [tramadol hcl], Robaxin [methocarbamol], and Penicillins    Immunizations :   Immunization History   Administered Date(s) Administered    Tdap (Boostrix, Adacel) 02/11/2015       Social History:    Social History     Tobacco Use    Smoking status: Every Day     Packs/day: 0.50     Years: 2.00     Pack years: 1.00     Types: Cigarettes    Smokeless tobacco: Never   Substance Use Topics    Alcohol use: Yes     Comment: occas    Drug use: No     Social History     Tobacco Use   Smoking Status Every Day    Packs/day: 0.50    Years: 2.00    Pack years: 1.00    Types: Cigarettes   Smokeless Tobacco Never      Family History   Problem Relation Age of Onset    Breast Cancer Maternal Grandmother 45    Arthritis Other     Asthma Other     Cancer Other     Diabetes Other     Seizures Other     Stroke Other           REVIEW OF SYSTEMS:      Constitutional:  fevers,  chills +=, night sweats  Eyes:  negative for blurred vision, eye discharge, visual disturbance   HEENT:  negative for hearing loss, ear drainage,nasal congestion  Respiratory:  negative for cough, shortness of breath or hemoptysis   Cardiovascular:  negative for chest pain, palpitations, syncope  Gastrointestinal:  negative for nausea, vomiting, diarrhea, constipation, abdominal pain  Genitourinary:  negative for frequency, dysuria, urinary incontinence, hematuria  Hematologic/Lymphatic:  negative for easy bruising, bleeding and lymphadenopathy  Allergic/Immunologic:  negative for recurrent infections, angioedema, anaphylaxis   Endocrine:  negative for weight changes, polyuria, polydipsia and polyphagia  Musculoskeletal:  Rt hip and lower back pain ++   pain, swelling, decreased range of motion  Integumentary: No rashes, skin lesions  Neurological:  negative for headaches, slurred speech, unilateral weakness  Psychiatric: negative for hallucinations,confusion,agitation.                 PHYSICAL EXAM:      Vitals:    BP (!) 146/99   Pulse 95   Temp 98.8 °F (37.1 °C) (Oral)   Resp 16   Ht 5' 8\" (1.727 m)   Wt 164 lb 10.9 oz (74.7 kg)   SpO2 96%   BMI 25.04 kg/m²        General Appearance: alert,in  acute distress, ++ pallor, no icterus  poor skin hygiene   Skin: warm and dry, no rash or erythema  Head: normocephalic and atraumatic  Eyes: pupils equal, round, and reactive to light, conjunctivae normal  ENT: tympanic membrane, external ear and ear canal normal bilaterally, nose without deformity, nasal mucosa and turbinates normal without polyps  Neck: supple and non-tender without mass, no thyromegaly  no cervical lymphadenopathy  Pulmonary/Chest: Bi basal crepts+ - no wheezes, rales or rhonchi, normal air movement, no respiratory distress  Cardiovascular: normal rate, regular rhythm, normal S1 and S2, esm+ murmurs, rubs, clicks, or gallops, no carotid bruits  Abdomen: soft, non-tender, non-distended, normal bowel sounds, no masses or organomegaly  Extremities: no cyanosis, clubbing or edema  Musculoskeletal: normal range of motion, no joint swelling, deformity or tenderness  Integumentary: No rashes, no abnormal skin lesions, no petechiae  Neurologic: reflexes normal and symmetric, no cranial nerve deficit  Psych:  Orientation, sensorium, mood normal            Lines: PICC  Needle tracks++   Rt gluteal area pain jerri DRAIN_   Lower leg multiple skin lesions from IVDA++   HD line in place     Data Review:    CBC:   Lab Results   Component Value Date    WBC 7.7 09/24/2022    HGB 7.8 (L) 09/24/2022    HCT 23.3 (L) 09/24/2022    MCV 83.4 09/24/2022     09/24/2022     RENAL:   Lab Results   Component Value Date    CREATININE 3.3 (H) 09/24/2022    BUN 25 (H) 09/24/2022     (L) 09/24/2022 K 3.9 09/24/2022    CL 94 (L) 09/24/2022    CO2 28 09/24/2022     SED RATE:   Lab Results   Component Value Date/Time    SEDRATE 51 07/23/2019 09:59 PM     CK:   Lab Results   Component Value Date/Time    CKTOTAL 10 09/18/2022 04:55 PM     CRP: No results found for: CRP  Hepatic Function Panel:   Lab Results   Component Value Date/Time    ALKPHOS 106 09/21/2022 05:54 AM    ALT 16 09/21/2022 05:54 AM    AST 19 09/21/2022 05:54 AM    PROT 5.6 09/21/2022 07:36 PM    PROT 6.5 07/28/2012 11:20 PM    BILITOT 0.3 09/21/2022 05:54 AM    BILIDIR <0.2 09/21/2022 05:54 AM    IBILI see below 09/21/2022 05:54 AM    LABALBU 2.2 09/24/2022 06:40 AM     UA:  Lab Results   Component Value Date/Time    COLORU Yellow 09/18/2022 04:36 PM    CLARITYU CLOUDY 09/18/2022 04:36 PM    GLUCOSEU Negative 09/18/2022 04:36 PM    GLUCOSEU NEGATIVE 07/31/2010 02:59 PM    BILIRUBINUR Negative 09/18/2022 04:36 PM    BILIRUBINUR NEGATIVE 07/31/2010 02:59 PM    KETUA Negative 09/18/2022 04:36 PM    SPECGRAV 1.008 09/18/2022 04:36 PM    BLOODU LARGE 09/18/2022 04:36 PM    PHUR 5.0 09/18/2022 04:36 PM    PROTEINU 100 09/18/2022 04:36 PM    UROBILINOGEN 0.2 09/18/2022 04:36 PM    NITRU Negative 09/18/2022 04:36 PM    LEUKOCYTESUR MODERATE 09/18/2022 04:36 PM    LABMICR YES 09/18/2022 04:36 PM    URINETYPE NotGiven 09/18/2022 04:36 PM      Urine Microscopic:   Lab Results   Component Value Date/Time    BACTERIA None Seen 09/18/2022 04:36 PM    COMU see below 09/07/2022 01:55 AM    HYALCAST 6 09/18/2022 04:36 PM    WBCUA 98 09/18/2022 04:36 PM    RBCUA 14 09/18/2022 04:36 PM    EPIU 1 09/18/2022 04:36 PM     Urine Reflex to Culture:   Lab Results   Component Value Date/Time    URRFLXCULT Yes 09/12/2022 04:16 PM      Summary   Severe eccentric tricuspid regurgitation. Small, mobile tricuspid valve vegetation noted measuring 0.9cm x 0.4cm.       Signature      ------------------------------------------------------------------   Electronically signed by Carina Sherwood MD (Interpreting   physician) on 09/16/2022 at 04:29 PM   ------------------------------------------------------------------    MICRO: cultures reviewed and updated by me   Blood Culture:          MRSA DNA Probe, Nasal [[id number]] Collected: 09/13/22 1220   Order Status: Completed Specimen: Nares Updated: 09/14/22 1131    MRSA SCREEN RT-PCR --    Negative  MRSA DNA not detected. Normal Range: Not detected    Narrative:     ORDER#: [id number]                          ORDERED BY: Trae Saldaña   SOURCE: Nares                              COLLECTED:  09/13/22 12:20   ANTIBIOTICS AT ANABELLA.:                      RECEIVED :  09/13/22 12:46   Blood Culture 1 [[id number]] (Abnormal) Collected: 09/12/22 1616   Order Status: Completed Specimen: Blood Updated: 09/14/22 0939    Blood Culture, Routine -- Abnormal     Gram stain Aerobic bottle:   Gram positive cocci in clusters   resembling Staphylococcus   Information to follow   and   Gram negative rods   Information to follow   Gram stain Anaerobic bottle:   Gram positive cocci in clusters   resembling Staphylococcus   Information to follow    Abnormal     Organism Staph aureus MRSA DNA Detected Abnormal     Blood Culture, Routine -- Abnormal     CONTACT PRECAUTIONS INDICATED   See additional report for complete BCID panel. mecA/C gene and MREJ gene Detected. Abnormal     Organism Serratia marcescens DNA Detected Abnormal     Blood Culture, Routine See additional report for complete BCID panel.     Organism Staph aureus MRSA Abnormal     Blood Culture, Routine -- Abnormal     POSITIVE for   Sensitivity to follow   CONTACT PRECAUTIONS INDICATED   Isolated two of two sets    Abnormal     Organism Serratia marcescens Abnormal     Blood Culture, Routine --    POSITIVE for   Sensitivity to follow   Isolated one of two sets    Narrative:     ORDER#: [id number]                          ORDERED BY: Juaquin Chilel   SOURCE: Blood COLLECTED:  09/12/22 16:16   ANTIBIOTICS AT A[facility]BELLA.:                      RECEIVED :  09/12/22 16:32   [phone],   Microbiology results called to and read back by Nelly Domingo RN,   09/13/2022 08:24, by [facility]   Microbiology results called to and read back by [facility],   09/13/2022 07:05, by [facility]   If child <=2 yrs old please draw pediatric bottle. ~Blood Culture 1   Culture, Blood 2 [[id number]] Collected: 09/14/22 0525   Order Status: Sent Specimen: Blood Updated: 09/14/22 0600   Culture, Blood 1 [[id number]] Collected: 09/14/22 0436   Order Status: Sent Specimen: Blood Updated: 09/14/22 0441   Culture, Urine [[id number]] Collected: 09/12/22 1616   Order Status: Completed Specimen: Urine, clean catch Updated: 09/13/22 2245    Urine Culture, Routine No growth at 18 to 36 hours   Narrative:     ORDER#: [id number]                          ORDERED BY: Yves Lentz   SOURCE: Urine Clean Catch                  COLLECTED:  09/12/22 16:16   ANTIBIOTICS AT ANABELLA.:                      RECEIVED :  09/12/22 19:35   Culture, Blood 2 [[id number]] (Abnormal) Collected: 09/12/22 2012   Order Status: Completed Specimen: Blood Updated: 09/13/22 1416    Culture, Blood 2 -- Abnormal     Gram stain Aerobic bottle:   Gram positive cocci in clusters   resembling Staphylococcus   Information to follow   Gram stain Anaerobic bottle:   Gram positive cocci in clusters   resembling Staphylococcus   Information to follow    Abnormal    Narrative:     ORDER#: [id number]                          ORDERED BY: Yves Lentz   SOURCE: Blood                              COLLECTED:  09/12/22 20:12   ANTIBIOTICS AT ANABELLA.:                      RECEIVED :  09/12/22 20:21   CALL  [facility]  [facility] tel. [phone],   Previous panic on this admission - call not needed per SOP, 09/13/2022 10:56,   by [facility]   If child <=2 yrs old please draw pediatric bottle. ~Blood Culture #2   Strep Pneumoniae Antigen [[id number]] Collected: 09/12/22 1900   Order Status: Completed Specimen: Urine, clean catch Updated: 09/13/22 1054    STREP PNEUMONIAE ANTIGEN, URINE --    Presumptive Negative   Presumptive negative suggests no current or recent   pneumococcal infection. Infection due to Strep pneumoniae   cannot be ruled out since the antigen present in the sample   may be below the detection limit of the test.   Normal Range:Presumptive Negative    Narrative:     ORDER#: [id number]                          ORDERED BY: Ambrosio Wayne   SOURCE: Urine Clean Catch                  COLLECTED:  09/12/22 19:00   ANTIBIOTICS AT A[facility]BELLA.:                      RECEIVED :  09/13/22 07:04   Legionella antigen, urine [[id number]] Collected: 09/12/22 1900   Order Status: Completed Specimen: Urine, clean catch Updated: 09/13/22 1047    L. pneumophila Serogp 1 Ur Ag --    Presumptive Negative   No Legionella pneumophila serogroup 1 antigens detected. A negative result does not exclude infection with   Legionella pneumophila serogroup 1 nor does it rule out   other microbial-caused respiratory infections or   disease caused by other serogroups of   Legionella pneumophila.    Normal Range: Presumptive Negative    Narrative:     ORDER#: [id number]                          ORDERED BY: Ambrosio Wayne   SOURCE: Urine Clean Catch                  COLLECTED:  09/12/22 19:00   ANTIBIOTICS AT [facility]BELLA.:                      RECEIVED :  09/13/22 07:04   Culture, Blood, PCR ID Panel [[id number]] Collected: 09/12/22 1616   Order Status: Completed Updated: 09/13/22 0707    Report SEE IMAGE   Narrative:     Donna Landis  [id number] tel. [phone],   Microbiology results called to and read back by Maddy Campos,   09/13/2022 07:05, by Michelle Cassette   Culture, Blood 2 [[id number]] Collected: 09/12/22 0000   Order Status: Canceled Specimen: Blood    Culture, Blood 1 [[id number]] Collected: 09/12/22 0000   Order Status: Canceled Specimen: Blood      Lab Results   Component Value Date/Time    BC No Growth after 4 days of incubation. 09/14/2022 04:36 AM    BLOODCULT2 No Growth after 4 days of incubation. 09/14/2022 05:25 AM      Collected: 09/16/22 1210   Order Status: Completed Specimen:  Body Fluid from Abscess Updated: 09/19/22 0615    Gram Stain Result 4+ WBC's (Polymorphonuclear)   2+ Gram positive cocci   1+ Gram negative rods    Abnormal     Anaerobic Culture --    Anaerobic culture further report to follow   No anaerobes isolated so far, Further report to follow     Organism Serratia marcescens Abnormal     WOUND/ABSCESS Light growth    Organism Staph aureus MRSA Abnormal     WOUND/ABSCESS -- Abnormal     Moderate growth   CONTACT PRECAUTIONS INDICATED    Abnormal    Narrative:     ORDER#: [id number]                          ORDERED BY: RAMILA MARTINES   SOURCE: Abscess Rt gluteal abscess         COLLECTED:  09/16/22 12:10   ANTIBIOTICS AT ANABELLA.:                      RECEIVED :  09/16/22 12:32   CALL  [facility]  [facility] tel. [phone],   Previous panic on this admission - call not needed per SOP, 09/17/2022 13:04,      Susceptibility    Staph aureus mrsa (3)    Antibiotic Interpretation Microscan  Method Status    ceFAZolin Resistant >16 mcg/mL BACTERIAL SUSCEPTIBILITY PANEL BY YA     clindamycin Sensitive <=0.5 mcg/mL BACTERIAL SUSCEPTIBILITY PANEL BY YA     erythromycin Resistant >4 mcg/mL BACTERIAL SUSCEPTIBILITY PANEL BY YA     oxacillin Resistant >2 mcg/mL BACTERIAL SUSCEPTIBILITY PANEL BY YA     tetracycline Sensitive <=4 mcg/mL BACTERIAL SUSCEPTIBILITY PANEL BY YA     trimethoprim-sulfamethoxazole Sensitive 1/19 mcg/mL BACTERIAL SUSCEPTIBILITY PANEL BY YA     vancomycin Sensitive 2 mcg/mL BACTERIAL SUSCEPTIBILITY PANEL BY YA       Serratia marcescens (4)    Antibiotic Interpretation Microscan  Method Status    amoxicillin-clavulanate Resistant >16/8 mcg/mL BACTERIAL SUSCEPTIBILITY PANEL BY YA     ampicillin Resistant >16 mcg/mL BACTERIAL SUSCEPTIBILITY PANEL BY YA     ampicillin-sulbactam Resistant 16/8 mcg/mL BACTERIAL SUSCEPTIBILITY PANEL BY YA     ceFAZolin Resistant >16 mcg/mL BACTERIAL SUSCEPTIBILITY PANEL BY YA     cefepime Sensitive <=2 mcg/mL BACTERIAL SUSCEPTIBILITY PANEL BY YA     cefTRIAXone Sensitive <=1 mcg/mL BACTERIAL SUSCEPTIBILITY PANEL BY YA     cefuroxime Resistant >16 mcg/mL BACTERIAL SUSCEPTIBILITY PANEL BY YA     ciprofloxacin Sensitive <=1 mcg/mL BACTERIAL SUSCEPTIBILITY PANEL BY YA     ertapenem Sensitive <=0.5 mcg/mL BACTERIAL SUSCEPTIBILITY PANEL BY YA     gentamicin Sensitive <=4 mcg/mL BACTERIAL SUSCEPTIBILITY PANEL BY YA     meropenem Sensitive <=1 mcg/mL BACTERIAL SUSCEPTIBILITY PANEL BY AY     piperacillin-tazobactam Sensitive <=16 mcg/mL BACTERIAL SUSCEPTIBILITY PANEL BY YA     trimethoprim-sulfamethoxazole Sensitive <=2/38 mcg/mL BACTERIAL SUSCEPTIBILITY PANEL BY YA      Condensed View       Respiratory Culture:  Lab Results   Component Value Date/Time    LABGRAM  09/16/2022 12:10 PM     4+ WBC's (Polymorphonuclear)  2+ Gram positive cocci  1+ Gram negative rods       AFB:No results found for: AFBSMEAR  Viral Culture:  Lab Results   Component Value Date/Time    COVID19 Not Detected 09/07/2022 12:00 AM    COVID19 Not Detected 07/20/2022 11:20 PM     Urine Culture:   No results for input(s): LABURIN in the last 72 hours. Summary   Normal left ventricle size, wall thickness, and systolic function with an   estimated ejection fraction of 60-65%. No regional wall motion abnormalities   are seen. Normal diastolic function. Normal right ventricular size and function. Mobile vegetation on tricuspid valve suggestive of endocarditis. Moderate tricuspid regurgitation. The right atrium is mildly dilated.       Signature      ------------------------------------------------------------------   Electronically signed by Gold Stark MD   (Interpreting physician) on 09/13/2022 at 04:15 PM ------------------------------------------------------------------    IMAGING:    IR REPLACE TUNNELED CVC W SQ PORT SAME ACCESS   Final Result   Successful conversion of a non tunneled hemodialysis catheter for a new 19   centimeter tunneled hemodialysis catheter. IR NONTUNNELED VASCULAR CATHETER > 5 YEARS   Final Result   Successful ultrasound and fluoroscopy guided non-tunneled Trialysis catheter   placement. US RENAL COMPLETE   Final Result   Unremarkable ultrasound of the kidneys and urinary bladder. No renal   atrophy, hydronephrosis or abnormal echotexture. CT ABSCESS DRAINAGE W CATH PLACEMENT S&I   Final Result   Successful CT guided placement of a 10 Austrian drainage catheter in the right   iliacus abscess. CT GUIDED NEEDLE PLACEMENT   Final Result   Successful CT guided placement of a 10 Austrian drainage catheter in the right   iliacus abscess. MRI LUMBAR SPINE W WO CONTRAST   Final Result   Partially visualized septic arthritis/osteomyelitis of the right sacroiliac   joint, with a large adjacent retroperitoneal abscess extending into the right   pelvic sidewall. Small intramuscular abscess also present within the left psoas muscle. Epidural phlegmon/abscess of the sacral canal.      Pelvic ascites. The findings were sent to the Radiology Results [address] at 7:23   pm on 9/15/2022 to be communicated to a licensed caregiver. VL Extremity Venous Bilateral   Final Result      CT HEAD WO CONTRAST   Final Result   No acute intracranial abnormality.          CT CHEST WO CONTRAST   Final Result   Diffuse in numeral bilateral pulmonary nodules many of which are cavitary   becoming coalescent at the right lung apex however diffusely throughout the   bilateral lungs consistent of septic emboli with small to moderate right   pleural effusion      Partially visualized portions of the upper abdomen reveal hepatosplenomegaly         XR CHEST PORTABLE   Final Result   Multifocal patchy airspace disease and cavitary nodules. The appearance is   suggestive of septic emboli in this clinical setting. Follow-up recommended to assure resolution.                All the pertinent images and reports for the current Hospitalization were reviewed by me     Scheduled Meds:   linezolid  600 mg Oral BID    b complex-C-folic acid  1 capsule Oral Daily    sevelamer  800 mg Oral TID WC    ceftaroline fosamil (TEFLARO) IVPB  200 mg IntraVENous q8h    heparin (porcine)  5,000 Units SubCUTAneous 3 times per day    lidocaine 1 % injection  5 mL IntraDERmal Once    sodium chloride flush  5-40 mL IntraVENous 2 times per day       Continuous Infusions:   sodium chloride      sodium chloride 25 mL (09/23/22 2212)       PRN Meds:  morphine, heparin (porcine), LORazepam, sodium chloride, chlorhexidine, sodium chloride flush, sodium chloride, hydrOXYzine pamoate, naloxone, calcium carbonate, promethazine, promethazine **OR** ondansetron, acetaminophen **OR** acetaminophen, perflutren lipid microspheres      Assessment:     Patient Active Problem List   Diagnosis    Tear of medial cartilage or meniscus of knee, current    Plica syndrome    Boxer's metacarpal fracture, neck, closed    Chondromalacia of patella    Degeneration of lumbar or lumbosacral intervertebral disc    Varicose veins of lower extremity    Intractable nausea and vomiting    Septicemia (HCC)    IVDU (intravenous drug user)    MRSA bacteremia    Sepsis due to methicillin resistant Staphylococcus aureus (MRSA) without acute organ dysfunction ([address]    Bacterial infection due to Serratia    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 WBC count is elevated high fever from ongoing bloodstream infection. Given IV drug abuse suspect endocarditis transthoracic echocardiogram with TV Vegetation    Trend WBC and repeat Blood cx NGTD    Will need placement to complete IV ABX        Rt gluteal and lower Lumbar area pain MRI L spine with abscess, septic joint     S/p IR aspiration of the abscess cx MRSA and serratia noted    Unfortunately kidney function continues to decline nephrology is now following. IV antibiotics have been adjusted    Will need a long course of antibiotic given the MRSA bacteremia and septic arthritis    HD line placed due to worsening Kidney function -completed hemodialysis for session without any issues. Unfortunately creatinine still remains elevated.     May need repeat MRI L spine in future     Check CRP and ESR tomorrow - orders in place          Labs, Microbiology, Radiology and all the pertinent results from current hospitalization and  care every where were reviewed  by me as a part of the evaluation   Plan:   Cont  IV Ceftaroline - 200 mg q x 8 HRS will cover MRSA and Serratia adjusted to GFR  Cont  oral  Linezolid x 600 mg q 12 HRS   Repeat Blood cx    NGTD  TTE Abnormal with vegetation   Will need IV abx  Watch for complications  HIV -ve and Hepatitis screen Hep C+Ve  Cardiology consult  STEFANO completed  d/w Dr. Martha Roach is small for angiovac  would be able to treat with IV abx   MRI L spine noted very abnormal s/p ID abscess aiarnold  Daptomycin not effective in LUNGS and Vancomycin resulted in MARYAN -   Needs placement   Await kidney function recovery to decide on adjusting the long-term antibiotics  May need repeat MRI once clinically better          Discussed with patient/Family and Nursing   Risk of Complications/Morbidity: High      Illness(es)/ Infection present that pose threat to bodily function. There is potential for severe exacerbation of infection/side effects of treatment. Therapy requires intensive monitoring for antimicrobial agent toxicity. Discussed with patient/Family and Nursing staff     Thanks for allowing me to participate in your patient's care and please call me with any questions or concerns.     Jose De Jesus Plaza MD  Infectious Disease  [facility] ([facility]) Physician  Phone: [phone]   Fax : [phone]

## 2022-09-25 NOTE — PLAN OF CARE
Problem: Discharge Planning  Goal: Discharge to home or other facility with appropriate resources  Outcome: Progressing  Flowsheets (Taken 9/24/2022 2207)  Discharge to home or other facility with appropriate resources: Identify barriers to discharge with patient and caregiver     Problem: Safety - Adult  Goal: Free from fall injury  Outcome: Progressing     Problem: Pain  Goal: Verbalizes/displays adequate comfort level or baseline comfort level  Outcome: Progressing     Problem: ABCDS Injury Assessment  Goal: Absence of physical injury  Outcome: Progressing     Problem: Skin/Tissue Integrity  Goal: Absence of new skin breakdown  Description: 1. Monitor for areas of redness and/or skin breakdown  2. Assess vascular access sites hourly  3. Every 4-6 hours minimum:  Change oxygen saturation probe site  4. Every 4-6 hours:  If on nasal continuous positive airway pressure, respiratory therapy assess nares and determine need for appliance change or resting period.   Outcome: Progressing     Problem: Nutrition Deficit:  Goal: Optimize nutritional status  Outcome: Progressing     Problem: Nutrition Deficit:  Goal: Optimize nutritional status  Outcome: Progressing

## 2022-09-25 NOTE — PROGRESS NOTES
Progress Note    Admit Date: 9/12/2022         Subjective and Overnight Events:  Seen at bedside  Has no complaints\  No CP, SOB, lying in bed comfortably  Complains of back pain and requesting to increase her morphine dose but do not have any associated radiation of pain or urinary incontinance    Objective:   Vitals: BP (!) 146/99   Pulse 95   Temp 98.8 °F (37.1 °C) (Oral)   Resp 16   Ht 5' 8\" (1.727 m)   Wt 164 lb 10.9 oz (74.7 kg)   SpO2 96%   BMI 25.04 kg/m²   BP (!) 146/99   Pulse 95   Temp 98.8 °F (37.1 °C) (Oral)   Resp 16   Ht 5' 8\" (1.727 m)   Wt 164 lb 10.9 oz (74.7 kg)   SpO2 96%   BMI 25.04 kg/m²       General appearance: lying in bed comfortably  HEENT:  Conjunctivae/corneas clear. Neck: Supple, with full range of motion. Respiratory:  Normal respiratory effort. Clear to auscultation, bilaterally without Rales/Wheezes/Rhonchi. Cardiovascular: Regular rate and rhythm with normal S1/S2 without murmurs or rubs  Abdomen: Soft, non-tender, non-distended, normal bowel sounds. Musculoskeletal: No cyanosis or edema bilaterally  Neurologic:  without any focal sensory/motor deficits.  grossly non-focal.  Psychiatric: Alert and oriented, Normal mood  Peripheral Pulses: +2 palpable, equal bilaterally       Data:     Scheduled Medications:    linezolid  600 mg Oral BID    b complex-C-folic acid  1 capsule Oral Daily    sevelamer  800 mg Oral TID     ceftaroline fosamil (TEFLARO) IVPB  200 mg IntraVENous q8h    heparin (porcine)  5,000 Units SubCUTAneous 3 times per day    lidocaine 1 % injection  5 mL IntraDERmal Once    sodium chloride flush  5-40 mL IntraVENous 2 times per day      PRN Medications: morphine, heparin (porcine), LORazepam, sodium chloride, chlorhexidine, sodium chloride flush, sodium chloride, hydrOXYzine pamoate, naloxone, calcium carbonate, promethazine, promethazine **OR** ondansetron, acetaminophen **OR** acetaminophen, perflutren lipid microspheres  Diet: ADULT DIET; Regular    Continuous Infusions:   sodium chloride      sodium chloride 25 mL (09/23/22 2212)         Intake/Output Summary (Last 24 hours) at 9/25/2022 1221  Last data filed at 9/25/2022 0311  Gross per 24 hour   Intake 530 ml   Output 515 ml   Net 15 ml         CBC:   Recent Labs     09/23/22  0526 09/24/22  0640   WBC 7.6 7.7   HGB 7.8* 7.8*    195       BMP:  Recent Labs     09/23/22  0526 09/24/22  0640   * 133*   K 3.7 3.9   CL 96* 94*   CO2 29 28   BUN 20 25*   CREATININE 2.7* 3.3*   GLUCOSE 90 86       ABGs: No results found for: PHART, PO2ART, PIE2KAK    Assessment/plan     Patient Active Problem List:     Tear of medial cartilage or meniscus of knee, current     Plica syndrome     Boxer's metacarpal fracture, neck, closed     Chondromalacia of patella     Degeneration of lumbar or lumbosacral intervertebral disc     Varicose veins of lower extremity     Intractable nausea and vomiting     Septicemia (HCC)     IVDU (intravenous drug user)     MRSA bacteremia     Sepsis due to methicillin resistant Staphylococcus aureus (MRSA) without acute organ dysfunction (HCC)     Bacterial infection due to Serratia     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 According to the patient's sister patient has been feeling sick for past few days, not feeling well. Patient was noticed to have fevers as well as chills.      Assessment  Sepsis POA - endocarditis +/- cavitary PNA - MRSA/Serratia bacteremia   Pulmonary septic emboli, cavitary nodules  Endocarditis  History of IV drug abuse  ADHD  Anemia    Plan  Continue atbx as per ID - d/w ID, plan to continue Abx inpt  Endocarditis  + cardiology -  no angiovac recommended, recs to continue IV abx   Nephrology concerned about Vancomycin toxicity, plan for IR catheter for Dialysis   Consulted hematology, r/u hemolysis, ordered haptoglobin - elevated  Added ensure with meals as she has low po intake   Pain control  Continue IV Abx  Continue HD per nephrology  Pain control  Will need longterm Abx  Patient has Drain by IR, IR service to determine timing of drain removal    Full Code    Continue IV abx per ID, plan for renal Biopsy next week    Denise Weinberg MD

## 2022-09-26 LAB
ALBUMIN SERPL-MCNC: 2.7 G/DL (ref 3.4–5)
ANION GAP SERPL CALCULATED.3IONS-SCNC: 10 MMOL/L (ref 3–16)
APTT: 31 SEC (ref 23–34.3)
BASOPHILS ABSOLUTE: 0.1 K/UL (ref 0–0.2)
BASOPHILS RELATIVE PERCENT: 1 %
BUN BLDV-MCNC: 20 MG/DL (ref 7–20)
CALCIUM SERPL-MCNC: 8.1 MG/DL (ref 8.3–10.6)
CHLORIDE BLD-SCNC: 99 MMOL/L (ref 99–110)
CO2: 28 MMOL/L (ref 21–32)
CREAT SERPL-MCNC: 3.1 MG/DL (ref 0.6–1.1)
EOSINOPHILS ABSOLUTE: 0.3 K/UL (ref 0–0.6)
EOSINOPHILS RELATIVE PERCENT: 3.9 %
GFR AFRICAN AMERICAN: 20
GFR NON-AFRICAN AMERICAN: 17
GLUCOSE BLD-MCNC: 88 MG/DL (ref 70–99)
HCT VFR BLD CALC: 25.5 % (ref 36–48)
HEMOGLOBIN: 8.4 G/DL (ref 12–16)
INR BLD: 1.17 (ref 0.87–1.14)
LYMPHOCYTES ABSOLUTE: 1.5 K/UL (ref 1–5.1)
LYMPHOCYTES RELATIVE PERCENT: 16.8 %
MCH RBC QN AUTO: 27.6 PG (ref 26–34)
MCHC RBC AUTO-ENTMCNC: 32.9 G/DL (ref 31–36)
MCV RBC AUTO: 84 FL (ref 80–100)
MONOCYTES ABSOLUTE: 0.5 K/UL (ref 0–1.3)
MONOCYTES RELATIVE PERCENT: 6.2 %
NEUTROPHILS ABSOLUTE: 6.3 K/UL (ref 1.7–7.7)
NEUTROPHILS RELATIVE PERCENT: 72.1 %
PDW BLD-RTO: 16.1 % (ref 12.4–15.4)
PHOSPHORUS: 5.4 MG/DL (ref 2.5–4.9)
PLATELET # BLD: 155 K/UL (ref 135–450)
PMV BLD AUTO: 7.4 FL (ref 5–10.5)
POTASSIUM SERPL-SCNC: 4.3 MMOL/L (ref 3.5–5.1)
PROTHROMBIN TIME: 14.9 SEC (ref 11.7–14.5)
RBC # BLD: 3.03 M/UL (ref 4–5.2)
SODIUM BLD-SCNC: 137 MMOL/L (ref 136–145)
URINE ELECTROPHORESIS INTERP: NORMAL
WBC # BLD: 8.7 K/UL (ref 4–11)

## 2022-09-26 PROCEDURE — 6370000000 HC RX 637 (ALT 250 FOR IP): Performed by: INTERNAL MEDICINE

## 2022-09-26 PROCEDURE — 2060000000 HC ICU INTERMEDIATE R&B

## 2022-09-26 PROCEDURE — 97530 THERAPEUTIC ACTIVITIES: CPT

## 2022-09-26 PROCEDURE — 94760 N-INVAS EAR/PLS OXIMETRY 1: CPT

## 2022-09-26 PROCEDURE — 80069 RENAL FUNCTION PANEL: CPT

## 2022-09-26 PROCEDURE — 85730 THROMBOPLASTIN TIME PARTIAL: CPT

## 2022-09-26 PROCEDURE — 36415 COLL VENOUS BLD VENIPUNCTURE: CPT

## 2022-09-26 PROCEDURE — 99233 SBSQ HOSP IP/OBS HIGH 50: CPT | Performed by: INTERNAL MEDICINE

## 2022-09-26 PROCEDURE — 2580000003 HC RX 258: Performed by: INTERNAL MEDICINE

## 2022-09-26 PROCEDURE — 6360000002 HC RX W HCPCS: Performed by: INTERNAL MEDICINE

## 2022-09-26 PROCEDURE — 97535 SELF CARE MNGMENT TRAINING: CPT

## 2022-09-26 PROCEDURE — 85025 COMPLETE CBC W/AUTO DIFF WBC: CPT

## 2022-09-26 PROCEDURE — 85610 PROTHROMBIN TIME: CPT

## 2022-09-26 RX ADMIN — ANTACID TABLETS 500 MG: 500 TABLET, CHEWABLE ORAL at 08:24

## 2022-09-26 RX ADMIN — LINEZOLID 600 MG: 600 TABLET, FILM COATED ORAL at 21:14

## 2022-09-26 RX ADMIN — HEPARIN SODIUM 5000 UNITS: 5000 INJECTION INTRAVENOUS; SUBCUTANEOUS at 21:14

## 2022-09-26 RX ADMIN — MORPHINE SULFATE 1.5 MG: 2 INJECTION, SOLUTION INTRAMUSCULAR; INTRAVENOUS at 10:34

## 2022-09-26 RX ADMIN — LINEZOLID 600 MG: 600 TABLET, FILM COATED ORAL at 08:25

## 2022-09-26 RX ADMIN — CEFTAROLINE FOSAMIL 200 MG: 600 POWDER, FOR SOLUTION INTRAVENOUS at 22:05

## 2022-09-26 RX ADMIN — NEPHROCAP 1 MG: 1 CAP ORAL at 08:24

## 2022-09-26 RX ADMIN — LORAZEPAM 1 MG: 1 TABLET ORAL at 04:11

## 2022-09-26 RX ADMIN — SEVELAMER CARBONATE 800 MG: 800 TABLET, FILM COATED ORAL at 11:48

## 2022-09-26 RX ADMIN — MORPHINE SULFATE 1.5 MG: 2 INJECTION, SOLUTION INTRAMUSCULAR; INTRAVENOUS at 21:15

## 2022-09-26 RX ADMIN — LORAZEPAM 1 MG: 1 TABLET ORAL at 15:30

## 2022-09-26 RX ADMIN — HEPARIN SODIUM 5000 UNITS: 5000 INJECTION INTRAVENOUS; SUBCUTANEOUS at 06:08

## 2022-09-26 RX ADMIN — MORPHINE SULFATE 1.5 MG: 2 INJECTION, SOLUTION INTRAMUSCULAR; INTRAVENOUS at 01:15

## 2022-09-26 RX ADMIN — CEFTAROLINE FOSAMIL 200 MG: 600 POWDER, FOR SOLUTION INTRAVENOUS at 15:26

## 2022-09-26 RX ADMIN — HEPARIN SODIUM 5000 UNITS: 5000 INJECTION INTRAVENOUS; SUBCUTANEOUS at 14:36

## 2022-09-26 RX ADMIN — ACETAMINOPHEN 650 MG: 325 TABLET ORAL at 14:37

## 2022-09-26 RX ADMIN — MORPHINE SULFATE 1.5 MG: 2 INJECTION, SOLUTION INTRAMUSCULAR; INTRAVENOUS at 14:37

## 2022-09-26 RX ADMIN — SODIUM CHLORIDE, PRESERVATIVE FREE 10 ML: 5 INJECTION INTRAVENOUS at 21:15

## 2022-09-26 RX ADMIN — CEFTAROLINE FOSAMIL 200 MG: 600 POWDER, FOR SOLUTION INTRAVENOUS at 05:32

## 2022-09-26 RX ADMIN — ACETAMINOPHEN 650 MG: 325 TABLET ORAL at 03:22

## 2022-09-26 RX ADMIN — MORPHINE SULFATE 1.5 MG: 2 INJECTION, SOLUTION INTRAMUSCULAR; INTRAVENOUS at 06:21

## 2022-09-26 ASSESSMENT — PAIN SCALES - GENERAL
PAINLEVEL_OUTOF10: 4
PAINLEVEL_OUTOF10: 6
PAINLEVEL_OUTOF10: 6
PAINLEVEL_OUTOF10: 7
PAINLEVEL_OUTOF10: 2
PAINLEVEL_OUTOF10: 2
PAINLEVEL_OUTOF10: 4
PAINLEVEL_OUTOF10: 8
PAINLEVEL_OUTOF10: 7
PAINLEVEL_OUTOF10: 0
PAINLEVEL_OUTOF10: 8

## 2022-09-26 ASSESSMENT — PAIN DESCRIPTION - DESCRIPTORS
DESCRIPTORS: ACHING

## 2022-09-26 ASSESSMENT — PAIN DESCRIPTION - ONSET
ONSET: ON-GOING
ONSET: ON-GOING

## 2022-09-26 ASSESSMENT — PAIN SCALES - WONG BAKER: WONGBAKER_NUMERICALRESPONSE: 0

## 2022-09-26 ASSESSMENT — PAIN DESCRIPTION - LOCATION
LOCATION: BACK
LOCATION: HEAD
LOCATION: BACK

## 2022-09-26 ASSESSMENT — PAIN - FUNCTIONAL ASSESSMENT
PAIN_FUNCTIONAL_ASSESSMENT: ACTIVITIES ARE NOT PREVENTED
PAIN_FUNCTIONAL_ASSESSMENT: PREVENTS OR INTERFERES SOME ACTIVE ACTIVITIES AND ADLS
PAIN_FUNCTIONAL_ASSESSMENT: PREVENTS OR INTERFERES SOME ACTIVE ACTIVITIES AND ADLS

## 2022-09-26 ASSESSMENT — PAIN DESCRIPTION - PAIN TYPE
TYPE: ACUTE PAIN
TYPE: ACUTE PAIN

## 2022-09-26 ASSESSMENT — PAIN DESCRIPTION - FREQUENCY
FREQUENCY: CONTINUOUS
FREQUENCY: CONTINUOUS

## 2022-09-26 ASSESSMENT — PAIN DESCRIPTION - ORIENTATION
ORIENTATION: LOWER
ORIENTATION: INNER
ORIENTATION: LOWER

## 2022-09-26 NOTE — PROGRESS NOTES
Infectious Disease Follow up Notes  Admit Date: 9/12/2022  Hospital Day: 15    Antibiotics :   Oral Linezolid  IV Ceftaroline      CHIEF COMPLAINT:     Sepsis  MRSA bacteremia  Serratia Bacteremia  Endocarditis  IVDA  TV Vegetation   Rt gluteal abscess     Subjective interval History :  40 y. o.woman with a history of IV drug abuse, ADHD, back pain, depression admitted to the hospital secondary to fever chills fatigue back pain. Patient family noted she was unable to get up from the bed for the past 3 days secondary to fevers and not feeling well. Admission labs indicate creatinine 1.5 procalcitonin elevated to 6.6, WBC elevated 15.5 hemoglobin 9.4 bilirubin 1.3 AST 42, blood cultures from admission positive for MRSA as well as Serratia. T-max 103.8 on admission. CT chest with diffuse innumerable bilateral pulmonary nodules consistent with cavitation and septic emboli. Given the presentation concern is for endocarditis secondary to IV drug abuse.   Patient not much interactive during normalization some of the history is provided by her daughters,       Interval History : KRYSTLE drain in place working ok  and Rt gluteal pain going down creat remains elevated and cough + tolerating IV ab ok family at bed side     Past Medical History:    Past Medical History:   Diagnosis Date    ADHD (attention deficit hyperactivity disorder)     Arthritis     Back pain     Depression     Migraine     Neutrophilia 9/15/2022       Past Surgical History:    Past Surgical History:   Procedure Laterality Date    IR INSERT NON TUNNELED CV CATH LESS THAN 5 YEARS Right 09/20/2022    Say Hickman; RIJ access; 15cm; Dr. Maren Nobles    IR NONTUNNELED VASCULAR CATHETER  09/20/2022    IR NONTUNNELED VASCULAR CATHETER 9/20/2022 [facility] PROCEDURES    KNEE ARTHROSCOPY  10/05/2010    PARTIAL HYSTERECTOMY (CERVIX NOT REMOVED)      TUBAL LIGATION  01/01/2004    TUNNELED VENOUS CATHETER PLACEMENT Right 09/23/2022    Permacath; RIJ access; 19cm; Dr. Savana Diaz       Current Medications:    No outpatient medications have been marked as taking for the 9/12/22 encounter [facility] Encounter).        Allergies:  Ultram [tramadol hcl], Robaxin [methocarbamol], and Penicillins    Immunizations :   Immunization History   Administered Date(s) Administered    Tdap (Boostrix, Adacel) 02/11/2015       Social History:    Social History     Tobacco Use    Smoking status: Every Day     Packs/day: 0.50     Years: 2.00     Pack years: 1.00     Types: Cigarettes    Smokeless tobacco: Never   Substance Use Topics    Alcohol use: Yes     Comment: occas    Drug use: No     Social History     Tobacco Use   Smoking Status Every Day    Packs/day: 0.50    Years: 2.00    Pack years: 1.00    Types: Cigarettes   Smokeless Tobacco Never      Family History   Problem Relation Age of Onset    Breast Cancer Maternal Grandmother 45    Arthritis Other     Asthma Other     Cancer Other     Diabetes Other     Seizures Other     Stroke Other           REVIEW OF SYSTEMS:      Constitutional:  fevers,  chills +=, night sweats  Eyes:  negative for blurred vision, eye discharge, visual disturbance   HEENT:  negative for hearing loss, ear drainage,nasal congestion  Respiratory:  negative for cough, shortness of breath or hemoptysis   Cardiovascular:  negative for chest pain, palpitations, syncope  Gastrointestinal:  negative for nausea, vomiting, diarrhea, constipation, abdominal pain  Genitourinary:  negative for frequency, dysuria, urinary incontinence, hematuria  Hematologic/Lymphatic:  negative for easy bruising, bleeding and lymphadenopathy  Allergic/Immunologic:  negative for recurrent infections, angioedema, anaphylaxis   Endocrine:  negative for weight changes, polyuria, polydipsia and polyphagia  Musculoskeletal:  Rt hip and lower back pain ++   pain, swelling, decreased range of motion  Integumentary: No rashes, skin lesions  Neurological:  negative for headaches, slurred speech, unilateral weakness  Psychiatric: negative for hallucinations,confusion,agitation.                 PHYSICAL EXAM:      Vitals:    BP (!) 153/103   Pulse 86   Temp 98.4 °F (36.9 °C)   Resp 16   Ht 5' 8\" (1.727 m)   Wt 156 lb 8.4 oz (71 kg)   SpO2 94%   BMI 23.80 kg/m²        General Appearance: alert,in  acute distress, ++ pallor, no icterus  poor skin hygiene   Skin: warm and dry, no rash or erythema  Head: normocephalic and atraumatic  Eyes: pupils equal, round, and reactive to light, conjunctivae normal  ENT: tympanic membrane, external ear and ear canal normal bilaterally, nose without deformity, nasal mucosa and turbinates normal without polyps  Neck: supple and non-tender without mass, no thyromegaly  no cervical lymphadenopathy  Pulmonary/Chest: Bi basal crepts+ - no wheezes, rales or rhonchi, normal air movement, no respiratory distress  Cardiovascular: normal rate, regular rhythm, normal S1 and S2, esm+ murmurs, rubs, clicks, or gallops, no carotid bruits  Abdomen: soft, non-tender, non-distended, normal bowel sounds, no masses or organomegaly  Extremities: no cyanosis, clubbing or edema  Musculoskeletal: normal range of motion, no joint swelling, deformity or tenderness  Integumentary: No rashes, no abnormal skin lesions, no petechiae  Neurologic: reflexes normal and symmetric, no cranial nerve deficit  Psych:  Orientation, sensorium, mood normal            Lines: PICC  Needle tracks++   Rt gluteal area pain jerri DRAIN_   Lower leg multiple skin lesions from IVDA++   HD line in place     Data Review:    CBC:   Lab Results   Component Value Date    WBC 7.7 09/24/2022    HGB 7.8 (L) 09/24/2022    HCT 23.3 (L) 09/24/2022    MCV 83.4 09/24/2022     09/24/2022     RENAL:   Lab Results   Component Value Date    CREATININE 3.3 (H) 09/24/2022    BUN 25 (H) 09/24/2022     (L) 09/24/2022    K 3.9 09/24/2022    CL 94 (L) 09/24/2022    CO2 28 09/24/2022     SED RATE:   Lab Results   Component Value Date/Time    SEDRATE 23 09/25/2022 05:32 AM     CK:   Lab Results   Component Value Date/Time    CKTOTAL 10 09/18/2022 04:55 PM     CRP:   Lab Results   Component Value Date/Time    CRP 49.8 09/25/2022 08:51 PM     Hepatic Function Panel:   Lab Results   Component Value Date/Time    ALKPHOS 106 09/21/2022 05:54 AM    ALT 16 09/21/2022 05:54 AM    AST 19 09/21/2022 05:54 AM    PROT 5.6 09/21/2022 07:36 PM    PROT 6.5 07/28/2012 11:20 PM    BILITOT 0.3 09/21/2022 05:54 AM    BILIDIR <0.2 09/21/2022 05:54 AM    IBILI see below 09/21/2022 05:54 AM    LABALBU 2.2 09/24/2022 06:40 AM     UA:  Lab Results   Component Value Date/Time    COLORU Yellow 09/18/2022 04:36 PM    CLARITYU CLOUDY 09/18/2022 04:36 PM    GLUCOSEU Negative 09/18/2022 04:36 PM    GLUCOSEU NEGATIVE 07/31/2010 02:59 PM    BILIRUBINUR Negative 09/18/2022 04:36 PM    BILIRUBINUR NEGATIVE 07/31/2010 02:59 PM    KETUA Negative 09/18/2022 04:36 PM    SPECGRAV 1.008 09/18/2022 04:36 PM    BLOODU LARGE 09/18/2022 04:36 PM    PHUR 5.0 09/18/2022 04:36 PM    PROTEINU 100 09/18/2022 04:36 PM    UROBILINOGEN 0.2 09/18/2022 04:36 PM    NITRU Negative 09/18/2022 04:36 PM    LEUKOCYTESUR MODERATE 09/18/2022 04:36 PM    LABMICR YES 09/18/2022 04:36 PM    URINETYPE NotGiven 09/18/2022 04:36 PM      Urine Microscopic:   Lab Results   Component Value Date/Time    BACTERIA None Seen 09/18/2022 04:36 PM    COMU see below 09/07/2022 01:55 AM    HYALCAST 6 09/18/2022 04:36 PM    WBCUA 98 09/18/2022 04:36 PM    RBCUA 14 09/18/2022 04:36 PM    EPIU 1 09/18/2022 04:36 PM     Urine Reflex to Culture:   Lab Results   Component Value Date/Time    URRFLXCULT Yes 09/12/2022 04:16 PM      Summary   Severe eccentric tricuspid regurgitation. Small, mobile tricuspid valve vegetation noted measuring 0.9cm x 0.4cm.       Signature      ------------------------------------------------------------------   Electronically signed by Leigha Ozuna MD (Interpreting   physician) on 09/16/2022 at 04:29 PM   ------------------------------------------------------------------    MICRO: cultures reviewed and updated by me   Blood Culture:          MRSA DNA Probe, Nasal [[id number]] Collected: 09/13/22 1220   Order Status: Completed Specimen: Nares Updated: 09/14/22 1131    MRSA SCREEN RT-PCR --    Negative  MRSA DNA not detected. Normal Range: Not detected    Narrative:     ORDER#: [id number]                          ORDERED BY: Rosalinda Arthur   SOURCE: Nares                              COLLECTED:  09/13/22 12:20   ANTIBIOTICS AT ANABELLA.:                      RECEIVED :  09/13/22 12:46   Blood Culture 1 [[id number]] (Abnormal) Collected: 09/12/22 1616   Order Status: Completed Specimen: Blood Updated: 09/14/22 0939    Blood Culture, Routine -- Abnormal     Gram stain Aerobic bottle:   Gram positive cocci in clusters   resembling Staphylococcus   Information to follow   and   Gram negative rods   Information to follow   Gram stain Anaerobic bottle:   Gram positive cocci in clusters   resembling Staphylococcus   Information to follow    Abnormal     Organism Staph aureus MRSA DNA Detected Abnormal     Blood Culture, Routine -- Abnormal     CONTACT PRECAUTIONS INDICATED   See additional report for complete BCID panel. mecA/C gene and MREJ gene Detected. Abnormal     Organism Serratia marcescens DNA Detected Abnormal     Blood Culture, Routine See additional report for complete BCID panel.     Organism Staph aureus MRSA Abnormal     Blood Culture, Routine -- Abnormal     POSITIVE for   Sensitivity to follow   CONTACT PRECAUTIONS INDICATED   Isolated two of two sets    Abnormal     Organism Serratia marcescens Abnormal     Blood Culture, Routine --    POSITIVE for   Sensitivity to follow   Isolated one of two sets    Narrative:     ORDER#: [id number]                          ORDERED BY: Jyoti Bland   SOURCE: Blood COLLECTED:  09/12/22 16:16   ANTIBIOTICS AT ANABELLA.:                      RECEIVED :  09/12/22 16:32   CALL  [place]  [facility] tel. [phone],   Microbiology results called to and read back by Mahi Pendleton RN,   09/13/2022 08:24, by [facility]   Microbiology results called to and read back by Brenden Campos,   09/13/2022 07:05, by [facility]   If child <=2 yrs old please draw pediatric bottle. ~Blood Culture 1   Culture, Blood 2 [[id number]] Collected: 09/14/22 0525   Order Status: Sent Specimen: Blood Updated: 09/14/22 0600   Culture, Blood 1 [[id number]] Collected: 09/14/22 0436   Order Status: Sent Specimen: Blood Updated: 09/14/22 0441   Culture, Urine [[id number]] Collected: 09/12/22 1616   Order Status: Completed Specimen: Urine, clean catch Updated: 09/13/22 2245    Urine Culture, Routine No growth at 18 to 36 hours   Narrative:     ORDER#: [id number]                          ORDERED BY: Boubacar Puga   SOURCE: Urine Clean Catch                  COLLECTED:  09/12/22 16:16   ANTIBIOTICS AT ANABELLA.:                      RECEIVED :  09/12/22 19:35   Culture, Blood 2 [[id number]] (Abnormal) Collected: 09/12/22 2012   Order Status: Completed Specimen: Blood Updated: 09/13/22 1416    Culture, Blood 2 -- Abnormal     Gram stain Aerobic bottle:   Gram positive cocci in clusters   resembling Staphylococcus   Information to follow   Gram stain Anaerobic bottle:   Gram positive cocci in clusters   resembling Staphylococcus   Information to follow    Abnormal    Narrative:     ORDER#: [id number]                          ORDERED BY: Boubacar Puga   SOURCE: Blood                              COLLECTED:  09/12/22 20:12   ANTIBIOTICS AT ANABELLA.:                      RECEIVED :  09/12/22 20:21   CALL  Gong[place]  [facility] tel. [phone],   Previous panic on this admission - call not needed per SOP, 09/13/2022 10:56,   by [facility]   If child <=2 yrs old please draw pediatric bottle. ~Blood Culture #2   Strep Pneumoniae Antigen [[id number]] Collected: 09/12/22 1900   Order Status: Completed Specimen: Urine, clean catch Updated: 09/13/22 1054    STREP PNEUMONIAE ANTIGEN, URINE --    Presumptive Negative   Presumptive negative suggests no current or recent   pneumococcal infection. Infection due to Strep pneumoniae   cannot be ruled out since the antigen present in the sample   may be below the detection limit of the test.   Normal Range:Presumptive Negative    Narrative:     ORDER#: [id number]                          ORDERED BY: Key Salazar   SOURCE: Urine Clean Catch                  COLLECTED:  09/12/22 19:00   ANTIBIOTICS AT ANABELLA.:                      RECEIVED :  09/13/22 07:04   Legionella antigen, urine [[id number]] Collected: 09/12/22 1900   Order Status: Completed Specimen: Urine, clean catch Updated: 09/13/22 1047    L. pneumophila Serogp 1 Ur Ag --    Presumptive Negative   No Legionella pneumophila serogroup 1 antigens detected. A negative result does not exclude infection with   Legionella pneumophila serogroup 1 nor does it rule out   other microbial-caused respiratory infections or   disease caused by other serogroups of   Legionella pneumophila.    Normal Range: Presumptive Negative    Narrative:     ORDER#: [id number]                          ORDERED BY: Key Salazar   SOURCE: Urine Clean Catch                  COLLECTED:  09/12/22 19:00   ANTIBIOTICS AT ANABELLA.:                      RECEIVED :  09/13/22 07:04   Culture, Blood, PCR ID Panel [[id number]] Collected: 09/12/22 1616   Order Status: Completed Updated: 09/13/22 0707    Report SEE IMAGE   Narrative:     Lawrence Chandler  [facility] tel. [phone],   Microbiology results called to and read back by Rosi Alegria,   09/13/2022 07:05, by Lisa Asher   Culture, Blood 2 [[id number]] Collected: 09/12/22 0000   Order Status: Canceled Specimen: Blood    Culture, Blood 1 [[id number]] Collected: 09/12/22 0000   Order Status: Canceled Specimen: Blood      Lab Results   Component Value Date/Time    BC No Growth after 4 days of incubation. 09/14/2022 04:36 AM    BLOODCULT2 No Growth after 4 days of incubation. 09/14/2022 05:25 AM      Collected: 09/16/22 1210   Order Status: Completed Specimen:  Body Fluid from Abscess Updated: 09/19/22 0615    Gram Stain Result 4+ WBC's (Polymorphonuclear)   2+ Gram positive cocci   1+ Gram negative rods    Abnormal     Anaerobic Culture --    Anaerobic culture further report to follow   No anaerobes isolated so far, Further report to follow     Organism Serratia marcescens Abnormal     WOUND/ABSCESS Light growth    Organism Staph aureus MRSA Abnormal     WOUND/ABSCESS -- Abnormal     Moderate growth   CONTACT PRECAUTIONS INDICATED    Abnormal    Narrative:     ORDER#: [id number]                          ORDERED BY: RAMILA MARTINES   SOURCE: Abscess Rt gluteal abscess         COLLECTED:  09/16/22 12:10   ANTIBIOTICS AT ANABELLA.:                      RECEIVED :  09/16/22 12:32   CALL  [place]  [facility] tel. [phone],   Previous panic on this admission - call not needed per SOP, 09/17/2022 13:04,      Susceptibility    Staph aureus mrsa (3)    Antibiotic Interpretation Microscan  Method Status    ceFAZolin Resistant >16 mcg/mL BACTERIAL SUSCEPTIBILITY PANEL BY YA     clindamycin Sensitive <=0.5 mcg/mL BACTERIAL SUSCEPTIBILITY PANEL BY YA     erythromycin Resistant >4 mcg/mL BACTERIAL SUSCEPTIBILITY PANEL BY YA     oxacillin Resistant >2 mcg/mL BACTERIAL SUSCEPTIBILITY PANEL BY YA     tetracycline Sensitive <=4 mcg/mL BACTERIAL SUSCEPTIBILITY PANEL BY YA     trimethoprim-sulfamethoxazole Sensitive 1/19 mcg/mL BACTERIAL SUSCEPTIBILITY PANEL BY YA     vancomycin Sensitive 2 mcg/mL BACTERIAL SUSCEPTIBILITY PANEL BY YA       Serratia marcescens (4)    Antibiotic Interpretation Microscan  Method Status    amoxicillin-clavulanate Resistant >16/8 mcg/mL BACTERIAL SUSCEPTIBILITY PANEL BY YA     ampicillin Resistant >16 mcg/mL BACTERIAL SUSCEPTIBILITY PANEL BY YA     ampicillin-sulbactam Resistant 16/8 mcg/mL BACTERIAL SUSCEPTIBILITY PANEL BY YA     ceFAZolin Resistant >16 mcg/mL BACTERIAL SUSCEPTIBILITY PANEL BY YA     cefepime Sensitive <=2 mcg/mL BACTERIAL SUSCEPTIBILITY PANEL BY YA     cefTRIAXone Sensitive <=1 mcg/mL BACTERIAL SUSCEPTIBILITY PANEL BY YA     cefuroxime Resistant >16 mcg/mL BACTERIAL SUSCEPTIBILITY PANEL BY YA     ciprofloxacin Sensitive <=1 mcg/mL BACTERIAL SUSCEPTIBILITY PANEL BY YA     ertapenem Sensitive <=0.5 mcg/mL BACTERIAL SUSCEPTIBILITY PANEL BY YA     gentamicin Sensitive <=4 mcg/mL BACTERIAL SUSCEPTIBILITY PANEL BY YA     meropenem Sensitive <=1 mcg/mL BACTERIAL SUSCEPTIBILITY PANEL BY YA     piperacillin-tazobactam Sensitive <=16 mcg/mL BACTERIAL SUSCEPTIBILITY PANEL BY YA     trimethoprim-sulfamethoxazole Sensitive <=2/38 mcg/mL BACTERIAL SUSCEPTIBILITY PANEL BY YA      Condensed View       Respiratory Culture:  Lab Results   Component Value Date/Time    LABGRAM  09/16/2022 12:10 PM     4+ WBC's (Polymorphonuclear)  2+ Gram positive cocci  1+ Gram negative rods       AFB:No results found for: AFBSMEAR  Viral Culture:  Lab Results   Component Value Date/Time    COVID19 Not Detected 09/07/2022 12:00 AM    COVID19 Not Detected 07/20/2022 11:20 PM     Urine Culture:   No results for input(s): LABURIN in the last 72 hours. Summary   Normal left ventricle size, wall thickness, and systolic function with an   estimated ejection fraction of 60-65%. No regional wall motion abnormalities   are seen. Normal diastolic function. Normal right ventricular size and function. Mobile vegetation on tricuspid valve suggestive of endocarditis. Moderate tricuspid regurgitation. The right atrium is mildly dilated.       Signature      ------------------------------------------------------------------   Electronically signed by Nikki Garcia MD   (Interpreting physician) on 09/13/2022 at 04:15 PM ------------------------------------------------------------------    IMAGING:    IR REPLACE TUNNELED CVC W SQ PORT SAME ACCESS   Final Result   Successful conversion of a non tunneled hemodialysis catheter for a new 19   centimeter tunneled hemodialysis catheter. IR NONTUNNELED VASCULAR CATHETER > 5 YEARS   Final Result   Successful ultrasound and fluoroscopy guided non-tunneled Trialysis catheter   placement. US RENAL COMPLETE   Final Result   Unremarkable ultrasound of the kidneys and urinary bladder. No renal   atrophy, hydronephrosis or abnormal echotexture. CT ABSCESS DRAINAGE W CATH PLACEMENT S&I   Final Result   Successful CT guided placement of a 10 [place] drainage catheter in the right   iliacus abscess. CT GUIDED NEEDLE PLACEMENT   Final Result   Successful CT guided placement of a 10 [place] drainage catheter in the right   iliacus abscess. MRI LUMBAR SPINE W WO CONTRAST   Final Result   Partially visualized septic arthritis/osteomyelitis of the right sacroiliac   joint, with a large adjacent retroperitoneal abscess extending into the right   pelvic sidewall. Small intramuscular abscess also present within the left psoas muscle. Epidural phlegmon/abscess of the sacral canal.      Pelvic ascites. The findings were sent to the Radiology Results [address] at 7:23   pm on 9/15/2022 to be communicated to a licensed caregiver. VL Extremity Venous Bilateral   Final Result      CT HEAD WO CONTRAST   Final Result   No acute intracranial abnormality.          CT CHEST WO CONTRAST   Final Result   Diffuse in numeral bilateral pulmonary nodules many of which are cavitary   becoming coalescent at the right lung apex however diffusely throughout the   bilateral lungs consistent of septic emboli with small to moderate right   pleural effusion      Partially visualized portions of the upper abdomen reveal hepatosplenomegaly         XR CHEST PORTABLE   Final Result   Multifocal patchy airspace disease and cavitary nodules. The appearance is   suggestive of septic emboli in this clinical setting. Follow-up recommended to assure resolution.                All the pertinent images and reports for the current Hospitalization were reviewed by me     Scheduled Meds:   linezolid  600 mg Oral BID    b complex-C-folic acid  1 capsule Oral Daily    sevelamer  800 mg Oral TID WC    ceftaroline fosamil (TEFLARO) IVPB  200 mg IntraVENous q8h    heparin (porcine)  5,000 Units SubCUTAneous 3 times per day    lidocaine 1 % injection  5 mL IntraDERmal Once    sodium chloride flush  5-40 mL IntraVENous 2 times per day       Continuous Infusions:   sodium chloride      sodium chloride 25 mL (09/23/22 2212)       PRN Meds:  morphine, heparin (porcine), LORazepam, sodium chloride, chlorhexidine, sodium chloride flush, sodium chloride, hydrOXYzine pamoate, naloxone, calcium carbonate, promethazine, promethazine **OR** ondansetron, acetaminophen **OR** acetaminophen, perflutren lipid microspheres      Assessment:     Patient Active Problem List   Diagnosis    Tear of medial cartilage or meniscus of knee, current    Plica syndrome    Boxer's metacarpal fracture, neck, closed    Chondromalacia of patella    Degeneration of lumbar or lumbosacral intervertebral disc    Varicose veins of lower extremity    Intractable nausea and vomiting    Septicemia (HCC)    IVDU (intravenous drug user)    MRSA bacteremia    Sepsis due to methicillin resistant Staphylococcus aureus (MRSA) without acute organ dysfunction ([facility] [address]    Bacterial infection due to Serratia    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 WBC count is elevated high fever from ongoing bloodstream infection. Given IV drug abuse suspect endocarditis transthoracic echocardiogram with TV Vegetation    Trend WBC and repeat Blood cx NGTD    Will need placement to complete IV ABX        Rt gluteal and lower Lumbar area pain MRI L spine with abscess, septic joint     S/p IR aspiration of the abscess cx MRSA and serratia noted    Unfortunately kidney function continues to decline nephrology is now following. IV antibiotics have been adjusted    Will need a long course of antibiotic given the MRSA bacteremia and septic arthritis    HD line placed due to worsening Kidney function -completed hemodialysis for session without any issues. Unfortunately creatinine still remains elevated.     May need repeat MRI L spine in future     Check CRP and ESR down trend noted            Labs, Microbiology, Radiology and all the pertinent results from current hospitalization and  care every where were reviewed  by me as a part of the evaluation   Plan:   Cont  IV Ceftaroline - 200 mg q x 8 HRS will cover MRSA and Serratia adjusted to GFR  Cont  oral  Linezolid x 600 mg q 12 HRS  - Platelets are going down needs to watch   Repeat Blood cx    NGTD  TTE Abnormal with vegetation   Will need IV abx  Watch for complications  HIV -ve and Hepatitis screen Hep C+Ve  Cardiology consult  STEFANO completed   Vegetation is small for angiovac  would be able to treat with IV abx   MRI L spine noted very abnormal s/p IR  abscess draianage  Daptomycin not effective in LUNGS and given MARYAN awaiting renal recovery to decide on long term IV abx course   Needs placement   Await kidney function recovery to decide on adjusting the long-term antibiotics  May need repeat MRI once clinically better   iF Remains on HD we can choose IV Vancomycin to complete the course  post biopsy          Discussed with patient/Family and Nursing   Risk of Complications/Morbidity: High      Illness(es)/ Infection present that pose threat to bodily function. There is potential for severe exacerbation of infection/side effects of treatment. Therapy requires intensive monitoring for antimicrobial agent toxicity. Discussed with patient/Family and Nursing staff     Thanks for allowing me to participate in your patient's care and please call me with any questions or concerns.     Isabell Phillips MD  Infectious Disease  [facility]) Physician  Phone: [phone]   Fax : [phone]

## 2022-09-26 NOTE — CARE COORDINATION
Discharge Planning:   Call received from [facility] at [facility], they cannot accept patient due to substance abuse history. [address]: cannot except. [facility]: New referral placed with Elle Higgins in admissions. Will likely need to assess patient at bedside, patient would need to agree to substance abuse treatment program at facility. JAROD Alas, STEVEN, Social Work/Case Management   [phone]  Electronically signed by JAROD Alas LSW on 9/26/2022 at 1:59 PM    [facility] cannot accept. New referral placed with Bhavik. Met with patient at bedside. Discussed discharge barrier. Patient aware. Discussed backup discharge plan. Home with father, and 25year old twins. Father would transport to and from dialysis. Messaged MD regarding possible IV antibiotics with dialysis. Awaiting confirmation.    Electronically signed by JAROD Alas LSW on 9/26/2022 at 3:44 PM

## 2022-09-26 NOTE — PLAN OF CARE
Problem: Discharge Planning  Goal: Discharge to home or other facility with appropriate resources  Outcome: Progressing  Flowsheets (Taken 9/25/2022 2143)  Discharge to home or other facility with appropriate resources: Identify barriers to discharge with patient and caregiver     Problem: Safety - Adult  Goal: Free from fall injury  Outcome: Progressing     Problem: Pain  Goal: Verbalizes/displays adequate comfort level or baseline comfort level  Outcome: Progressing     Problem: ABCDS Injury Assessment  Goal: Absence of physical injury  Outcome: Progressing     Problem: Skin/Tissue Integrity  Goal: Absence of new skin breakdown  Description: 1. Monitor for areas of redness and/or skin breakdown  2. Assess vascular access sites hourly  3. Every 4-6 hours minimum:  Change oxygen saturation probe site  4. Every 4-6 hours:  If on nasal continuous positive airway pressure, respiratory therapy assess nares and determine need for appliance change or resting period.   Outcome: Progressing     Problem: Nutrition Deficit:  Goal: Optimize nutritional status  Outcome: Progressing

## 2022-09-26 NOTE — PROGRESS NOTES
Comprehensive Nutrition Assessment    Type and Reason for Visit:  Reassess    Nutrition Recommendations/Plan:   Regular diet   Ensure TID     Malnutrition Assessment:  Malnutrition Status: At risk for malnutrition (Comment) (09/19/22 1540)    Context:  Acute Illness     Findings of the 6 clinical characteristics of malnutrition:  Energy Intake:  50% or less of estimated energy requirements for 5 or more days  Weight Loss:  Unable to assess (fluid shifts, diuretic use)     Body Fat Loss:  Unable to assess     Muscle Mass Loss:  Unable to assess    Fluid Accumulation:  No significant fluid accumulation     Strength:  Not Performed    Nutrition Assessment:    Follow-up. On HD still. Pt continues on Regular diet with Ensure TID. Appetite remains minimal.  Family brining in items. Pt will occassionally accept Ensure. Continue current interventions at this time. Nutrition Related Findings:    BM 9/24 Wound Type: None       Current Nutrition Intake & Therapies:    Average Meal Intake: 1-25%, 26-50%  Average Supplements Intake: %  ADULT DIET; Regular  ADULT ORAL NUTRITION SUPPLEMENT; Breakfast, Lunch, Dinner; Standard High Calorie/High Protein Oral Supplement    Anthropometric Measures:  Height: 5' 8\" (172.7 cm)  Ideal Body Weight (IBW): 140 lbs (64 kg)    Admission Body Weight: 141 lb (64 kg)  Current Body Weight: 156 lb (70.8 kg), 107.9 % IBW. Weight Source: Bed Scale  Current BMI (kg/m2): 23.7                          BMI Categories: Normal Weight (BMI 18.5-24. 9)    Estimated Daily Nutrient Needs:        Energy (kcal/day): 2240-6810 (25-30 x ABW 69 kg)     Protein (g/day):  (1.2-1.5 x ABW 69 kg)  Method Used for Fluid Requirements: 1 ml/kcal  Fluid (ml/day):      Nutrition Diagnosis:   Inadequate oral intake related to inadequate protein-energy intake as evidenced by intake 0-25%, intake 26-50%    Nutrition Interventions:   Food and/or Nutrient Delivery: Continue Current Diet, Continue Current Tube Feeding  Nutrition Education/Counseling: No recommendation at this time  Coordination of Nutrition Care: Continue to monitor while inpatient       Goals:  Previous Goal Met: Progressing toward Goal(s)  Goals: PO intake 50% or greater       Nutrition Monitoring and Evaluation:   Behavioral-Environmental Outcomes: None Identified  Food/Nutrient Intake Outcomes: Food and Nutrient Intake, Supplement Intake  Physical Signs/Symptoms Outcomes: Biochemical Data, Constipation, Diarrhea, Nausea or Vomiting, Fluid Status or Edema, Skin, Weight    Discharge Planning:     Too soon to determine     Charisma Garibay, [address] [place] [address],   Contact: [phone]

## 2022-09-26 NOTE — PROGRESS NOTES
Occupational Therapy  Facility/Department: [facility]  PROGRESSIVE CARE  Occupational Daily Treatment Note      Name: Kalee Real  : 1980  MRN: [id number]  Date of Service: 2022    Discharge Recommendations:  Patient would benefit from continued therapy after discharge, 3-5 sessions per week          Patient Diagnosis(es): The primary encounter diagnosis was Septicemia [facility]). A diagnosis of IVDU (intravenous drug user) was also pertinent to this visit. Past Medical History:  has a past medical history of ADHD (attention deficit hyperactivity disorder), Arthritis, Back pain, Depression, Migraine, and Neutrophilia. Past Surgical History:  has a past surgical history that includes Partial hysterectomy; Knee arthroscopy (10/05/2010); Tubal ligation (2004); IR INSERT NON TUNNELED CV CATH <5 YEARS (Right, 2022); IR NONTUNNELED VASCULAR CATHETER > 5 YEARS (2022); and Tunneled venous catheter placement (Right, 2022). Kalee Real scored a 14/24 on the AM-PAC ADL Inpatient form. Current research shows that an AM-PAC score of 17 or less is typically not associated with a discharge to the patient's home setting. Based on the patient's AM-PAC score and their current ADL deficits, it is recommended that the patient have 3-5 sessions per week of Occupational Therapy at d/c to increase the patient's independence. Please see assessment section for further patient specific details. If patient discharges prior to next session this note will serve as a discharge summary. Please see below for the latest assessment towards goals. Assessment   Performance deficits / Impairments: Decreased functional mobility ; Decreased safe awareness;Decreased balance;Decreased ADL status; Decreased high-level IADLs;Decreased endurance;Decreased strength  Assessment: Discussed with OTR am pac score is 14 which indicates need for continued skilled OT to increase [place] and decrease caregiver burden. Patient completed supine to sit with CGA for safety. Min A of 2 for sit<>stand from elevted height of bed to roger stedy to commode to recliner chair. Mod A for stand to sit from commode. Use of roger stedy for safe mobility. Dependent for lower body dressing and toileting tasks. Seated on roger stedy to wash hands and face. Patient is a very high fall risk and unable to return home at this time due to need for lift equipment and assist level requirements. Anticipate that patient will tolerate low to mod therapy. Cont with POC. REQUIRES OT FOLLOW-UP: Yes  Activity Tolerance  Activity Tolerance: Patient limited by pain        Plan   Plan  Times per Week: 3-5  Current Treatment Recommendations: Strengthening, Balance training, Functional mobility training, Endurance training, Self-Care / ADL, Safety education & training, Gait training, Equipment evaluation, education, & procurement, Patient/Caregiver education & training     Restrictions  Restrictions/Precautions  Restrictions/Precautions: Fall Risk  Position Activity Restriction  Other position/activity restrictions: KRYSTLE drain for large retroperitoneal abcess    Subjective   General  Chart Reviewed: Yes  Patient assessed for rehabilitation services?: Yes  Additional Pertinent Hx: Per H&P: \"40year-old female with past medical history of drug abuse, ADHD, who presents to the hospital due to feeling fatigue, pain in her legs as well as back, she has a history of IV drug abuse, per patient she has been sober for now, patient family is also on the bedside who also contributed to the patient history. According to the patient's sister patient has been feeling sick for past few days, not feeling well. Patient was noticed to have fevers as well as chills. \" MRI showed OM of R SI joint and large retroperitoneal abscess. 9/16 Underwent drainage tube placement .   Response to previous treatment: Patient with no complaints from previous session  Family / Caregiver Present: No  Referring Practitioner: Dr. Villa Smoker: Patient supine in bed upon arrival to room with PT. Patient reports pain in back, 9/10. RN informed  General Comment  Comments: Per RN ok to see     Social/Functional History  Social/Functional History  Lives With: Family (mom and dad)  Type of Home: House  Home Layout: Two level, Laundry in basement (one story and a basement)  Home Access: Stairs to enter with rails  Entrance Stairs - Number of Steps: 10-12 with rail  Entrance Stairs - Rails: Both  Bathroom Shower/Tub: Tub/Shower unit  Bathroom Toilet: Standard  Bathroom Equipment: Shower chair  Home Equipment:  (no DME)  Has the patient had two or more falls in the past year or any fall with injury in the past year?: Yes (fall about a week ago)  ADL Assistance: Independent  Homemaking Assistance: Independent  Ambulation Assistance: Independent (no AD)  Transfer Assistance: Independent  Active : No  Occupation: Unemployed       Objective      Safety Devices  Type of Devices: All fall risk precautions in place;Call light within reach; Left in chair;Chair alarm in place;Gait belt;Nurse notified (Simultaneous filing. User may not have seen previous data.)  Bed Mobility Training  Bed Mobility Training: Yes  Overall Level of Assistance: Contact-guard assistance  Rolling: Stand-by assistance (HOB elevated, bedrail)  Supine to Sit: Contact-guard assistance (HOB elevated, bedrail)  Sit to Supine:  (unable ot assess)  Scooting: Contact-guard assistance  Balance  Sitting: Intact  Standing: With support (CGA standing in roger stedy)  Transfer Training  Transfer Training: Yes  Overall Level of Assistance: Minimum assistance;Assist X2  Sit to Stand: Minimum assistance;Assist X2 (bed elevated, from bed once, from toilet oncve, from stedy seat 2x)  Stand to Sit: Minimum assistance;Assist X2  Stand Pivot Transfers:  Total assistance (stedy)  Gait Training  Gait Training: No (initially pt agreeable to trying to walk with the  walker, but back pain increased with toileting and washing up at sink from the stedy seat to the point that pt was in tears and needed to go to recliner)  Toilet Transfers  Equipment Used: Grab bars  Toilet Transfer: Moderate assistance  Toilet Transfers Comments: roger stedy     ADL  Grooming: Stand by assistance  Grooming Skilled Clinical Factors: seated on seat of roger kingdy to wash face and hands  LE Dressing: Dependent/Total  LE Dressing Skilled Clinical Factors: dependent to thread briefs over feet and hips  Toileting: Dependent/Total (luna in place, assist for clothing management)        Bed mobility  Supine to Sit: Contact guard assistance (HOB elevated and side rail)  Sit to Supine: Unable to assess (up in chair at end of session)  Transfers  Sit to stand: Minimal assistance;2 Person assistance  Stand to sit: Minimal assistance;2 Person assistance  Transfer Comments: Min A of 2 for sit<>stand from elevated height of bed to roger stedy to recliner chair. Use of roger nunez for safe mobility from bed to bathroom to recliner chair     Cognition  Overall Cognitive Status: [facility]  Cognition Comment: flat affect; poor judgment but appears cognitively [facility]  Orientation  Overall Orientation Status: Within Functional Limits                  Education Given To: Patient  Education Provided: Role of Therapy;Plan of Care;Transfer Training;ADL Adaptive Strategies            AM-PAC Score        AM-PAC Inpatient Daily Activity Raw Score: 14 (09/26/22 0914)  AM-PAC Inpatient ADL T-Scale Score : 33.39 (09/26/22 0914)  ADL Inpatient CMS 0-100% Score: 59.67 (09/26/22 0914)  ADL Inpatient CMS G-Code Modifier : CK (09/26/22 0914)           Goals  Short Term Goals  Time Frame for Short term goals: Prior to DC.  Status: goals ongoing  Short Term Goal 1: Pt will complete ADL transfers with supervision  Short Term Goal 2: Pt will complete functional mobility with supervision  Short Term Goal 3: Pt will tolerate standing > 3 min for functional task with supervision  Short Term Goal 4: Pt will complete toileting with min A  Short Term Goal 5: Pt will complete UE exercises in all plane to inc strength endurance for ADLs  Patient Goals   Patient goals : to feel better       Therapy Time   Individual Concurrent Group Co-treatment   Time In 0840         Time Out 3411         Minutes 45               Electronically signed by Bandar Yang [id number] on 9/26/2022 at 9:28 AM

## 2022-09-26 NOTE — PROGRESS NOTES
exercise program;Equipment evaluation, education, & procurement     Restrictions  Restrictions/Precautions  Restrictions/Precautions: Fall Risk  Position Activity Restriction  Other position/activity restrictions: KRYSTLE drain for large retroperitoneal abcess     Subjective    Subjective  Subjective: pain in back and severe headache 9/10, pt crying by end of session  Orientation  Overall Orientation Status: Within Functional Limits  Cognition  Overall Cognitive Status: [facility]  Cognition Comment: flat affect; poor judgment but appears cognitively [facility]     Objective   Vitals     Bed Mobility Training  Bed Mobility Training: Yes  Overall Level of Assistance: Contact-guard assistance  Rolling: Stand-by assistance (HOB elevated, bedrail)  Supine to Sit: Contact-guard assistance (HOB elevated, bedrail)  Sit to Supine:  (unable ot assess)  Scooting: Contact-guard assistance  Balance  Sitting: Intact  Standing: With support (CGA standing in roger stedy)  Transfer Training  Transfer Training: Yes  Overall Level of Assistance: Minimum assistance;Assist X2  Sit to Stand: Minimum assistance;Assist X2 (bed elevated, from bed once, from toilet oncve, from stedy seat 2x)  Stand to Sit: Minimum assistance;Assist X2  Stand Pivot Transfers: Total assistance (stedy)  Gait Training  Gait Training: No (initially pt agreeable to trying to walk with the wh walker, but back pain increased with toileting and washing up at sink from the stedy seat to the point that pt was in tears and needed to go to recliner)        Other Specialty Interventions  Other Treatments/Modalities: pt began to wash up at sink, see OT note  Safety Devices  Type of Devices: All fall risk precautions in place;Call light within reach; Left in chair;Chair alarm in place;Gait belt;Nurse notified (Simultaneous filing.  User may not have seen previous data.)       Goals  Short Term Goals  Time Frame for Short term goals: by discharge  Short term goal 1: bed mob mod A  Short term goal 2: transfers mod A  Short term goal 3: progress to gait when able  Patient Goals   Patient goals : pt did not state a goal    Education       Therapy Time   Individual Concurrent Group Co-treatment   Time In 0845         Time Out 0925         Minutes 40         Timed Code Treatment Minutes: 40 Minutes   Charges = 40 min theract  April Miles, [address]

## 2022-09-26 NOTE — PROGRESS NOTES
[facility]  Phone: [phone]  Fax: [phone]  [facility]. [facility]         39 y/o WF with h/o depression, IVDA and back pain admitted with feeling weak, fatigued and pain in her back She was seen in ER on 9/7/22 with back apin and diagnosed with UTI Received toradol and was discharged on Motrin and Cefdinir Urine CX grew klebsiella pan senstive However she continued to feel poorly with back pain, and fatigue   Had fever to 101 on admission Started on Vancomycin and Cefepime Cr was 1.5 mg on admission improved to 1.1 mg but has increased to 1.8 mg now [facility] from admission growing MRSA and Serratia CT chest with cavitation and septic emboli  She reports decreased UOP no dysuria hematuria  Has been taking Ibuprofen 800 mg TID prior to admission  MRI showed OM of R SI joint and large retroperitoneal abscess   Underwent drainage tube placement . Events noted , labs reviewed . Feels OK  UOP NOTED. REVIEW OF SYSTEMS:  No CP/SOB or GEIGER  . No Family at bed side     Physical Exam:    VITALS:  BP (!) 132/97   Pulse 90   Temp 98.2 °F (36.8 °C) (Oral)   Resp 16   Ht 5' 8\" (1.727 m)   Wt 156 lb 8.4 oz (71 kg)   SpO2 95%   BMI 23.80 kg/m²   24HR INTAKE/OUTPUT:    Intake/Output Summary (Last 24 hours) at 9/26/2022 1222  Last data filed at 9/26/2022 0500  Gross per 24 hour   Intake 250 ml   Output 645 ml   Net -395 ml         Constitutional:  awake   Respiratory:  Decrease BS at  bases   Gastrointestinal:  + tenderness. Normal Bowel Sounds  Cardiovascular:  S1, S2 RRR   Edema:  +  edema  Acc Rt TDC .     DATA:    CBC:  Lab Results   Component Value Date/Time    WBC 7.7 09/24/2022 06:40 AM    RBC 2.79 09/24/2022 06:40 AM    HGB 7.8 09/24/2022 06:40 AM    HCT 23.3 09/24/2022 06:40 AM    MCV 83.4 09/24/2022 06:40 AM    MCH 28.2 09/24/2022 06:40 AM    MCHC 33.8 09/24/2022 06:40 AM    RDW 15.7 09/24/2022 06:40 AM     09/24/2022 06:40 AM    MPV 7.1 09/24/2022 06:40 AM     CMP:  Lab Results   Component Value Date/Time     09/24/2022 06:40 AM    K 3.9 09/24/2022 06:40 AM    K 4.3 09/19/2022 05:51 AM    CL 94 09/24/2022 06:40 AM    CO2 28 09/24/2022 06:40 AM    BUN 25 09/24/2022 06:40 AM    CREATININE 3.3 09/24/2022 06:40 AM    GFRAA 19 09/24/2022 06:40 AM    GFRAA >60 07/28/2012 11:20 PM    AGRATIO 0.4 09/12/2022 03:44 PM    LABGLOM 15 09/24/2022 06:40 AM    GLUCOSE 86 09/24/2022 06:40 AM    PROT 5.6 09/21/2022 07:36 PM    PROT 6.5 07/28/2012 11:20 PM    CALCIUM 7.8 09/24/2022 06:40 AM    BILITOT 0.3 09/21/2022 05:54 AM    ALKPHOS 106 09/21/2022 05:54 AM    AST 19 09/21/2022 05:54 AM    ALT 16 09/21/2022 05:54 AM      Hepatic Function Panel:   Lab Results   Component Value Date/Time    ALKPHOS 106 09/21/2022 05:54 AM    ALT 16 09/21/2022 05:54 AM    AST 19 09/21/2022 05:54 AM    PROT 5.6 09/21/2022 07:36 PM    PROT 6.5 07/28/2012 11:20 PM    BILITOT 0.3 09/21/2022 05:54 AM    BILIDIR <0.2 09/21/2022 05:54 AM    IBILI see below 09/21/2022 05:54 AM      Phosphorus:   Lab Results   Component Value Date/Time    PHOS 5.5 09/24/2022 06:40 AM       ASSESSMENT:  Principal Problem:    Intractable nausea and vomiting  Active Problems:    Septicemia ([address]    IVDU (intravenous drug user)    MRSA bacteremia    Sepsis due to methicillin resistant Staphylococcus aureus (MRSA) without acute organ dysfunction ([address]    Bacterial infection due to Serratia    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 *      PLAN  Assessment/  1-MARYAN suspect Vancomycin toxicity /ATN/ infectious GN . 1st HD 9/20  UOP /labs noted   S./p [facility]  9/23 . Will need Kidney Bx this wk . HD in am. Check labs in am .   2-MRSA and serratia bacteremia with retroperitoneal abscess  septic pulmonary emboli and possible TV endocarditis  3-IVDA   4 Anemia  Hb Noted S/p Tx . Epo with HD.   5 Hypocalcemia with severe Hypoalbuminemia improving PO4 at goal        Brandon Perez MD, L[facility] [place]

## 2022-09-27 ENCOUNTER — APPOINTMENT (OUTPATIENT)
Dept: CT IMAGING | Age: 42
DRG: 720 | End: 2022-09-27
Payer: MEDICAID

## 2022-09-27 LAB
ALBUMIN SERPL-MCNC: 2.2 G/DL (ref 3.4–5)
ALP BLD-CCNC: 85 U/L (ref 40–129)
ALT SERPL-CCNC: 6 U/L (ref 10–40)
ANION GAP SERPL CALCULATED.3IONS-SCNC: 10 MMOL/L (ref 3–16)
AST SERPL-CCNC: 9 U/L (ref 15–37)
BILIRUB SERPL-MCNC: <0.2 MG/DL (ref 0–1)
BILIRUBIN DIRECT: <0.2 MG/DL (ref 0–0.3)
BILIRUBIN, INDIRECT: ABNORMAL MG/DL (ref 0–1)
BUN BLDV-MCNC: 27 MG/DL (ref 7–20)
CALCIUM SERPL-MCNC: 8.1 MG/DL (ref 8.3–10.6)
CHLORIDE BLD-SCNC: 100 MMOL/L (ref 99–110)
CO2: 28 MMOL/L (ref 21–32)
CREAT SERPL-MCNC: 3.3 MG/DL (ref 0.6–1.1)
GFR AFRICAN AMERICAN: 19
GFR NON-AFRICAN AMERICAN: 15
GLUCOSE BLD-MCNC: 86 MG/DL (ref 70–99)
HCT VFR BLD CALC: 23.5 % (ref 36–48)
HEMOGLOBIN: 7.8 G/DL (ref 12–16)
MCH RBC QN AUTO: 28.1 PG (ref 26–34)
MCHC RBC AUTO-ENTMCNC: 33 G/DL (ref 31–36)
MCV RBC AUTO: 85.1 FL (ref 80–100)
PDW BLD-RTO: 16.1 % (ref 12.4–15.4)
PHOSPHORUS: 5.7 MG/DL (ref 2.5–4.9)
PLATELET # BLD: 146 K/UL (ref 135–450)
PMV BLD AUTO: 7.6 FL (ref 5–10.5)
POTASSIUM SERPL-SCNC: 4.5 MMOL/L (ref 3.5–5.1)
RBC # BLD: 2.77 M/UL (ref 4–5.2)
SODIUM BLD-SCNC: 138 MMOL/L (ref 136–145)
TOTAL PROTEIN: 5.8 G/DL (ref 6.4–8.2)
WBC # BLD: 9.1 K/UL (ref 4–11)

## 2022-09-27 PROCEDURE — 2060000000 HC ICU INTERMEDIATE R&B

## 2022-09-27 PROCEDURE — 9990000010 HC NO CHARGE VISIT: Performed by: PHYSICAL THERAPIST

## 2022-09-27 PROCEDURE — 6370000000 HC RX 637 (ALT 250 FOR IP): Performed by: INTERNAL MEDICINE

## 2022-09-27 PROCEDURE — 80069 RENAL FUNCTION PANEL: CPT

## 2022-09-27 PROCEDURE — 94760 N-INVAS EAR/PLS OXIMETRY 1: CPT

## 2022-09-27 PROCEDURE — 6360000002 HC RX W HCPCS: Performed by: INTERNAL MEDICINE

## 2022-09-27 PROCEDURE — 80076 HEPATIC FUNCTION PANEL: CPT

## 2022-09-27 PROCEDURE — 99233 SBSQ HOSP IP/OBS HIGH 50: CPT | Performed by: INTERNAL MEDICINE

## 2022-09-27 PROCEDURE — 36592 COLLECT BLOOD FROM PICC: CPT

## 2022-09-27 PROCEDURE — 2580000003 HC RX 258: Performed by: INTERNAL MEDICINE

## 2022-09-27 PROCEDURE — 90935 HEMODIALYSIS ONE EVALUATION: CPT

## 2022-09-27 PROCEDURE — 85027 COMPLETE CBC AUTOMATED: CPT

## 2022-09-27 PROCEDURE — 71250 CT THORAX DX C-: CPT

## 2022-09-27 RX ADMIN — NEPHROCAP 1 MG: 1 CAP ORAL at 11:27

## 2022-09-27 RX ADMIN — MORPHINE SULFATE 1.5 MG: 2 INJECTION, SOLUTION INTRAMUSCULAR; INTRAVENOUS at 05:41

## 2022-09-27 RX ADMIN — CEFTAROLINE FOSAMIL 200 MG: 600 POWDER, FOR SOLUTION INTRAVENOUS at 22:36

## 2022-09-27 RX ADMIN — LINEZOLID 600 MG: 600 TABLET, FILM COATED ORAL at 20:13

## 2022-09-27 RX ADMIN — HEPARIN SODIUM 5000 UNITS: 5000 INJECTION INTRAVENOUS; SUBCUTANEOUS at 04:38

## 2022-09-27 RX ADMIN — SODIUM CHLORIDE, PRESERVATIVE FREE 10 ML: 5 INJECTION INTRAVENOUS at 20:14

## 2022-09-27 RX ADMIN — ACETAMINOPHEN 650 MG: 325 TABLET ORAL at 13:30

## 2022-09-27 RX ADMIN — SODIUM CHLORIDE 25 ML: 9 INJECTION, SOLUTION INTRAVENOUS at 15:55

## 2022-09-27 RX ADMIN — MORPHINE SULFATE 1.5 MG: 2 INJECTION, SOLUTION INTRAMUSCULAR; INTRAVENOUS at 01:18

## 2022-09-27 RX ADMIN — MORPHINE SULFATE 1.5 MG: 2 INJECTION, SOLUTION INTRAMUSCULAR; INTRAVENOUS at 20:13

## 2022-09-27 RX ADMIN — SODIUM CHLORIDE, PRESERVATIVE FREE 10 ML: 5 INJECTION INTRAVENOUS at 11:28

## 2022-09-27 RX ADMIN — MORPHINE SULFATE 1.5 MG: 2 INJECTION, SOLUTION INTRAMUSCULAR; INTRAVENOUS at 15:51

## 2022-09-27 RX ADMIN — LINEZOLID 600 MG: 600 TABLET, FILM COATED ORAL at 11:27

## 2022-09-27 RX ADMIN — LORAZEPAM 1 MG: 1 TABLET ORAL at 12:54

## 2022-09-27 RX ADMIN — SEVELAMER CARBONATE 800 MG: 800 TABLET, FILM COATED ORAL at 11:27

## 2022-09-27 RX ADMIN — MORPHINE SULFATE 1.5 MG: 2 INJECTION, SOLUTION INTRAMUSCULAR; INTRAVENOUS at 11:27

## 2022-09-27 RX ADMIN — CEFTAROLINE FOSAMIL 200 MG: 600 POWDER, FOR SOLUTION INTRAVENOUS at 15:55

## 2022-09-27 RX ADMIN — CEFTAROLINE FOSAMIL 200 MG: 600 POWDER, FOR SOLUTION INTRAVENOUS at 04:42

## 2022-09-27 ASSESSMENT — PAIN DESCRIPTION - LOCATION
LOCATION: BACK
LOCATION: HEAD
LOCATION: BACK

## 2022-09-27 ASSESSMENT — PAIN DESCRIPTION - DESCRIPTORS
DESCRIPTORS: NAGGING;DISCOMFORT
DESCRIPTORS: NAGGING;DISCOMFORT
DESCRIPTORS: ACHING

## 2022-09-27 ASSESSMENT — PAIN DESCRIPTION - FREQUENCY
FREQUENCY: CONTINUOUS

## 2022-09-27 ASSESSMENT — PAIN DESCRIPTION - ORIENTATION
ORIENTATION: LOWER
ORIENTATION: RIGHT;LOWER
ORIENTATION: LOWER
ORIENTATION: LOWER;RIGHT
ORIENTATION: LOWER
ORIENTATION: LOWER

## 2022-09-27 ASSESSMENT — PAIN DESCRIPTION - ONSET
ONSET: ON-GOING

## 2022-09-27 ASSESSMENT — PAIN SCALES - GENERAL
PAINLEVEL_OUTOF10: 7
PAINLEVEL_OUTOF10: 8
PAINLEVEL_OUTOF10: 0
PAINLEVEL_OUTOF10: 0
PAINLEVEL_OUTOF10: 4
PAINLEVEL_OUTOF10: 9
PAINLEVEL_OUTOF10: 8
PAINLEVEL_OUTOF10: 7
PAINLEVEL_OUTOF10: 7

## 2022-09-27 ASSESSMENT — PAIN DESCRIPTION - PAIN TYPE
TYPE: ACUTE PAIN

## 2022-09-27 NOTE — FLOWSHEET NOTE
Treatment time: 3 HR     Net UF: 2 LITERS     Pre weight: 71 KG   Post weight: 69 KG   EDW: TBD     Access used: R TDC  Access function: GOOD     Medications or blood products given: NONE     Regular outpatient schedule: MARYAN     Summary of response to treatment: Pt tolerated tx well.  Post VSS    Copy of dialysis treatment record placed in chart, to be scanned into EMR.      09/27/22 0740 09/27/22 1051   Vital Signs   BP (!) 164/99 (!) 156/99   Temp 97.8 °F (36.6 °C) 97.6 °F (36.4 °C)   Heart Rate 92 88   Weight 156 lb 8.4 oz (71 kg) 152 lb 1.9 oz (69 kg)   Weight Method Bed scale  --    Post-Hemodialysis Assessment   Hemodialysis Intake (ml)  --  400 ml   Hemodialysis Output (ml)  --  2400 ml   NET Removed (ml)  --  2000   Tolerated Treatment  --  Good

## 2022-09-27 NOTE — PROGRESS NOTES
Progress Note    Admit Date: 9/12/2022         Subjective and Overnight Events:  Seen at bedside, appears comfortable in bed  Has no complaints  No CP, SOB  Complains of back pain and requesting to increase her morphine dose but do not have any associated radiation of pain or urinary incontinance    Objective:   Vitals: BP (!) 158/101   Pulse 89   Temp 98.8 °F (37.1 °C) (Oral)   Resp 14   Ht 5' 8\" (1.727 m)   Wt 156 lb 8.4 oz (71 kg)   SpO2 96%   BMI 23.80 kg/m²   BP (!) 158/101   Pulse 89   Temp 98.8 °F (37.1 °C) (Oral)   Resp 14   Ht 5' 8\" (1.727 m)   Wt 156 lb 8.4 oz (71 kg)   SpO2 96%   BMI 23.80 kg/m²     General appearance: lying in bed comfortably, on RA  HEENT:  Conjunctivae/corneas clear. Neck: Supple, with full range of motion. Respiratory:  Normal respiratory effort. Clear to auscultation, bilaterally without Rales/Wheezes/Rhonchi. Cardiovascular: Regular rate and rhythm with normal S1/S2 without murmurs or rubs  Abdomen: Soft, non-tender, non-distended, normal bowel sounds. Musculoskeletal: No cyanosis or edema bilaterally  Neurologic:  without any focal sensory/motor deficits.  grossly non-focal.  Psychiatric: Alert and oriented, Normal mood  Peripheral Pulses: +2 palpable, equal bilaterally       Data:     Scheduled Medications:    linezolid  600 mg Oral BID    b complex-C-folic acid  1 capsule Oral Daily    sevelamer  800 mg Oral TID WC    ceftaroline fosamil (TEFLARO) IVPB  200 mg IntraVENous q8h    heparin (porcine)  5,000 Units SubCUTAneous 3 times per day    lidocaine 1 % injection  5 mL IntraDERmal Once    sodium chloride flush  5-40 mL IntraVENous 2 times per day      PRN Medications: morphine, heparin (porcine), LORazepam, sodium chloride, chlorhexidine, sodium chloride flush, sodium chloride, hydrOXYzine pamoate, naloxone, calcium carbonate, promethazine, promethazine **OR** ondansetron, acetaminophen **OR** acetaminophen, perflutren lipid microspheres  Diet: ADULT DIET; Regular  ADULT ORAL NUTRITION SUPPLEMENT; Breakfast, Lunch, Dinner; Standard High Calorie/High Protein Oral Supplement    Continuous Infusions:   sodium chloride      sodium chloride 25 mL (09/23/22 2212)         Intake/Output Summary (Last 24 hours) at 9/26/2022 2012  Last data filed at 9/26/2022 1800  Gross per 24 hour   Intake 99.19 ml   Output 1225 ml   Net -1125.81 ml         CBC:   Recent Labs     09/24/22  0640 09/26/22  1130   WBC 7.7 8.7   HGB 7.8* 8.4*    155       BMP:  Recent Labs     09/24/22  0640 09/26/22  1130   * 137   K 3.9 4.3   CL 94* 99   CO2 28 28   BUN 25* 20   CREATININE 3.3* 3.1*   GLUCOSE 86 88       ABGs: No results found for: PHART, PO2ART, [id number]    Assessment/plan     Patient Active Problem List:     Tear of medial cartilage or meniscus of knee, current     Plica syndrome     Boxer's metacarpal fracture, neck, closed     Chondromalacia of patella     Degeneration of lumbar or lumbosacral intervertebral disc     Varicose veins of lower extremity     Intractable nausea and vomiting     Septicemia (HCC)     IVDU (intravenous drug user)     MRSA bacteremia     Sepsis due to methicillin resistant Staphylococcus aureus (MRSA) without acute organ dysfunction (HCC)     Bacterial infection due to Serratia     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 According to the patient's sister patient has been feeling sick for past few days, not feeling well. Patient was noticed to have fevers as well as chills.

## 2022-09-27 NOTE — PLAN OF CARE
Problem: Safety - Adult  Goal: Free from fall injury  Outcome: Progressing     Problem: Pain  Goal: Verbalizes/displays adequate comfort level or baseline comfort level  Outcome: Progressing     Problem: Skin/Tissue Integrity  Goal: Absence of new skin breakdown  Outcome: Progressing     Problem: Nutrition Deficit:  Goal: Optimize nutritional status  Outcome: Not Progressing

## 2022-09-27 NOTE — PROGRESS NOTES
Patient started coughing up small amounts of bright red blood along with two blood clots at 1250. Dr. Odalys Potter notified. Orders received to hold Heparin and obtain CT chest. Respiratory status remained unchanged, O2 saturations stable on room air. No further coughing up blood after initial episodes. Continue to monitor.

## 2022-09-27 NOTE — PROGRESS NOTES
Hematology Note    Pt currently not in room. Hb stable. Will check back in tomorrow.     Andre Beach MD

## 2022-09-27 NOTE — PROGRESS NOTES
Progress Note    Admit Date: 9/12/2022         Subjective and Overnight Events:  Seen at bedside, appears comfortable in bed  Has no complaints  No CP, SOB  Complains of back pain and requesting to increase her morphine dose but do not have any associated radiation of pain or urinary incontinance    Objective:   Vitals: BP (!) 137/101   Pulse 92   Temp 98.2 °F (36.8 °C) (Oral)   Resp 16   Ht 5' 8\" (1.727 m)   Wt 152 lb 1.9 oz (69 kg)   SpO2 97%   BMI 23.13 kg/m²   BP (!) 137/101   Pulse 92   Temp 98.2 °F (36.8 °C) (Oral)   Resp 16   Ht 5' 8\" (1.727 m)   Wt 152 lb 1.9 oz (69 kg)   SpO2 97%   BMI 23.13 kg/m²     General appearance: lying in bed comfortably, on RA  HEENT:  Conjunctivae/corneas clear. Neck: Supple, with full range of motion. Respiratory:  Normal respiratory effort. Clear to auscultation, bilaterally without Rales/Wheezes/Rhonchi. Cardiovascular: Regular rate and rhythm with normal S1/S2 without murmurs or rubs  Abdomen: Soft, non-tender, non-distended, normal bowel sounds. Musculoskeletal: No cyanosis or edema bilaterally  Neurologic:  without any focal sensory/motor deficits.  grossly non-focal.  Psychiatric: Alert and oriented, Normal mood  Peripheral Pulses: +2 palpable, equal bilaterally   No change in physical exam today    Data:     Scheduled Medications:    linezolid  600 mg Oral BID    b complex-C-folic acid  1 capsule Oral Daily    sevelamer  800 mg Oral TID WC    ceftaroline fosamil (TEFLARO) IVPB  200 mg IntraVENous q8h    [Held by provider] heparin (porcine)  5,000 Units SubCUTAneous 3 times per day    lidocaine 1 % injection  5 mL IntraDERmal Once    sodium chloride flush  5-40 mL IntraVENous 2 times per day      PRN Medications: morphine, heparin (porcine), LORazepam, sodium chloride, chlorhexidine, sodium chloride flush, sodium chloride, hydrOXYzine pamoate, naloxone, calcium carbonate, promethazine, promethazine **OR** ondansetron, acetaminophen **OR** acetaminophen, perflutren lipid microspheres  Diet: ADULT DIET; Regular  ADULT ORAL NUTRITION SUPPLEMENT; Breakfast, Lunch, Dinner; Standard High Calorie/High Protein Oral Supplement  Diet NPO    Continuous Infusions:   sodium chloride      sodium chloride 100 mL/hr at 09/27/22 1858         Intake/Output Summary (Last 24 hours) at 9/27/2022 1946  Last data filed at 9/27/2022 1858  Gross per 24 hour   Intake 814.53 ml   Output 3235 ml   Net -2420.47 ml         CBC:   Recent Labs     09/26/22  1130 09/27/22  0440   WBC 8.7 9.1   HGB 8.4* 7.8*    146       BMP:  Recent Labs     09/26/22  1130 09/27/22  0440    138   K 4.3 4.5   CL 99 100   CO2 28 28   BUN 20 27*   CREATININE 3.1* 3.3*   GLUCOSE 88 86       ABGs: No results found for: PHART, PO2ART, [id number]    Assessment/plan     Patient Active Problem List:     Tear of medial cartilage or meniscus of knee, current     Plica syndrome     Boxer's metacarpal fracture, neck, closed     Chondromalacia of patella     Degeneration of lumbar or lumbosacral intervertebral disc     Varicose veins of lower extremity     Intractable nausea and vomiting     Septicemia (HCC)     IVDU (intravenous drug user)     MRSA bacteremia     Sepsis due to methicillin resistant Staphylococcus aureus (MRSA) without acute organ dysfunction (HCC)     Bacterial infection due to Serratia     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   According to the patient's sister patient has been feeling sick for past few days, not feeling well. Patient was noticed to have fevers as well as chills.      Assessment  Sepsis POA - endocarditis +/- cavitary PNA - MRSA/Serratia bacteremia   Pulmonary septic emboli, cavitary nodules  Endocarditis  History of IV drug abuse  ADHD  Anemia    Plan  Continue atbx as per ID - d/w ID, plan to continue Abx inpt  Endocarditis  + cardiology -  no angiovac recommended, recs to continue IV abx   Nephrology concerned about Vancomycin toxicity, plan for IR catheter for Dialysis   Consulted hematology, r/u hemolysis, ordered haptoglobin - elevated  Added ensure with meals as she has low po intake   Pain control  Continue IV Abx  Continue HD per nephrology  Pain control  Will need longterm Abx  Patient has Drain by IR, IR service to determine timing of drain removal    Full Code    Continue IV abx per ID, plan for renal Biopsy tomorrow  HD per nephrology  D/w ID recs Abx inpt for now  Patient has PICC line    Robel Mcghee MD

## 2022-09-27 NOTE — PLAN OF CARE
Problem: Discharge Planning  Goal: Discharge to home or other facility with appropriate resources  Outcome: Progressing     Problem: Safety - Adult  Goal: Free from fall injury  Outcome: Progressing     Problem: Pain  Goal: Verbalizes/displays adequate comfort level or baseline comfort level  Outcome: Progressing     Problem: ABCDS Injury Assessment  Goal: Absence of physical injury  Outcome: Progressing     Problem: Skin/Tissue Integrity  Goal: Absence of new skin breakdown  Description: 1. Monitor for areas of redness and/or skin breakdown  2. Assess vascular access sites hourly  3. Every 4-6 hours minimum:  Change oxygen saturation probe site  4. Every 4-6 hours:  If on nasal continuous positive airway pressure, respiratory therapy assess nares and determine need for appliance change or resting period.   Outcome: Progressing     Problem: Nutrition Deficit:  Goal: Optimize nutritional status  Outcome: Progressing  Flowsheets (Taken 9/26/2022 1207 by Vaughn Avalos, RD, LD)  Nutrient intake appropriate for improving, restoring, or maintaining nutritional needs:   Assess nutritional status and recommend course of action   Monitor oral intake, labs, and treatment plans   Recommend appropriate diets, oral nutritional supplements, and vitamin/mineral supplements

## 2022-09-27 NOTE — PROGRESS NOTES
Physical Therapy    Hyun Doyle  [phone]  [id number]    Attempted to see for PT session however pt out of room for dialysis; will attempt later as schedule permits  Electronically signed by CHLOÉ ALMAZAN PT on 9/27/2022 at 9:08 AM

## 2022-09-27 NOTE — PROGRESS NOTES
Infectious Disease Follow up Notes  Admit Date: 9/12/2022  Hospital Day: 16    Antibiotics :   Oral Linezolid  IV Ceftaroline      CHIEF COMPLAINT:     Sepsis  MRSA bacteremia  Serratia Bacteremia  Endocarditis  IVDA  TV Vegetation   Rt gluteal abscess     Subjective interval History :  40 y. o.woman with a history of IV drug abuse, ADHD, back pain, depression admitted to the hospital secondary to fever chills fatigue back pain. Patient family noted she was unable to get up from the bed for the past 3 days secondary to fevers and not feeling well. Admission labs indicate creatinine 1.5 procalcitonin elevated to 6.6, WBC elevated 15.5 hemoglobin 9.4 bilirubin 1.3 AST 42, blood cultures from admission positive for MRSA as well as Serratia. T-max 103.8 on admission. CT chest with diffuse innumerable bilateral pulmonary nodules consistent with cavitation and septic emboli. Given the presentation concern is for endocarditis secondary to IV drug abuse.   Patient not much interactive during normalization some of the history is provided by her daughters,       Interval History : KRYSTLE drain in place working ok  and Rt gluteal pain going down creat remains elevated and cough +  sputum byron and CT chest pending -  tolerating IV abx ok       Past Medical History:    Past Medical History:   Diagnosis Date    ADHD (attention deficit hyperactivity disorder)     Arthritis     Back pain     Depression     Migraine     Neutrophilia 9/15/2022       Past Surgical History:    Past Surgical History:   Procedure Laterality Date    IR INSERT NON TUNNELED CV CATH LESS THAN 5 YEARS Right 09/20/2022    Deneen Jordan; RIJ access; 15cm; Dr. Ace Vázquez    IR NONTUNNELED VASCULAR CATHETER  09/20/2022    IR NONTUNNELED VASCULAR CATHETER 9/20/2022 [facility] PROCEDURES    KNEE ARTHROSCOPY  10/05/2010    PARTIAL HYSTERECTOMY (CERVIX NOT REMOVED)      TUBAL LIGATION 01/01/2004    TUNNELED VENOUS CATHETER PLACEMENT Right 09/23/2022    Permacath; RIJ access; 19cm; Dr. Colten Jaffe       Current Medications:    No outpatient medications have been marked as taking for the 9/12/22 encounter [facility] Encounter).        Allergies:  Ultram [tramadol hcl], Robaxin [methocarbamol], and Penicillins    Immunizations :   Immunization History   Administered Date(s) Administered    Tdap (Boostrix, Adacel) 02/11/2015       Social History:    Social History     Tobacco Use    Smoking status: Every Day     Packs/day: 0.50     Years: 2.00     Pack years: 1.00     Types: Cigarettes    Smokeless tobacco: Never   Substance Use Topics    Alcohol use: Yes     Comment: occas    Drug use: No     Social History     Tobacco Use   Smoking Status Every Day    Packs/day: 0.50    Years: 2.00    Pack years: 1.00    Types: Cigarettes   Smokeless Tobacco Never      Family History   Problem Relation Age of Onset    Breast Cancer Maternal Grandmother 45    Arthritis Other     Asthma Other     Cancer Other     Diabetes Other     Seizures Other     Stroke Other           REVIEW OF SYSTEMS:      Constitutional:  fevers,  chills +=, night sweats  Eyes:  negative for blurred vision, eye discharge, visual disturbance   HEENT:  negative for hearing loss, ear drainage,nasal congestion  Respiratory:  negative for cough, shortness of breath or hemoptysis   Cardiovascular:  negative for chest pain, palpitations, syncope  Gastrointestinal:  negative for nausea, vomiting, diarrhea, constipation, abdominal pain  Genitourinary:  negative for frequency, dysuria, urinary incontinence, hematuria  Hematologic/Lymphatic:  negative for easy bruising, bleeding and lymphadenopathy  Allergic/Immunologic:  negative for recurrent infections, angioedema, anaphylaxis   Endocrine:  negative for weight changes, polyuria, polydipsia and polyphagia  Musculoskeletal:  Rt hip and lower back pain ++   pain, swelling, decreased range of motion  Integumentary: No rashes, skin lesions  Neurological:  negative for headaches, slurred speech, unilateral weakness  Psychiatric: negative for hallucinations,confusion,agitation.                 PHYSICAL EXAM:      Vitals:    BP (!) 137/101   Pulse 92   Temp 98.2 °F (36.8 °C) (Oral)   Resp 16   Ht 5' 8\" (1.727 m)   Wt 152 lb 1.9 oz (69 kg)   SpO2 97%   BMI 23.13 kg/m²        General Appearance: alert,in  acute distress, ++ pallor, no icterus  poor skin hygiene   Skin: warm and dry, no rash or erythema  Head: normocephalic and atraumatic  Eyes: pupils equal, round, and reactive to light, conjunctivae normal  ENT: tympanic membrane, external ear and ear canal normal bilaterally, nose without deformity, nasal mucosa and turbinates normal without polyps  Neck: supple and non-tender without mass, no thyromegaly  no cervical lymphadenopathy  Pulmonary/Chest: Bi basal crepts+ - no wheezes, rales or rhonchi, normal air movement, no respiratory distress  Cardiovascular: normal rate, regular rhythm, normal S1 and S2, esm+ murmurs, rubs, clicks, or gallops, no carotid bruits  Abdomen: soft, non-tender, non-distended, normal bowel sounds, no masses or organomegaly  Extremities: no cyanosis, clubbing or edema  Musculoskeletal: normal range of motion, no joint swelling, deformity or tenderness  Integumentary: No rashes, no abnormal skin lesions, no petechiae  Neurologic: reflexes normal and symmetric, no cranial nerve deficit  Psych:  Orientation, sensorium, mood normal            Lines: PICC  Needle tracks++   Rt gluteal area pain jerri DRAIN_   Lower leg multiple skin lesions from IVDA++   HD line in place     Data Review:    CBC:   Lab Results   Component Value Date    WBC 9.1 09/27/2022    HGB 7.8 (L) 09/27/2022    HCT 23.5 (L) 09/27/2022    MCV 85.1 09/27/2022     09/27/2022     RENAL:   Lab Results   Component Value Date    CREATININE 3.3 (H) 09/27/2022    BUN 27 (H) 09/27/2022     09/27/2022    K 4.5 09/27/2022     09/27/2022    CO2 28 09/27/2022     SED RATE:   Lab Results   Component Value Date/Time    SEDRATE 23 09/25/2022 05:32 AM     CK:   Lab Results   Component Value Date/Time    CKTOTAL 10 09/18/2022 04:55 PM     CRP:   Lab Results   Component Value Date/Time    CRP 49.8 09/25/2022 08:51 PM     Hepatic Function Panel:   Lab Results   Component Value Date/Time    ALKPHOS 85 09/27/2022 04:40 AM    ALT 6 09/27/2022 04:40 AM    AST 9 09/27/2022 04:40 AM    PROT 5.8 09/27/2022 04:40 AM    PROT 6.5 07/28/2012 11:20 PM    BILITOT <0.2 09/27/2022 04:40 AM    BILIDIR <0.2 09/27/2022 04:40 AM    IBILI see below 09/27/2022 04:40 AM    LABALBU 2.2 09/27/2022 04:40 AM     UA:  Lab Results   Component Value Date/Time    COLORU Yellow 09/18/2022 04:36 PM    CLARITYU CLOUDY 09/18/2022 04:36 PM    GLUCOSEU Negative 09/18/2022 04:36 PM    GLUCOSEU NEGATIVE 07/31/2010 02:59 PM    BILIRUBINUR Negative 09/18/2022 04:36 PM    BILIRUBINUR NEGATIVE 07/31/2010 02:59 PM    KETUA Negative 09/18/2022 04:36 PM    SPECGRAV 1.008 09/18/2022 04:36 PM    BLOODU LARGE 09/18/2022 04:36 PM    PHUR 5.0 09/18/2022 04:36 PM    PROTEINU 100 09/18/2022 04:36 PM    UROBILINOGEN 0.2 09/18/2022 04:36 PM    NITRU Negative 09/18/2022 04:36 PM    LEUKOCYTESUR MODERATE 09/18/2022 04:36 PM    LABMICR YES 09/18/2022 04:36 PM    URINETYPE NotGiven 09/18/2022 04:36 PM      Urine Microscopic:   Lab Results   Component Value Date/Time    BACTERIA None Seen 09/18/2022 04:36 PM    COMU see below 09/07/2022 01:55 AM    HYALCAST 6 09/18/2022 04:36 PM    WBCUA 98 09/18/2022 04:36 PM    RBCUA 14 09/18/2022 04:36 PM    EPIU 1 09/18/2022 04:36 PM     Urine Reflex to Culture:   Lab Results   Component Value Date/Time    URRFLXCULT Yes 09/12/2022 04:16 PM      Summary   Severe eccentric tricuspid regurgitation. Small, mobile tricuspid valve vegetation noted measuring 0.9cm x 0.4cm.       Signature ------------------------------------------------------------------   Electronically signed by Carina Sherwood MD (Interpreting   physician) on 09/16/2022 at 04:29 PM   ------------------------------------------------------------------    MICRO: cultures reviewed and updated by me   Blood Culture:          MRSA DNA Probe, Nasal [[id number]] Collected: 09/13/22 1220   Order Status: Completed Specimen: Nares Updated: 09/14/22 1131    MRSA SCREEN RT-PCR --    Negative  MRSA DNA not detected. Normal Range: Not detected    Narrative:     ORDER#: [id number]                          ORDERED BY: Trae Saldaña   SOURCE: Nares                              COLLECTED:  09/13/22 12:20   ANTIBIOTICS AT [facility].:                      RECEIVED :  09/13/22 12:46   Blood Culture 1 [[id number]] (Abnormal) Collected: 09/12/22 1616   Order Status: Completed Specimen: Blood Updated: 09/14/22 0939    Blood Culture, Routine -- Abnormal     Gram stain Aerobic bottle:   Gram positive cocci in clusters   resembling Staphylococcus   Information to follow   and   Gram negative rods   Information to follow   Gram stain Anaerobic bottle:   Gram positive cocci in clusters   resembling Staphylococcus   Information to follow    Abnormal     Organism Staph aureus MRSA DNA Detected Abnormal     Blood Culture, Routine -- Abnormal     CONTACT PRECAUTIONS INDICATED   See additional report for complete BCID panel. mecA/C gene and MREJ gene Detected. Abnormal     Organism Serratia marcescens DNA Detected Abnormal     Blood Culture, Routine See additional report for complete BCID panel.     Organism Staph aureus MRSA Abnormal     Blood Culture, Routine -- Abnormal     POSITIVE for   Sensitivity to follow   CONTACT PRECAUTIONS INDICATED   Isolated two of two sets    Abnormal     Organism Serratia marcescens Abnormal     Blood Culture, Routine --    POSITIVE for   Sensitivity to follow   Isolated one of two sets    Narrative:     ORDER#: [id number] ORDERED BY: Aj Ojeda   SOURCE: Blood                              COLLECTED:  09/12/22 16:16   ANTIBIOTICS AT [facility].:                      RECEIVED :  09/12/22 16:32   CALL  [facility]   tel. [phone],   Microbiology results called to and read back by Mathis Dakins RN,   09/13/2022 08:24, by [facility]   Microbiology results called to and read back by Nicole Campos,   09/13/2022 07:05, by [facility]   If child <=2 yrs old please draw pediatric bottle. ~Blood Culture 1   Culture, Blood 2 [[id number]] Collected: 09/14/22 0525   Order Status: Sent Specimen: Blood Updated: 09/14/22 0600   Culture, Blood 1 [[id number]] Collected: 09/14/22 0436   Order Status: Sent Specimen: Blood Updated: 09/14/22 0441   Culture, Urine [[id number]] Collected: 09/12/22 1616   Order Status: Completed Specimen: Urine, clean catch Updated: 09/13/22 2245    Urine Culture, Routine No growth at 18 to 36 hours   Narrative:     ORDER#: [id number]                          ORDERED BY: Aj Ojeda   SOURCE: Urine Clean Catch                  COLLECTED:  09/12/22 16:16   ANTIBIOTICS AT [facility].:                      RECEIVED :  09/12/22 19:35   Culture, Blood 2 [[id number]] (Abnormal) Collected: 09/12/22 2012   Order Status: Completed Specimen: Blood Updated: 09/13/22 1416    Culture, Blood 2 -- Abnormal     Gram stain Aerobic bottle:   Gram positive cocci in clusters   resembling Staphylococcus   Information to follow   Gram stain Anaerobic bottle:   Gram positive cocci in clusters   resembling Staphylococcus   Information to follow    Abnormal    Narrative:     ORDER#: [id number]                          ORDERED BY: Aj Ojeda   SOURCE: Blood                              COLLECTED:  09/12/22 20:12   ANTIBIOTICS AT [facility].:                      RECEIVED :  09/12/22 20:21   CALL  [facility]   tel. [phone],   Previous panic on this admission - call not needed per SOP, 09/13/2022 10:56,   by [facility]   If child <=2 yrs old please draw pediatric bottle. ~Blood Culture #2   Strep Pneumoniae Antigen [[id number]] Collected: 09/12/22 1900   Order Status: Completed Specimen: Urine, clean catch Updated: 09/13/22 1054    STREP PNEUMONIAE ANTIGEN, URINE --    Presumptive Negative   Presumptive negative suggests no current or recent   pneumococcal infection. Infection due to Strep pneumoniae   cannot be ruled out since the antigen present in the sample   may be below the detection limit of the test.   Normal Range:Presumptive Negative    Narrative:     ORDER#: [id number]                          ORDERED BY: Shruti Balderas   SOURCE: Urine Clean Catch                  COLLECTED:  09/12/22 19:00   ANTIBIOTICS AT [facility].:                      RECEIVED :  09/13/22 07:04   Legionella antigen, urine [[id number]] Collected: 09/12/22 1900   Order Status: Completed Specimen: Urine, clean catch Updated: 09/13/22 1047    L. pneumophila Serogp 1 Ur Ag --    Presumptive Negative   No Legionella pneumophila serogroup 1 antigens detected. A negative result does not exclude infection with   Legionella pneumophila serogroup 1 nor does it rule out   other microbial-caused respiratory infections or   disease caused by other serogroups of   Legionella pneumophila.    Normal Range: Presumptive Negative    Narrative:     ORDER#: [id number]                          ORDERED BY: Shruti Balderas   SOURCE: Urine Clean Catch                  COLLECTED:  09/12/22 19:00   ANTIBIOTICS AT [facility].:                      RECEIVED :  09/13/22 07:04   Culture, Blood, PCR ID Panel [[id number]] Collected: 09/12/22 1616   Order Status: Completed Updated: 09/13/22 0707    Report SEE IMAGE   Narrative:     James Patel  [id number] tel. [phone],   Microbiology results called to and read back by Kelsey Khanna,   09/13/2022 07:05, by Chloe Nava   Culture, Blood 2 [[id number]] Collected: 09/12/22 0000   Order Status: Canceled Specimen: Blood    Culture, Blood 1 [[id number]] Collected: 09/12/22 0000   Order Status: Canceled Specimen: Blood      Lab Results   Component Value Date/Time    BC No Growth after 4 days of incubation. 09/14/2022 04:36 AM    BLOODCULT2 No Growth after 4 days of incubation. 09/14/2022 05:25 AM      Collected: 09/16/22 1210   Order Status: Completed Specimen:  Body Fluid from Abscess Updated: 09/19/22 0615    Gram Stain Result 4+ WBC's (Polymorphonuclear)   2+ Gram positive cocci   1+ Gram negative rods    Abnormal     Anaerobic Culture --    Anaerobic culture further report to follow   No anaerobes isolated so far, Further report to follow     Organism Serratia marcescens Abnormal     WOUND/ABSCESS Light growth    Organism Staph aureus MRSA Abnormal     WOUND/ABSCESS -- Abnormal     Moderate growth   CONTACT PRECAUTIONS INDICATED    Abnormal    Narrative:     ORDER#: [id number]                          ORDERED BY: RAMILA MARTINES   SOURCE: Abscess Rt gluteal abscess         COLLECTED:  09/16/22 12:10   ANTIBIOTICS AT [facility].:                      RECEIVED :  09/16/22 12:32   CALL  [facility]  [facility] tel. [phone],   Previous panic on this admission - call not needed per SOP, 09/17/2022 13:04,      Susceptibility    Staph aureus mrsa (3)    Antibiotic Interpretation Microscan  Method Status    ceFAZolin Resistant >16 mcg/mL BACTERIAL SUSCEPTIBILITY PANEL BY YA     clindamycin Sensitive <=0.5 mcg/mL BACTERIAL SUSCEPTIBILITY PANEL BY YA     erythromycin Resistant >4 mcg/mL BACTERIAL SUSCEPTIBILITY PANEL BY YA     oxacillin Resistant >2 mcg/mL BACTERIAL SUSCEPTIBILITY PANEL BY YA     tetracycline Sensitive <=4 mcg/mL BACTERIAL SUSCEPTIBILITY PANEL BY YA     trimethoprim-sulfamethoxazole Sensitive 1/19 mcg/mL BACTERIAL SUSCEPTIBILITY PANEL BY YA     vancomycin Sensitive 2 mcg/mL BACTERIAL SUSCEPTIBILITY PANEL BY YA       Serratia marcescens (4)    Antibiotic Interpretation Microscan  Method Status    amoxicillin-clavulanate Resistant >16/8 mcg/mL BACTERIAL SUSCEPTIBILITY PANEL BY YA     ampicillin Resistant >16 mcg/mL BACTERIAL SUSCEPTIBILITY PANEL BY YA     ampicillin-sulbactam Resistant 16/8 mcg/mL BACTERIAL SUSCEPTIBILITY PANEL BY YA     ceFAZolin Resistant >16 mcg/mL BACTERIAL SUSCEPTIBILITY PANEL BY YA     cefepime Sensitive <=2 mcg/mL BACTERIAL SUSCEPTIBILITY PANEL BY YA     cefTRIAXone Sensitive <=1 mcg/mL BACTERIAL SUSCEPTIBILITY PANEL BY YA     cefuroxime Resistant >16 mcg/mL BACTERIAL SUSCEPTIBILITY PANEL BY YA     ciprofloxacin Sensitive <=1 mcg/mL BACTERIAL SUSCEPTIBILITY PANEL BY YA     ertapenem Sensitive <=0.5 mcg/mL BACTERIAL SUSCEPTIBILITY PANEL BY YA     gentamicin Sensitive <=4 mcg/mL BACTERIAL SUSCEPTIBILITY PANEL BY YA     meropenem Sensitive <=1 mcg/mL BACTERIAL SUSCEPTIBILITY PANEL BY YA     piperacillin-tazobactam Sensitive <=16 mcg/mL BACTERIAL SUSCEPTIBILITY PANEL BY YA     trimethoprim-sulfamethoxazole Sensitive <=2/38 mcg/mL BACTERIAL SUSCEPTIBILITY PANEL BY YA      Condensed View       Respiratory Culture:  Lab Results   Component Value Date/Time    LABGRAM  09/16/2022 12:10 PM     4+ WBC's (Polymorphonuclear)  2+ Gram positive cocci  1+ Gram negative rods       AFB:No results found for: AFBSMEAR  Viral Culture:  Lab Results   Component Value Date/Time    COVID19 Not Detected 09/07/2022 12:00 AM    COVID19 Not Detected 07/20/2022 11:20 PM     Urine Culture:   No results for input(s): LABURIN in the last 72 hours. Summary   Normal left ventricle size, wall thickness, and systolic function with an   estimated ejection fraction of 60-65%. No regional wall motion abnormalities   are seen. Normal diastolic function. Normal right ventricular size and function. Mobile vegetation on tricuspid valve suggestive of endocarditis. Moderate tricuspid regurgitation. The right atrium is mildly dilated.       Signature      ------------------------------------------------------------------   Electronically signed by Nolan Javed MD   (Interpreting physician) on 09/13/2022 at 04:15 PM   ------------------------------------------------------------------    IMAGING:    IR REPLACE TUNNELED CVC W SQ PORT SAME ACCESS   Final Result   Successful conversion of a non tunneled hemodialysis catheter for a new 19   centimeter tunneled hemodialysis catheter. IR NONTUNNELED VASCULAR CATHETER > 5 YEARS   Final Result   Successful ultrasound and fluoroscopy guided non-tunneled Trialysis catheter   placement. US RENAL COMPLETE   Final Result   Unremarkable ultrasound of the kidneys and urinary bladder. No renal   atrophy, hydronephrosis or abnormal echotexture. CT ABSCESS DRAINAGE W CATH PLACEMENT S&I   Final Result   Successful CT guided placement of a 10 [place] drainage catheter in the right   iliacus abscess. CT GUIDED NEEDLE PLACEMENT   Final Result   Successful CT guided placement of a 10 [place] drainage catheter in the right   iliacus abscess. MRI LUMBAR SPINE W WO CONTRAST   Final Result   Partially visualized septic arthritis/osteomyelitis of the right sacroiliac   joint, with a large adjacent retroperitoneal abscess extending into the right   pelvic sidewall. Small intramuscular abscess also present within the left psoas muscle. Epidural phlegmon/abscess of the sacral canal.      Pelvic ascites. The findings were sent to the Radiology Results [address] at 7:23   pm on 9/15/2022 to be communicated to a licensed caregiver. VL Extremity Venous Bilateral   Final Result      CT HEAD WO CONTRAST   Final Result   No acute intracranial abnormality.          CT CHEST WO CONTRAST   Final Result   Diffuse in numeral bilateral pulmonary nodules many of which are cavitary   becoming coalescent at the right lung apex however diffusely throughout the   bilateral lungs consistent of septic emboli with small to moderate right   pleural effusion      Partially visualized portions of the upper abdomen reveal hepatosplenomegaly XR CHEST PORTABLE   Final Result   Multifocal patchy airspace disease and cavitary nodules. The appearance is   suggestive of septic emboli in this clinical setting. Follow-up recommended to assure resolution.                All the pertinent images and reports for the current Hospitalization were reviewed by me     Scheduled Meds:   linezolid  600 mg Oral BID    b complex-C-folic acid  1 capsule Oral Daily    sevelamer  800 mg Oral TID WC    ceftaroline fosamil (TEFLARO) IVPB  200 mg IntraVENous q8h    heparin (porcine)  5,000 Units SubCUTAneous 3 times per day    lidocaine 1 % injection  5 mL IntraDERmal Once    sodium chloride flush  5-40 mL IntraVENous 2 times per day       Continuous Infusions:   sodium chloride      sodium chloride 25 mL (09/23/22 2212)       PRN Meds:  morphine, heparin (porcine), LORazepam, sodium chloride, chlorhexidine, sodium chloride flush, sodium chloride, hydrOXYzine pamoate, naloxone, calcium carbonate, promethazine, promethazine **OR** ondansetron, acetaminophen **OR** acetaminophen, perflutren lipid microspheres      Assessment:     Patient Active Problem List   Diagnosis    Tear of medial cartilage or meniscus of knee, current    Plica syndrome    Boxer's metacarpal fracture, neck, closed    Chondromalacia of patella    Degeneration of lumbar or lumbosacral intervertebral disc    Varicose veins of lower extremity    Intractable nausea and vomiting    Septicemia (HCC)    IVDU (intravenous drug user)    MRSA bacteremia    Sepsis due to methicillin resistant Staphylococcus aureus (MRSA) without acute organ dysfunction ([address]    Bacterial infection due to Serratia    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 WBC count is elevated high fever from ongoing bloodstream infection. Given IV drug abuse suspect endocarditis transthoracic echocardiogram with TV Vegetation    Trend WBC and repeat Blood cx NGTD    Will need placement to complete IV ABX        Rt gluteal and lower Lumbar area pain MRI L spine with abscess, septic joint     S/p IR aspiration of the abscess cx MRSA and serratia noted    Unfortunately kidney function continues to decline nephrology is now following. IV antibiotics have been adjusted    Will need a long course of antibiotic given the MRSA bacteremia and septic arthritis    HD line placed due to worsening Kidney function -completed hemodialysis for session without any issues. Unfortunately creatinine still remains elevated.     May need repeat MRI L spine in future     Check CRP and ESR down trend noted    Hemoptysis from Septic Emboli likely and CT chest pending will check resp cx         Labs, Microbiology, Radiology and all the pertinent results from current hospitalization and  care every where were reviewed  by me as a part of the evaluation   Plan:   Cont  IV Ceftaroline - 200 mg q x 8 HRS will cover MRSA and Serratia adjusted to GFR  Cont  oral  Linezolid x 600 mg q 12 HRS  - Platelets are going down needs to watch   Repeat Blood cx    NGTD  TTE Abnormal with vegetation   Will need IV abx  Watch for complications  HIV -ve and Hepatitis screen Hep C+Ve  Cardiology consult  STEFANO completed   Vegetation is small for angiovac  would be able to treat with IV abx   MRI L spine noted very abnormal s/p IR  abscess draianage  Daptomycin not effective in LUNGS  awaiting renal recovery to decide on long term IV abx course   Needs placement   Await kidney function recovery to decide on adjusting the long-term antibiotics  May need repeat MRI once clinically better   iF Remains on HD we can choose IV Vancomycin to complete the course  post biopsy   Resp cx - CT chest pending -          Discussed with patient/Family and Nursing   Risk of Complications/Morbidity: High      Illness(es)/ Infection present that pose threat to bodily function. There is potential for severe exacerbation of infection/side effects of treatment. Therapy requires intensive monitoring for antimicrobial agent toxicity. Discussed with patient/Family and Nursing staff     Thanks for allowing me to participate in your patient's care and please call me with any questions or concerns.     Lottie Krause MD  Infectious Disease  [facility] [place] [facility] Physician  Phone: [phone]   Fax : [phone]

## 2022-09-27 NOTE — PROGRESS NOTES
[facility]  Phone: [phone]  Fax: [phone]  [facility]. [facility]         41 y/o WF with h/o depression, IVDA and back pain admitted with feeling weak, fatigued and pain in her back She was seen in ER on 9/7/22 with back apin and diagnosed with UTI Received toradol and was discharged on Motrin and Cefdinir Urine CX grew klebsiella pan senstive However she continued to feel poorly with back pain, and fatigue   Had fever to 101 on admission Started on Vancomycin and Cefepime Cr was 1.5 mg on admission improved to 1.1 mg but has increased to 1.8 mg now [facility] from admission growing MRSA and Serratia CT chest with cavitation and septic emboli  She reports decreased UOP no dysuria hematuria  Has been taking Ibuprofen 800 mg TID prior to admission  MRI showed OM of R SI joint and large retroperitoneal abscess   Underwent drainage tube placement . Events noted , labs reviewed . Seen post HD . REVIEW OF SYSTEMS:  No CP/SOB or GEIGER  . No Family at bed side     Physical Exam:    VITALS:  BP (!) 137/101   Pulse 92   Temp 98.2 °F (36.8 °C) (Oral)   Resp 16   Ht 5' 8\" (1.727 m)   Wt 152 lb 1.9 oz (69 kg)   SpO2 97%   BMI 23.13 kg/m²   24HR INTAKE/OUTPUT:    Intake/Output Summary (Last 24 hours) at 9/27/2022 1252  Last data filed at 9/27/2022 1051  Gross per 24 hour   Intake 689.19 ml   Output 3465 ml   Net -2775.81 ml         Constitutional:  awake   Respiratory:  Decrease BS at  bases   Gastrointestinal:  + tenderness. Normal Bowel Sounds  Cardiovascular:  S1, S2 RRR   Edema:  +  edema  Acc Rt TDC .     DATA:    CBC:  Lab Results   Component Value Date/Time    WBC 9.1 09/27/2022 04:40 AM    RBC 2.77 09/27/2022 04:40 AM    HGB 7.8 09/27/2022 04:40 AM    HCT 23.5 09/27/2022 04:40 AM    MCV 85.1 09/27/2022 04:40 AM    MCH 28.1 09/27/2022 04:40 AM    MCHC 33.0 09/27/2022 04:40 AM    RDW 16.1 09/27/2022 04:40 AM     09/27/2022 04:40 AM    MPV 7.6 09/27/2022 04:40 AM     CMP:  Lab Results   Component Value Date/Time     09/27/2022 04:40 AM    K 4.5 09/27/2022 04:40 AM    K 4.3 09/19/2022 05:51 AM     09/27/2022 04:40 AM    CO2 28 09/27/2022 04:40 AM    BUN 27 09/27/2022 04:40 AM    CREATININE 3.3 09/27/2022 04:40 AM    GFRAA 19 09/27/2022 04:40 AM    GFRAA >60 07/28/2012 11:20 PM    AGRATIO 0.4 09/12/2022 03:44 PM    LABGLOM 15 09/27/2022 04:40 AM    GLUCOSE 86 09/27/2022 04:40 AM    PROT 5.8 09/27/2022 04:40 AM    PROT 6.5 07/28/2012 11:20 PM    CALCIUM 8.1 09/27/2022 04:40 AM    BILITOT <0.2 09/27/2022 04:40 AM    ALKPHOS 85 09/27/2022 04:40 AM    AST 9 09/27/2022 04:40 AM    ALT 6 09/27/2022 04:40 AM      Hepatic Function Panel:   Lab Results   Component Value Date/Time    ALKPHOS 85 09/27/2022 04:40 AM    ALT 6 09/27/2022 04:40 AM    AST 9 09/27/2022 04:40 AM    PROT 5.8 09/27/2022 04:40 AM    PROT 6.5 07/28/2012 11:20 PM    BILITOT <0.2 09/27/2022 04:40 AM    BILIDIR <0.2 09/27/2022 04:40 AM    IBILI see below 09/27/2022 04:40 AM      Phosphorus:   Lab Results   Component Value Date/Time    PHOS 5.7 09/27/2022 04:40 AM       ASSESSMENT:  Principal Problem:    Intractable nausea and vomiting  Active Problems:    Septicemia (HCC)    IVDU (intravenous drug user)    MRSA bacteremia    Sepsis due to methicillin resistant Staphylococcus aureus (MRSA) without acute organ dysfunction (HCC)    Bacterial infection due to Serratia    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 *      PLAN  Assessment/  1-MARYAN suspect Vancomycin toxicity /ATN/ infectious GN . 1st HD 9/20  UOP /labs noted   S./p [facility] [place] by [facility]  9/23 . Kidney Bx in am, procedure and complications explained to pt again. She is agreeable  . Hold Sq Heparin . HD as ordered ,DW adjusted .  Check labs in am .   2-MRSA and serratia bacteremia with retroperitoneal abscess  septic pulmonary emboli and possible TV endocarditis  3-IVDA   4 Anemia  Hb Noted S/p Tx .  Epo with HD.   5 SW cons for out pt HDU placement         Sherrie Hamilton MD, Joo Mariee

## 2022-09-28 ENCOUNTER — APPOINTMENT (OUTPATIENT)
Dept: CT IMAGING | Age: 42
DRG: 720 | End: 2022-09-28
Payer: MEDICAID

## 2022-09-28 LAB
ABO/RH: NORMAL
ALBUMIN SERPL-MCNC: 2.2 G/DL (ref 3.4–5)
ANION GAP SERPL CALCULATED.3IONS-SCNC: 11 MMOL/L (ref 3–16)
ANTIBODY SCREEN: NORMAL
APTT: 33.4 SEC (ref 23–34.3)
BASOPHILS ABSOLUTE: 0.1 K/UL (ref 0–0.2)
BASOPHILS RELATIVE PERCENT: 0.9 %
BUN BLDV-MCNC: 16 MG/DL (ref 7–20)
CALCIUM SERPL-MCNC: 8 MG/DL (ref 8.3–10.6)
CHLORIDE BLD-SCNC: 103 MMOL/L (ref 99–110)
CO2: 26 MMOL/L (ref 21–32)
CREAT SERPL-MCNC: 2.6 MG/DL (ref 0.6–1.1)
EOSINOPHILS ABSOLUTE: 0.2 K/UL (ref 0–0.6)
EOSINOPHILS RELATIVE PERCENT: 2.9 %
GFR AFRICAN AMERICAN: 24
GFR NON-AFRICAN AMERICAN: 20
GLUCOSE BLD-MCNC: 83 MG/DL (ref 70–99)
HCT VFR BLD CALC: 22.2 % (ref 36–48)
HCT VFR BLD CALC: 23.2 % (ref 36–48)
HEMOGLOBIN: 7.4 G/DL (ref 12–16)
HEMOGLOBIN: 7.7 G/DL (ref 12–16)
INR BLD: 1.24 (ref 0.87–1.14)
LYMPHOCYTES ABSOLUTE: 1.4 K/UL (ref 1–5.1)
LYMPHOCYTES RELATIVE PERCENT: 17.3 %
MCH RBC QN AUTO: 28.2 PG (ref 26–34)
MCH RBC QN AUTO: 28.3 PG (ref 26–34)
MCHC RBC AUTO-ENTMCNC: 33.1 G/DL (ref 31–36)
MCHC RBC AUTO-ENTMCNC: 33.4 G/DL (ref 31–36)
MCV RBC AUTO: 84.4 FL (ref 80–100)
MCV RBC AUTO: 85.7 FL (ref 80–100)
MONOCYTES ABSOLUTE: 0.4 K/UL (ref 0–1.3)
MONOCYTES RELATIVE PERCENT: 5.2 %
NEUTROPHILS ABSOLUTE: 5.8 K/UL (ref 1.7–7.7)
NEUTROPHILS RELATIVE PERCENT: 73.7 %
PDW BLD-RTO: 16.3 % (ref 12.4–15.4)
PDW BLD-RTO: 16.4 % (ref 12.4–15.4)
PHOSPHORUS: 4.1 MG/DL (ref 2.5–4.9)
PLATELET # BLD: 133 K/UL (ref 135–450)
PLATELET # BLD: 144 K/UL (ref 135–450)
PMV BLD AUTO: 7.9 FL (ref 5–10.5)
PMV BLD AUTO: 7.9 FL (ref 5–10.5)
POTASSIUM SERPL-SCNC: 4.4 MMOL/L (ref 3.5–5.1)
PROTHROMBIN TIME: 15.5 SEC (ref 11.7–14.5)
RBC # BLD: 2.64 M/UL (ref 4–5.2)
RBC # BLD: 2.7 M/UL (ref 4–5.2)
SODIUM BLD-SCNC: 140 MMOL/L (ref 136–145)
WBC # BLD: 7.8 K/UL (ref 4–11)
WBC # BLD: 8.4 K/UL (ref 4–11)

## 2022-09-28 PROCEDURE — 85027 COMPLETE CBC AUTOMATED: CPT

## 2022-09-28 PROCEDURE — 6360000002 HC RX W HCPCS: Performed by: INTERNAL MEDICINE

## 2022-09-28 PROCEDURE — 99233 SBSQ HOSP IP/OBS HIGH 50: CPT | Performed by: INTERNAL MEDICINE

## 2022-09-28 PROCEDURE — 2060000000 HC ICU INTERMEDIATE R&B

## 2022-09-28 PROCEDURE — 86850 RBC ANTIBODY SCREEN: CPT

## 2022-09-28 PROCEDURE — 2580000003 HC RX 258: Performed by: INTERNAL MEDICINE

## 2022-09-28 PROCEDURE — 6370000000 HC RX 637 (ALT 250 FOR IP): Performed by: INTERNAL MEDICINE

## 2022-09-28 PROCEDURE — 80069 RENAL FUNCTION PANEL: CPT

## 2022-09-28 PROCEDURE — 36592 COLLECT BLOOD FROM PICC: CPT

## 2022-09-28 PROCEDURE — 2500000003 HC RX 250 WO HCPCS: Performed by: INTERNAL MEDICINE

## 2022-09-28 PROCEDURE — 85610 PROTHROMBIN TIME: CPT

## 2022-09-28 PROCEDURE — 97116 GAIT TRAINING THERAPY: CPT | Performed by: PHYSICAL THERAPIST

## 2022-09-28 PROCEDURE — 86900 BLOOD TYPING SEROLOGIC ABO: CPT

## 2022-09-28 PROCEDURE — 94760 N-INVAS EAR/PLS OXIMETRY 1: CPT

## 2022-09-28 PROCEDURE — 97530 THERAPEUTIC ACTIVITIES: CPT

## 2022-09-28 PROCEDURE — 85025 COMPLETE CBC W/AUTO DIFF WBC: CPT

## 2022-09-28 PROCEDURE — 86901 BLOOD TYPING SEROLOGIC RH(D): CPT

## 2022-09-28 PROCEDURE — 97530 THERAPEUTIC ACTIVITIES: CPT | Performed by: PHYSICAL THERAPIST

## 2022-09-28 PROCEDURE — 85730 THROMBOPLASTIN TIME PARTIAL: CPT

## 2022-09-28 RX ORDER — CLINDAMYCIN PHOSPHATE 900 MG/50ML
900 INJECTION INTRAVENOUS EVERY 8 HOURS
Status: DISCONTINUED | OUTPATIENT
Start: 2022-09-28 | End: 2022-10-01 | Stop reason: HOSPADM

## 2022-09-28 RX ADMIN — MORPHINE SULFATE 1.5 MG: 2 INJECTION, SOLUTION INTRAMUSCULAR; INTRAVENOUS at 09:10

## 2022-09-28 RX ADMIN — MORPHINE SULFATE 1.5 MG: 2 INJECTION, SOLUTION INTRAMUSCULAR; INTRAVENOUS at 04:26

## 2022-09-28 RX ADMIN — SODIUM CHLORIDE, PRESERVATIVE FREE 10 ML: 5 INJECTION INTRAVENOUS at 22:08

## 2022-09-28 RX ADMIN — SEVELAMER CARBONATE 800 MG: 800 TABLET, FILM COATED ORAL at 09:00

## 2022-09-28 RX ADMIN — CEFTAROLINE FOSAMIL 200 MG: 600 POWDER, FOR SOLUTION INTRAVENOUS at 15:32

## 2022-09-28 RX ADMIN — NEPHROCAP 1 MG: 1 CAP ORAL at 09:00

## 2022-09-28 RX ADMIN — MORPHINE SULFATE 1.5 MG: 2 INJECTION, SOLUTION INTRAMUSCULAR; INTRAVENOUS at 22:07

## 2022-09-28 RX ADMIN — SEVELAMER CARBONATE 800 MG: 800 TABLET, FILM COATED ORAL at 12:41

## 2022-09-28 RX ADMIN — SEVELAMER CARBONATE 800 MG: 800 TABLET, FILM COATED ORAL at 18:02

## 2022-09-28 RX ADMIN — MORPHINE SULFATE 1.5 MG: 2 INJECTION, SOLUTION INTRAMUSCULAR; INTRAVENOUS at 13:47

## 2022-09-28 RX ADMIN — CEFTAROLINE FOSAMIL 200 MG: 600 POWDER, FOR SOLUTION INTRAVENOUS at 22:20

## 2022-09-28 RX ADMIN — LINEZOLID 600 MG: 600 TABLET, FILM COATED ORAL at 09:00

## 2022-09-28 RX ADMIN — MORPHINE SULFATE 1.5 MG: 2 INJECTION, SOLUTION INTRAMUSCULAR; INTRAVENOUS at 18:12

## 2022-09-28 RX ADMIN — CEFTAROLINE FOSAMIL 200 MG: 600 POWDER, FOR SOLUTION INTRAVENOUS at 06:05

## 2022-09-28 RX ADMIN — CLINDAMYCIN IN 5 PERCENT DEXTROSE 900 MG: 18 INJECTION, SOLUTION INTRAVENOUS at 18:02

## 2022-09-28 RX ADMIN — SODIUM CHLORIDE, PRESERVATIVE FREE 20 ML: 5 INJECTION INTRAVENOUS at 09:01

## 2022-09-28 RX ADMIN — MORPHINE SULFATE 1.5 MG: 2 INJECTION, SOLUTION INTRAMUSCULAR; INTRAVENOUS at 00:24

## 2022-09-28 ASSESSMENT — PAIN DESCRIPTION - DESCRIPTORS
DESCRIPTORS: ACHING

## 2022-09-28 ASSESSMENT — PAIN DESCRIPTION - ORIENTATION
ORIENTATION: RIGHT;MID
ORIENTATION: LOWER
ORIENTATION: LOWER;RIGHT
ORIENTATION: MID;RIGHT
ORIENTATION: LOWER;RIGHT
ORIENTATION: MID;RIGHT

## 2022-09-28 ASSESSMENT — PAIN SCALES - GENERAL
PAINLEVEL_OUTOF10: 7
PAINLEVEL_OUTOF10: 8
PAINLEVEL_OUTOF10: 6
PAINLEVEL_OUTOF10: 6
PAINLEVEL_OUTOF10: 0
PAINLEVEL_OUTOF10: 7
PAINLEVEL_OUTOF10: 8
PAINLEVEL_OUTOF10: 0
PAINLEVEL_OUTOF10: 6

## 2022-09-28 ASSESSMENT — PAIN DESCRIPTION - LOCATION
LOCATION: BACK

## 2022-09-28 ASSESSMENT — PAIN DESCRIPTION - FREQUENCY
FREQUENCY: CONTINUOUS

## 2022-09-28 ASSESSMENT — PAIN DESCRIPTION - PAIN TYPE
TYPE: ACUTE PAIN

## 2022-09-28 ASSESSMENT — PAIN DESCRIPTION - ONSET
ONSET: ON-GOING

## 2022-09-28 NOTE — PROGRESS NOTES
Progress Note    Admit Date: 9/12/2022         Subjective and Overnight Events:  Seen at bedside, appears comfortable in bed  Has no complaints today  No CP, SOB  Complains of back pain and requesting to increase her morphine dose but do not have any associated radiation of pain or urinary incontinance  Lying in bed comfortably, her nose bleed has stopped    Objective:   Vitals: BP (!) 139/97   Pulse 90   Temp 98.4 °F (36.9 °C) (Oral)   Resp 18   Ht 5' 8\" (1.727 m)   Wt 166 lb 14.2 oz (75.7 kg)   SpO2 95%   BMI 25.38 kg/m²   BP (!) 139/97   Pulse 90   Temp 98.4 °F (36.9 °C) (Oral)   Resp 18   Ht 5' 8\" (1.727 m)   Wt 166 lb 14.2 oz (75.7 kg)   SpO2 95%   BMI 25.38 kg/m²     General appearance: on RA, lying in bed, holding conversations  HEENT:  Conjunctivae/corneas clear. Neck: Supple, with full range of motion. Respiratory:  Normal respiratory effort. Clear to auscultation, bilaterally without Rales/Wheezes/Rhonchi. Cardiovascular: Regular rate and rhythm with normal S1/S2 without murmurs or rubs  Abdomen: Soft, non-tender, non-distended, normal bowel sounds. Musculoskeletal: No cyanosis or edema bilaterally  Neurologic:  without any focal sensory/motor deficits.  grossly non-focal.  Psychiatric: Alert and oriented, Normal mood  Peripheral Pulses: +2 palpable, equal bilaterally   No change in physical exam today    Data:     Scheduled Medications:    clindamycin (CLEOCIN) IV  900 mg IntraVENous Q8H    b complex-C-folic acid  1 capsule Oral Daily    sevelamer  800 mg Oral TID     ceftaroline fosamil (TEFLARO) IVPB  200 mg IntraVENous q8h    [Held by provider] heparin (porcine)  5,000 Units SubCUTAneous 3 times per day    lidocaine 1 % injection  5 mL IntraDERmal Once    sodium chloride flush  5-40 mL IntraVENous 2 times per day      PRN Medications: morphine, heparin (porcine), LORazepam, sodium chloride, chlorhexidine, sodium chloride flush, sodium chloride, hydrOXYzine pamoate, naloxone, calcium carbonate, promethazine, promethazine **OR** ondansetron, acetaminophen **OR** acetaminophen, perflutren lipid microspheres  Diet: Diet NPO  ADULT DIET; Regular  ADULT ORAL NUTRITION SUPPLEMENT; Lunch; Standard High Calorie/High Protein Oral Supplement    Continuous Infusions:   sodium chloride      sodium chloride 100 mL/hr at 09/27/22 1858         Intake/Output Summary (Last 24 hours) at 9/28/2022 1658  Last data filed at 9/28/2022 1143  Gross per 24 hour   Intake 304.53 ml   Output 580 ml   Net -275.47 ml         CBC:   Recent Labs     09/28/22  0425 09/28/22  1251   WBC 8.4 7.8   HGB 7.4* 7.7*    133*       BMP:  Recent Labs     09/27/22  0440 09/28/22  0425    140   K 4.5 4.4    103   CO2 28 26   BUN 27* 16   CREATININE 3.3* 2.6*   GLUCOSE 86 83       ABGs: No results found for: PHART, PO2ART, FGA2YXX    Assessment/plan     Patient Active Problem List:     Tear of medial cartilage or meniscus of knee, current     Plica syndrome     Boxer's metacarpal fracture, neck, closed     Chondromalacia of patella     Degeneration of lumbar or lumbosacral intervertebral disc     Varicose veins of lower extremity     Intractable nausea and vomiting     Septicemia (HCC)     IVDU (intravenous drug user)     MRSA bacteremia     Sepsis due to methicillin resistant Staphylococcus aureus (MRSA) without acute organ dysfunction (HCC)     Bacterial infection due to Serratia     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   According to the patient's sister patient has been feeling sick for past few days, not feeling well. Patient was noticed to have fevers as well as chills.      Assessment  Sepsis POA - endocarditis +/- cavitary PNA - MRSA/Serratia bacteremia   Pulmonary septic emboli, cavitary nodules  Endocarditis  History of IV drug abuse  ADHD  Anemia    Plan  Continue atbx as per ID - d/w ID, plan to continue Abx inpt  Endocarditis  + cardiology -  no angiovac recommended, recs to continue IV abx   Nephrology concerned about Vancomycin toxicity, plan for IR catheter for Dialysis   Consulted hematology, r/u hemolysis, ordered haptoglobin - elevated  Added ensure with meals as she has low po intake   Pain control  Continue IV Abx  Continue HD per nephrology  Pain control  Will need longterm Abx  Patient has Drain by IR, IR service to determine timing of drain removal    Heparin for DVT Ppx - held for Renal Bx tomorrow    Full Code    Continue IV abx per ID, plan for renal Biopsy tomorrow, it was cancelled today, plan for HD tomorrow as well, dc 1-2 days after Bx  ID recs Abx inpt for now  Patient has PICC line and KRYSTLE drain    Earnest Bill MD

## 2022-09-28 NOTE — PROGRESS NOTES
Occupational Therapy  Facility/Department: [facility]  Occupational Therapy Daily Treatment Note    Name: Jason Cotter  : 1980  MRN: [id number]  Date of Service: 2022    Discharge Recommendations:  Patient would benefit from continued therapy after discharge, 3-5 sessions per week          Patient Diagnosis(es): The primary encounter diagnosis was Septicemia [facility]). A diagnosis of IVDU (intravenous drug user) was also pertinent to this visit. Past Medical History:  has a past medical history of ADHD (attention deficit hyperactivity disorder), Arthritis, Back pain, Depression, Migraine, and Neutrophilia. Past Surgical History:  has a past surgical history that includes Partial hysterectomy; Knee arthroscopy (10/05/2010); Tubal ligation (2004); IR INSERT NON TUNNELED CV CATH <5 YEARS (Right, 2022); IR NONTUNNELED VASCULAR CATHETER > 5 YEARS (2022); and Tunneled venous catheter placement (Right, 2022). Assessment   Performance deficits / Impairments: Decreased functional mobility ; Decreased safe awareness;Decreased balance;Decreased ADL status; Decreased high-level IADLs;Decreased endurance;Decreased strength  Assessment: Discussed with OTR am pac score is 14 which indicates need for continued skilled OT to increase [place] and decrease caregiver burden. Patient completed supine to sit with CGA for safety. Pt completed mobility in the room using RW with min/CGA. She completed transfers with min/CGA. She was able to complete hygiene after a bowel movement but required assist with thoroughness. Pt has demonstrated improvement with mobility. Per case management note pt may have to go home. would required 24 hour assist if she goes home and HHOT. Prognosis: Good  History: 39 y/o female admitted 2022 with Sepsis and Pulmonary septic emboli, cavitary nodules. Pt with history of IV drug abuse.  PTA pt lives at home with her parents and was independent with ADLs and functional mobility  Activity Tolerance  Activity Tolerance: Patient Tolerated treatment well  Activity Tolerance Comments: Pt with pain but able to tolerate mobility to the bathroom. Plan   Plan  Times per Week: 3-5  Current Treatment Recommendations: Strengthening, Balance training, Functional mobility training, Endurance training, Self-Care / ADL, Safety education & training, Gait training, Equipment evaluation, education, & procurement, Patient/Caregiver education & training     Restrictions  Restrictions/Precautions  Restrictions/Precautions: Fall Risk  Position Activity Restriction  Other position/activity restrictions: KRYSTLE drain for large retroperitoneal abcess    Subjective   General  Chart Reviewed: Yes, Progress Notes  Patient assessed for rehabilitation services?: Yes  Additional Pertinent Hx: Per H&P: \"40year-old female with past medical history of drug abuse, ADHD, who presents to the hospital due to feeling fatigue, pain in her legs as well as back, she has a history of IV drug abuse, per patient she has been sober for now, patient family is also on the bedside who also contributed to the patient history. According to the patient's sister patient has been feeling sick for past few days, not feeling well. Patient was noticed to have fevers as well as chills. \" MRI showed OM of R SI joint and large retroperitoneal abscess. 9/16 Underwent drainage tube placement . Response to previous treatment: Patient with no complaints from previous session  Family / Caregiver Present: No  Referring Practitioner: Dr. Gomez Cap: Pt seen bedside and agreed to therapy. Pt seen with PT for cotx.   General Comment  Comments: Per RN ok to see     Social/Functional History  Social/Functional History  Lives With: Family (mom and dad)  Type of Home: House  Home Layout: Two level, Laundry in basement (one story and a basement)  Home Access: Stairs to enter with rails  Entrance Stairs - Number of Steps: 10-12 with rail  Entrance Stairs - Rails: Both  Bathroom Shower/Tub: Tub/Shower unit  Bathroom Toilet: Standard  Bathroom Equipment: Shower chair  Home Equipment:  (no DME)  Has the patient had two or more falls in the past year or any fall with injury in the past year?: Yes (fall about a week ago)  ADL Assistance: Independent  Homemaking Assistance: Independent  Ambulation Assistance: Independent (no AD)  Transfer Assistance: Independent  Active : No  Occupation: Unemployed       Objective   Heart Rate: 90  Heart Rate Source: Monitor  BP: (!) 139/97  BP Location: Left Arm  BP Method: Automatic  Patient Position: Lying left side  MAP (Calculated): 111  Resp: 18  SpO2: 95 %  O2 Device: None (Room air)             Safety Devices  Type of Devices: All fall risk precautions in place;Call light within reach; Left in chair;Chair alarm in place;Gait belt;Nurse notified  Gait  Overall Level of Assistance: Contact-guard assistance  Distance (ft): 15 Feet (15 feet x 2)  Assistive Device: Walker, rolling  Toilet Transfers  Toilet - Technique: Ambulating  Equipment Used: Grab bars  Toilet Transfer: Minimal assistance;Contact guard assistance  Toilet Transfers Comments: Using RW  Wheelchair Bed Transfers  Wheelchair/Bed - Technique: Ambulating  Equipment Used: Other (recliner)  Level of Asssistance: Minimal assistance;Contact guard assistance  Wheelchair Transfers Comments: Cues to align with the recliner. Using RW     ADL  Grooming: Contact guard assistance  Grooming Skilled Clinical Factors: Stood at the sink with CGA to wash her hands. Toileting Skilled Clinical Factors: Pt completed hygiene after a bowel movement on the toilet. Required min assist for thoroughness.      Activity Tolerance  Activity Tolerance: Patient limited by endurance  Bed mobility  Supine to Sit: Contact guard assistance (increased time with HOB slightly elevated and max v. cues)  Transfers  Sit to stand: Minimal assistance;Contact guard assistance  Stand to sit: Minimal assistance;Contact guard assistance  Transfer Comments: Using RW  Vision  Vision: Within Functional Limits  Hearing  Hearing: Within functional limits  Cognition  Cognition Comment: flat affect; poor judgment but appears cognitively [facility]  Orientation  Overall Orientation Status: Within Normal Limits                                               AM-PAC Score        AM-PAC Inpatient Daily Activity Raw Score: 17 (09/28/22 1543)  AM-PAC Inpatient ADL T-Scale Score : 37.26 (09/28/22 1543)  ADL Inpatient CMS 0-100% Score: 50.11 (09/28/22 1543)  ADL Inpatient CMS G-Code Modifier : CK (09/28/22 1543)        Goals  Short Term Goals  Time Frame for Short term goals: Prior to DC. Status: goals ongoing  Short Term Goal 1: Pt will complete ADL transfers with supervision  Short Term Goal 2: Pt will complete functional mobility with supervision  Short Term Goal 3: Pt will tolerate standing > 3 min for functional task with supervision  Short Term Goal 4: Pt will complete toileting with min A  Short Term Goal 5: Pt will complete UE exercises in all plane to inc strength endurance for ADLs  Patient Goals   Patient goals : to feel better       Therapy Time   Individual Concurrent Group Co-treatment   Time In 1300         Time Out 1340         Minutes 40             This note to serve as OT d/c summary if pt is d/c-ed from hospital prior to next OT session.       Filipe Jack, [address]

## 2022-09-28 NOTE — PROGRESS NOTES
[facility]  Phone: [phone]  Fax: [phone]  [facility]. [facility]         41 y/o WF with h/o depression, IVDA and back pain admitted with feeling weak, fatigued and pain in her back She was seen in ER on 9/7/22 with back apin and diagnosed with UTI Received toradol and was discharged on Motrin and Cefdinir Urine CX grew klebsiella pan senstive However she continued to feel poorly with back pain, and fatigue   Had fever to 101 on admission Started on Vancomycin and Cefepime Cr was 1.5 mg on admission improved to 1.1 mg but has increased to 1.8 mg now [facility] from admission growing MRSA and Serratia CT chest with cavitation and septic emboli  She reports decreased UOP no dysuria hematuria  Has been taking Ibuprofen 800 mg TID prior to admission  MRI showed OM of R SI joint and large retroperitoneal abscess   Underwent drainage tube placement . Events noted , labs reviewed . Does not feel well. Kidney Bx postponed for now  . UOP/labs noted . REVIEW OF SYSTEMS:  No CP/SOB or GEIGER  . C/o hemoptysis . No Family at bed side     Physical Exam:    VITALS:  BP (!) 139/97   Pulse 90   Temp 98.4 °F (36.9 °C) (Oral)   Resp 18   Ht 5' 8\" (1.727 m)   Wt 166 lb 14.2 oz (75.7 kg)   SpO2 95%   BMI 25.38 kg/m²   24HR INTAKE/OUTPUT:    Intake/Output Summary (Last 24 hours) at 9/28/2022 1209  Last data filed at 9/28/2022 1143  Gross per 24 hour   Intake 304.53 ml   Output 930 ml   Net -625.47 ml         Constitutional:  awake   Respiratory:  Decrease BS at  bases   Gastrointestinal:  + tenderness. Normal Bowel Sounds  Cardiovascular:  S1, S2 RRR   Edema:  +  edema  Acc Rt TDC .     DATA:    CBC:  Lab Results   Component Value Date/Time    WBC 8.4 09/28/2022 04:25 AM    RBC 2.64 09/28/2022 04:25 AM    HGB 7.4 09/28/2022 04:25 AM    HCT 22.2 09/28/2022 04:25 AM    MCV 84.4 09/28/2022 04:25 AM    MCH 28.2 09/28/2022 04:25 AM    MCHC 33.4 09/28/2022 04:25 AM    RDW 16.3 09/28/2022 04:25 AM  09/28/2022 04:25 AM    MPV 7.9 09/28/2022 04:25 AM     CMP:  Lab Results   Component Value Date/Time     09/28/2022 04:25 AM    K 4.4 09/28/2022 04:25 AM    K 4.3 09/19/2022 05:51 AM     09/28/2022 04:25 AM    CO2 26 09/28/2022 04:25 AM    BUN 16 09/28/2022 04:25 AM    CREATININE 2.6 09/28/2022 04:25 AM    GFRAA 24 09/28/2022 04:25 AM    GFRAA >60 07/28/2012 11:20 PM    AGRATIO 0.4 09/12/2022 03:44 PM    LABGLOM 20 09/28/2022 04:25 AM    GLUCOSE 83 09/28/2022 04:25 AM    PROT 5.8 09/27/2022 04:40 AM    PROT 6.5 07/28/2012 11:20 PM    CALCIUM 8.0 09/28/2022 04:25 AM    BILITOT <0.2 09/27/2022 04:40 AM    ALKPHOS 85 09/27/2022 04:40 AM    AST 9 09/27/2022 04:40 AM    ALT 6 09/27/2022 04:40 AM      Hepatic Function Panel:   Lab Results   Component Value Date/Time    ALKPHOS 85 09/27/2022 04:40 AM    ALT 6 09/27/2022 04:40 AM    AST 9 09/27/2022 04:40 AM    PROT 5.8 09/27/2022 04:40 AM    PROT 6.5 07/28/2012 11:20 PM    BILITOT <0.2 09/27/2022 04:40 AM    BILIDIR <0.2 09/27/2022 04:40 AM    IBILI see below 09/27/2022 04:40 AM      Phosphorus:   Lab Results   Component Value Date/Time    PHOS 4.1 09/28/2022 04:25 AM       ASSESSMENT:  Principal Problem:    Intractable nausea and vomiting  Active Problems:    Septicemia ([address]    IVDU (intravenous drug user)    MRSA bacteremia    Sepsis due to methicillin resistant Staphylococcus aureus (MRSA) without acute organ dysfunction ([address]    Bacterial infection due to Serratia    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 *      PLAN  Assessment/  1-MARYAN suspect Vancomycin toxicity /ATN/ infectious GN . 1st HD 9/20  UOP /labs noted   S./p [facility]  9/23 . Dc luna cath . Kidney Bx in am if stable . Hold Sq Heparin .    HD in am.Check labs in am .     2-MRSA and serratia bacteremia with retroperitoneal abscess  septic pulmonary emboli and possible TV endocarditis    3-IVDA     4 Anemia  Hb Noted S/p Tx . Epo with HD.    May need tx , recheck Hb .    5 SW cons for out pt HDU placement    Plan dw pt ad nurse       Carmen Rust MD, Amna Ramirez

## 2022-09-28 NOTE — PROGRESS NOTES
Physical Therapy  Facility/Department: [address] PROGRESSIVE CARE  Physical Therapy Treatment note    Name: Kalee Real  : 1980  MRN: [id number]  Date of Service: 2022    Discharge Recommendations:  24 hour supervision or assist, Home with Home health PT   PT Equipment Recommendations  Equipment Needed: Yes  Mobility Devices: Aurelio Fish: Rolling and a 3 in 1 BS commode (pt has decreased endurance and having a BSC will allow pt to be more independent and decrease her fall risk at home    Kalee Real scored a 18/24 on the AM-PAC short mobility form. Current research shows that an AM-PAC score of 18 or greater is typically associated with a discharge to the patient's home setting. Based on the patient's AM-PAC score and their current functional mobility deficits, it is recommended that the patient have 2-3 sessions per week of Physical Therapy at d/c to increase the patient's independence. At this time, this patient demonstrates the endurance and safety to discharge home with Home PT and a follow up treatment frequency of 2-3x/wk. Please see assessment section for further patient specific details. If patient discharges prior to next session this note will serve as a discharge summary. Please see below for the latest assessment towards goals. Patient Diagnosis(es): The primary encounter diagnosis was Septicemia ([address] A diagnosis of IVDU (intravenous drug user) was also pertinent to this visit. Past Medical History:  has a past medical history of ADHD (attention deficit hyperactivity disorder), Arthritis, Back pain, Depression, Migraine, and Neutrophilia. Past Surgical History:  has a past surgical history that includes Partial hysterectomy; Knee arthroscopy (10/05/2010); Tubal ligation (2004); IR INSERT NON TUNNELED CV CATH <5 YEARS (Right, 2022);  IR NONTUNNELED VASCULAR CATHETER > 5 YEARS (2022); and Tunneled venous catheter placement (Right, 09/23/2022). Assessment   Body Structures, Functions, Activity Limitations Requiring Skilled Therapeutic Intervention: Decreased functional mobility   Assessment: pt making progress in therapy however needs significant assist to give full effort; pt is an elevated fall risk however will benefit from continued therapy at discharge; per CM note, pt will need to return home with assist from family  Therapy Prognosis: Fair  Decision Making: High Complexity  Clinical Presentation: evolving  Requires PT Follow-Up: Yes  Activity Tolerance  Activity Tolerance: Patient limited by endurance     Plan   Plan  Plan: 3-5 times per week  Current Treatment Recommendations: Functional mobility training, Balance training, Gait training, Stair training, Transfer training, Home exercise program, Equipment evaluation, education, & procurement  Safety Devices  Type of Devices: All fall risk precautions in place, Call light within reach, Left in chair, Chair alarm in place, Gait belt, Nurse notified  Restraints  Restraints Initially in Place: No     Restrictions  Restrictions/Precautions  Restrictions/Precautions: Fall Risk  Position Activity Restriction  Other position/activity restrictions: KRYSTLE drain for large retroperitoneal abcess     Subjective   General  Chart Reviewed: Yes  Additional Pertinent Hx: per Dr Cantu Session note: \"40year-old female with past medical history of drug abuse, ADHD, who presents to the hospital due to feeling fatigue, pain in her legs as well as back, she has a history of IV drug abuse, per patient she has been sober for now, patient family is also on the bedside who also contributed to the patient history. According to the patient's sister patient has been feeling sick for past few days, not feeling well. Patient was noticed to have fevers as well as chills. \" 9-16 s/p CT-guided drain placement for R iliacus abcess  Response To Previous Treatment: Patient with no complaints from previous session.   Family / Caregiver Present: No  Referring Practitioner: Dr Beryle Acosta  Referral Date : 09/12/22  Follows Commands: Within Functional Limits  Subjective  Subjective: pt needed to use commode therefore eager to get up to bathroom; pt needs encouragement to fully participate         Social/Functional History  Social/Functional History  Lives With: Family (mom and dad)  Type of Home: House  Home Layout: Two level, Laundry in basement (one story and a basement)  Home Access: Stairs to enter with rails  Entrance Stairs - Number of Steps: 10-12 with rail  Entrance Stairs - Rails: Both  Bathroom Shower/Tub: Tub/Shower unit  Bathroom Toilet: Standard  Bathroom Equipment: Shower chair  Home Equipment:  (no DME)  Has the patient had two or more falls in the past year or any fall with injury in the past year?: Yes (fall about a week ago)  ADL Assistance: Independent  Homemaking Assistance: Independent  Ambulation Assistance: Independent (no AD)  Transfer Assistance: Independent  Active : No  Occupation: Unemployed  Vision/Hearing  Vision  Vision: Within Functional Limits  Hearing  Hearing: Within functional limits    Cognition   Orientation  Overall Orientation Status: Within Normal Limits  Cognition  Cognition Comment: flat affect; poor judgment but appears cognitively WFL     Objective   Heart Rate: 90  Heart Rate Source: Monitor  BP: (!) 139/97  BP Location: Left Arm  BP Method: Automatic  Patient Position: Lying left side  MAP (Calculated): 111  Resp: 18  SpO2: 95 %  O2 Device: None (Room air)                          Gait  Overall Level of Assistance: Contact-guard assistance  Distance (ft): 15 Feet (15 feet x 2)  Assistive Device: Walker, rolling  Bed mobility  Supine to Sit: Contact guard assistance (increased time with HOB slightly elevated and max v. cues)  Transfers  Sit to Stand: Contact guard assistance;Minimal Assistance  Stand to sit: Contact guard assistance;Minimal Assistance  Ambulation  Surface: level tile  Device: [address]: Contact guard assistance  Quality of Gait: slow small steps, therapist managed IV pole  Distance: 22' x2  Comments: pt used commode with assist to finish cleansing her; pt stood at sink to wash her hands with CGA     Balance  Comments: SBA for sitting EOB; CGA for static standing with RW           OutComes Score                                                  AM-PAC Score  AM-PAC Inpatient Mobility Raw Score : 18 (09/28/22 1538)  AM-PAC Inpatient T-Scale Score : 43.63 (09/28/22 1538)  Mobility Inpatient CMS 0-100% Score: 46.58 (09/28/22 1538)  Mobility Inpatient CMS G-Code Modifier : CK (09/28/22 1538)          Tinneti Score       Goals  Short Term Goals  Time Frame for Short term goals: by discharge  Short term goal 1: bed mob mod A - met; new goal MI  Short term goal 2: transfers mod A- met; new goal MI  Short term goal 3: progress to gait when able- amb 100' with RW SBA  Patient Goals   Patient goals : pt did not state a goal       Education  Patient Education  Education Given To: Patient  Education Provided Comments: reviewed call light and not getting up without assist; encouraged pt to sit up for a few hours to increase her endurance  Education Method: Verbal  Education Outcome: Verbalized understanding;Continued education needed      Therapy Time   Individual Concurrent Group Co-treatment   Time In 1500         Time Out 1540         Minutes 40                 CHLOÉ ALMAZAN PT   Electronically signed by CHLOÉ ALMAZAN PT on 9/28/2022 at 3:42 PM

## 2022-09-28 NOTE — PROGRESS NOTES
Hematology Oncology Daily Progress Note    Admit Date: 9/12/2022  Hospital day several    Subjective:     Patient has complaints of chronic LBP and weakness--denies sob/cp. Medication side effects: none    Scheduled Meds:   linezolid  600 mg Oral BID    b complex-C-folic acid  1 capsule Oral Daily    sevelamer  800 mg Oral TID     ceftaroline fosamil (TEFLARO) IVPB  200 mg IntraVENous q8h    [Held by provider] heparin (porcine)  5,000 Units SubCUTAneous 3 times per day    lidocaine 1 % injection  5 mL IntraDERmal Once    sodium chloride flush  5-40 mL IntraVENous 2 times per day     Continuous Infusions:   sodium chloride      sodium chloride 100 mL/hr at 09/27/22 1858     PRN Meds:morphine, heparin (porcine), LORazepam, sodium chloride, chlorhexidine, sodium chloride flush, sodium chloride, hydrOXYzine pamoate, naloxone, calcium carbonate, promethazine, promethazine **OR** ondansetron, acetaminophen **OR** acetaminophen, perflutren lipid microspheres    Review of Systems  Pertinent items are noted in HPI. REVIEW OF SYSTEMS:         Constitutional: Denies fever, sweats, weight loss     Eyes: No visual changes or diplopia. No scleral icterus. ENT: No Headaches, hearing loss or vertigo. No mouth sores or sore throat. Cardiovascular: No chest pain, dyspnea on exertion, palpitations or loss of consciousness. Respiratory: No cough or wheezing, no sputum production. No hemoptysis. .    Gastrointestinal: No abdominal pain, appetite loss, blood in stools. No change in bowel habits. Genitourinary: No dysuria, trouble voiding, or hematuria. Musculoskeletal:  Generalized weakness. No joint complaints. Integumentary: No rash or pruritis. Neurological: No headache, diplopia. No change in gait, balance, or coordination. No paresthesias. Endocrine: No temperature intolerance. No excessive thirst, fluid intake, or urination.    Hematologic/Lymphatic: No abnormal bruising or ecchymoses, blood clots or swollen lymph nodes. Allergic/Immunologic: No nasal congestion or hives. Objective:     Patient Vitals for the past 8 hrs:   BP Temp Temp src Pulse Resp SpO2 Weight   09/28/22 0557 (!) 147/104 98.3 °F (36.8 °C) Oral 91 20 94 % --   09/28/22 0456 -- -- -- -- 16 -- --   09/28/22 0328 -- -- -- -- -- -- 166 lb 14.2 oz (75.7 kg)   09/28/22 0054 -- -- -- -- 18 -- --   09/27/22 2357 (!) 148/99 98.2 °F (36.8 °C) Oral (!) 105 20 98 % --     I/O last 3 completed shifts: In: 844.5 [P.O.:270; I.V.:25; IV Piggyback:149.5]  Out: 8188 [Urine:1125; Drains:15]  No intake/output data recorded.     BP (!) 147/104   Pulse 91   Temp 98.3 °F (36.8 °C) (Oral)   Resp 20   Ht 5' 8\" (1.727 m)   Wt 166 lb 14.2 oz (75.7 kg)   SpO2 94%   BMI 25.38 kg/m²     General Appearance:    Alert, cooperative, no distress, appears stated age   Head:    Normocephalic, without obvious abnormality, atraumatic   Eyes:    PERRL, conjunctiva/corneas clear, EOM's intact, fundi     benign, both eyes        Ears:    Normal TM's and external ear canals, both ears   Nose:   Nares normal, septum midline, mucosa normal, no drainage    or sinus tenderness   Throat:   Lips, mucosa, and tongue normal; teeth and gums normal   Neck:   Supple, symmetrical, trachea midline, no adenopathy;        thyroid:  No enlargement/tenderness/nodules; no carotid    bruit or JVD   Back:     Symmetric, no curvature, ROM normal, no CVA tenderness   Lungs:     Clear to auscultation bilaterally, respirations unlabored   Chest wall:    No tenderness or deformity   Heart:    Regular rate and rhythm, S1 and S2 normal, no murmur, rub   or gallop   Abdomen:     Soft, non-tender, bowel sounds active all four quadrants,     no masses, no organomegaly           Extremities:   Extremities normal, atraumatic, no cyanosis or edema   Pulses:   2+ and symmetric all extremities   Skin:   Skin color, texture, turgor normal, no rashes or lesions   Lymph nodes:   Cervical, supraclavicular, and axillary nodes normal   Neurologic:   CNII-XII intact. 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 *      Plan:     Anemia. Her anemia is multifactorial.  It is partially due to bone marrow suppression from her sepsis as well as bone marrow suppression from her meds (i.e. antibiotics). Her iron studies are consistent with acute\chronic inflammation. She is not hemolyzing. If her counts do not do not start to start to recover in the next few months, she may need a bone marrow biopsy. She also has acute kidney injury. Hopefully she is getting keep maxed out on EPO with dialysis. MRSA\Serratia bacteremia. ID is following. I will sign off on the case.   Please reconsult if questions arise        Electronically signed by Francicso Valentin MD on 9/28/2022 at 7:41 AM

## 2022-09-28 NOTE — PLAN OF CARE
Problem: Discharge Planning  Goal: Discharge to home or other facility with appropriate resources  Outcome: Progressing     Problem: Safety - Adult  Goal: Free from fall injury  Outcome: Progressing     Problem: Pain  Goal: Verbalizes/displays adequate comfort level or baseline comfort level  Outcome: Progressing     Problem: Skin/Tissue Integrity  Goal: Absence of new skin breakdown  Outcome: Progressing

## 2022-09-28 NOTE — PROGRESS NOTES
Infectious Disease Follow up Notes  Admit Date: 9/12/2022  Hospital Day: 17    Antibiotics :   Oral Linezolid stopped due to low platelets  IV Clindamycin   IV Ceftaroline      CHIEF COMPLAINT:     Sepsis  MRSA bacteremia  Serratia Bacteremia  Endocarditis  IVDA  TV Vegetation   Rt gluteal abscess   Septic embolism -     Subjective interval History :  40 y. o.woman with a history of IV drug abuse, ADHD, back pain, depression admitted to the hospital secondary to fever chills fatigue back pain. Patient family noted she was unable to get up from the bed for the past 3 days secondary to fevers and not feeling well. Admission labs indicate creatinine 1.5 procalcitonin elevated to 6.6, WBC elevated 15.5 hemoglobin 9.4 bilirubin 1.3 AST 42, blood cultures from admission positive for MRSA as well as Serratia. T-max 103.8 on admission. CT chest with diffuse innumerable bilateral pulmonary nodules consistent with cavitation and septic emboli. Given the presentation concern is for endocarditis secondary to IV drug abuse.   Patient not much interactive during normalization some of the history is provided by her daughters,       Interval History : KRYSTLE drain in place working ok  and Rt gluteal pain going down creat some improvement  - WBC trend down and Hb low Renal following has some cough and sputum production requested sputum cx     Past Medical History:    Past Medical History:   Diagnosis Date    ADHD (attention deficit hyperactivity disorder)     Arthritis     Back pain     Depression     Migraine     Neutrophilia 9/15/2022       Past Surgical History:    Past Surgical History:   Procedure Laterality Date    IR INSERT NON TUNNELED CV CATH LESS THAN 5 YEARS Right 09/20/2022    [facility]; RIJ access; 15cm; Dr. Yeboah Nip    IR NONTUNNELED VASCULAR CATHETER  09/20/2022    IR NONTUNNELED VASCULAR CATHETER 9/20/2022 [facility] PROCED[facility]    KNEE ARTHROSCOPY  10/05/2010    PARTIAL HYSTERECTOMY (CERVIX NOT REMOVED)      TUBAL LIGATION  01/01/2004    TUNNELED VENOUS CATHETER PLACEMENT Right 09/23/2022    [facility]; RIJ access; 19cm; Dr. Ortencia Meigs       Current Medications:    No outpatient medications have been marked as taking for the 9/12/22 encounter [facility] Encounter).        Allergies:  Ultram [tramadol hcl], Robaxin [methocarbamol], and Penicillins    Immunizations :   Immunization History   Administered Date(s) Administered    Tdap (Boostrix, Adacel) 02/11/2015       Social History:    Social History     Tobacco Use    Smoking status: Every Day     Packs/day: 0.50     Years: 2.00     Pack years: 1.00     Types: Cigarettes    Smokeless tobacco: Never   Substance Use Topics    Alcohol use: Yes     Comment: occas    Drug use: No     Social History     Tobacco Use   Smoking Status Every Day    Packs/day: 0.50    Years: 2.00    Pack years: 1.00    Types: Cigarettes   Smokeless Tobacco Never      Family History   Problem Relation Age of Onset    Breast Cancer Maternal Grandmother 45    Arthritis Other     Asthma Other     Cancer Other     Diabetes Other     Seizures Other     Stroke Other           REVIEW OF SYSTEMS:      Constitutional:  fevers,  chills +=, night sweats  Eyes:  negative for blurred vision, eye discharge, visual disturbance   HEENT:  negative for hearing loss, ear drainage,nasal congestion  Respiratory:  negative for cough, shortness of breath or hemoptysis   Cardiovascular:  negative for chest pain, palpitations, syncope  Gastrointestinal:  negative for nausea, vomiting, diarrhea, constipation, abdominal pain  Genitourinary:  negative for frequency, dysuria, urinary incontinence, hematuria  Hematologic/Lymphatic:  negative for easy bruising, bleeding and lymphadenopathy  Allergic/Immunologic:  negative for recurrent infections, angioedema, anaphylaxis   Endocrine:  negative for weight changes, polyuria, polydipsia and polyphagia  Musculoskeletal:  Rt hip and lower back pain ++   pain, swelling, decreased range of motion  Integumentary: No rashes, skin lesions  Neurological:  negative for headaches, slurred speech, unilateral weakness  Psychiatric: negative for hallucinations,confusion,agitation.                 PHYSICAL EXAM:      Vitals:    BP (!) 139/97   Pulse 90   Temp 98.4 °F (36.9 °C) (Oral)   Resp 18   Ht 5' 8\" (1.727 m)   Wt 166 lb 14.2 oz (75.7 kg)   SpO2 95%   BMI 25.38 kg/m²        General Appearance: alert,in  acute distress, ++ pallor, no icterus  poor skin hygiene   Skin: warm and dry, no rash or erythema  Head: normocephalic and atraumatic  Eyes: pupils equal, round, and reactive to light, conjunctivae normal  ENT: tympanic membrane, external ear and ear canal normal bilaterally, nose without deformity, nasal mucosa and turbinates normal without polyps  Neck: supple and non-tender without mass, no thyromegaly  no cervical lymphadenopathy  Pulmonary/Chest: Bi basal crepts+ - no wheezes, rales or rhonchi, normal air movement, no respiratory distress  Cardiovascular: normal rate, regular rhythm, normal S1 and S2, esm+ murmurs, rubs, clicks, or gallops, no carotid bruits  Abdomen: soft, non-tender, non-distended, normal bowel sounds, no masses or organomegaly  Extremities: no cyanosis, clubbing or edema  Musculoskeletal: normal range of motion, no joint swelling, deformity or tenderness  Integumentary: No rashes, no abnormal skin lesions, no petechiae  Neurologic: reflexes normal and symmetric, no cranial nerve deficit  Psych:  Orientation, sensorium, mood normal            Lines: PICC  Needle tracks++   Rt gluteal area pain jerri DRAIN_   Lower leg multiple skin lesions from IVDA++   HD line in place     Data Review:    CBC:   Lab Results   Component Value Date    WBC 7.8 09/28/2022    HGB 7.7 (L) 09/28/2022    HCT 23.2 (L) 09/28/2022    MCV 85.7 09/28/2022     (L) 09/28/2022     RENAL:   Lab Results Component Value Date    CREATININE 2.6 (H) 09/28/2022    BUN 16 09/28/2022     09/28/2022    K 4.4 09/28/2022     09/28/2022    CO2 26 09/28/2022     SED RATE:   Lab Results   Component Value Date/Time    SEDRATE 23 09/25/2022 05:32 AM     CK:   Lab Results   Component Value Date/Time    CKTOTAL 10 09/18/2022 04:55 PM     CRP:   Lab Results   Component Value Date/Time    CRP 49.8 09/25/2022 08:51 PM     Hepatic Function Panel:   Lab Results   Component Value Date/Time    ALKPHOS 85 09/27/2022 04:40 AM    ALT 6 09/27/2022 04:40 AM    AST 9 09/27/2022 04:40 AM    PROT 5.8 09/27/2022 04:40 AM    PROT 6.5 07/28/2012 11:20 PM    BILITOT <0.2 09/27/2022 04:40 AM    BILIDIR <0.2 09/27/2022 04:40 AM    IBILI see below 09/27/2022 04:40 AM    LABALBU 2.2 09/28/2022 04:25 AM     UA:  Lab Results   Component Value Date/Time    COLORU Yellow 09/18/2022 04:36 PM    CLARITYU CLOUDY 09/18/2022 04:36 PM    GLUCOSEU Negative 09/18/2022 04:36 PM    GLUCOSEU NEGATIVE 07/31/2010 02:59 PM    BILIRUBINUR Negative 09/18/2022 04:36 PM    BILIRUBINUR NEGATIVE 07/31/2010 02:59 PM    KETUA Negative 09/18/2022 04:36 PM    SPECGRAV 1.008 09/18/2022 04:36 PM    BLOODU LARGE 09/18/2022 04:36 PM    PHUR 5.0 09/18/2022 04:36 PM    PROTEINU 100 09/18/2022 04:36 PM    UROBILINOGEN 0.2 09/18/2022 04:36 PM    NITRU Negative 09/18/2022 04:36 PM    LEUKOCYTESUR MODERATE 09/18/2022 04:36 PM    LABMICR YES 09/18/2022 04:36 PM    URINETYPE NotGiven 09/18/2022 04:36 PM      Urine Microscopic:   Lab Results   Component Value Date/Time    BACTERIA None Seen 09/18/2022 04:36 PM    COMU see below 09/07/2022 01:55 AM    HYALCAST 6 09/18/2022 04:36 PM    WBCUA 98 09/18/2022 04:36 PM    RBCUA 14 09/18/2022 04:36 PM    EPIU 1 09/18/2022 04:36 PM     Urine Reflex to Culture:   Lab Results   Component Value Date/Time    URRFLXCULT Yes 09/12/2022 04:16 PM      Summary   Severe eccentric tricuspid regurgitation.    Small, mobile tricuspid valve vegetation noted measuring 0.9cm x 0.4cm. Signature      ------------------------------------------------------------------   Electronically signed by Brit Kunz MD (Interpreting   physician) on 09/16/2022 at 04:29 PM   ------------------------------------------------------------------    MICRO: cultures reviewed and updated by me   Blood Culture:          MRSA DNA Probe, Nasal [[id number]] Collected: 09/13/22 1220   Order Status: Completed Specimen: Nares Updated: 09/14/22 1131    MRSA SCREEN RT-PCR --    Negative  MRSA DNA not detected. Normal Range: Not detected    Narrative:     ORDER#: [id number]                          ORDERED BY: Anahy Baum   SOURCE: Nares                              COLLECTED:  09/13/22 12:20   ANTIBIOTICS AT [facility]A.:                      RECEIVED :  09/13/22 12:46   Blood Culture 1 [[id number]] (Abnormal) Collected: 09/12/22 1616   Order Status: Completed Specimen: Blood Updated: 09/14/22 0939    Blood Culture, Routine -- Abnormal     Gram stain Aerobic bottle:   Gram positive cocci in clusters   resembling Staphylococcus   Information to follow   and   Gram negative rods   Information to follow   Gram stain Anaerobic bottle:   Gram positive cocci in clusters   resembling Staphylococcus   Information to follow    Abnormal     Organism Staph aureus MRSA DNA Detected Abnormal     Blood Culture, Routine -- Abnormal     CONTACT PRECAUTIONS INDICATED   See additional report for complete BCID panel. mecA/C gene and MREJ gene Detected. Abnormal     Organism Serratia marcescens DNA Detected Abnormal     Blood Culture, Routine See additional report for complete BCID panel.     Organism Staph aureus MRSA Abnormal     Blood Culture, Routine -- Abnormal     POSITIVE for   Sensitivity to follow   CONTACT PRECAUTIONS INDICATED   Isolated two of two sets    Abnormal     Organism Serratia marcescens Abnormal     Blood Culture, Routine --    POSITIVE for   Sensitivity to follow   Isolated one of two sets    Narrative:     ORDER#: [id number]                          ORDERED BY: Jossue Salomon   SOURCE: Blood                              COLLECTED:  09/12/22 16:16   ANTIBIOTICS AT [facility].:                      RECEIVED :  09/12/22 16:32   [facility]  [phone],   Microbiology results called to and read back by Dioni Howard RN,   09/13/2022 08:24, by [facility]   Microbiology results called to and read back by Rafal Campos,   09/13/2022 07:05, by [facility]   If child <=2 yrs old please draw pediatric bottle. ~Blood Culture 1   Culture, Blood 2 [[id number]] Collected: 09/14/22 0525   Order Status: Sent Specimen: Blood Updated: 09/14/22 0600   Culture, Blood 1 [[id number]] Collected: 09/14/22 0436   Order Status: Sent Specimen: Blood Updated: 09/14/22 0441   Culture, Urine [[id number]] Collected: 09/12/22 1616   Order Status: Completed Specimen: Urine, clean catch Updated: 09/13/22 2245    Urine Culture, Routine No growth at 18 to 36 hours   Narrative:     ORDER#: [id number]                          ORDERED BY: Jossue Salomon   SOURCE: Urine Clean Catch                  COLLECTED:  09/12/22 16:16   ANTIBIOTICS AT [facility].:                      RECEIVED :  09/12/22 19:35   Culture, Blood 2 [[id number]] (Abnormal) Collected: 09/12/22 2012   Order Status: Completed Specimen: Blood Updated: 09/13/22 1416    Culture, Blood 2 -- Abnormal     Gram stain Aerobic bottle:   Gram positive cocci in clusters   resembling Staphylococcus   Information to follow   Gram stain Anaerobic bottle:   Gram positive cocci in clusters   resembling Staphylococcus   Information to follow    Abnormal    Narrative:     ORDER#: [id number]                          ORDERED BY: Jossue Salomon   SOURCE: Blood                              COLLECTED:  09/12/22 20:12   ANTIBIOTICS AT [facility].:                      RECEIVED :  09/12/22 20:21   [facility]  [place]  [facility] [phone],   Previous panic on this admission - call not needed per SOP, 09/13/2022 10:56,   by Lisa Asher   If child <=2 yrs old please draw pediatric bottle. ~Blood Culture #2   Strep Pneumoniae Antigen [[id number]] Collected: 09/12/22 1900   Order Status: Completed Specimen: Urine, clean catch Updated: 09/13/22 1054    STREP PNEUMONIAE ANTIGEN, URINE --    Presumptive Negative   Presumptive negative suggests no current or recent   pneumococcal infection. Infection due to Strep pneumoniae   cannot be ruled out since the antigen present in the sample   may be below the detection limit of the test.   Normal Range:Presumptive Negative    Narrative:     ORDER#: [id number]                          ORDERED BY: Key Salazar   SOURCE: Urine Clean Catch                  COLLECTED:  09/12/22 19:00   ANTIBIOTICS AT [facility].:                      RECEIVED :  09/13/22 07:04   Legionella antigen, urine [[id number]] Collected: 09/12/22 1900   Order Status: Completed Specimen: Urine, clean catch Updated: 09/13/22 1047    L. pneumophila Serogp 1 Ur Ag --    Presumptive Negative   No Legionella pneumophila serogroup 1 antigens detected. A negative result does not exclude infection with   Legionella pneumophila serogroup 1 nor does it rule out   other microbial-caused respiratory infections or   disease caused by other serogroups of   Legionella pneumophila.    Normal Range: Presumptive Negative    Narrative:     ORDER#: [id number]                          ORDERED BY: Key Salazar   SOURCE: Urine Clean Catch                  COLLECTED:  09/12/22 19:00   ANTIBIOTICS AT [facility].:                      RECEIVED :  09/13/22 07:04   Culture, Blood, PCR ID Panel [[id number]] Collected: 09/12/22 1616   Order Status: Completed Updated: 09/13/22 0707    Report SEE IMAGE   Narrative:     Lawrence Chandler  [id number] tel. [phone],   Microbiology results called to and read back by Rosi Alegria,   09/13/2022 07:05, by Lisa Asher   Culture, Blood 2 [[id number]] Collected: 09/12/22 0000   Order Status: Canceled Specimen: Blood Culture, Blood 1 [[id number]] Collected: 09/12/22 0000   Order Status: Canceled Specimen: Blood      Lab Results   Component Value Date/Time    BC No Growth after 4 days of incubation. 09/14/2022 04:36 AM    BLOODCULT2 No Growth after 4 days of incubation. 09/14/2022 05:25 AM      Collected: 09/16/22 1210   Order Status: Completed Specimen:  Body Fluid from Abscess Updated: 09/19/22 0615    Gram Stain Result 4+ WBC's (Polymorphonuclear)   2+ Gram positive cocci   1+ Gram negative rods    Abnormal     Anaerobic Culture --    Anaerobic culture further report to follow   No anaerobes isolated so far, Further report to follow     Organism Serratia marcescens Abnormal     WOUND/ABSCESS Light growth    Organism Staph aureus MRSA Abnormal     WOUND/ABSCESS -- Abnormal     Moderate growth   CONTACT PRECAUTIONS INDICATED    Abnormal    Narrative:     ORDER#: [id number]                          ORDERED BY: RAMILA MARTINES   SOURCE: Abscess Rt gluteal abscess         COLLECTED:  09/16/22 12:10   ANTIBIOTICS AT [facility].:                      RECEIVED :  09/16/22 12:32   CALL  [place]  [facility] tel. [phone],   Previous panic on this admission - call not needed per SOP, 09/17/2022 13:04,      Susceptibility    Staph aureus mrsa (3)    Antibiotic Interpretation Microscan  Method Status    ceFAZolin Resistant >16 mcg/mL BACTERIAL SUSCEPTIBILITY PANEL BY YA     clindamycin Sensitive <=0.5 mcg/mL BACTERIAL SUSCEPTIBILITY PANEL BY YA     erythromycin Resistant >4 mcg/mL BACTERIAL SUSCEPTIBILITY PANEL BY YA     oxacillin Resistant >2 mcg/mL BACTERIAL SUSCEPTIBILITY PANEL BY YA     tetracycline Sensitive <=4 mcg/mL BACTERIAL SUSCEPTIBILITY PANEL BY YA     trimethoprim-sulfamethoxazole Sensitive 1/19 mcg/mL BACTERIAL SUSCEPTIBILITY PANEL BY YA     vancomycin Sensitive 2 mcg/mL BACTERIAL SUSCEPTIBILITY PANEL BY YA       Serratia marcescens (4)    Antibiotic Interpretation Microscan  Method Status    amoxicillin-clavulanate Resistant >16/8 mcg/mL BACTERIAL SUSCEPTIBILITY PANEL BY YA     ampicillin Resistant >16 mcg/mL BACTERIAL SUSCEPTIBILITY PANEL BY YA     ampicillin-sulbactam Resistant 16/8 mcg/mL BACTERIAL SUSCEPTIBILITY PANEL BY YA     ceFAZolin Resistant >16 mcg/mL BACTERIAL SUSCEPTIBILITY PANEL BY YA     cefepime Sensitive <=2 mcg/mL BACTERIAL SUSCEPTIBILITY PANEL BY YA     cefTRIAXone Sensitive <=1 mcg/mL BACTERIAL SUSCEPTIBILITY PANEL BY YA     cefuroxime Resistant >16 mcg/mL BACTERIAL SUSCEPTIBILITY PANEL BY YA     ciprofloxacin Sensitive <=1 mcg/mL BACTERIAL SUSCEPTIBILITY PANEL BY YA     ertapenem Sensitive <=0.5 mcg/mL BACTERIAL SUSCEPTIBILITY PANEL BY YA     gentamicin Sensitive <=4 mcg/mL BACTERIAL SUSCEPTIBILITY PANEL BY YA     meropenem Sensitive <=1 mcg/mL BACTERIAL SUSCEPTIBILITY PANEL BY YA     piperacillin-tazobactam Sensitive <=16 mcg/mL BACTERIAL SUSCEPTIBILITY PANEL BY YA     trimethoprim-sulfamethoxazole Sensitive <=2/38 mcg/mL BACTERIAL SUSCEPTIBILITY PANEL BY YA      Condensed View       Respiratory Culture:  Lab Results   Component Value Date/Time    LABGRAM  09/16/2022 12:10 PM     4+ WBC's (Polymorphonuclear)  2+ Gram positive cocci  1+ Gram negative rods       AFB:No results found for: AFBSMEAR  Viral Culture:  Lab Results   Component Value Date/Time    COVID19 Not Detected 09/07/2022 12:00 AM    COVID19 Not Detected 07/20/2022 11:20 PM     Urine Culture:   No results for input(s): LABURIN in the last 72 hours. Summary   Normal left ventricle size, wall thickness, and systolic function with an   estimated ejection fraction of 60-65%. No regional wall motion abnormalities   are seen. Normal diastolic function. Normal right ventricular size and function. Mobile vegetation on tricuspid valve suggestive of endocarditis. Moderate tricuspid regurgitation. The right atrium is mildly dilated.       Signature      ------------------------------------------------------------------   Electronically signed by Papa Cerda MD   (Interpreting physician) on 09/13/2022 at 04:15 PM   ------------------------------------------------------------------    IMAGING:    CT CHEST WO CONTRAST   Final Result   Widespread cavitary nodules, decreased on the right and left. , in keeping   with the diagnosis of septic emboli. Increased pleural effusions with increasing consolidative change at the lung   bases. IR REPLACE TUNNELED CVC W SQ PORT SAME ACCESS   Final Result   Successful conversion of a non tunneled hemodialysis catheter for a new 19   centimeter tunneled hemodialysis catheter. IR NONTUNNELED VASCULAR CATHETER > 5 YEARS   Final Result   Successful ultrasound and fluoroscopy guided non-tunneled Trialysis catheter   placement. US RENAL COMPLETE   Final Result   Unremarkable ultrasound of the kidneys and urinary bladder. No renal   atrophy, hydronephrosis or abnormal echotexture. CT ABSCESS DRAINAGE W CATH PLACEMENT S&I   Final Result   Successful CT guided placement of a 10 Serbian drainage catheter in the right   iliacus abscess. CT GUIDED NEEDLE PLACEMENT   Final Result   Successful CT guided placement of a 10 Serbian drainage catheter in the right   iliacus abscess. MRI LUMBAR SPINE W WO CONTRAST   Final Result   Partially visualized septic arthritis/osteomyelitis of the right sacroiliac   joint, with a large adjacent retroperitoneal abscess extending into the right   pelvic sidewall. Small intramuscular abscess also present within the left psoas muscle. Epidural phlegmon/abscess of the sacral canal.      Pelvic ascites. The findings were sent to the Radiology Results [address] at 7:23   pm on 9/15/2022 to be communicated to a licensed caregiver. VL Extremity Venous Bilateral   Final Result      CT HEAD WO CONTRAST   Final Result   No acute intracranial abnormality.          CT CHEST WO CONTRAST   Final Result   Diffuse in numeral bilateral pulmonary nodules many of which are cavitary   becoming coalescent at the right lung apex however diffusely throughout the   bilateral lungs consistent of septic emboli with small to moderate right   pleural effusion      Partially visualized portions of the upper abdomen reveal hepatosplenomegaly         XR CHEST PORTABLE   Final Result   Multifocal patchy airspace disease and cavitary nodules. The appearance is   suggestive of septic emboli in this clinical setting. Follow-up recommended to assure resolution.          CT BIOPSY RENAL    (Results Pending)         All the pertinent images and reports for the current Hospitalization were reviewed by me     Scheduled Meds:   linezolid  600 mg Oral BID    b complex-C-folic acid  1 capsule Oral Daily    sevelamer  800 mg Oral TID WC    ceftaroline fosamil (TEFLARO) IVPB  200 mg IntraVENous q8h    [Held by provider] heparin (porcine)  5,000 Units SubCUTAneous 3 times per day    lidocaine 1 % injection  5 mL IntraDERmal Once    sodium chloride flush  5-40 mL IntraVENous 2 times per day       Continuous Infusions:   sodium chloride      sodium chloride 100 mL/hr at 09/27/22 1858       PRN Meds:  morphine, heparin (porcine), LORazepam, sodium chloride, chlorhexidine, sodium chloride flush, sodium chloride, hydrOXYzine pamoate, naloxone, calcium carbonate, promethazine, promethazine **OR** ondansetron, acetaminophen **OR** acetaminophen, perflutren lipid microspheres      Assessment:     Patient Active Problem List   Diagnosis    Tear of medial cartilage or meniscus of knee, current    Plica syndrome    Boxer's metacarpal fracture, neck, closed    Chondromalacia of patella    Degeneration of lumbar or lumbosacral intervertebral disc    Varicose veins of lower extremity    Intractable nausea and vomiting    Septicemia ([address]    IVDU (intravenous drug user)    MRSA bacteremia    Sepsis due to methicillin resistant Staphylococcus aureus (MRSA) without acute organ dysfunction (HCC)    Bacterial infection due to Serratia    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 WBC count is elevated high fever from ongoing bloodstream infection. Given IV drug abuse suspect endocarditis transthoracic echocardiogram with TV Vegetation    Trend WBC and repeat Blood cx NGTD    Will need placement to complete IV ABX        Rt gluteal and lower Lumbar area pain MRI L spine with abscess, septic joint     S/p IR aspiration of the abscess cx MRSA and serratia noted    Unfortunately kidney function continues to decline nephrology is now following. IV antibiotics have been adjusted    Will need a long course of antibiotic given the MRSA bacteremia and septic arthritis    HD line placed due to worsening Kidney function -completed hemodialysis for session without any issues. Unfortunately creatinine still remains elevated.     May need repeat MRI L spine in future     Check CRP and ESR down trend noted    Hemoptysis from Septic Emboli likely and CT chest with cavitary lesions from MRSA infection and will check resp cx    Platelets are going down on Linezolid - will need to change IV Clindamycin         Labs, Microbiology, Radiology and all the pertinent results from current hospitalization and  care every where were reviewed  by me as a part of the evaluation   Plan:   Cont  IV Ceftaroline - 200 mg q x 8 HRS will cover MRSA and Serratia adjusted to GFR  D/c  Linezolid   Repeat Blood cx    NGTD  TTE Abnormal

## 2022-09-28 NOTE — CARE COORDINATION
[facility] portal request submitted for outpatient dialysis chair time.     Remi Casas RN, BSN, Case Management  Phone: [phone]  Electronically signed by Remi Casas RN on 9/28/2022 at 3:00 PM

## 2022-09-28 NOTE — PLAN OF CARE
Problem: Discharge Planning  Goal: Discharge to home or other facility with appropriate resources  9/28/2022 1723 by Latha Delvalle RN  Outcome: Progressing  9/28/2022 0533 by Sherron Helm RN  Outcome: Progressing     Problem: Safety - Adult  Goal: Free from fall injury  9/28/2022 1723 by Latha Delvalle RN  Outcome: Progressing  9/28/2022 0533 by Sherron Helm RN  Outcome: Progressing     Problem: Pain  Goal: Verbalizes/displays adequate comfort level or baseline comfort level  9/28/2022 1723 by Latha Delvalle RN  Outcome: Progressing  9/28/2022 0533 by Sherron Helm RN  Outcome: Progressing     Problem: ABCDS Injury Assessment  Goal: Absence of physical injury  Outcome: Progressing     Problem: Skin/Tissue Integrity  Goal: Absence of new skin breakdown  Description: 1. Monitor for areas of redness and/or skin breakdown  2. Assess vascular access sites hourly  3. Every 4-6 hours minimum:  Change oxygen saturation probe site  4. Every 4-6 hours:  If on nasal continuous positive airway pressure, respiratory therapy assess nares and determine need for appliance change or resting period.   9/28/2022 1723 by Latha Delvalle RN  Outcome: Progressing  9/28/2022 0533 by Sherron Helm RN  Outcome: Progressing     Problem: Nutrition Deficit:  Goal: Optimize nutritional status  Outcome: Progressing

## 2022-09-29 LAB
ALBUMIN SERPL-MCNC: 2.4 G/DL (ref 3.4–5)
ANION GAP SERPL CALCULATED.3IONS-SCNC: 10 MMOL/L (ref 3–16)
BUN BLDV-MCNC: 21 MG/DL (ref 7–20)
CALCIUM SERPL-MCNC: 8.2 MG/DL (ref 8.3–10.6)
CHLORIDE BLD-SCNC: 103 MMOL/L (ref 99–110)
CO2: 27 MMOL/L (ref 21–32)
CREAT SERPL-MCNC: 2.7 MG/DL (ref 0.6–1.1)
GFR AFRICAN AMERICAN: 23
GFR NON-AFRICAN AMERICAN: 19
GLUCOSE BLD-MCNC: 85 MG/DL (ref 70–99)
HCT VFR BLD CALC: 21.8 % (ref 36–48)
HEMOGLOBIN: 7.1 G/DL (ref 12–16)
MCH RBC QN AUTO: 28.1 PG (ref 26–34)
MCHC RBC AUTO-ENTMCNC: 32.6 G/DL (ref 31–36)
MCV RBC AUTO: 86 FL (ref 80–100)
PDW BLD-RTO: 16.1 % (ref 12.4–15.4)
PHOSPHORUS: 5.4 MG/DL (ref 2.5–4.9)
PLATELET # BLD: 150 K/UL (ref 135–450)
PMV BLD AUTO: 7.8 FL (ref 5–10.5)
POTASSIUM SERPL-SCNC: 4.3 MMOL/L (ref 3.5–5.1)
RBC # BLD: 2.54 M/UL (ref 4–5.2)
SODIUM BLD-SCNC: 140 MMOL/L (ref 136–145)
WBC # BLD: 7.8 K/UL (ref 4–11)

## 2022-09-29 PROCEDURE — 36592 COLLECT BLOOD FROM PICC: CPT

## 2022-09-29 PROCEDURE — 6360000002 HC RX W HCPCS: Performed by: INTERNAL MEDICINE

## 2022-09-29 PROCEDURE — 87070 CULTURE OTHR SPECIMN AEROBIC: CPT

## 2022-09-29 PROCEDURE — 99233 SBSQ HOSP IP/OBS HIGH 50: CPT | Performed by: INTERNAL MEDICINE

## 2022-09-29 PROCEDURE — 6370000000 HC RX 637 (ALT 250 FOR IP): Performed by: INTERNAL MEDICINE

## 2022-09-29 PROCEDURE — 2580000003 HC RX 258: Performed by: INTERNAL MEDICINE

## 2022-09-29 PROCEDURE — 87205 SMEAR GRAM STAIN: CPT

## 2022-09-29 PROCEDURE — 2500000003 HC RX 250 WO HCPCS: Performed by: INTERNAL MEDICINE

## 2022-09-29 PROCEDURE — 85027 COMPLETE CBC AUTOMATED: CPT

## 2022-09-29 PROCEDURE — 2060000000 HC ICU INTERMEDIATE R&B

## 2022-09-29 PROCEDURE — 90935 HEMODIALYSIS ONE EVALUATION: CPT

## 2022-09-29 PROCEDURE — 80069 RENAL FUNCTION PANEL: CPT

## 2022-09-29 RX ORDER — HYDROCODONE BITARTRATE AND ACETAMINOPHEN 5; 325 MG/1; MG/1
1 TABLET ORAL EVERY 6 HOURS PRN
Status: DISCONTINUED | OUTPATIENT
Start: 2022-09-29 | End: 2022-10-01 | Stop reason: HOSPADM

## 2022-09-29 RX ORDER — CALCIUM CARBONATE 500 MG/1
500 TABLET, CHEWABLE ORAL 3 TIMES DAILY PRN
Qty: 30 TABLET | Refills: 0 | Status: SHIPPED | OUTPATIENT
Start: 2022-09-29 | End: 2022-10-29

## 2022-09-29 RX ORDER — HYDROCODONE BITARTRATE AND ACETAMINOPHEN 5; 325 MG/1; MG/1
1 TABLET ORAL EVERY 6 HOURS PRN
Qty: 9 TABLET | Refills: 0 | Status: ON HOLD | OUTPATIENT
Start: 2022-09-29 | End: 2022-10-10 | Stop reason: HOSPADM

## 2022-09-29 RX ORDER — MORPHINE SULFATE 2 MG/ML
0.5 INJECTION, SOLUTION INTRAMUSCULAR; INTRAVENOUS EVERY 4 HOURS PRN
Status: DISCONTINUED | OUTPATIENT
Start: 2022-09-29 | End: 2022-10-01 | Stop reason: HOSPADM

## 2022-09-29 RX ADMIN — SODIUM CHLORIDE, PRESERVATIVE FREE 10 ML: 5 INJECTION INTRAVENOUS at 12:28

## 2022-09-29 RX ADMIN — SODIUM CHLORIDE 25 ML: 9 INJECTION, SOLUTION INTRAVENOUS at 06:15

## 2022-09-29 RX ADMIN — HEPARIN SODIUM 2400 UNITS: 1000 INJECTION INTRAVENOUS; SUBCUTANEOUS at 11:09

## 2022-09-29 RX ADMIN — NEPHROCAP 1 MG: 1 CAP ORAL at 12:26

## 2022-09-29 RX ADMIN — MORPHINE SULFATE 1.5 MG: 2 INJECTION, SOLUTION INTRAMUSCULAR; INTRAVENOUS at 02:17

## 2022-09-29 RX ADMIN — HYDROCODONE BITARTRATE AND ACETAMINOPHEN 1 TABLET: 5; 325 TABLET ORAL at 17:30

## 2022-09-29 RX ADMIN — ANTACID TABLETS 500 MG: 500 TABLET, CHEWABLE ORAL at 06:49

## 2022-09-29 RX ADMIN — MORPHINE SULFATE 1.5 MG: 2 INJECTION, SOLUTION INTRAMUSCULAR; INTRAVENOUS at 06:07

## 2022-09-29 RX ADMIN — CLINDAMYCIN IN 5 PERCENT DEXTROSE 900 MG: 18 INJECTION, SOLUTION INTRAVENOUS at 12:31

## 2022-09-29 RX ADMIN — CEFTAROLINE FOSAMIL 200 MG: 600 POWDER, FOR SOLUTION INTRAVENOUS at 23:49

## 2022-09-29 RX ADMIN — CEFTAROLINE FOSAMIL 200 MG: 600 POWDER, FOR SOLUTION INTRAVENOUS at 06:15

## 2022-09-29 RX ADMIN — CEFTAROLINE FOSAMIL 200 MG: 600 POWDER, FOR SOLUTION INTRAVENOUS at 15:41

## 2022-09-29 RX ADMIN — SEVELAMER CARBONATE 800 MG: 800 TABLET, FILM COATED ORAL at 12:26

## 2022-09-29 RX ADMIN — EPOETIN ALFA-EPBX 10000 UNITS: 10000 INJECTION, SOLUTION INTRAVENOUS; SUBCUTANEOUS at 10:56

## 2022-09-29 RX ADMIN — CLINDAMYCIN IN 5 PERCENT DEXTROSE 900 MG: 18 INJECTION, SOLUTION INTRAVENOUS at 20:34

## 2022-09-29 RX ADMIN — MORPHINE SULFATE 1.5 MG: 2 INJECTION, SOLUTION INTRAMUSCULAR; INTRAVENOUS at 12:26

## 2022-09-29 RX ADMIN — CLINDAMYCIN IN 5 PERCENT DEXTROSE 900 MG: 18 INJECTION, SOLUTION INTRAVENOUS at 02:19

## 2022-09-29 RX ADMIN — SEVELAMER CARBONATE 800 MG: 800 TABLET, FILM COATED ORAL at 17:31

## 2022-09-29 ASSESSMENT — PAIN DESCRIPTION - ORIENTATION
ORIENTATION: MID;LOWER;RIGHT
ORIENTATION: LOWER;LEFT;RIGHT;MID
ORIENTATION: MID;RIGHT
ORIENTATION: MID

## 2022-09-29 ASSESSMENT — PAIN DESCRIPTION - DESCRIPTORS
DESCRIPTORS: ACHING

## 2022-09-29 ASSESSMENT — PAIN DESCRIPTION - ONSET: ONSET: ON-GOING

## 2022-09-29 ASSESSMENT — PAIN DESCRIPTION - LOCATION
LOCATION: BACK

## 2022-09-29 ASSESSMENT — PAIN SCALES - GENERAL
PAINLEVEL_OUTOF10: 8
PAINLEVEL_OUTOF10: 8
PAINLEVEL_OUTOF10: 0
PAINLEVEL_OUTOF10: 0
PAINLEVEL_OUTOF10: 9
PAINLEVEL_OUTOF10: 8
PAINLEVEL_OUTOF10: 4

## 2022-09-29 ASSESSMENT — PAIN DESCRIPTION - FREQUENCY: FREQUENCY: CONTINUOUS

## 2022-09-29 ASSESSMENT — PAIN DESCRIPTION - PAIN TYPE: TYPE: ACUTE PAIN

## 2022-09-29 NOTE — PROGRESS NOTES
Tx time: 3:00    Net UF: 1.5L    Pre Wt: 70.5kg  Post Wt:  69.0kg  Est Dry Wt: Access used:  R CVC  Access Function:  Good flow without issues. Medications/Blood products given:  Retacrit    Summary/Response: Tx completed per order without issues. Pt tolerated well. Copy of Dialysis record placed in chart, to be scanned in EMR.

## 2022-09-29 NOTE — PROGRESS NOTES
Occupational Therapy  Pt to be discharged home today. BSC recommended for safety, energy conservation. see the last note for status.   Maryse NASH/SRAVANTHI,515

## 2022-09-29 NOTE — PROGRESS NOTES
MPV 7.8 09/29/2022 04:00 AM     CMP:  Lab Results   Component Value Date/Time     09/29/2022 04:00 AM    K 4.3 09/29/2022 04:00 AM    K 4.3 09/19/2022 05:51 AM     09/29/2022 04:00 AM    CO2 27 09/29/2022 04:00 AM    BUN 21 09/29/2022 04:00 AM    CREATININE 2.7 09/29/2022 04:00 AM    GFRAA 23 09/29/2022 04:00 AM    GFRAA >60 07/28/2012 11:20 PM    AGRATIO 0.4 09/12/2022 03:44 PM    LABGLOM 19 09/29/2022 04:00 AM    GLUCOSE 85 09/29/2022 04:00 AM    PROT 5.8 09/27/2022 04:40 AM    PROT 6.5 07/28/2012 11:20 PM    CALCIUM 8.2 09/29/2022 04:00 AM    BILITOT <0.2 09/27/2022 04:40 AM    ALKPHOS 85 09/27/2022 04:40 AM    AST 9 09/27/2022 04:40 AM    ALT 6 09/27/2022 04:40 AM      Hepatic Function Panel:   Lab Results   Component Value Date/Time    ALKPHOS 85 09/27/2022 04:40 AM    ALT 6 09/27/2022 04:40 AM    AST 9 09/27/2022 04:40 AM    PROT 5.8 09/27/2022 04:40 AM    PROT 6.5 07/28/2012 11:20 PM    BILITOT <0.2 09/27/2022 04:40 AM    BILIDIR <0.2 09/27/2022 04:40 AM    IBILI see below 09/27/2022 04:40 AM      Phosphorus:   Lab Results   Component Value Date/Time    PHOS 5.4 09/29/2022 04:00 AM       ASSESSMENT:  Principal Problem:    Intractable nausea and vomiting  Active Problems:    Septicemia (HCC)    IVDU (intravenous drug user)    MRSA bacteremia    Sepsis due to methicillin resistant Staphylococcus aureus (MRSA) without acute organ dysfunction ([address]    Bacterial infection due to Serratia    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 *      PLAN  Assessment/  1-MARYAN suspect Vancomycin toxicity /ATN/ infectious GN . 1st HD 9/20  . S./p [facility]  9/23 . Kidney Bx on hold  ,increase UOP and Cr noted .    HD as ordered , DW adjusted   Check labs in am .     2-MRSA and serratia bacteremia with retroperitoneal abscess  septic pulmonary emboli and possible TV endocarditis    3-IVDA     4 Anemia  Hb Noted S/p Tx . Epo with HD.    May need tx , recheck Hb in am.    Plan dw pt ad nurse       Mariposa De Luna MD, FACP

## 2022-09-29 NOTE — CARE COORDINATION
CASE MANAGEMENT DISCHARGE SUMMARY:    DISCHARGE DATE: 9/29/2022    DISCHARGED TO: Home with family     DME: Debbie Vargas and bedside commode delivered to bedside by [address] [place] [address] Avenue: patient declined needs     TRANSPORTATION: family              TIME: patient to coordinate with RN     PREFERRED PHARMACY: [address], JAROD, [place], Social Work/Case Management   [phone]  Electronically signed by JAROD Muñoz, STEVEN on 9/29/2022 at 1:57 PM

## 2022-09-29 NOTE — DISCHARGE INSTR - COC
Continuity of Care Form    Patient Name: Ernesto Luke   :  1980  MRN:  [id number]    Admit date:  2022  Discharge date:  10/1/2022    Code Status Order: Full Code   Advance Directives:   [facility] Documentation       Date/Time Healthcare Directive Type of Healthcare Directive Copy in [address] Agent's Name Healthcare Agent's Phone Number    22 [phone] No, patient does not have an advance directive for healthcare treatment -- -- -- -- --            Admitting Physician:  Jeny Quiñones MD  PCP: No primary care provider on file.     Discharging Nurse: [address] [place] [address] Unit/Room#: F0X-0672/4781-25  Discharging Unit Phone Number: [phone]    Emergency Contact:   Extended Emergency Contact Information  Primary Emergency Contact: Kennedi Maxwell  Address: [address] Phone: [phone]  Work Phone: [phone]  Relation: Parent  Secondary Emergency Contact: [address] [facility] Phone: [phone]  Relation: Parent    Past Surgical History:  Past Surgical History:   Procedure Laterality Date    IR INSERT NON TUNNELED CV CATH LESS THAN 5 YEARS Right 2022    Maddy Houston; RIJ access; 15cm; Dr. Rene Cutting    IR NONTUNNELED VASCULAR CATHETER  2022    IR NONTUNNELED VASCULAR CATHETER 2022 [facility] PROCEDURES    KNEE ARTHROSCOPY  10/05/2010    PARTIAL HYSTERECTOMY (CERVIX NOT REMOVED)      TUBAL LIGATION  2004    TUNNELED VENOUS CATHETER PLACEMENT Right 2022    Permacath; RIJ access; 19cm; Dr. 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    Degeneration of lumbar or lumbosacral intervertebral disc M51.37    Varicose veins of lower extremity I83.90    Intractable nausea and vomiting R11.2    Septicemia (HCC) A41.9    IVDU (intravenous drug user) F19.90    MRSA bacteremia R78.81, B95.62    Sepsis due to methicillin resistant Staphylococcus aureus (MRSA) without acute organ dysfunction (HCC) A41.02    Bacterial infection due to Serratia A49.8    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 I33.0, B95.8    Septic embolism (HCC) I76    Abnormal CT of the chest R93.89    Neutrophilia D72.9    Fever and chills R50.9    Hep C w/o coma, chronic (HCC) B18.2       Isolation/Infection:   Isolation            Contact          Patient Infection Status       Infection Onset Added Last Indicated Last Indicated By Review Planned Expiration Resolved Resolved By    MRSA 09/12/22 09/14/22 09/16/22 Culture, Anaerobic and Aerobic        Resolved    COVID-19 (Rule Out) 09/06/22 09/06/22 09/07/22 COVID-19, Rapid (Ordered)   09/07/22 Rule-Out Test Resulted    COVID-19 (Rule Out) 07/20/22 07/20/22 07/20/22 COVID-19 (Ordered)   07/21/22 Rule-Out Test Resulted    COVID-19 (Rule Out) 07/20/22 07/20/22 07/20/22 COVID-19, Rapid (Ordered)   07/20/22 Rule-Out Test Resulted            Nurse Assessment:  Last Vital Signs: BP (!) 159/106   Pulse 86   Temp 98 °F (36.7 °C) (Oral)   Resp 16   Ht 5' 8\" (1.727 m)   Wt 155 lb 6.8 oz (70.5 kg)   SpO2 95%   BMI 23.63 kg/m²     Last documented pain score (0-10 scale): Pain Level: 9  Last Weight:   Wt Readings from Last 1 Encounters:   09/29/22 155 lb 6.8 oz (70.5 kg)     Mental Status:  oriented and alert    IV Access:  - Dialysis Catheter  - site  right and subclavian, insertion date: 9/23/2022    Nursing Mobility/ADLs:  Walking   Independent  Transfer  Independent  Bathing  Independent  Dressing  Independent  Toileting  Independent  Feeding  Independent  Med [address]  Assisted  Med Delivery   whole    Wound Care Documentation and Therapy:        Elimination:  Continence: Bowel: Yes  Bladder: Yes  Urinary Catheter: None   Colostomy/Ileostomy/Ileal Conduit: No       Date of Last BM: 9/30    Intake/Output Summary (Last 24 hours) at 9/29/2022 1300  Last data filed at 9/29/2022 0755  Gross per 24 hour   Intake 4086.26 ml   Output 900 ml   Net 3186.26 ml     I/O last 3 completed shifts: In: 4158.3 [P.O.:330; I.V.:3528.8; IV Piggyback:299.5]  Out: 1480 [Urine:1475; Drains:5]    Safety Concerns: At Risk for Falls    Impairments/Disabilities:      None    Nutrition Therapy:  Current Nutrition Therapy:   - Oral Diet:  General    Routes of Feeding: Oral  Liquids: No Restrictions  Daily Fluid Restriction: no  Last Modified Barium Swallow with Video (Video Swallowing Test): not done    Treatments at the Time of Hospital Discharge:   Respiratory Treatments: n/a  Oxygen Therapy:  is not on home oxygen therapy.   Ventilator:    - No ventilator support    Rehab Therapies: Physical Therapy and Occupational Therapy  Weight Bearing Status/Restrictions: No weight bearing restrictions  Other Medical Equipment (for information only, NOT a DME order):  walker and bedside commode  Other Treatments: n/a    Patient's personal belongings (please select all that are sent with patient):  None    RN SIGNATURE:  Electronically signed by Felecia Mohamud RN on 10/1/22 at 4:44 PM EDT    CASE MANAGEMENT/SOCIAL WORK SECTION    Inpatient Status Date: 9/12/2022    Readmission Risk Assessment Score:  Readmission Risk              Risk of Unplanned Readmission:  25           Discharging to Facility/ Agency   Name:   Address:  Phone:  Fax:    Dialysis Facility (if applicable)   Name: [facility]   Address:  Dialysis Schedule: TTS @ 1130  Phone: [phone]  Fax:  [phone]    / signature: Electronically signed by JAROD Dugan LSW on 9/29/22 at 1:26 PM EDT    PHYSICIAN SECTION    Prognosis: Fair    Condition at Discharge: Stable    Rehab Potential (if transferring to Rehab): Fair    Recommended Labs or Other Treatments After Discharge: follow up with your Kidney doctor, infetion doctor, Dialysis facility in 3 days     IV Daptomycin x  550 mg x 3 times a week for x  6 weeks with HD sessions  Oral Clindamycin x 300 mg x Q 8 hr x 6 weeks  Oral Levofloxacin x  250 mg X  Q 8 hr x 6 weeks  CBC with diff, ESR, CRP, CK weekly  fAX RESULTS  [phone]  fOLLOW UP X 4 WEEK with   First dose given in hospital       [address] [facility]  Phone: [phone]   Fax : [phone]       Physician Certification: I certify the above information and transfer of Marie Eng  is necessary for the continuing treatment of the diagnosis listed and that she requires Home Care for greater 30 days.      Update Admission H&P: No change in H&P    PHYSICIAN SIGNATURE:  Electronically signed by Coby Lebron MD on 9/29/22 at 1:02 PM EDT

## 2022-09-29 NOTE — CARE COORDINATION
[facility] [address] [facility]  First Treatment: 10/04/2022 @ 11:30 AM  Regular Treatment: T/T/S 11:45 AM  Started: 09/28/2022 1:47 pm    DME Needs: walker, requested order. Macario: Herminia Aquino of DME need. Confirmed Jessica to follow and deliver DME once order completed. Called to [facility] [phone]. Requested they call back with confirmation that patient can attend HD on Saturday. Await confirmation. JAROD Orozco, STEVEN, Social Work/Case Management   [phone]  Electronically signed by JAROD Orozco LSW on 9/29/2022 at 1:23 PM      Spoke to Guillermo Flynn at [facility]. , confirmed that patient will be able to attend chair time on Saturday 10/1/2022  @ 1130.    Electronically signed by JAROD Orozco LSW on 9/29/2022 at 1:26 PM

## 2022-09-29 NOTE — PROGRESS NOTES
Physical Therapy  [facility]  [id number]  [id number]    Pt has a discharge order and plan to return home with family assist; pt will need a RW and BSC as pt has decreased endurance and is an elevated fall risk;  The [facility] will enable pt to be more Ind at home as she will not have the endurance to amb to her bathroom in a timely manner and needs assist with walking with RW; pt not safe to attempt amb without the RW but can complete bed<->BSC transfer with supervision  Electronically signed by CHLOÉ ALMAZAN, PT on 9/29/2022 at 1:54 PM

## 2022-09-29 NOTE — PROGRESS NOTES
Infectious Disease Follow up Notes  Admit Date: 9/12/2022  Hospital Day: 18    Antibiotics :   Oral Linezolid stopped due to low platelets  IV Clindamycin   IV Ceftaroline      CHIEF COMPLAINT:     Sepsis  MRSA bacteremia  Serratia Bacteremia  Endocarditis  IVDA  TV Vegetation   Rt gluteal abscess   Septic embolism -     Subjective interval History :  40 y. o.woman with a history of IV drug abuse, ADHD, back pain, depression admitted to the hospital secondary to fever chills fatigue back pain. Patient family noted she was unable to get up from the bed for the past 3 days secondary to fevers and not feeling well. Admission labs indicate creatinine 1.5 procalcitonin elevated to 6.6, WBC elevated 15.5 hemoglobin 9.4 bilirubin 1.3 AST 42, blood cultures from admission positive for MRSA as well as Serratia. T-max 103.8 on admission. CT chest with diffuse innumerable bilateral pulmonary nodules consistent with cavitation and septic emboli. Given the presentation concern is for endocarditis secondary to IV drug abuse.   Patient not much interactive during normalization some of the history is provided by her daughters,       Interval History : KRYSTLE drain in place working ok  and Rt gluteal pain going down creat some improvement  - WBC trend down and Hb low and will repeat CT of the L spine and d/w Primary team that she is not ready for d/c as her renal function may improve and final abx recs pending      Past Medical History:    Past Medical History:   Diagnosis Date    ADHD (attention deficit hyperactivity disorder)     Arthritis     Back pain     Depression     Migraine     Neutrophilia 9/15/2022       Past Surgical History:    Past Surgical History:   Procedure Laterality Date    IR INSERT NON TUNNELED CV CATH LESS THAN 5 YEARS Right 09/20/2022    Raegan Chacon; MAKENZIE access; 15cm; Dr. Sloane Acosta    IR NONTUNNELED VASCULAR CATHETER  09/20/2022    IR NONTUNNELED VASCULAR CATHETER 9/20/2022 [facility] PROCEDURES    KNEE ARTHROSCOPY  10/05/2010    PARTIAL HYSTERECTOMY (CERVIX NOT REMOVED)      TUBAL LIGATION  01/01/2004    TUNNELED VENOUS CATHETER PLACEMENT Right 09/23/2022    Permacath; RIJ access; 19cm; Dr. Margie Javed       Current Medications:    Outpatient Medications Marked as Taking for the 9/12/22 encounter [facility] Encounter)   Medication Sig Dispense Refill    calcium carbonate (TUMS) 500 MG chewable tablet Take 1 tablet by mouth 3 times daily as needed for Heartburn 30 tablet 0    HYDROcodone-acetaminophen (NORCO) 5-325 MG per tablet Take 1 tablet by mouth every 6 hours as needed for Pain for up to 3 days.  9 tablet 0       Allergies:  Ultram [tramadol hcl], Robaxin [methocarbamol], and Penicillins    Immunizations :   Immunization History   Administered Date(s) Administered    Tdap (Boostrix, Adacel) 02/11/2015       Social History:    Social History     Tobacco Use    Smoking status: Every Day     Packs/day: 0.50     Years: 2.00     Pack years: 1.00     Types: Cigarettes    Smokeless tobacco: Never   Substance Use Topics    Alcohol use: Yes     Comment: occas    Drug use: No     Social History     Tobacco Use   Smoking Status Every Day    Packs/day: 0.50    Years: 2.00    Pack years: 1.00    Types: Cigarettes   Smokeless Tobacco Never      Family History   Problem Relation Age of Onset    Breast Cancer Maternal Grandmother 45    Arthritis Other     Asthma Other     Cancer Other     Diabetes Other     Seizures Other     Stroke Other           REVIEW OF SYSTEMS:      Constitutional:  fevers,  chills +=, night sweats  Eyes:  negative for blurred vision, eye discharge, visual disturbance   HEENT:  negative for hearing loss, ear drainage,nasal congestion  Respiratory:  negative for cough, shortness of breath or hemoptysis   Cardiovascular:  negative for chest pain, palpitations, syncope  Gastrointestinal:  negative for nausea, vomiting, diarrhea, constipation, abdominal pain  Genitourinary:  negative for frequency, dysuria, urinary incontinence, hematuria  Hematologic/Lymphatic:  negative for easy bruising, bleeding and lymphadenopathy  Allergic/Immunologic:  negative for recurrent infections, angioedema, anaphylaxis   Endocrine:  negative for weight changes, polyuria, polydipsia and polyphagia  Musculoskeletal:  Rt hip and lower back pain ++   pain, swelling, decreased range of motion  Integumentary: No rashes, skin lesions  Neurological:  negative for headaches, slurred speech, unilateral weakness  Psychiatric: negative for hallucinations,confusion,agitation.                 PHYSICAL EXAM:      Vitals:    BP (!) 159/106   Pulse 86   Temp 98 °F (36.7 °C) (Oral)   Resp 16   Ht 5' 8\" (1.727 m)   Wt 155 lb 6.8 oz (70.5 kg)   SpO2 95%   BMI 23.63 kg/m²        General Appearance: alert,in  acute distress, ++ pallor, no icterus  poor skin hygiene   Skin: warm and dry, no rash or erythema  Head: normocephalic and atraumatic  Eyes: pupils equal, round, and reactive to light, conjunctivae normal  ENT: tympanic membrane, external ear and ear canal normal bilaterally, nose without deformity, nasal mucosa and turbinates normal without polyps  Neck: supple and non-tender without mass, no thyromegaly  no cervical lymphadenopathy  Pulmonary/Chest: Bi basal crepts+ - no wheezes, rales or rhonchi, normal air movement, no respiratory distress  Cardiovascular: normal rate, regular rhythm, normal S1 and S2, esm+ murmurs, rubs, clicks, or gallops, no carotid bruits  Abdomen: soft, non-tender, non-distended, normal bowel sounds, no masses or organomegaly  Extremities: no cyanosis, clubbing or edema  Musculoskeletal: normal range of motion, no joint swelling, deformity or tenderness  Integumentary: No rashes, no abnormal skin lesions, no petechiae  Neurologic: reflexes normal and symmetric, no cranial nerve deficit  Psych:  Orientation, sensorium, mood normal Lines: PICC  Needle tracks++   Rt gluteal area pain jerri DRAIN_   Lower leg multiple skin lesions from IVDA++   HD line in place     Data Review:    CBC:   Lab Results   Component Value Date    WBC 7.8 09/29/2022    HGB 7.1 (L) 09/29/2022    HCT 21.8 (L) 09/29/2022    MCV 86.0 09/29/2022     09/29/2022     RENAL:   Lab Results   Component Value Date    CREATININE 2.7 (H) 09/29/2022    BUN 21 (H) 09/29/2022     09/29/2022    K 4.3 09/29/2022     09/29/2022    CO2 27 09/29/2022     SED RATE:   Lab Results   Component Value Date/Time    SEDRATE 23 09/25/2022 05:32 AM     CK:   Lab Results   Component Value Date/Time    CKTOTAL 10 09/18/2022 04:55 PM     CRP:   Lab Results   Component Value Date/Time    CRP 49.8 09/25/2022 08:51 PM     Hepatic Function Panel:   Lab Results   Component Value Date/Time    ALKPHOS 85 09/27/2022 04:40 AM    ALT 6 09/27/2022 04:40 AM    AST 9 09/27/2022 04:40 AM    PROT 5.8 09/27/2022 04:40 AM    PROT 6.5 07/28/2012 11:20 PM    BILITOT <0.2 09/27/2022 04:40 AM    BILIDIR <0.2 09/27/2022 04:40 AM    IBILI see below 09/27/2022 04:40 AM    LABALBU 2.4 09/29/2022 04:00 AM     UA:  Lab Results   Component Value Date/Time    COLORU Yellow 09/18/2022 04:36 PM    CLARITYU CLOUDY 09/18/2022 04:36 PM    GLUCOSEU Negative 09/18/2022 04:36 PM    GLUCOSEU NEGATIVE 07/31/2010 02:59 PM    BILIRUBINUR Negative 09/18/2022 04:36 PM    BILIRUBINUR NEGATIVE 07/31/2010 02:59 PM    KETUA Negative 09/18/2022 04:36 PM    SPECGRAV 1.008 09/18/2022 04:36 PM    BLOODU LARGE 09/18/2022 04:36 PM    PHUR 5.0 09/18/2022 04:36 PM    PROTEINU 100 09/18/2022 04:36 PM    UROBILINOGEN 0.2 09/18/2022 04:36 PM    NITRU Negative 09/18/2022 04:36 PM    LEUKOCYTESUR MODERATE 09/18/2022 04:36 PM    LABMICR YES 09/18/2022 04:36 PM    URINETYPE NotGiven 09/18/2022 04:36 PM      Urine Microscopic:   Lab Results   Component Value Date/Time    BACTERIA None Seen 09/18/2022 04:36 PM    COMU see below 09/07/2022 01:55 AM HYALCAST 6 09/18/2022 04:36 PM    WBCUA 98 09/18/2022 04:36 PM    RBCUA 14 09/18/2022 04:36 PM    EPIU 1 09/18/2022 04:36 PM     Urine Reflex to Culture:   Lab Results   Component Value Date/Time    URRFLXCULT Yes 09/12/2022 04:16 PM      Summary   Severe eccentric tricuspid regurgitation. Small, mobile tricuspid valve vegetation noted measuring 0.9cm x 0.4cm. Signature      ------------------------------------------------------------------   Electronically signed by Chiki Stone MD (Interpreting   physician) on 09/16/2022 at 04:29 PM   ------------------------------------------------------------------    MICRO: cultures reviewed and updated by me   Blood Culture:          MRSA DNA Probe, Nasal [[id number]] Collected: 09/13/22 1220   Order Status: Completed Specimen: Nares Updated: 09/14/22 1131    MRSA SCREEN RT-PCR --    Negative  MRSA DNA not detected. Normal Range: Not detected    Narrative:     ORDER#: [id number]                          ORDERED BY: Cristian Barajas   SOURCE: Nares                              COLLECTED:  09/13/22 12:20   ANTIBIOTICS AT [facility].:                      RECEIVED :  09/13/22 12:46   Blood Culture 1 [[id number]] (Abnormal) Collected: 09/12/22 1616   Order Status: Completed Specimen: Blood Updated: 09/14/22 0939    Blood Culture, Routine -- Abnormal     Gram stain Aerobic bottle:   Gram positive cocci in clusters   resembling Staphylococcus   Information to follow   and   Gram negative rods   Information to follow   Gram stain Anaerobic bottle:   Gram positive cocci in clusters   resembling Staphylococcus   Information to follow    Abnormal     Organism Staph aureus MRSA DNA Detected Abnormal     Blood Culture, Routine -- Abnormal     CONTACT PRECAUTIONS INDICATED   See additional report for complete BCID panel. mecA/C gene and MREJ gene Detected.     Abnormal     Organism Serratia marcescens DNA Detected Abnormal     Blood Culture, Routine See additional report for complete BCID panel. Organism Staph aureus MRSA Abnormal     Blood Culture, Routine -- Abnormal     POSITIVE for   Sensitivity to follow   CONTACT PRECAUTIONS INDICATED   Isolated two of two sets    Abnormal     Organism Serratia marcescens Abnormal     Blood Culture, Routine --    POSITIVE for   Sensitivity to follow   Isolated one of two sets    Narrative:     ORDER#: [id number]                          ORDERED BY: Rosio Mcbride   SOURCE: Blood                              COLLECTED:  09/12/22 16:16   ANTIBIOTICS AT [facility].:                      RECEIVED :  09/12/22 16:32   CALL  [address] [place] Irizarry [phone],   Microbiology results called to and read back by Babak Mcelroy RN,   09/13/2022 08:24, by [facility]   Microbiology results called to and read back by Lucina Santiago Pharm,   09/13/2022 07:05, by [facility]   If child <=2 yrs old please draw pediatric bottle. ~Blood Culture 1   Culture, Blood 2 [[id number]] Collected: 09/14/22 0525   Order Status: Sent Specimen: Blood Updated: 09/14/22 0600   Culture, Blood 1 [[id number]] Collected: 09/14/22 0436   Order Status: Sent Specimen: Blood Updated: 09/14/22 0441   Culture, Urine [[id number]] Collected: 09/12/22 1616   Order Status: Completed Specimen: Urine, clean catch Updated: 09/13/22 2245    Urine Culture, Routine No growth at 18 to 36 hours   Narrative:     ORDER#: [id number]                          ORDERED BY: Rosio Mcbride   SOURCE: Urine Clean Catch                  COLLECTED:  09/12/22 16:16   ANTIBIOTICS AT [facility].:                      RECEIVED :  09/12/22 19:35   Culture, Blood 2 [[id number]] (Abnormal) Collected: 09/12/22 2012   Order Status: Completed Specimen: Blood Updated: 09/13/22 1416    Culture, Blood 2 -- Abnormal     Gram stain Aerobic bottle:   Gram positive cocci in clusters   resembling Staphylococcus   Information to follow   Gram stain Anaerobic bottle:   Gram positive cocci in clusters   resembling Staphylococcus   Information to follow Abnormal    Narrative:     ORDER#: [id number]                          ORDERED BY: Swapna Rudolph   SOURCE: Blood                              COLLECTED:  09/12/22 20:12   ANTIBIOTICS AT [facility].:                      RECEIVED :  09/12/22 20:21   CALL  [place]  [place] tel. [phone],   Previous panic on this admission - call not needed per SOP, 09/13/2022 10:56,   by Ashly Downing   If child <=2 yrs old please draw pediatric bottle. ~Blood Culture #2   Strep Pneumoniae Antigen [[id number]] Collected: 09/12/22 1900   Order Status: Completed Specimen: Urine, clean catch Updated: 09/13/22 1054    STREP PNEUMONIAE ANTIGEN, URINE --    Presumptive Negative   Presumptive negative suggests no current or recent   pneumococcal infection. Infection due to Strep pneumoniae   cannot be ruled out since the antigen present in the sample   may be below the detection limit of the test.   Normal Range:Presumptive Negative    Narrative:     ORDER#: [id number]                          ORDERED BY: Renetta Dean   SOURCE: Urine Clean Catch                  COLLECTED:  09/12/22 19:00   ANTIBIOTICS AT [facility].:                      RECEIVED :  09/13/22 07:04   Legionella antigen, urine [[id number]] Collected: 09/12/22 1900   Order Status: Completed Specimen: Urine, clean catch Updated: 09/13/22 1047    L. pneumophila Serogp 1 Ur Ag --    Presumptive Negative   No Legionella pneumophila serogroup 1 antigens detected. A negative result does not exclude infection with   Legionella pneumophila serogroup 1 nor does it rule out   other microbial-caused respiratory infections or   disease caused by other serogroups of   Legionella pneumophila.    Normal Range: Presumptive Negative    Narrative:     ORDER#: [id number]                          ORDERED BY: KATIE FLETCHER   SOURCE: Urine Clean Catch                  COLLECTED:  09/12/22 19:00   ANTIBIOTICS AT [facility].:                      RECEIVED :  09/13/22 07:04   Culture, Blood, PCR ID Panel [[id number]] Collected: 09/12/22 1616   Order Status: Completed Updated: 09/13/22 0707    Report SEE IMAGE   Narrative:     Janna Caldwell  [facility] tel. [phone],   Microbiology results called to and read back by Bandar Nino,   09/13/2022 07:05, by Laura Frank   Culture, Blood 2 [[id number]] Collected: 09/12/22 0000   Order Status: Canceled Specimen: Blood    Culture, Blood 1 [[id number]] Collected: 09/12/22 0000   Order Status: Canceled Specimen: Blood      Lab Results   Component Value Date/Time    BC No Growth after 4 days of incubation. 09/14/2022 04:36 AM    BLOODCULT2 No Growth after 4 days of incubation. 09/14/2022 05:25 AM      Collected: 09/16/22 1210   Order Status: Completed Specimen:  Body Fluid from Abscess Updated: 09/19/22 0615    Gram Stain Result 4+ WBC's (Polymorphonuclear)   2+ Gram positive cocci   1+ Gram negative rods    Abnormal     Anaerobic Culture --    Anaerobic culture further report to follow   No anaerobes isolated so far, Further report to follow     Organism Serratia marcescens Abnormal     WOUND/ABSCESS Light growth    Organism Staph aureus MRSA Abnormal     WOUND/ABSCESS -- Abnormal     Moderate growth   CONTACT PRECAUTIONS INDICATED    Abnormal    Narrative:     ORDER#: [id number]                          ORDERED BY: RAMILA MARTINES   SOURCE: Abscess Rt gluteal abscess         COLLECTED:  09/16/22 12:10   ANTIBIOTICS AT [facility].:                      RECEIVED :  09/16/22 12:32   CALL  [place]  [facility] tel. [phone],   Previous panic on this admission - call not needed per SOP, 09/17/2022 13:04,      Susceptibility    Staph aureus mrsa (3)    Antibiotic Interpretation Microscan  Method Status    ceFAZolin Resistant >16 mcg/mL BACTERIAL SUSCEPTIBILITY PANEL BY YA     clindamycin Sensitive <=0.5 mcg/mL BACTERIAL SUSCEPTIBILITY PANEL BY YA     erythromycin Resistant >4 mcg/mL BACTERIAL SUSCEPTIBILITY PANEL BY YA     oxacillin Resistant >2 mcg/mL BACTERIAL SUSCEPTIBILITY PANEL BY YA     tetracycline Sensitive <=4 mcg/mL BACTERIAL SUSCEPTIBILITY PANEL BY YA     trimethoprim-sulfamethoxazole Sensitive 1/19 mcg/mL BACTERIAL SUSCEPTIBILITY PANEL BY YA     vancomycin Sensitive 2 mcg/mL BACTERIAL SUSCEPTIBILITY PANEL BY YA       Serratia marcescens (4)    Antibiotic Interpretation Microscan  Method Status    amoxicillin-clavulanate Resistant >16/8 mcg/mL BACTERIAL SUSCEPTIBILITY PANEL BY YA     ampicillin Resistant >16 mcg/mL BACTERIAL SUSCEPTIBILITY PANEL BY YA     ampicillin-sulbactam Resistant 16/8 mcg/mL BACTERIAL SUSCEPTIBILITY PANEL BY YA     ceFAZolin Resistant >16 mcg/mL BACTERIAL SUSCEPTIBILITY PANEL BY YA     cefepime Sensitive <=2 mcg/mL BACTERIAL SUSCEPTIBILITY PANEL BY YA     cefTRIAXone Sensitive <=1 mcg/mL BACTERIAL SUSCEPTIBILITY PANEL BY YA     cefuroxime Resistant >16 mcg/mL BACTERIAL SUSCEPTIBILITY PANEL BY YA     ciprofloxacin Sensitive <=1 mcg/mL BACTERIAL SUSCEPTIBILITY PANEL BY YA     ertapenem Sensitive <=0.5 mcg/mL BACTERIAL SUSCEPTIBILITY PANEL BY YA     gentamicin Sensitive <=4 mcg/mL BACTERIAL SUSCEPTIBILITY PANEL BY YA     meropenem Sensitive <=1 mcg/mL BACTERIAL SUSCEPTIBILITY PANEL BY YA     piperacillin-tazobactam Sensitive <=16 mcg/mL BACTERIAL SUSCEPTIBILITY PANEL BY YA     trimethoprim-sulfamethoxazole Sensitive <=2/38 mcg/mL BACTERIAL SUSCEPTIBILITY PANEL BY YA      Condensed View       Respiratory Culture:  Lab Results   Component Value Date/Time    LABGRAM  09/16/2022 12:10 PM     4+ WBC's (Polymorphonuclear)  2+ Gram positive cocci  1+ Gram negative rods       AFB:No results found for: AFBSMEAR  Viral Culture:  Lab Results   Component Value Date/Time    COVID19 Not Detected 09/07/2022 12:00 AM    COVID19 Not Detected 07/20/2022 11:20 PM     Urine Culture:   No results for input(s): LABURIN in the last 72 hours. Summary   Normal left ventricle size, wall thickness, and systolic function with an   estimated ejection fraction of 60-65%.  No regional wall motion abnormalities are seen. Normal diastolic function. Normal right ventricular size and function. Mobile vegetation on tricuspid valve suggestive of endocarditis. Moderate tricuspid regurgitation. The right atrium is mildly dilated. Signature      ------------------------------------------------------------------   Electronically signed by Blaze Stratton MD   (Interpreting physician) on 09/13/2022 at 04:15 PM   ------------------------------------------------------------------    IMAGING:    CT CHEST WO CONTRAST   Final Result   Widespread cavitary nodules, decreased on the right and left. , in keeping   with the diagnosis of septic emboli. Increased pleural effusions with increasing consolidative change at the lung   bases. IR REPLACE TUNNELED CVC W SQ PORT SAME ACCESS   Final Result   Successful conversion of a non tunneled hemodialysis catheter for a new 19   centimeter tunneled hemodialysis catheter. IR NONTUNNELED VASCULAR CATHETER > 5 YEARS   Final Result   Successful ultrasound and fluoroscopy guided non-tunneled Trialysis catheter   placement. US RENAL COMPLETE   Final Result   Unremarkable ultrasound of the kidneys and urinary bladder. No renal   atrophy, hydronephrosis or abnormal echotexture. CT ABSCESS DRAINAGE W CATH PLACEMENT S&I   Final Result   Successful CT guided placement of a 10 Prydeinig drainage catheter in the right   iliacus abscess. CT GUIDED NEEDLE PLACEMENT   Final Result   Successful CT guided placement of a 10 Prydeinig drainage catheter in the right   iliacus abscess. MRI LUMBAR SPINE W WO CONTRAST   Final Result   Partially visualized septic arthritis/osteomyelitis of the right sacroiliac   joint, with a large adjacent retroperitoneal abscess extending into the right   pelvic sidewall. Small intramuscular abscess also present within the left psoas muscle. Epidural phlegmon/abscess of the sacral canal.      Pelvic ascites. The findings were sent to the Radiology Results [address] at 7:23   pm on 9/15/2022 to be communicated to a licensed caregiver. VL Extremity Venous Bilateral   Final Result      CT HEAD WO CONTRAST   Final Result   No acute intracranial abnormality. CT CHEST WO CONTRAST   Final Result   Diffuse in numeral bilateral pulmonary nodules many of which are cavitary   becoming coalescent at the right lung apex however diffusely throughout the   bilateral lungs consistent of septic emboli with small to moderate right   pleural effusion      Partially visualized portions of the upper abdomen reveal hepatosplenomegaly         XR CHEST PORTABLE   Final Result   Multifocal patchy airspace disease and cavitary nodules. The appearance is   suggestive of septic emboli in this clinical setting. Follow-up recommended to assure resolution.          CT BIOPSY RENAL    (Results Pending)         All the pertinent images and reports for the current Hospitalization were reviewed by me     Scheduled Meds:   clindamycin (CLEOCIN) IV  900 mg IntraVENous Q8H    b complex-C-folic acid  1 capsule Oral Daily    sevelamer  800 mg Oral TID WC    ceftaroline fosamil (TEFLARO) IVPB  200 mg IntraVENous q8h    [Held by provider] heparin (porcine)  5,000 Units SubCUTAneous 3 times per day    lidocaine 1 % injection  5 mL IntraDERmal Once    sodium chloride flush  5-40 mL IntraVENous 2 times per day       Continuous Infusions:   sodium chloride      sodium chloride 25 mL/hr at 09/29/22 0755       PRN Meds:  morphine, HYDROcodone 5 mg - acetaminophen, heparin (porcine), LORazepam, sodium chloride, chlorhexidine, sodium chloride flush, sodium chloride, hydrOXYzine pamoate, naloxone, calcium carbonate, promethazine, promethazine **OR** ondansetron, acetaminophen **OR** acetaminophen, perflutren lipid microspheres      Assessment:     Patient Active Problem List   Diagnosis    Tear of medial cartilage or meniscus of knee, current    Plica syndrome    Boxer's metacarpal fracture, neck, closed    Chondromalacia of patella    Degeneration of lumbar or lumbosacral intervertebral disc    Varicose veins of lower extremity    Intractable nausea and vomiting    Septicemia (HCC)    IVDU (intravenous drug user)    MRSA bacteremia    Sepsis due to methicillin resistant Staphylococcus aureus (MRSA) without acute organ dysfunction (HCC)    Bacterial infection due to Serratia    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 WBC count is elevated high fever from ongoing bloodstream infection. Given IV drug abuse suspect endocarditis transthoracic echocardiogram with TV Vegetation    Trend WBC and repeat Blood cx NGTD    Will need placement to complete IV ABX        Rt gluteal and lower Lumbar area pain MRI L spine with abscess, septic joint     S/p IR aspiration of the abscess cx MRSA and serratia noted    Unfortunately kidney function continues to decline nephrology is now following. IV antibiotics have been adjusted    Will need a long course of antibiotic given the MRSA bacteremia and septic arthritis    HD line placed due to worsening Kidney function -completed hemodialysis for session without any issues. Unfortunately creatinine still remains elevated.     Check CRP and ESR down trend noted    Hemoptysis from Septic Emboli likely and CT chest with cavitary lesions from MRSA infection and will check resp cx    Platelets are going down on Linezolid - will need to change IV Clindamycin     CT L spine non contrast ordered     D/w  and her renal function negative recovering but would like to avoid IV vancomycin at this juncture final antibiotic plan is pending    D/w  primary team that she is not ready for discharge at this time        Labs, Microbiology, Radiology and all the pertinent results from current hospitalization and  care every where were reviewed  by me as a part of the evaluation   Plan:   Cont  IV Ceftaroline - 200 mg q x 8 HRS will cover MRSA and Serratia adjusted to GFR  D/c  Linezolid   Repeat Blood cx    NGTD  TTE Abnormal with vegetation   Will need IV abx  Watch for complications  HIV -ve and Hepatitis screen Hep C+Ve  Cardiology consult  STEFANO completed   Vegetation is small for angiovac  would be able to treat with IV abx   MRI L spine noted very abnormal s/p IR  abscess dot  Daptomycin not effective in LUNGS  awaiting renal recovery to decide on long term IV abx course   Needs placement   Await kidney function recovery to decide on adjusting the long-term antibiotics  May need repeat MRI once clinically better   iF Remains on HD we can choose IV Vancomycin to complete the course  post biopsy   Resp cx - CT chest results noted and on going changes from septic embolism  IV Clindamycin x  900 mg q 8 HRS   Check CT L spine  - hold off on d/c pending clinical improvement           Discussed with patient/Family and Nursing   Risk of Complications/Morbidity: High      Illness(es)/ Infection present that pose threat to bodily function. There is potential for severe exacerbation of infection/side effects of treatment. Therapy requires intensive monitoring for antimicrobial agent toxicity. Discussed with patient/Family and Nursing staff     Thanks for allowing me to participate in your patient's care and please call me with any questions or concerns.     Rafael Laureano MD  Infectious Disease  [facility]) Physician  Phone: [phone]   Fax : [phone]

## 2022-09-29 NOTE — CARE COORDINATION
Dialysis order and flowsheets uploaded to [facility] portal.     Plan for home with 24 hour supervision or assist, Home with Home health PT.      NEED: Outpatient dialysis with IV antibiotics, and DME order for a walker    JAROD Orozco, STEVEN, Social Work/Case Management   [phone]  Electronically signed by JAROD Orozco, STEVEN on 9/29/2022 at 8:23 AM

## 2022-09-30 ENCOUNTER — APPOINTMENT (OUTPATIENT)
Dept: CT IMAGING | Age: 42
DRG: 720 | End: 2022-09-30
Payer: MEDICAID

## 2022-09-30 LAB
ALBUMIN SERPL-MCNC: 2.3 G/DL (ref 3.4–5)
ANION GAP SERPL CALCULATED.3IONS-SCNC: 9 MMOL/L (ref 3–16)
BUN BLDV-MCNC: 13 MG/DL (ref 7–20)
CALCIUM SERPL-MCNC: 8 MG/DL (ref 8.3–10.6)
CHLORIDE BLD-SCNC: 102 MMOL/L (ref 99–110)
CO2: 27 MMOL/L (ref 21–32)
CREAT SERPL-MCNC: 2.3 MG/DL (ref 0.6–1.1)
FERRITIN: 558.3 NG/ML (ref 15–150)
GFR AFRICAN AMERICAN: 28
GFR NON-AFRICAN AMERICAN: 23
GLUCOSE BLD-MCNC: 88 MG/DL (ref 70–99)
HCT VFR BLD CALC: 24.2 % (ref 36–48)
HEMOGLOBIN: 8.1 G/DL (ref 12–16)
IRON SATURATION: 40 % (ref 15–50)
IRON: 64 UG/DL (ref 37–145)
MCH RBC QN AUTO: 28 PG (ref 26–34)
MCHC RBC AUTO-ENTMCNC: 33.3 G/DL (ref 31–36)
MCV RBC AUTO: 84.3 FL (ref 80–100)
PDW BLD-RTO: 16 % (ref 12.4–15.4)
PHOSPHORUS: 4.6 MG/DL (ref 2.5–4.9)
PLATELET # BLD: 147 K/UL (ref 135–450)
PMV BLD AUTO: 7.7 FL (ref 5–10.5)
POTASSIUM SERPL-SCNC: 3.9 MMOL/L (ref 3.5–5.1)
PRO-BNP: 7102 PG/ML (ref 0–124)
RBC # BLD: 2.87 M/UL (ref 4–5.2)
SODIUM BLD-SCNC: 138 MMOL/L (ref 136–145)
TOTAL IRON BINDING CAPACITY: 161 UG/DL (ref 260–445)
WBC # BLD: 6.4 K/UL (ref 4–11)

## 2022-09-30 PROCEDURE — 2060000000 HC ICU INTERMEDIATE R&B

## 2022-09-30 PROCEDURE — 99233 SBSQ HOSP IP/OBS HIGH 50: CPT | Performed by: INTERNAL MEDICINE

## 2022-09-30 PROCEDURE — 85027 COMPLETE CBC AUTOMATED: CPT

## 2022-09-30 PROCEDURE — 6370000000 HC RX 637 (ALT 250 FOR IP): Performed by: INTERNAL MEDICINE

## 2022-09-30 PROCEDURE — 94760 N-INVAS EAR/PLS OXIMETRY 1: CPT

## 2022-09-30 PROCEDURE — 2580000003 HC RX 258: Performed by: INTERNAL MEDICINE

## 2022-09-30 PROCEDURE — 6360000002 HC RX W HCPCS: Performed by: INTERNAL MEDICINE

## 2022-09-30 PROCEDURE — 87086 URINE CULTURE/COLONY COUNT: CPT

## 2022-09-30 PROCEDURE — 80069 RENAL FUNCTION PANEL: CPT

## 2022-09-30 PROCEDURE — 2500000003 HC RX 250 WO HCPCS: Performed by: INTERNAL MEDICINE

## 2022-09-30 PROCEDURE — 87077 CULTURE AEROBIC IDENTIFY: CPT

## 2022-09-30 PROCEDURE — 83550 IRON BINDING TEST: CPT

## 2022-09-30 PROCEDURE — 87186 SC STD MICRODIL/AGAR DIL: CPT

## 2022-09-30 PROCEDURE — 72131 CT LUMBAR SPINE W/O DYE: CPT

## 2022-09-30 PROCEDURE — 83540 ASSAY OF IRON: CPT

## 2022-09-30 PROCEDURE — 83880 ASSAY OF NATRIURETIC PEPTIDE: CPT

## 2022-09-30 PROCEDURE — 82728 ASSAY OF FERRITIN: CPT

## 2022-09-30 RX ORDER — LABETALOL HYDROCHLORIDE 5 MG/ML
10 INJECTION, SOLUTION INTRAVENOUS EVERY 4 HOURS PRN
Status: DISCONTINUED | OUTPATIENT
Start: 2022-09-30 | End: 2022-10-01 | Stop reason: HOSPADM

## 2022-09-30 RX ADMIN — HYDROCODONE BITARTRATE AND ACETAMINOPHEN 1 TABLET: 5; 325 TABLET ORAL at 00:05

## 2022-09-30 RX ADMIN — HYDROCODONE BITARTRATE AND ACETAMINOPHEN 1 TABLET: 5; 325 TABLET ORAL at 08:21

## 2022-09-30 RX ADMIN — MORPHINE SULFATE 0.5 MG: 2 INJECTION, SOLUTION INTRAMUSCULAR; INTRAVENOUS at 12:52

## 2022-09-30 RX ADMIN — CEFTAROLINE FOSAMIL 200 MG: 600 POWDER, FOR SOLUTION INTRAVENOUS at 07:05

## 2022-09-30 RX ADMIN — SODIUM CHLORIDE, PRESERVATIVE FREE 10 ML: 5 INJECTION INTRAVENOUS at 21:00

## 2022-09-30 RX ADMIN — HYDROCODONE BITARTRATE AND ACETAMINOPHEN 1 TABLET: 5; 325 TABLET ORAL at 21:55

## 2022-09-30 RX ADMIN — SEVELAMER CARBONATE 800 MG: 800 TABLET, FILM COATED ORAL at 12:51

## 2022-09-30 RX ADMIN — NEPHROCAP 1 MG: 1 CAP ORAL at 08:21

## 2022-09-30 RX ADMIN — CEFTAROLINE FOSAMIL 200 MG: 600 POWDER, FOR SOLUTION INTRAVENOUS at 22:50

## 2022-09-30 RX ADMIN — SEVELAMER CARBONATE 800 MG: 800 TABLET, FILM COATED ORAL at 15:53

## 2022-09-30 RX ADMIN — CEFTAROLINE FOSAMIL 200 MG: 600 POWDER, FOR SOLUTION INTRAVENOUS at 14:17

## 2022-09-30 RX ADMIN — CLINDAMYCIN IN 5 PERCENT DEXTROSE 900 MG: 18 INJECTION, SOLUTION INTRAVENOUS at 05:43

## 2022-09-30 RX ADMIN — CLINDAMYCIN IN 5 PERCENT DEXTROSE 900 MG: 18 INJECTION, SOLUTION INTRAVENOUS at 12:58

## 2022-09-30 RX ADMIN — CLINDAMYCIN IN 5 PERCENT DEXTROSE 900 MG: 18 INJECTION, SOLUTION INTRAVENOUS at 21:20

## 2022-09-30 RX ADMIN — SEVELAMER CARBONATE 800 MG: 800 TABLET, FILM COATED ORAL at 08:21

## 2022-09-30 RX ADMIN — HYDROCODONE BITARTRATE AND ACETAMINOPHEN 1 TABLET: 5; 325 TABLET ORAL at 15:53

## 2022-09-30 RX ADMIN — LORAZEPAM 1 MG: 1 TABLET ORAL at 15:53

## 2022-09-30 ASSESSMENT — PAIN DESCRIPTION - LOCATION
LOCATION: BACK
LOCATION: BACK
LOCATION: ABDOMEN;BACK
LOCATION: BACK
LOCATION: HEAD

## 2022-09-30 ASSESSMENT — PAIN SCALES - GENERAL
PAINLEVEL_OUTOF10: 0
PAINLEVEL_OUTOF10: 5
PAINLEVEL_OUTOF10: 7
PAINLEVEL_OUTOF10: 9
PAINLEVEL_OUTOF10: 5
PAINLEVEL_OUTOF10: 6

## 2022-09-30 ASSESSMENT — PAIN DESCRIPTION - DESCRIPTORS
DESCRIPTORS: ACHING
DESCRIPTORS: ACHING
DESCRIPTORS: ACHING;CRAMPING
DESCRIPTORS: ACHING
DESCRIPTORS: ACHING

## 2022-09-30 ASSESSMENT — PAIN DESCRIPTION - ORIENTATION
ORIENTATION: LOWER;MID
ORIENTATION: MID;LOWER
ORIENTATION: MID;LOWER
ORIENTATION: MID
ORIENTATION: MID

## 2022-09-30 ASSESSMENT — PAIN DESCRIPTION - PAIN TYPE: TYPE: CHRONIC PAIN;ACUTE PAIN

## 2022-09-30 ASSESSMENT — PAIN - FUNCTIONAL ASSESSMENT: PAIN_FUNCTIONAL_ASSESSMENT: PREVENTS OR INTERFERES WITH MANY ACTIVE NOT PASSIVE ACTIVITIES

## 2022-09-30 NOTE — PROGRESS NOTES
Comprehensive Nutrition Assessment    Type and Reason for Visit:  Reassess    Nutrition Recommendations/Plan:   Continue regular diet; add Glucerna daily; modify ensure enlive to arrive at breakfast     Malnutrition Assessment:  Malnutrition Status: At risk for malnutrition (Comment) (09/19/22 1540)    Context:  Acute Illness     Findings of the 6 clinical characteristics of malnutrition:  Energy Intake:  50% or less of estimated energy requirements for 5 or more days  Weight Loss:  Unable to assess (fluid shifts, diuretic use)     Body Fat Loss:  Unable to assess     Muscle Mass Loss:  Unable to assess    Fluid Accumulation:  No significant fluid accumulation     Strength:  Not Performed    Nutrition Assessment:    Follow up. Pt was seen in room . Pt reports that appetite is slowly returning and that she is consuming around 1/3 of her meals. Pt reports she does not always receieve her Ensure. Pt reports drinking ONS when it arrives but would prefer a flavor other than chocolate. Pt agreed to strawberry glucerna. Nutrition Related Findings:    BM 9/30. no edema Wound Type: None       Current Nutrition Intake & Therapies:    Average Meal Intake: 26-50%  Average Supplements Intake: Unable to assess  ADULT DIET; Regular  ADULT ORAL NUTRITION SUPPLEMENT; AM Snack; Standard High Calorie/High Protein Oral Supplement    Anthropometric Measures:  Height: 5' 8\" (172.7 cm)  Ideal Body Weight (IBW): 140 lbs (64 kg)    Admission Body Weight: 141 lb (64 kg)  Current Body Weight: 156 lb (70.8 kg), 107.9 % IBW. Weight Source: Bed Scale  Current BMI (kg/m2): 23.7                          BMI Categories: Normal Weight (BMI 18.5-24. 9)    Estimated Daily Nutrient Needs:        Energy (kcal/day): 4141-1825 (25-30 x ABW 69 kg)     Protein (g/day):  (1.2-1.5 x ABW 69 kg)  Method Used for Fluid Requirements: 1 ml/kcal  Fluid (ml/day):      Nutrition Diagnosis:   Inadequate oral intake related to inadequate protein-energy intake as evidenced by intake 0-25%, intake 26-50%    Nutrition Interventions:   Food and/or Nutrient Delivery: Modify Oral Nutrition Supplement, Continue Current Diet  Nutrition Education/Counseling: No recommendation at this time  Coordination of Nutrition Care: Continue to monitor while inpatient       Goals:  Previous Goal Met: Progressing toward Goal(s)  Goals: PO intake 75% or greater       Nutrition Monitoring and Evaluation:   Behavioral-Environmental Outcomes: None Identified  Food/Nutrient Intake Outcomes: Food and Nutrient Intake, Supplement Intake  Physical Signs/Symptoms Outcomes: Biochemical Data, GI Status, Nutrition Focused Physical Findings, Weight    Discharge Planning:     Too soon to determine     Dary Luis, [address]  Contact: [phone]

## 2022-09-30 NOTE — PROGRESS NOTES
[facility]  Phone: [phone]  Fax: [phone]  [facility]         41 y/o WF with h/o depression, IVDA and back pain admitted with feeling weak, fatigued and pain in her back She was seen in ER on 9/7/22 with back apin and diagnosed with UTI Received toradol and was discharged on Motrin and Cefdinir Urine CX grew klebsiella pan senstive However she continued to feel poorly with back pain, and fatigue   Had fever to 101 on admission Started on Vancomycin and Cefepime Cr was 1.5 mg on admission improved to 1.1 mg but has increased to 1.8 mg now [facility] from admission growing MRSA and Serratia CT chest with cavitation and septic emboli  She reports decreased UOP no dysuria hematuria  Has been taking Ibuprofen 800 mg TID prior to admission  MRI showed OM of R SI joint and large retroperitoneal abscess   Underwent drainage tube placement . Events noted , labs reviewed . UOP/labs noted . REVIEW OF SYSTEMS:  No CP/SOB or GEIGER  or hemoptysis . No Family at bed side     Physical Exam:    VITALS:  BP (!) 171/110   Pulse 83   Temp 98.7 °F (37.1 °C) (Oral)   Resp 19   Ht 5' 8\" (1.727 m)   Wt 153 lb 3.5 oz (69.5 kg)   SpO2 95%   BMI 23.30 kg/m²   24HR INTAKE/OUTPUT:    Intake/Output Summary (Last 24 hours) at 9/30/2022 1048  Last data filed at 9/30/2022 0400  Gross per 24 hour   Intake 661.3 ml   Output 2550 ml   Net -1888.7 ml         Constitutional:  awake   Respiratory:  Decrease BS at  bases   Gastrointestinal:  + tenderness. Normal Bowel Sounds  Cardiovascular:  S1, S2 RRR   Edema:  +  edema  Acc Rt TDC .     DATA:    CBC:  Lab Results   Component Value Date/Time    WBC 6.4 09/30/2022 05:50 AM    RBC 2.87 09/30/2022 05:50 AM    HGB 8.1 09/30/2022 05:50 AM    HCT 24.2 09/30/2022 05:50 AM    MCV 84.3 09/30/2022 05:50 AM    MCH 28.0 09/30/2022 05:50 AM    MCHC 33.3 09/30/2022 05:50 AM    RDW 16.0 09/30/2022 05:50 AM     09/30/2022 05:50 AM    MPV 7.7 09/30/2022 05:50 AM     CMP:  Lab Results   Component Value Date/Time     09/30/2022 05:50 AM    K 3.9 09/30/2022 05:50 AM    K 4.3 09/19/2022 05:51 AM     09/30/2022 05:50 AM    CO2 27 09/30/2022 05:50 AM    BUN 13 09/30/2022 05:50 AM    CREATININE 2.3 09/30/2022 05:50 AM    GFRAA 28 09/30/2022 05:50 AM    GFRAA >60 07/28/2012 11:20 PM    AGRATIO 0.4 09/12/2022 03:44 PM    LABGLOM 23 09/30/2022 05:50 AM    GLUCOSE 88 09/30/2022 05:50 AM    PROT 5.8 09/27/2022 04:40 AM    PROT 6.5 07/28/2012 11:20 PM    CALCIUM 8.0 09/30/2022 05:50 AM    BILITOT <0.2 09/27/2022 04:40 AM    ALKPHOS 85 09/27/2022 04:40 AM    AST 9 09/27/2022 04:40 AM    ALT 6 09/27/2022 04:40 AM      Hepatic Function Panel:   Lab Results   Component Value Date/Time    ALKPHOS 85 09/27/2022 04:40 AM    ALT 6 09/27/2022 04:40 AM    AST 9 09/27/2022 04:40 AM    PROT 5.8 09/27/2022 04:40 AM    PROT 6.5 07/28/2012 11:20 PM    BILITOT <0.2 09/27/2022 04:40 AM    BILIDIR <0.2 09/27/2022 04:40 AM    IBILI see below 09/27/2022 04:40 AM      Phosphorus:   Lab Results   Component Value Date/Time    PHOS 4.6 09/30/2022 05:50 AM       ASSESSMENT:  Principal Problem:    Intractable nausea and vomiting  Active Problems:    Septicemia (HCC)    IVDU (intravenous drug user)    MRSA bacteremia    Sepsis due to methicillin resistant Staphylococcus aureus (MRSA) without acute organ dysfunction ([facility] [address]    Bacterial infection due to Serratia    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 *      PLAN  Assessment/  1-MARYAN suspect Vancomycin toxicity /ATN/ infectious GN . 1st HD 9/20  . S./p [facility]  9/23 . Kidney Bx on hold  ,increase UOP and Cr noted . HD in am if needed .    Check labs in am .     2-MRSA and serratia bacteremia with retroperitoneal abscess  septic pulmonary emboli and possible TV endocarditis    3-IVDA     4 Anemia  Hb Noted S/p Tx . Epo with HD.    May need tx , recheck Hb in am.    Plan dw pt ad nurse       Jessie Pappas MD, FACP

## 2022-09-30 NOTE — CARE COORDINATION
Discharge Planning:   PT/OT: DME   Equipment Needed: bedside commode and walker - provided to patient at bedside. PLAN: home with family, outpatient dialysis with iv antibiotics.    Per chart review, Stable for Discharge from medical standpoint, awaiting ID recs for abx at discharge    NEED: final infectious disease recommendation     JAROD Colvin, STEVEN, Social Work/Case Management   [phone]  Electronically signed by JAROD Colvin, STEVEN on 9/30/2022 at 8:23 AM

## 2022-09-30 NOTE — CARE COORDINATION
Discharge Planning:   Per chart review, patient may require Daptomyocin at discharge. Called to [place], [place] to Evangelista Guajardo RN. Confirmed they do provide that medication, but it will take a couple days to come in. Spoke to Mahendra Hayes RN. Notified they can proceed with dapto. Harshad:   #[phone]  Requested RN call and notify of iv antibiotic order.      JAROD Montana, MILADW, Social Work/Case Management   [phone]  Electronically signed by JAROD Montana, MILADW on 9/30/2022 at 12:19 PM

## 2022-09-30 NOTE — PROGRESS NOTES
Infectious Disease Follow up Notes  Admit Date: 9/12/2022  Hospital Day: 19    Antibiotics     At d/c   IV Daptomycin with HD sessions  Oral Clindamycin x 300 mg Q 8 hrs  Levofloxacin x  250 mg daily          Current IV abx : IV Ceftaroline  IV Clindamycin      CHIEF COMPLAINT:     Sepsis  MRSA bacteremia  Serratia Bacteremia  Endocarditis  IVDA  TV Vegetation   Rt gluteal abscess   Septic embolism -     Subjective interval History :  40 y. o.woman with a history of IV drug abuse, ADHD, back pain, depression admitted to the hospital secondary to fever chills fatigue back pain. Patient family noted she was unable to get up from the bed for the past 3 days secondary to fevers and not feeling well. Admission labs indicate creatinine 1.5 procalcitonin elevated to 6.6, WBC elevated 15.5 hemoglobin 9.4 bilirubin 1.3 AST 42, blood cultures from admission positive for MRSA as well as Serratia. T-max 103.8 on admission. CT chest with diffuse innumerable bilateral pulmonary nodules consistent with cavitation and septic emboli. Given the presentation concern is for endocarditis secondary to IV drug abuse. Patient not much interactive during normalization some of the history is provided by her daughters,       Interval History : KRYSTLE drain in place working ok  and Rt gluteal pain going down creat some improvement  - WBC trend down and Hb low and will repeat CT of the L spine we will start her with ongoing osteomyelitis septic arthritis.     Per  she is approved to get IV daptomycin with hemodialysis     Past Medical History:    Past Medical History:   Diagnosis Date    ADHD (attention deficit hyperactivity disorder)     Arthritis     Back pain     Depression     Migraine     Neutrophilia 9/15/2022       Past Surgical History:    Past Surgical History:   Procedure Laterality Date    IR INSERT NON TUNNELED CV CATH LESS THAN 5 YEARS Right 09/20/2022    Gemma Rayo access; 15cm; Dr. Noel Sweeney    IR NONTUNNELED VASCULAR CATHETER  09/20/2022    IR NONTUNNELED VASCULAR CATHETER 9/20/2022 [facility]    KNEE ARTHROSCOPY  10/05/2010    PARTIAL HYSTERECTOMY (CERVIX NOT REMOVED)      TUBAL LIGATION  01/01/2004    TUNNELED VENOUS CATHETER PLACEMENT Right 09/23/2022    Permacath; RIJ access; 19cm; Dr. Mayte Fuentes       Current Medications:    Outpatient Medications Marked as Taking for the 9/12/22 encounter [facility]OSP[facility] Encounter)   Medication Sig Dispense Refill    calcium carbonate (TUMS) 500 MG chewable tablet Take 1 tablet by mouth 3 times daily as needed for Heartburn 30 tablet 0    HYDROcodone-acetaminophen (NORCO) 5-325 MG per tablet Take 1 tablet by mouth every 6 hours as needed for Pain for up to 3 days.  9 tablet 0       Allergies:  Ultram [tramadol hcl], Robaxin [methocarbamol], and Penicillins    Immunizations :   Immunization History   Administered Date(s) Administered    Tdap (Boostrix, Adacel) 02/11/2015       Social History:    Social History     Tobacco Use    Smoking status: Every Day     Packs/day: 0.50     Years: 2.00     Pack years: 1.00     Types: Cigarettes    Smokeless tobacco: Never   Substance Use Topics    Alcohol use: Yes     Comment: occas    Drug use: No     Social History     Tobacco Use   Smoking Status Every Day    Packs/day: 0.50    Years: 2.00    Pack years: 1.00    Types: Cigarettes   Smokeless Tobacco Never      Family History   Problem Relation Age of Onset    Breast Cancer Maternal Grandmother 45    Arthritis Other     Asthma Other     Cancer Other     Diabetes Other     Seizures Other     Stroke Other           REVIEW OF SYSTEMS:      Constitutional:  fevers,  chills +=, night sweats  Eyes:  negative for blurred vision, eye discharge, visual disturbance   HEENT:  negative for hearing loss, ear drainage,nasal congestion  Respiratory:  negative for cough, shortness of breath or hemoptysis   Cardiovascular: negative for chest pain, palpitations, syncope  Gastrointestinal:  negative for nausea, vomiting, diarrhea, constipation, abdominal pain  Genitourinary:  negative for frequency, dysuria, urinary incontinence, hematuria  Hematologic/Lymphatic:  negative for easy bruising, bleeding and lymphadenopathy  Allergic/Immunologic:  negative for recurrent infections, angioedema, anaphylaxis   Endocrine:  negative for weight changes, polyuria, polydipsia and polyphagia  Musculoskeletal:  Rt hip and lower back pain ++   pain, swelling, decreased range of motion  Integumentary: No rashes, skin lesions  Neurological:  negative for headaches, slurred speech, unilateral weakness  Psychiatric: negative for hallucinations,confusion,agitation.                 PHYSICAL EXAM:      Vitals:    BP (!) 171/110   Pulse 83   Temp 98.7 °F (37.1 °C) (Oral)   Resp 19   Ht 5' 8\" (1.727 m)   Wt 153 lb 3.5 oz (69.5 kg)   SpO2 95%   BMI 23.30 kg/m²        General Appearance: alert,in  acute distress, ++ pallor, no icterus  poor skin hygiene   Skin: warm and dry, no rash or erythema  Head: normocephalic and atraumatic  Eyes: pupils equal, round, and reactive to light, conjunctivae normal  ENT: tympanic membrane, external ear and ear canal normal bilaterally, nose without deformity, nasal mucosa and turbinates normal without polyps  Neck: supple and non-tender without mass, no thyromegaly  no cervical lymphadenopathy  Pulmonary/Chest: Bi basal crepts+ - no wheezes, rales or rhonchi, normal air movement, no respiratory distress  Cardiovascular: normal rate, regular rhythm, normal S1 and S2, esm+ murmurs, rubs, clicks, or gallops, no carotid bruits  Abdomen: soft, non-tender, non-distended, normal bowel sounds, no masses or organomegaly  Extremities: no cyanosis, clubbing or edema  Musculoskeletal: normal range of motion, no joint swelling, deformity or tenderness  Integumentary: No rashes, no abnormal skin lesions, no petechiae  Neurologic: reflexes normal and symmetric, no cranial nerve deficit  Psych:  Orientation, sensorium, mood normal            Lines: PICC  Needle tracks++   Rt gluteal area pain jerri DRAIN_   Lower leg multiple skin lesions from IVDA++   HD line in place     Data Review:    CBC:   Lab Results   Component Value Date    WBC 6.4 09/30/2022    HGB 8.1 (L) 09/30/2022    HCT 24.2 (L) 09/30/2022    MCV 84.3 09/30/2022     09/30/2022     RENAL:   Lab Results   Component Value Date    CREATININE 2.3 (H) 09/30/2022    BUN 13 09/30/2022     09/30/2022    K 3.9 09/30/2022     09/30/2022    CO2 27 09/30/2022     SED RATE:   Lab Results   Component Value Date/Time    SEDRATE 23 09/25/2022 05:32 AM     CK:   Lab Results   Component Value Date/Time    CKTOTAL 10 09/18/2022 04:55 PM     CRP:   Lab Results   Component Value Date/Time    CRP 49.8 09/25/2022 08:51 PM     Hepatic Function Panel:   Lab Results   Component Value Date/Time    ALKPHOS 85 09/27/2022 04:40 AM    ALT 6 09/27/2022 04:40 AM    AST 9 09/27/2022 04:40 AM    PROT 5.8 09/27/2022 04:40 AM    PROT 6.5 07/28/2012 11:20 PM    BILITOT <0.2 09/27/2022 04:40 AM    BILIDIR <0.2 09/27/2022 04:40 AM    IBILI see below 09/27/2022 04:40 AM    LABALBU 2.3 09/30/2022 05:50 AM     UA:  Lab Results   Component Value Date/Time    COLORU Yellow 09/18/2022 04:36 PM    CLARITYU CLOUDY 09/18/2022 04:36 PM    GLUCOSEU Negative 09/18/2022 04:36 PM    GLUCOSEU NEGATIVE 07/31/2010 02:59 PM    BILIRUBINUR Negative 09/18/2022 04:36 PM    BILIRUBINUR NEGATIVE 07/31/2010 02:59 PM    KETUA Negative 09/18/2022 04:36 PM    SPECGRAV 1.008 09/18/2022 04:36 PM    BLOODU LARGE 09/18/2022 04:36 PM    PHUR 5.0 09/18/2022 04:36 PM    PROTEINU 100 09/18/2022 04:36 PM    UROBILINOGEN 0.2 09/18/2022 04:36 PM    NITRU Negative 09/18/2022 04:36 PM    LEUKOCYTESUR MODERATE 09/18/2022 04:36 PM    LABMICR YES 09/18/2022 04:36 PM    URINETYPE NotGiven 09/18/2022 04:36 PM      Urine Microscopic:   Lab Results Component Value Date/Time    BACTERIA None Seen 09/18/2022 04:36 PM    COMU see below 09/07/2022 01:55 AM    HYALCAST 6 09/18/2022 04:36 PM    WBCUA 98 09/18/2022 04:36 PM    RBCUA 14 09/18/2022 04:36 PM    EPIU 1 09/18/2022 04:36 PM     Urine Reflex to Culture:   Lab Results   Component Value Date/Time    URRFLXCULT Yes 09/12/2022 04:16 PM      Summary   Severe eccentric tricuspid regurgitation. Small, mobile tricuspid valve vegetation noted measuring 0.9cm x 0.4cm. Signature      ------------------------------------------------------------------   Electronically signed by David Cuba MD (Interpreting   physician) on 09/16/2022 at 04:29 PM   ------------------------------------------------------------------    MICRO: cultures reviewed and updated by me   Blood Culture:          MRSA DNA Probe, Nasal [[id number]] Collected: 09/13/22 1220   Order Status: Completed Specimen: Nares Updated: 09/14/22 1131    MRSA SCREEN RT-PCR --    Negative  MRSA DNA not detected. Normal Range: Not detected    Narrative:     ORDER#: [id number]                          ORDERED BY: Rosie Reynolds   SOURCE: Nares                              COLLECTED:  09/13/22 12:20   ANTIBIOTICS AT [facility]A.:                      RECEIVED :  09/13/22 12:46   Blood Culture 1 [[id number]] (Abnormal) Collected: 09/12/22 1616   Order Status: Completed Specimen: Blood Updated: 09/14/22 0939    Blood Culture, Routine -- Abnormal     Gram stain Aerobic bottle:   Gram positive cocci in clusters   resembling Staphylococcus   Information to follow   and   Gram negative rods   Information to follow   Gram stain Anaerobic bottle:   Gram positive cocci in clusters   resembling Staphylococcus   Information to follow    Abnormal     Organism Staph aureus MRSA DNA Detected Abnormal     Blood Culture, Routine -- Abnormal     CONTACT PRECAUTIONS INDICATED   See additional report for complete BCID panel. mecA/C gene and MREJ gene Detected.     Abnormal Organism Serratia marcescens DNA Detected Abnormal     Blood Culture, Routine See additional report for complete BCID panel. Organism Staph aureus MRSA Abnormal     Blood Culture, Routine -- Abnormal     POSITIVE for   Sensitivity to follow   CONTACT PRECAUTIONS INDICATED   Isolated two of two sets    Abnormal     Organism Serratia marcescens Abnormal     Blood Culture, Routine --    POSITIVE for   Sensitivity to follow   Isolated one of two sets    Narrative:     ORDER#: [id number]                          ORDERED BY: Malachi Loco   SOURCE: Blood                              COLLECTED:  09/12/22 16:16   ANTIBIOTICS AT [facility].:                      RECEIVED :  09/12/22 16:32   CALL  [facility]  [id number] Anisa Yin [phone],   Microbiology results called to and read back by Jorje Nazario RN,   09/13/2022 08:24, by [facility]   Microbiology results called to and read back by Tony Campos,   09/13/2022 07:05, by [facility]   If child <=2 yrs old please draw pediatric bottle. ~Blood Culture 1   Culture, Blood 2 [[id number]] Collected: 09/14/22 0525   Order Status: Sent Specimen: Blood Updated: 09/14/22 0600   Culture, Blood 1 [[id number]] Collected: 09/14/22 0436   Order Status: Sent Specimen: Blood Updated: 09/14/22 0441   Culture, Urine [[id number]] Collected: 09/12/22 1616   Order Status: Completed Specimen: Urine, clean catch Updated: 09/13/22 2245    Urine Culture, Routine No growth at 18 to 36 hours   Narrative:     ORDER#: [id number]                          ORDERED BY: Malachi Loco   SOURCE: Urine Clean Catch                  COLLECTED:  09/12/22 16:16   ANTIBIOTICS AT [facility]A.:                      RECEIVED :  09/12/22 19:35   Culture, Blood 2 [[id number]] (Abnormal) Collected: 09/12/22 2012   Order Status: Completed Specimen: Blood Updated: 09/13/22 1416    Culture, Blood 2 -- Abnormal     Gram stain Aerobic bottle:   Gram positive cocci in clusters   resembling Staphylococcus   Information to follow   Gram stain Anaerobic bottle:   Gram positive cocci in clusters   resembling Staphylococcus   Information to follow    Abnormal    Narrative:     ORDER#: [id number]                          ORDERED BY: Huey Brand   SOURCE: Blood                              COLLECTED:  09/12/22 20:12   ANTIBIOTICS AT [facility].:                      RECEIVED :  09/12/22 20:21   CALL  [facility]  [facility] tel. [phone],   Previous panic on this admission - call not needed per SOP, 09/13/2022 10:56,   by Yvon Colon   If child <=2 yrs old please draw pediatric bottle. ~Blood Culture #2   Strep Pneumoniae Antigen [[id number]] Collected: 09/12/22 1900   Order Status: Completed Specimen: Urine, clean catch Updated: 09/13/22 1054    STREP PNEUMONIAE ANTIGEN, URINE --    Presumptive Negative   Presumptive negative suggests no current or recent   pneumococcal infection. Infection due to Strep pneumoniae   cannot be ruled out since the antigen present in the sample   may be below the detection limit of the test.   Normal Range:Presumptive Negative    Narrative:     ORDER#: [id number]                          ORDERED BY: Jennifer Velasquez   SOURCE: Urine Clean Catch                  COLLECTED:  09/12/22 19:00   ANTIBIOTICS AT [facility].:                      RECEIVED :  09/13/22 07:04   Legionella antigen, urine [[id number]] Collected: 09/12/22 1900   Order Status: Completed Specimen: Urine, clean catch Updated: 09/13/22 1047    L. pneumophila Serogp 1 Ur Ag --    Presumptive Negative   No Legionella pneumophila serogroup 1 antigens detected. A negative result does not exclude infection with   Legionella pneumophila serogroup 1 nor does it rule out   other microbial-caused respiratory infections or   disease caused by other serogroups of   Legionella pneumophila.    Normal Range: Presumptive Negative    Narrative:     ORDER#: [id number]                          ORDERED BY: Jennifer Velasquez   SOURCE: Urine Clean Catch                  COLLECTED:  09/12/22 19:00   ANTIBIOTICS AT [facility].:                      RECEIVED :  09/13/22 07:04   Culture, Blood, PCR ID Panel [[id number]] Collected: 09/12/22 1616   Order Status: Completed Updated: 09/13/22 0707    Report SEE IMAGE   Narrative:     Germán Omalley  [id number] tel. [phone],   Microbiology results called to and read back by Raphael Queen,   09/13/2022 07:05, by Rikki Greene   Culture, Blood 2 [[id number]] Collected: 09/12/22 0000   Order Status: Canceled Specimen: Blood    Culture, Blood 1 [[id number]] Collected: 09/12/22 0000   Order Status: Canceled Specimen: Blood      Lab Results   Component Value Date/Time    BC No Growth after 4 days of incubation. 09/14/2022 04:36 AM    BLOODCULT2 No Growth after 4 days of incubation. 09/14/2022 05:25 AM      Collected: 09/16/22 1210   Order Status: Completed Specimen:  Body Fluid from Abscess Updated: 09/19/22 0615    Gram Stain Result 4+ WBC's (Polymorphonuclear)   2+ Gram positive cocci   1+ Gram negative rods    Abnormal     Anaerobic Culture --    Anaerobic culture further report to follow   No anaerobes isolated so far, Further report to follow     Organism Serratia marcescens Abnormal     WOUND/ABSCESS Light growth    Organism Staph aureus MRSA Abnormal     WOUND/ABSCESS -- Abnormal     Moderate growth   CONTACT PRECAUTIONS INDICATED    Abnormal    Narrative:     ORDER#: [id number]                          ORDERED BY: RAMILA MARTINES   SOURCE: Abscess Rt gluteal abscess         COLLECTED:  09/16/22 12:10   ANTIBIOTICS AT [facility].:                      RECEIVED :  09/16/22 12:32   CALL  [facility]  [facility] tel. [phone],   Previous panic on this admission - call not needed per SOP, 09/17/2022 13:04,      Susceptibility    Staph aureus mrsa (3)    Antibiotic Interpretation Microscan  Method Status    ceFAZolin Resistant >16 mcg/mL BACTERIAL SUSCEPTIBILITY PANEL BY YA     clindamycin Sensitive <=0.5 mcg/mL BACTERIAL SUSCEPTIBILITY PANEL BY YA     erythromycin Resistant >4 mcg/mL BACTERIAL SUSCEPTIBILITY PANEL BY YA     oxacillin Resistant >2 mcg/mL BACTERIAL SUSCEPTIBILITY PANEL BY YA     tetracycline Sensitive <=4 mcg/mL BACTERIAL SUSCEPTIBILITY PANEL BY YA     trimethoprim-sulfamethoxazole Sensitive 1/19 mcg/mL BACTERIAL SUSCEPTIBILITY PANEL BY YA     vancomycin Sensitive 2 mcg/mL BACTERIAL SUSCEPTIBILITY PANEL BY YA       Serratia marcescens (4)    Antibiotic Interpretation Microscan  Method Status    amoxicillin-clavulanate Resistant >16/8 mcg/mL BACTERIAL SUSCEPTIBILITY PANEL BY YA     ampicillin Resistant >16 mcg/mL BACTERIAL SUSCEPTIBILITY PANEL BY YA     ampicillin-sulbactam Resistant 16/8 mcg/mL BACTERIAL SUSCEPTIBILITY PANEL BY YA     ceFAZolin Resistant >16 mcg/mL BACTERIAL SUSCEPTIBILITY PANEL BY YA     cefepime Sensitive <=2 mcg/mL BACTERIAL SUSCEPTIBILITY PANEL BY YA     cefTRIAXone Sensitive <=1 mcg/mL BACTERIAL SUSCEPTIBILITY PANEL BY YA     cefuroxime Resistant >16 mcg/mL BACTERIAL SUSCEPTIBILITY PANEL BY YA     ciprofloxacin Sensitive <=1 mcg/mL BACTERIAL SUSCEPTIBILITY PANEL BY YA     ertapenem Sensitive <=0.5 mcg/mL BACTERIAL SUSCEPTIBILITY PANEL BY YA     gentamicin Sensitive <=4 mcg/mL BACTERIAL SUSCEPTIBILITY PANEL BY YA     meropenem Sensitive <=1 mcg/mL BACTERIAL SUSCEPTIBILITY PANEL BY YA     piperacillin-tazobactam Sensitive <=16 mcg/mL BACTERIAL SUSCEPTIBILITY PANEL BY YA     trimethoprim-sulfamethoxazole Sensitive <=2/38 mcg/mL BACTERIAL SUSCEPTIBILITY PANEL BY YA      Condensed View       Respiratory Culture:  Lab Results   Component Value Date/Time    LABGRAM  09/16/2022 12:10 PM     4+ WBC's (Polymorphonuclear)  2+ Gram positive cocci  1+ Gram negative rods       AFB:No results found for: AFBSMEAR  Viral Culture:  Lab Results   Component Value Date/Time    COVID19 Not Detected 09/07/2022 12:00 AM    COVID19 Not Detected 07/20/2022 11:20 PM     Urine Culture:   No results for input(s): LABURIN in the last 72 hours.     Summary   Normal left ventricle size, wall thickness, and systolic function with an   estimated ejection fraction of 60-65%. No regional wall motion abnormalities   are seen. Normal diastolic function. Normal right ventricular size and function. Mobile vegetation on tricuspid valve suggestive of endocarditis. Moderate tricuspid regurgitation. The right atrium is mildly dilated. Signature      ------------------------------------------------------------------   Electronically signed by Blaze Stratton MD   (Interpreting physician) on 09/13/2022 at 04:15 PM   ------------------------------------------------------------------    IMAGING:    CT CHEST WO CONTRAST   Final Result   Widespread cavitary nodules, decreased on the right and left. , in keeping   with the diagnosis of septic emboli. Increased pleural effusions with increasing consolidative change at the lung   bases. IR REPLACE TUNNELED CVC W SQ PORT SAME ACCESS   Final Result   Successful conversion of a non tunneled hemodialysis catheter for a new 19   centimeter tunneled hemodialysis catheter. IR NONTUNNELED VASCULAR CATHETER > 5 YEARS   Final Result   Successful ultrasound and fluoroscopy guided non-tunneled Trialysis catheter   placement. US RENAL COMPLETE   Final Result   Unremarkable ultrasound of the kidneys and urinary bladder. No renal   atrophy, hydronephrosis or abnormal echotexture. CT ABSCESS DRAINAGE W CATH PLACEMENT S&I   Final Result   Successful CT guided placement of a 10 Nigerien drainage catheter in the right   iliacus abscess. CT GUIDED NEEDLE PLACEMENT   Final Result   Successful CT guided placement of a 10 [place] drainage catheter in the right   iliacus abscess. MRI LUMBAR SPINE W WO CONTRAST   Final Result   Partially visualized septic arthritis/osteomyelitis of the right sacroiliac   joint, with a large adjacent retroperitoneal abscess extending into the right   pelvic sidewall.       Small intramuscular abscess also present within the left psoas muscle. Epidural phlegmon/abscess of the sacral canal.      Pelvic ascites. The findings were sent to the Radiology Results [address] at 7:23   pm on 9/15/2022 to be communicated to a licensed caregiver. VL Extremity Venous Bilateral   Final Result      CT HEAD WO CONTRAST   Final Result   No acute intracranial abnormality. CT CHEST WO CONTRAST   Final Result   Diffuse in numeral bilateral pulmonary nodules many of which are cavitary   becoming coalescent at the right lung apex however diffusely throughout the   bilateral lungs consistent of septic emboli with small to moderate right   pleural effusion      Partially visualized portions of the upper abdomen reveal hepatosplenomegaly         XR CHEST PORTABLE   Final Result   Multifocal patchy airspace disease and cavitary nodules. The appearance is   suggestive of septic emboli in this clinical setting. Follow-up recommended to assure resolution.          CT BIOPSY RENAL    (Results Pending)   CT LUMBAR SPINE WO CONTRAST    (Results Pending)         All the pertinent images and reports for the current Hospitalization were reviewed by me     Scheduled Meds:   clindamycin (CLEOCIN) IV  900 mg IntraVENous Q8H    b complex-C-folic acid  1 capsule Oral Daily    sevelamer  800 mg Oral TID WC    ceftaroline fosamil (TEFLARO) IVPB  200 mg IntraVENous q8h    [Held by provider] heparin (porcine)  5,000 Units SubCUTAneous 3 times per day    lidocaine 1 % injection  5 mL IntraDERmal Once    sodium chloride flush  5-40 mL IntraVENous 2 times per day       Continuous Infusions:   sodium chloride      sodium chloride 10 mL/hr at 09/29/22 1915       PRN Meds:  labetalol, morphine, HYDROcodone 5 mg - acetaminophen, heparin (porcine), LORazepam, sodium chloride, chlorhexidine, sodium chloride flush, sodium chloride, hydrOXYzine pamoate, naloxone, calcium carbonate, promethazine, promethazine **OR** ondansetron, acetaminophen **OR** acetaminophen, perflutren lipid microspheres      Assessment:     Patient Active Problem List   Diagnosis    Tear of medial cartilage or meniscus of knee, current    Plica syndrome    Boxer's metacarpal fracture, neck, closed    Chondromalacia of patella    Degeneration of lumbar or lumbosacral intervertebral disc    Varicose veins of lower extremity    Intractable nausea and vomiting    Septicemia (HCC)    IVDU (intravenous drug user)    MRSA bacteremia    Sepsis due to methicillin resistant Staphylococcus aureus (MRSA) without acute organ dysfunction ([address]    Bacterial infection due to Serratia    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 WBC count is elevated high fever from ongoing bloodstream infection. Given IV drug abuse suspect endocarditis transthoracic echocardiogram with TV Vegetation    Trend WBC and repeat Blood cx NGTD    Will need placement to complete IV ABX        Rt gluteal and lower Lumbar area pain MRI L spine with abscess, septic joint     S/p IR aspiration of the abscess cx MRSA and serratia noted    Unfortunately kidney function continues to decline nephrology is now following. IV antibiotics have been adjusted    Will need a long course of antibiotic given the MRSA bacteremia and septic arthritis    HD line placed due to worsening Kidney function -completed hemodialysis for session without any issues. Unfortunately creatinine still remains elevated.     Check CRP and ESR down trend noted    Hemoptysis from Septic Emboli likely and CT chest with cavitary lesions from MRSA infection and will check resp cx    Platelets are going down on Linezolid - will need to change IV Clindamycin     CT L spine non contrast ordered     D/w  and her renal function negative recovering but would like to avoid IV vancomycin at this juncture final antibiotic plan is pending    D/w  primary team that she is not ready for discharge at this time        Labs, Microbiology, Radiology and all the pertinent results from current hospitalization and  care every where were reviewed  by me as a part of the evaluation   Plan:   Cont  IV Ceftaroline - 200 mg q x 8 HRS will cover MRSA and Serratia adjusted to GFR as in patient   Change to IV Daptomycin with HD sessions x 3 times a week for x  6 weeks at d/c    Repeat Blood cx    NGTD  TTE Abnormal with vegetation   Will need IV abx  Watch for complications  HIV -ve and Hepatitis screen Hep C+Ve  Cardiology consult  STEFANO completed   Vegetation is small for angiovac  would be able to treat with IV abx   MRI L spine noted very abnormal s/p IR  abscess dot  long term IV abx course   Needs placement   Await kidney function recovery to decide on adjusting the long-term antibiotics  May need repeat MRI once clinically better     Resp cx - CT chest results noted and on going changes from septic embolism  IV Clindamycin x  900 mg q 8 HRS   Check CT L spine  indicating osteomyelitis and diskitis and will ask IR to check       At dc will pan IV Daptomycin 8mg/kg with HD sessiosns x 3 mari a week for  6 weeks  Oral Clindamycin x  300 mg Q 8 hr x 6 weeks  Oral Levofloxacin x  250 mg daily x 6 weeks  She will need follow  up with me in x 4 weeks        Discussed with patient/Family and Nursing   Risk of Complications/Morbidity: High      Illness(es)/ Infection present that pose threat to bodily function.    There is potential for severe exacerbation of infection/side effects of treatment. Therapy requires intensive monitoring for antimicrobial agent toxicity. Discussed with patient/Family and Nursing staff     Thanks for allowing me to participate in your patient's care and please call me with any questions or concerns.     Jorje Carreon MD  Infectious Disease  [facility]) Physician  Phone: [phone]   Fax : [phone]

## 2022-09-30 NOTE — PROGRESS NOTES
Progress Note    Admit Date: 9/12/2022         Subjective and Overnight Events:  Seen at bedside  Has no complaints today  No CP, SOB  Complains of back pain and requesting to increase her morphine dose but do not have any associated radiation of pain or urinary incontinance  Lying in bed comfortably, her nose bleed has stopped  She do not have any complains today    Objective:   Vitals: BP (!) 160/106   Pulse 83   Temp 98.7 °F (37.1 °C) (Oral)   Resp 16   Ht 5' 8\" (1.727 m)   Wt 153 lb 3.5 oz (69.5 kg)   SpO2 95%   BMI 23.30 kg/m²   BP (!) 160/106   Pulse 83   Temp 98.7 °F (37.1 °C) (Oral)   Resp 16   Ht 5' 8\" (1.727 m)   Wt 153 lb 3.5 oz (69.5 kg)   SpO2 95%   BMI 23.30 kg/m²     General appearance: on RA, lying in bed, holding conversations, alert awake  HEENT:  Conjunctivae/corneas clear. Neck: Supple, with full range of motion. Respiratory:  Normal respiratory effort. Clear to auscultation, bilaterally without Rales/Wheezes/Rhonchi. Cardiovascular: Regular rate and rhythm with normal S1/S2 without murmurs or rubs  Abdomen: Soft, non-tender, non-distended, normal bowel sounds. Musculoskeletal: No cyanosis or edema bilaterally  Neurologic:  without any focal sensory/motor deficits.  grossly non-focal.  Psychiatric: Alert and oriented, Normal mood  Peripheral Pulses: +2 palpable, equal bilaterally   No change in physical exam today    Data:     Scheduled Medications:    clindamycin (CLEOCIN) IV  900 mg IntraVENous Q8H    b complex-C-folic acid  1 capsule Oral Daily    sevelamer  800 mg Oral TID     ceftaroline fosamil (TEFLARO) IVPB  200 mg IntraVENous q8h    [Held by provider] heparin (porcine)  5,000 Units SubCUTAneous 3 times per day    lidocaine 1 % injection  5 mL IntraDERmal Once    sodium chloride flush  5-40 mL IntraVENous 2 times per day      PRN Medications: labetalol, morphine, HYDROcodone 5 mg - acetaminophen, heparin (porcine), LORazepam, sodium chloride, chlorhexidine, sodium chloride flush, sodium chloride, hydrOXYzine pamoate, naloxone, calcium carbonate, promethazine, promethazine **OR** ondansetron, acetaminophen **OR** acetaminophen, perflutren lipid microspheres  Diet: ADULT DIET;  Regular  ADULT ORAL NUTRITION SUPPLEMENT; Breakfast; Standard High Calorie/High Protein Oral Supplement  ADULT ORAL NUTRITION SUPPLEMENT; Dinner; Diabetic Oral Supplement    Continuous Infusions:   sodium chloride      sodium chloride 10 mL/hr at 09/29/22 1915         Intake/Output Summary (Last 24 hours) at 9/30/2022 1855  Last data filed at 9/30/2022 1145  Gross per 24 hour   Intake 499.16 ml   Output 560 ml   Net -60.84 ml         CBC:   Recent Labs     09/29/22  0400 09/30/22  0550   WBC 7.8 6.4   HGB 7.1* 8.1*    147       BMP:  Recent Labs     09/29/22  0400 09/30/22  0550    138   K 4.3 3.9    102   CO2 27 27   BUN 21* 13   CREATININE 2.7* 2.3*   GLUCOSE 85 88       ABGs: No results found for: PHART, PO2ART, HRZ6RBI    Assessment/plan     Patient Active Problem List:     Tear of medial cartilage or meniscus of knee, current     Plica syndrome     Boxer's metacarpal fracture, neck, closed     Chondromalacia of patella     Degeneration of lumbar or lumbosacral intervertebral disc     Varicose veins of lower extremity     Intractable nausea and vomiting     Septicemia (HCC)     IVDU (intravenous drug user)     MRSA bacteremia     Sepsis due to methicillin resistant Staphylococcus aureus (MRSA) without acute organ dysfunction (HCC)     Bacterial infection due to Serratia     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 According to the patient's sister patient has been feeling sick for past few days, not feeling well. Patient was noticed to have fevers as well as chills.      Assessment  Sepsis POA - endocarditis +/- cavitary PNA - MRSA/Serratia bacteremia   Pulmonary septic emboli, cavitary nodules  Endocarditis  History of IV drug abuse  ADHD  Anemia    Plan  Continue atbx as per ID - d/w ID, plan to continue Abx inpt  Endocarditis  + cardiology -  no angiovac recommended, recs to continue IV abx   Nephrology concerned about Vancomycin toxicity, plan for IR catheter for Dialysis   Consulted hematology, r/u hemolysis, ordered haptoglobin - elevated  Added ensure with meals as she has low po intake   Pain control  Continue IV Abx  Continue HD per nephrology  Pain control  Will need longterm Abx  Patient has Drain by IR, IR service to determine timing of drain removal    Heparin for DVT Ppx - held for Renal Bx tomorrow    Full Code    Continue IV abx per ID, plan for renal Biopsy tomorrow, it was cancelled today, plan for HD tomorrow as well, dc 1-2 days after Bx  Patient has KRYSTLE drain    Stable for Discharge from medical standpoint, awaiting ID recs for abx at discharge  At dc will pan IV Daptomycin 8mg/kg with HD sessiosns x 3 mari a week for  6 weeks  Oral Clindamycin x  300 mg Q 8 hr x 6 weeks  Oral Levofloxacin x  250 mg daily x 6 weeks  She will need follow  up with me ( Dr. Krystle Castillo)  in x 4 weeks     DC likely Tomorrow  HD as outpatient  CM following on the case    Merry Higgins MD

## 2022-09-30 NOTE — PROGRESS NOTES
Narcotic Waste Documentation    Administered 0.5 mg of Morphine and wasted 1.5 mg per [facility].       Electronically signed by Estuardo Hardin RN on 9/30/2022 at 3:38 PM      Electronically signed by Petra Homans, RN on 9/30/2022 at 3:38 PM

## 2022-10-01 VITALS
WEIGHT: 148.81 LBS | BODY MASS INDEX: 22.55 KG/M2 | RESPIRATION RATE: 16 BRPM | SYSTOLIC BLOOD PRESSURE: 158 MMHG | OXYGEN SATURATION: 95 % | TEMPERATURE: 97 F | HEART RATE: 83 BPM | HEIGHT: 68 IN | DIASTOLIC BLOOD PRESSURE: 102 MMHG

## 2022-10-01 LAB
ALBUMIN SERPL-MCNC: 2.2 G/DL (ref 3.4–5)
ANION GAP SERPL CALCULATED.3IONS-SCNC: 10 MMOL/L (ref 3–16)
BUN BLDV-MCNC: 13 MG/DL (ref 7–20)
CALCIUM SERPL-MCNC: 8.2 MG/DL (ref 8.3–10.6)
CHLORIDE BLD-SCNC: 103 MMOL/L (ref 99–110)
CO2: 28 MMOL/L (ref 21–32)
CREAT SERPL-MCNC: 2.6 MG/DL (ref 0.6–1.1)
CULTURE, RESPIRATORY: ABNORMAL
CULTURE, RESPIRATORY: ABNORMAL
GFR AFRICAN AMERICAN: 24
GFR NON-AFRICAN AMERICAN: 20
GLUCOSE BLD-MCNC: 93 MG/DL (ref 70–99)
GRAM STAIN RESULT: ABNORMAL
HCT VFR BLD CALC: 22.2 % (ref 36–48)
HEMOGLOBIN: 7.3 G/DL (ref 12–16)
MCH RBC QN AUTO: 27.9 PG (ref 26–34)
MCHC RBC AUTO-ENTMCNC: 32.6 G/DL (ref 31–36)
MCV RBC AUTO: 85.5 FL (ref 80–100)
ORGANISM: ABNORMAL
PDW BLD-RTO: 15.7 % (ref 12.4–15.4)
PHOSPHORUS: 5.3 MG/DL (ref 2.5–4.9)
PLATELET # BLD: 181 K/UL (ref 135–450)
PMV BLD AUTO: 7.6 FL (ref 5–10.5)
POTASSIUM SERPL-SCNC: 3.8 MMOL/L (ref 3.5–5.1)
RBC # BLD: 2.6 M/UL (ref 4–5.2)
SODIUM BLD-SCNC: 141 MMOL/L (ref 136–145)
WBC # BLD: 8.3 K/UL (ref 4–11)

## 2022-10-01 PROCEDURE — 2580000003 HC RX 258: Performed by: INTERNAL MEDICINE

## 2022-10-01 PROCEDURE — 97535 SELF CARE MNGMENT TRAINING: CPT

## 2022-10-01 PROCEDURE — 6360000002 HC RX W HCPCS: Performed by: INTERNAL MEDICINE

## 2022-10-01 PROCEDURE — 2500000003 HC RX 250 WO HCPCS: Performed by: INTERNAL MEDICINE

## 2022-10-01 PROCEDURE — 80069 RENAL FUNCTION PANEL: CPT

## 2022-10-01 PROCEDURE — 97530 THERAPEUTIC ACTIVITIES: CPT

## 2022-10-01 PROCEDURE — 6370000000 HC RX 637 (ALT 250 FOR IP): Performed by: INTERNAL MEDICINE

## 2022-10-01 PROCEDURE — 90935 HEMODIALYSIS ONE EVALUATION: CPT

## 2022-10-01 PROCEDURE — 85027 COMPLETE CBC AUTOMATED: CPT

## 2022-10-01 RX ORDER — CLINDAMYCIN HYDROCHLORIDE 300 MG/1
300 CAPSULE ORAL 3 TIMES DAILY
Qty: 126 CAPSULE | Refills: 0 | Status: ON HOLD
Start: 2022-10-01 | End: 2022-10-10 | Stop reason: HOSPADM

## 2022-10-01 RX ORDER — LEVOFLOXACIN 250 MG/1
250 TABLET ORAL DAILY
Qty: 42 TABLET | Refills: 0 | Status: ON HOLD
Start: 2022-10-01 | End: 2022-10-10 | Stop reason: HOSPADM

## 2022-10-01 RX ADMIN — CLINDAMYCIN IN 5 PERCENT DEXTROSE 900 MG: 18 INJECTION, SOLUTION INTRAVENOUS at 11:13

## 2022-10-01 RX ADMIN — CEFTAROLINE FOSAMIL 200 MG: 600 POWDER, FOR SOLUTION INTRAVENOUS at 06:03

## 2022-10-01 RX ADMIN — CLINDAMYCIN IN 5 PERCENT DEXTROSE 900 MG: 18 INJECTION, SOLUTION INTRAVENOUS at 04:38

## 2022-10-01 RX ADMIN — SEVELAMER CARBONATE 800 MG: 800 TABLET, FILM COATED ORAL at 16:28

## 2022-10-01 RX ADMIN — HYDROCODONE BITARTRATE AND ACETAMINOPHEN 1 TABLET: 5; 325 TABLET ORAL at 04:39

## 2022-10-01 RX ADMIN — NEPHROCAP 1 MG: 1 CAP ORAL at 08:24

## 2022-10-01 RX ADMIN — HYDROCODONE BITARTRATE AND ACETAMINOPHEN 1 TABLET: 5; 325 TABLET ORAL at 11:14

## 2022-10-01 RX ADMIN — DAPTOMYCIN 550 MG: 500 INJECTION, POWDER, LYOPHILIZED, FOR SOLUTION INTRAVENOUS at 16:27

## 2022-10-01 RX ADMIN — SEVELAMER CARBONATE 800 MG: 800 TABLET, FILM COATED ORAL at 08:24

## 2022-10-01 RX ADMIN — HYDROCODONE BITARTRATE AND ACETAMINOPHEN 1 TABLET: 5; 325 TABLET ORAL at 16:29

## 2022-10-01 ASSESSMENT — PAIN DESCRIPTION - DESCRIPTORS
DESCRIPTORS: ACHING
DESCRIPTORS: ACHING;DISCOMFORT
DESCRIPTORS: ACHING

## 2022-10-01 ASSESSMENT — PAIN DESCRIPTION - ORIENTATION
ORIENTATION: MID;LOWER

## 2022-10-01 ASSESSMENT — PAIN DESCRIPTION - LOCATION
LOCATION: BACK

## 2022-10-01 ASSESSMENT — PAIN SCALES - GENERAL
PAINLEVEL_OUTOF10: 6
PAINLEVEL_OUTOF10: 7
PAINLEVEL_OUTOF10: 8

## 2022-10-01 NOTE — DISCHARGE SUMMARY
Hospital Medicine Discharge Summary    Patient: Harshil Torre     Gender: female  : 1980   Age: 40 y.o. MRN: [id number]    Admitting Physician: Amira Smiley MD  Discharge Physician: Ivonne Maldonado MD     Code Status: Full Code     Admit Date: 2022   Discharge Date:   10/1/22    Disposition:  Home with daugheter to help. Discussed risks of IVDU with chelsea iv access. Discharge Diagnoses:    Sepsis POA - endocarditis +/- cavitary PNA - MRSA/Serratia bacteremia   Pulmonary septic emboli, cavitary nodules  Endocarditis  History of IV drug abuse  ADHD  Anemia    Follow-up appointments:  with ID as arranged    Outpatient to do list: none    Condition at Discharge:  Stable    Hospital Course:     4year-old female with past medical history of drug abuse, ADHD, who presents to the hospital due to feeling fatigue, pain in her legs as well as back, she has a history of IV drug abuse, per patient she has been sober for now, patient family is also on the bedside who also contributed to the patient history. According to the patient's sister patient has been feeling sick for past few days, not feeling well. Patient was noticed to have fevers as well as chills. She was diagnosed with infective endocarditis and blood cultures grew MRSA and Serratia and she had anemia, renal impairment and cavitatory lung nodules consistent with septic emboli due to IVDU. ID, cardiology, nephrology and hemonc were consulted. She was Dcd on the below antibiotic regimen and will FU as an OP with ID.    Discharge Medications:   Current Discharge Medication List        START taking these medications    Details   clindamycin (CLEOCIN) 300 MG capsule Take 1 capsule by mouth 3 times daily  Qty: 126 capsule, Refills: 0      levoFLOXacin (LEVAQUIN) 250 MG tablet Take 1 tablet by mouth daily  Qty: 42 tablet, Refills: 0      calcium carbonate (TUMS) 500 MG chewable tablet Take 1 tablet by mouth 3 times daily as needed for Heartburn  Qty: 30 tablet, Refills: 0      HYDROcodone-acetaminophen (NORCO) 5-325 MG per tablet Take 1 tablet by mouth every 6 hours as needed for Pain for up to 3 days. Qty: 9 tablet, Refills: 0    Comments: Reduce doses taken as pain becomes manageable  Associated Diagnoses: Gluteal abscess; Septic embolism [facility])           Current Discharge Medication List        Current Discharge Medication List        Current Discharge Medication List        STOP taking these medications       ibuprofen (ADVIL;MOTRIN) 800 MG tablet Comments:   Reason for Stopping:         cefdinir (OMNICEF) 300 MG capsule Comments:   Reason for Stopping:               Discharge ROS:  A complete review of systems was asked and negative except for above     Discharge Exam:    BP (!) 141/94   Pulse 98   Temp 98.4 °F (36.9 °C) (Oral)   Resp 17   Ht 5' 8\" (1.727 m)   Wt 152 lb 1.9 oz (69 kg)   SpO2 95%   BMI 23.13 kg/m²   General appearance: on RA, lying in bed, holding conversations, alert awake  HEENT:  Conjunctivae/corneas clear. Neck: Supple, with full range of motion. Respiratory:  Normal respiratory effort. Clear to auscultation, bilaterally without Rales/Wheezes/Rhonchi. Cardiovascular: Regular rate and rhythm with normal S1/S2 without murmurs or rubs  Abdomen: Soft, non-tender, non-distended, normal bowel sounds. Musculoskeletal: No cyanosis or edema bilaterally  Neurologic:  without any focal sensory/motor deficits. grossly non-focal.  Psychiatric: Alert and oriented, Normal mood  Peripheral Pulses: +2 palpable, equal bilaterally   No change in physical exam today    Labs:  For convenience and continuity at follow-up the following most recent labs are provided:    Lab Results   Component Value Date/Time    WBC 8.3 10/01/2022 06:00 AM    HGB 7.3 10/01/2022 06:00 AM    HCT 22.2 10/01/2022 06:00 AM    MCV 85.5 10/01/2022 06:00 AM     10/01/2022 06:00 AM     10/01/2022 06:00 AM    K 3.8 10/01/2022 06:00 AM K 4.3 09/19/2022 05:51 AM     10/01/2022 06:00 AM    CO2 28 10/01/2022 06:00 AM    BUN 13 10/01/2022 06:00 AM    CREATININE 2.6 10/01/2022 06:00 AM    CALCIUM 8.2 10/01/2022 06:00 AM    PHOS 5.3 10/01/2022 06:00 AM    ALKPHOS 85 09/27/2022 04:40 AM    ALT 6 09/27/2022 04:40 AM    AST 9 09/27/2022 04:40 AM    BILITOT <0.2 09/27/2022 04:40 AM    BILIDIR <0.2 09/27/2022 04:40 AM    LABALBU 2.2 10/01/2022 06:00 AM     Lab Results   Component Value Date    INR 1.24 (H) 09/28/2022    INR 1.17 (H) 09/26/2022    INR 1.34 (H) 09/19/2022       Radiology:  Echocardiogram transesophageal    Result Date: 9/16/2022  Transesophageal Echocardiography Report (STEFANO)  Demographics   Patient Name       Luke Gillette   Date of Study      09/16/2022         Gender              Female   Patient Number     [id number]         Date of Birth       1980   Visit Number       [id number]          Age                 40 year(s)   Accession Number   [id number]         Room Number         8555   Corporate ID       [id number]            Prieto Crain [facility]   Ordering Physician Lisa Awad.,   Darnell James.                     MD                 Physician           Lisa Awad.,                                                            MD  Procedure Type of Study   STEFANO procedure:ECHOCARDIOGRAM TRANSESOPHAGEAL. Procedure Date Date: 09/16/2022 Start: 07:19 AM Study Location: [facility] Technical Quality: Adequate visualization Indications:Endocarditis. Additional Indications:No cardiac history on file. Current smoker, IVDU. Patient Status: Routine Height: 68 inches Weight: 142 pounds BSA: 1.77 m2 BMI: 21.59 kg/m2 Rhythm: Within normal limits HR: 97 bpm BP: 123/74 mmHg  Type of Anesthesia: Moderate sedation   Conclusions   Summary  Severe eccentric tricuspid regurgitation.   Small, mobile tricuspid valve vegetation noted measuring 0.9cm x 0.4cm. Signature   ------------------------------------------------------------------  Electronically signed by Avel Turner MD (Interpreting  physician) on 09/16/2022 at 04:29 PM  ------------------------------------------------------------------   Findings   Left Ventricle  Overall left ventricular systolic function appears normal.  Ejection fraction is visually estimated to be 55-60%. Mitral Valve  Trivial mitral regurgitation. Tricuspid Valve  Severe eccentric tricuspid regurgitation. Small, mobile tricuspid valve vegetation noted measuring 0.9cm x 0.4cm. Pericardial Effusion  No pericardial effusion noted. Pleural Effusion  No pleural effusion. Echo Complete    Result Date: 9/13/2022  Transthoracic Echocardiography Report (TTE)  Demographics   Patient Name        Desi [facility]   Date of Study       09/13/2022     Gender               Female   Patient Number      [id number]     Date of Birth        1980   Visit Number        [id number]      Age                  40 year(s)   Accession Number    [id number]     Room Number          7174   Corporate ID        [id number]        Sonographer          Jet Chavez  Physician  Verna Nixon  Interpreting         [address] [place] [address].                                     Physician            Jake Mendoza MD  Procedure Type of Study   TTE procedure:ECHOCARDIOGRAM COMPLETE 2D W DOPPLER W COLOR. Procedure Date Date: 09/13/2022 Start: 12:34 PM Study Location: [facility] Technical Quality: Limited visualization due to poor acoustical window. Indications:Endocarditis. Additional Indications:No Cardiac History .  Patient Status: Routine Height: 68 inches Weight: 142 pounds BSA: 1.77 m2 BMI: 21.59 kg/m2 Rhythm: Within normal limits HR: 97 bpm BP: 124/68 mmHg  Conclusions Summary  Normal left ventricle size, wall thickness, and systolic function with an  estimated ejection fraction of 60-65%. No regional wall motion abnormalities  are seen. Normal diastolic function. Normal right ventricular size and function. Mobile vegetation on tricuspid valve suggestive of endocarditis. Moderate tricuspid regurgitation. The right atrium is mildly dilated. Signature   ------------------------------------------------------------------  Electronically signed by Rajiv Raman MD  (Interpreting physician) on 09/13/2022 at 04:15 PM  ------------------------------------------------------------------   Findings   Left Ventricle  Normal left ventricle size, wall thickness, and systolic function with an  estimated ejection fraction of 60-65%. No regional wall motion abnormalities  are seen. Normal diastolic function. Mitral Valve  The mitral valve is normal in structure and function. Trivial mitral regurgitation. No evidence of mitral stenosis. Left Atrium  Left atrium is of normal size. Aortic Valve  The aortic valve leaflets are not well visualized. No evidence of aortic valve regurgitation. No evidence of aortic valve stenosis. Aorta  The aortic root is normal in size. The ascending aorta is normal in size. Right Ventricle  Normal right ventricular size and function. TAPSE= 1.9cm   Tricuspid Valve  Mobile vegetation on tricuspid valve suggestive of endocarditis. Moderate tricuspid regurgitation. No evidence of tricuspid stenosis. Right Atrium  The right atrium is mildly dilated. Pulmonic Valve  The pulmonic valve is not well visualized. Trivial pulmonic regurgitation present. No evidence of pulmonic valve stenosis. Pericardial Effusion  No pericardial effusion noted. Pleural Effusion  No pleural effusion. Miscellaneous  IVC size is normal (<2.1cm) and collapses > 50% with respiration consistent  with normal RA pressure (3mmHg).   Unable to estimate pulmonary artery pressure secondary to incomplete TR jet  envelope. M-Mode/2D Measurements (cm)   LV Diastolic Dimension: 5.73 cm LV Systolic Dimension: 3.36 cm  LV Septum Diastolic: 2.66 cm  LV PW Diastolic: 6.46 cm        AO Root Dimension: 9.80 cm  RV Diastolic Dimension: 3.96 cm                                  LA Area: 18.8 cm2  LVOT: 1.87 cm                   LA volume/Index: 52.9 ml /30 ml/m2  Doppler Measurements   AV Peak Velocity: 163 cm/s     MV Peak E-Wave: 87.4 cm/s  AV Peak Gradient: 10.63 mmHg   MV Peak A-Wave: 78 cm/s  AV Mean Gradient: 6 mmHg       MV E/A Ratio: 1.12  LVOT Peak Velocity: 152 cm/s   MV P1/2t: 43 msec  AV Area (Continuity):2.53 cm2  MV Mean Gradient: 2 mmHg                                 MV Max P mmHg  TR Velocity:196 cm/s           MV Vmax:107 cm/s  TR Gradient:15.37 mmHg         MV VTI:21.6 cm/s   E' Septal Velocity: 15.1 cm/s  MV Area (continuity): 3.37 cm2  E' Lateral Velocity: 22.2 cm/s MV Deceleration Time: 148 msec  PV Peak Velocity: 119 cm/s     MV Area (PHT): 5.12 cm2  PV Peak Gradient: 5.66 mmHg   Aortic Valve   Peak Velocity: 163 cm/s     Mean Velocity: 118 cm/s  Peak Gradient: 10.63 mmHg   Mean Gradient: 6 mmHg  Area (continuity): 2.53 cm2  AV VTI: 28.7 cm  Aorta   Aortic Root: 2.61 cm  Ascending Aorta: 2.59 cm  LVOT Diameter: 1.87 cm      CT HEAD WO CONTRAST    Result Date: 2022  EXAMINATION: CT OF THE HEAD WITHOUT CONTRAST  2022 12:07 pm TECHNIQUE: CT of the head was performed without the administration of intravenous contrast. Automated exposure control, iterative reconstruction, and/or weight based adjustment of the mA/kV was utilized to reduce the radiation dose to as low as reasonably achievable. COMPARISON: None. HISTORY: ORDERING SYSTEM PROVIDED HISTORY: AMS TECHNOLOGIST PROVIDED HISTORY: Has a \"code stroke\" or \"stroke alert\" been called? ->No Reason for exam:->AMS Is the patient pregnant?->No Reason for Exam: ams FINDINGS: BRAIN/VENTRICLES: There is no acute intracranial hemorrhage, mass effect or midline shift. No abnormal extra-axial fluid collection. The gray-white differentiation is maintained without evidence of an acute infarct. There is no evidence of hydrocephalus. ORBITS: The visualized portion of the orbits demonstrate no acute abnormality. SINUSES: The visualized paranasal sinuses and mastoid air cells demonstrate no acute abnormality. SOFT TISSUES/SKULL:  No acute abnormality of the visualized skull or soft tissues. No acute intracranial abnormality. CT CHEST WO CONTRAST    Result Date: 9/27/2022  EXAMINATION: CT OF THE CHEST WITHOUT CONTRAST 9/27/2022 3:36 pm TECHNIQUE: CT of the chest was performed without the administration of intravenous contrast. Multiplanar reformatted images are provided for review. Automated exposure control, iterative reconstruction, and/or weight based adjustment of the mA/kV was utilized to reduce the radiation dose to as low as reasonably achievable. COMPARISON: 09/12/2022 HISTORY: ORDERING SYSTEM PROVIDED HISTORY: hemoptysis TECHNOLOGIST PROVIDED HISTORY: Reason for exam:->hemoptysis Is the patient pregnant?->No Reason for Exam: hemoptysis FINDINGS: Mediastinum: Thyroid gland unremarkable. Tip of PICC and central line projects in region of SVC. Small pericardial effusion is seen. Small hiatal hernia seen. There is nonspecific thickening at the GE junction. Small mediastinal nodes are noted, likely reactive Lungs/pleura: There are bilateral pleural effusions seen, left greater than right, increased compared to prior. There is increased lung consolidation, left greater than right. Cavitary and non cavitary nodules in the left lung are again identified, overall decreased compared to prior. .  For example, an index cavitary nodule in the left upper lobe measures 1.3 cm x 1.3 cm, previously measuring 2.0 cm x 2.1 cm.  On the right, a bilobed cavitary nodule which previously measured 6.9 cm by 4.2 cm, now measures 5.6 cm x 2.7 cm and now appears as 2 separate nodules. Upper Abdomen: Right adrenal gland is normal.  Left adrenal gland is normal Soft Tissues/Bones: Bones appear unchanged. There is body wall anasarca     Widespread cavitary nodules, decreased on the right and left. , in keeping with the diagnosis of septic emboli. Increased pleural effusions with increasing consolidative change at the lung bases. CT CHEST WO CONTRAST    Result Date: 9/12/2022  EXAMINATION: CT OF THE CHEST WITHOUT CONTRAST 9/12/2022 7:09 pm TECHNIQUE: CT of the chest was performed without the administration of intravenous contrast. Multiplanar reformatted images are provided for review. Automated exposure control, iterative reconstruction, and/or weight based adjustment of the mA/kV was utilized to reduce the radiation dose to as low as reasonably achievable. COMPARISON: Chest x-ray earlier same day HISTORY: ORDERING SYSTEM PROVIDED HISTORY: PNA? TECHNOLOGIST PROVIDED HISTORY: Reason for exam:->PNA? Is the patient pregnant?->No Reason for Exam: pna FINDINGS: Mediastinum: Thyroid is homogeneous in attenuation. No bulky adenopathy however a few mildly enlarged mediastinal and hilar lymph nodes of reactive adenopathy. Central airways are patent. Esophagus is normal course and caliber. Cardiac size within normal limits without pericardial effusion. Lungs/pleura: Multifocal bilateral innumerable pulmonary nodules majority of which are cavitary consistent with septic emboli including coalescent area in the right lung apex measuring up to 6.9 x 4.2 cm. Small to moderate right pleural effusion with adjacent atelectasis. Upper Abdomen: Visualized portions of the upper abdomen partially imaged reveals hepatosplenomegaly. Soft Tissues/Bones: No acute osseous or soft tissue findings. No aggressive osseous lesion.      Diffuse in numeral bilateral pulmonary nodules many of which are cavitary becoming coalescent at the right lung apex however diffusely throughout the bilateral lungs consistent of septic emboli with small to moderate right pleural effusion Partially visualized portions of the upper abdomen reveal hepatosplenomegaly     CT LUMBAR SPINE WO CONTRAST    Result Date: 9/30/2022  EXAMINATION: CT OF THE LUMBAR SPINE WITHOUT CONTRAST  9/30/2022 TECHNIQUE: CT of the lumbar spine was performed without the administration of intravenous contrast. Multiplanar reformatted images are provided for review. Adjustment of mA and/or kV according to patient size was utilized. Automated exposure control, iterative reconstruction, and/or weight based adjustment of the mA/kV was utilized to reduce the radiation dose to as low as reasonably achievable. COMPARISON: None HISTORY: ORDERING SYSTEM PROVIDED HISTORY: Lumbar abscesss s/p Drainage check for residual abscess TECHNOLOGIST PROVIDED HISTORY: Reason for exam:->Lumbar abscesss s/p Drainage check for residual abscess Is the patient pregnant?->No Reason for Exam: Lumbar abscesss s/p Drainage check for residual abscess FINDINGS: BONES/ALIGNMENT: There is normal alignment of the spine. The vertebral body heights are maintained. Erosive destructive process noted involving the right sacroiliac joint, with sclerosis and heterogenous bone density. This extends to involve the right iliac wing. No pathologic fractures noted. DEGENERATIVE CHANGES: No significant degenerative changes of the lumbar spine. SOFT TISSUES/RETROPERITONEUM: Patient had a large right iliac fossa fluid collection on previous examination. A drainage catheter is noted in this region. The right iliac fossa is incompletely covered on this examination and there is limited resolution to comment upon the residual fluid collection. A 2nd collection is noted along the right sacral canal. Soft tissue fullness noted in this region. There is limited resolution. Bilateral moderate pleural effusions incompletely imaged.      1. Acute osteomyelitis-septic arthritis noted involving the right sacroiliac joint with osteomyelitis process extending to the right iliac wing. 2. Patient had a large right iliac fossa fluid collection on previous examination. A drainage catheter is noted in this region. The right iliac fossa is incompletely covered on this examination and there is limited resolution to comment upon the residual fluid collection. A 2nd collection is noted along the right sacral canal.  Soft tissue fullness noted in this region. There is limited resolution. Patient will benefit from a contrast enhanced CT or MR of the pelvis targeting the right iliac fossa and the pelvis. 3. Moderate bilateral pleural effusions. 4. Normal appearing bony lumbar spine. RECOMMENDATIONS: Recommend obtaining contrast enhanced CT/MR of the pelvis for complete coverage of the right iliac fossa and pelvis. CT ABSCESS DRAINAGE W CATH PLACEMENT S&I    Result Date: 9/16/2022  PROCEDURE: CT GUIDEDRIGHT ILIACUSABSCESS DRAINAGE CATHETER PLACEMENT MODERATE CONSCIOUS SEDATION 9/16/2022 HISTORY: ORDERING SYSTEM PROVIDED HISTORY: Rt gluteal abscess,IVDA, MRSA bacteremia TECHNOLOGIST PROVIDED HISTORY: See MRI L spine report Reason for exam:->Rt gluteal abscess,IVDA, MRSA bacteremia Is the patient pregnant?->No Reason for Exam: CT ABSCESS DRAINAGE W CATH PLACEMENT S&I; ORDERING SYSTEM PROVIDED HISTORY: drain TECHNOLOGIST PROVIDED HISTORY: See MRI L spine report Reason for exam:->drain Is the patient pregnant?->No Reason for Exam: CT ABSCESS DRAINAGE W CATH PLACEMENT S&I SEDATION: 1 mgversed and 50 mcg fentanyl were titrated intravenously for moderate sedation monitored under my direction. Total intraservice time of sedation was 14 minutes. The patient's vital signs were monitored throughout the procedure and recorded in the patient's medical record by the nurse. TECHNIQUE: Informed consent was obtained after a detailed explanation of the procedure including risks, benefits, and alternatives.   Universal protocol was followed. Sterile gowns, masks, hats, and gloves utilized for maximal sterile barrier. A suitable skin site was prepped and draped in sterile fashion following CT localization. An 18 gauge needle was advanced under CT guidance into the right iliacus abscess and a 0.035 guidewire was used to place a 10 Filipino abscess drainage catheter after the fashion tract was dilated. The catheter was sutured to the skin and the patient tolerated the procedure well. The catheter was attached to KRYSTLE suction drainage. Estimated blood loss: Less than 5 cc Dose modulation, iterative reconstruction, and/or weight based adjustment of the mA/kV was utilized to reduce the radiation dose to as low as reasonably achievable. DLP: 3791.08 mGy-cm FINDINGS: A total of 100 mL of purulent fluid was removed and sent for diagnostic tests. Successful CT guided placement of a 10 Filipino drainage catheter in the right iliacus abscess. CT GUIDED NEEDLE PLACEMENT    Result Date: 9/16/2022  PROCEDURE: CT GUIDEDRIGHT ILIACUSABSCESS DRAINAGE CATHETER PLACEMENT MODERATE CONSCIOUS SEDATION 9/16/2022 HISTORY: ORDERING SYSTEM PROVIDED HISTORY: Rt gluteal abscess,IVDA, MRSA bacteremia TECHNOLOGIST PROVIDED HISTORY: See MRI L spine report Reason for exam:->Rt gluteal abscess,IVDA, MRSA bacteremia Is the patient pregnant?->No Reason for Exam: CT ABSCESS DRAINAGE W CATH PLACEMENT S&I; ORDERING SYSTEM PROVIDED HISTORY: drain TECHNOLOGIST PROVIDED HISTORY: See MRI L spine report Reason for exam:->drain Is the patient pregnant?->No Reason for Exam: CT ABSCESS DRAINAGE W CATH PLACEMENT S&I SEDATION: 1 mgversed and 50 mcg fentanyl were titrated intravenously for moderate sedation monitored under my direction. Total intraservice time of sedation was 14 minutes. The patient's vital signs were monitored throughout the procedure and recorded in the patient's medical record by the nurse.  TECHNIQUE: Informed consent was obtained after a detailed explanation of the procedure including risks, benefits, and alternatives. Universal protocol was followed. Sterile gowns, masks, hats, and gloves utilized for maximal sterile barrier. A suitable skin site was prepped and draped in sterile fashion following CT localization. An 18 gauge needle was advanced under CT guidance into the right iliacus abscess and a 0.035 guidewire was used to place a 10 Eritrean abscess drainage catheter after the fashion tract was dilated. The catheter was sutured to the skin and the patient tolerated the procedure well. The catheter was attached to KRYSTLE suction drainage. Estimated blood loss: Less than 5 cc Dose modulation, iterative reconstruction, and/or weight based adjustment of the mA/kV was utilized to reduce the radiation dose to as low as reasonably achievable. DLP: 8265.40 mGy-cm FINDINGS: A total of 100 mL of purulent fluid was removed and sent for diagnostic tests. Successful CT guided placement of a 10 Eritrean drainage catheter in the right iliacus abscess. MRI LUMBAR SPINE W WO CONTRAST    Result Date: 9/15/2022  EXAMINATION: MRI OF THE LUMBAR SPINE WITHOUT AND WITH CONTRAST  9/15/2022 3:44 pm TECHNIQUE: Multiplanar multisequence MRI of the lumbar spine was performed without and with the administration of intravenous contrast. COMPARISON: None. HISTORY: ORDERING SYSTEM PROVIDED HISTORY: Sepsis, IVDA, Mrsa BACTEREMIA and Back pain check for Diskitis TECHNOLOGIST PROVIDED HISTORY: Please include sacral area due to pain in the lower spine Reason for exam:->Sepsis, IVDA, Mrsa BACTEREMIA and Back pain check for Diskitis Reason for Exam: Sepsis, IVDA, Mrsa BACTEREMIA and Back pain check for Diskitis FINDINGS: There is a normal lumbar lordosis. No acute fracture or traumatic subluxation is identified. There is no MRI evidence of discitis. There is partially visualized fluid within the right sacroiliac joint with adjacent marrow edema.   There is also a rim enhancing fluid collection deep to the right iliacus muscle, measuring at least 7.8 x 3.1 cm in diameter, consistent with abscess. This extends along the right pelvic sidewall. Enhancing soft tissue is also seen extending into the epidural space of the sacral canal with a small collection measuring up to 7.8 mm in diameter, consistent with phlegmon/abscess. A small fluid collection is present within the left psoas muscle measuring 5 mm in transverse diameter on image number 21 of series 17, consistent with an abscess. Presacral edema is noted. There is pelvic ascites. No significant canal or neural foraminal narrowing of the L-spine is identified. Partially visualized septic arthritis/osteomyelitis of the right sacroiliac joint, with a large adjacent retroperitoneal abscess extending into the right pelvic sidewall. Small intramuscular abscess also present within the left psoas muscle. Epidural phlegmon/abscess of the sacral canal. Pelvic ascites. The findings were sent to the Radiology Results [address] at 7:23 pm on 9/15/2022 to be communicated to a licensed caregiver. US RENAL COMPLETE    Result Date: 9/16/2022  EXAMINATION: RETROPERITONEAL ULTRASOUND OF THE KIDNEYS AND URINARY BLADDER 9/16/2022 COMPARISON: None HISTORY: ORDERING SYSTEM PROVIDED HISTORY: MARYAN TECHNOLOGIST PROVIDED HISTORY: Reason for exam:->MARYAN FINDINGS: Kidneys: The right kidney measures 13.8 cm in length and the left kidney measures 12.3 cm in length. Kidneys demonstrate normal cortical echogenicity. No evidence of hydronephrosis or intrarenal stones. Bladder: Unremarkable appearance of the bladder. No significant post void residual.     Unremarkable ultrasound of the kidneys and urinary bladder. No renal atrophy, hydronephrosis or abnormal echotexture. XR CHEST PORTABLE    Result Date: 9/12/2022  EXAMINATION: ONE XRAY VIEW OF THE CHEST 9/12/2022 4:19 pm COMPARISON: None.  HISTORY: ORDERING SYSTEM PROVIDED HISTORY: IVDU/Fever TECHNOLOGIST PROVIDED HISTORY: Reason for exam:->IVDU/Fever Reason for Exam: fever FINDINGS: The cardiomediastinal silhouette is normal in size. Multifocal patchy airspace opacities, as well as cavitary nodules are present bilaterally. No pleural effusion or pneumothorax is identified. Multifocal patchy airspace disease and cavitary nodules. The appearance is suggestive of septic emboli in this clinical setting. Follow-up recommended to assure resolution. IR NONTUNNELED VASCULAR CATHETER > 5 YEARS    Result Date: 9/20/2022  PROCEDURE: ULTRASOUND GUIDED VASCULAR ACCESS. FLUOROSCOPY GUIDED PLACEMENT OF A NON-TUNNELED CATHETER. 9/20/2022. HISTORY: ORDERING SYSTEM PROVIDED HISTORY: needs dialysis TECHNOLOGIST PROVIDED HISTORY: Reason for exam:->needs dialysis Is the patient pregnant?->No Reason for Exam: needs dialysis SEDATION: None FLUOROSCOPY DOSE AND TYPE OR TIME AND EXPOSURES: Time: 0.4 minutes Dose: 8.17 mGy Estimated blood loss: Less than 5 cc TECHNIQUE: Informed consent was obtained after a detailed explanation of the procedure including risks, benefits, and alternatives. Universal protocol was observed. The right neck and chest were prepped and draped in sterile fashion using maximum sterile barrier technique. Local anesthesia was achieved with lidocaine. A micropuncture needle was used to access the right internal jugular vein using ultrasound guidance. An ultrasound image demonstrating patency of the vein with needle tip located within it. An image was obtained and stored in PACs. A 0.035 guidewire was used to place a 15 cm Trialysis catheter using fluoroscopic guidance with tip in the right atrium after fascial tract dilation. The catheter flushed easily and there was a good blood return. The catheter was sutured to the skin. The catheter was locked with heparin. The patient tolerated the procedure well and there were no immediate complications. A clean dry sterile dressing was placed.  FINDINGS: Fluoroscopic image demonstrates the tip of the catheter in the right atrium. Successful ultrasound and fluoroscopy guided non-tunneled Trialysis catheter placement. VL Extremity Venous Bilateral    Result Date: 9/15/2022  Lower Extremities DVT Study  Demographics   Patient Name        Lennox Spencer   Date of Study       09/15/2022     Gender               Female   Patient Number      [id number]     Date of Birth        1980   Visit Number        [id number]      Age                  40 year(s)   Accession Number    [id number]     Room Number          5791   Corporate ID        [id number]        Justin Coates RVT   Ordering Physician  Amy Figueroa MD                                     Physician  Procedure Type of Study:   Veins:Lower Extremities DVT Study, VASC EXTREMITY VENOUS DUPLEX BILATERAL. Vascular Sonographer Report  Indications for Study:Leg pain. Impressions Right Impression No evidence of deep vein or superficial vein thrombosis involving the right lower extremity. Left Impression No evidence of deep vein or superficial vein thrombosis involving the left lower extremity. Conclusions   Summary   No evidence of thrombophlebitis is noted bilaterally in the deep and  superficial veins of the legs. Signature   ------------------------------------------------------------------  Electronically signed by Purcell Gilford, MD (Interpreting  physician) on 09/15/2022 at 11:52 AM  ------------------------------------------------------------------  Patient Status:Routine. Study [address] [facility]. Technical Quality:Adequate visualization. Velocities are measured in cm/s ; Diameters are measured in mm Right Lower Extremities DVT Study Measurements Right 2D Measurements +------------------------+----------+---------------+----------+ ! Location                ! Visualized! Compressibility! Thrombosis! +------------------------+----------+---------------+----------+ ! Sapheno Femoral Junction! Yes       ! Yes            ! None      ! +------------------------+----------+---------------+----------+ ! GSV Thigh               ! Yes       ! Yes            ! None      ! +------------------------+----------+---------------+----------+ ! Common Femoral          !Yes       ! Yes            ! None      ! +------------------------+----------+---------------+----------+ ! Prox Femoral            !Yes       ! Yes            ! None      ! +------------------------+----------+---------------+----------+ ! Mid Femoral             !Yes       ! Yes            ! None      ! +------------------------+----------+---------------+----------+ ! Dist Femoral            !Yes       ! Yes            ! None      ! +------------------------+----------+---------------+----------+ ! Deep Femoral            !Yes       ! Yes            ! None      ! +------------------------+----------+---------------+----------+ ! Popliteal               !Yes       ! Yes            ! None      ! +------------------------+----------+---------------+----------+ ! GSV Below Knee          ! Yes       ! Yes            ! None      ! +------------------------+----------+---------------+----------+ ! Gastroc                 ! Yes       ! Yes            ! None      ! +------------------------+----------+---------------+----------+ ! Soleal                  !Yes       ! Yes            ! None      ! +------------------------+----------+---------------+----------+ ! PTV                     ! Yes       ! Yes            ! None      ! +------------------------+----------+---------------+----------+ ! ATV                     ! Yes       ! Yes            ! None      ! +------------------------+----------+---------------+----------+ ! Peroneal                !Yes       ! Yes            ! None      ! +------------------------+----------+---------------+----------+ ! GSV Calf                ! Yes       ! Yes            ! None      ! +------------------------+----------+---------------+----------+ ! SSV                     ! Yes       ! Yes            ! None      ! +------------------------+----------+---------------+----------+ Right Doppler Measurements +--------------+------+------+------------+ ! Location      ! Signal!Reflux! Reflux (sec)! +--------------+------+------+------------+ ! Common Femoral!Phasic!      !            ! +--------------+------+------+------------+ ! Popliteal     !Phasic!      !            ! +--------------+------+------+------------+ Left Lower Extremities DVT Study Measurements Left 2D Measurements +------------------------+----------+---------------+----------+ ! Location                ! Visualized! Compressibility! Thrombosis! +------------------------+----------+---------------+----------+ ! Sapheno Femoral Junction! Yes       ! Yes            ! None      ! +------------------------+----------+---------------+----------+ ! GSV Thigh               ! Yes       ! Yes            ! None      ! +------------------------+----------+---------------+----------+ ! Common Femoral          !Yes       ! Yes            ! None      ! +------------------------+----------+---------------+----------+ ! Prox Femoral            !Yes       ! Yes            ! None      ! +------------------------+----------+---------------+----------+ ! Mid Femoral             !Yes       ! Yes            ! None      ! +------------------------+----------+---------------+----------+ ! Dist Femoral            !Yes       ! Yes            ! None      ! +------------------------+----------+---------------+----------+ ! Deep Femoral            !Yes       ! Yes            ! None      ! +------------------------+----------+---------------+----------+ ! Popliteal               !Yes       ! Yes            ! None      ! +------------------------+----------+---------------+----------+ ! GSV Below Knee          ! Yes       ! Yes            ! None      ! +------------------------+----------+---------------+----------+ ! Gastroc                 ! Yes       ! Yes            ! None      ! +------------------------+----------+---------------+----------+ ! Soleal                  !Yes       ! Yes            ! None      ! +------------------------+----------+---------------+----------+ ! PTV                     ! Yes       ! Yes            ! None      ! +------------------------+----------+---------------+----------+ ! ATV                     ! Yes       ! Yes            ! None      ! +------------------------+----------+---------------+----------+ ! Peroneal                !Yes       ! Yes            ! None      ! +------------------------+----------+---------------+----------+ ! GSV Calf                ! Yes       ! Yes            ! None      ! +------------------------+----------+---------------+----------+ ! SSV                     ! Yes       ! Yes            ! None      ! +------------------------+----------+---------------+----------+ Left Doppler Measurements +--------------+------+------+------------+ ! Location      ! Signal!Reflux! Reflux (sec)! +--------------+------+------+------------+ ! Common Femoral!Phasic!      !            ! +--------------+------+------+------------+ ! Popliteal     !Phasic!      !            ! +--------------+------+------+------------+    IR REPLACE TUNNELED CVC W SQ PORT SAME ACCESS    Result Date: 9/23/2022  PROCEDURE: FLUOROSCOPY GUIDED CONVERSION OF A NON TUNNELED HEMODIALYSIS CATHETER TO A TUNNELED HEMODIALYSIS CATHETER MODERATE CONSCIOUS SEDATION 9/23/2022 HISTORY: ORDERING SYSTEM PROVIDED HISTORY: for dialysis TECHNOLOGIST PROVIDED HISTORY: Reason for exam:->for dialysis Is the patient pregnant?->No SEDATION: 2 mgversed and 100 mcg fentanyl were titrated intravenously for moderate sedation monitored under my direction. Total intraservice time of sedation was 14 minutes.   The patient's vital signs were monitored throughout the procedure and recorded in the patient's medical record by the nurse. FLUOROSCOPY DOSE AND TYPE OR TIME AND EXPOSURES: Fluoro time: 0.1 minutes Cumulative air kerma: 2.75 mGy TECHNIQUE: Informed consent was obtained after a detailed explanation of the procedure including risks, benefits, and alternatives. Universal protocol was observed. The right neck, chest, and indwelling right internal jugular catheter were prepped and draped in sterile fashion using maximum sterile barrier technique. Local anesthesia was achieved with lidocaine. A subcutaneous tunnel was created to the infraclavicular region and a tunneled 19 cm hemodialysis catheter was pulled through the subcutaneous tunnel to the venotomy site. A 0.035 guidewire was inserted through the indwelling catheter and the catheter was exchanged over the wire for a peel-away sheath under fluoroscopic guidance. The new tunneled catheter was then advanced through the peel-away sheath under fluoroscopic guidance to the right atrium. The catheter flushed easily and there was a good blood return. The catheter was sutured to the skin. The catheter was locked with heparinized saline. The patient tolerated the procedure well and there were no immediate complications. Estimated blood loss: Less than 5 cc FINDINGS: Fluoroscopic image demonstrates the tip of the catheter in the right atrium. Successful conversion of a non tunneled hemodialysis catheter for a new 19 centimeter tunneled hemodialysis catheter. EKG     Rhythm: normal sinus   Rate: normal  Clinical Impression: no acute changes        The patient was seen and examined on day of discharge and this discharge summary is in conjunction with any daily progress note from day of discharge. Time Spent on discharge is 30 minutes  in the examination, evaluation, counseling and review of medications and discharge plan.       Note that more than 30 minutes was spent in preparing discharge papers, discussing discharge with patient, medication review, etc.       Signed:    Aruna Blancas MD   10/1/2022      Thank you No primary care provider on file. for the opportunity to be involved in this patient's care.  If you have any questions or concerns please feel free to contact me at [facility]

## 2022-10-01 NOTE — PROGRESS NOTES
Treatment time: 3 hours     Net UF: 1.5 L    Pre weight: 69kg  Post weight: 67.5kg   EDW: TBD, MARYAN     Access used: Left chest TDC  Access function: well, tolerated 300 BFR     Medications or blood products given: heparin dwells     Regular outpatient schedule: MARYAN     Summary of response to treatment: tolerated well,, no issues     Copy of dialysis treatment record placed in chart, to be scanned into EMR.

## 2022-10-01 NOTE — CARE COORDINATION
DISCHARGE SUMMARY     DATE OF DISCHARGE: 10/1/22    DISCHARGE DESTINATION: home    HOME CARE: No    HEMODIALYSIS: Yes    Location: [address] [place] [address]   # [phone]  Fax# [phone]    Schedule: T,TH,SA --11:30AM chair time  Patient states her father will transport her to and from her HD appts. TRANSPORTATION: her dad will transport home today     NEW DME ORDERED: yes  BSC and walker per [place]    COMMENTS: run sheets and IV Abx orders< H&P and nephrology consults faxed to [facility] #  [phone] as requested.   .Electronically signed by Juan Aviles on 10/1/2022 at 3:48 PM  #[phone]

## 2022-10-01 NOTE — PROGRESS NOTES
Discharge orders acknowledged by RN . Discharge teaching completed with pt and family. AVS reviewed and all questions answered. Medication regimen reviewed and pt understands schedule. Follow up appointments also reviewed with pt and resources given for discharge. Pt was sent electronic to be filled and understands schedule. PICC removed. Bedside monitor removed from pt. 60+ minutes of education completed. Required core measures completed. Pt vitals WDL. Pt discharged with all belongings to home with family. Pt transported off of unit via wheelchair. No complications.       Electronically signed by Raffi Hernandez RN on 10/1/2022 at 6:12 PM

## 2022-10-01 NOTE — PLAN OF CARE
Problem: Discharge Planning  Goal: Discharge to home or other facility with appropriate resources  Outcome: Adequate for Discharge     Problem: Safety - Adult  Goal: Free from fall injury  Outcome: Adequate for Discharge     Problem: Pain  Goal: Verbalizes/displays adequate comfort level or baseline comfort level  Outcome: Adequate for Discharge     Problem: ABCDS Injury Assessment  Goal: Absence of physical injury  Outcome: Adequate for Discharge     Problem: Skin/Tissue Integrity  Goal: Absence of new skin breakdown  Description: 1. Monitor for areas of redness and/or skin breakdown  2. Assess vascular access sites hourly  3. Every 4-6 hours minimum:  Change oxygen saturation probe site  4. Every 4-6 hours:  If on nasal continuous positive airway pressure, respiratory therapy assess nares and determine need for appliance change or resting period.   Outcome: Adequate for Discharge     Problem: Nutrition Deficit:  Goal: Optimize nutritional status  Outcome: Adequate for Discharge     Problem: Cardiovascular - Adult  Goal: Maintains optimal cardiac output and hemodynamic stability  Outcome: Adequate for Discharge     Problem: Skin/Tissue Integrity - Adult  Goal: Skin integrity remains intact  Outcome: Adequate for Discharge     Problem: Skin/Tissue Integrity - Adult  Goal: Incisions, wounds, or drain sites healing without S/S of infection  Outcome: Adequate for Discharge     Problem: Musculoskeletal - Adult  Goal: Maintain proper alignment of affected body part  Outcome: Adequate for Discharge     Problem: Gastrointestinal - Adult  Goal: Maintains adequate nutritional intake  Outcome: Adequate for Discharge     Problem: Infection - Adult  Goal: Absence of infection at discharge  Outcome: Adequate for Discharge

## 2022-10-01 NOTE — PROGRESS NOTES
Progress Note    Admit Date: 9/12/2022         Subjective and Overnight Events:  Seen at bedside  Has no complaints today  No CP, SOB  Complains of back pain and requesting to increase her morphine dose but do not have any associated radiation of pain or urinary incontinance  Lying in bed comfortably, her nose bleed has stopped  She do not have any complains today    Objective:   Vitals: BP (!) 141/94   Pulse 98   Temp 98.4 °F (36.9 °C) (Oral)   Resp 18   Ht 5' 8\" (1.727 m)   Wt 152 lb 1.9 oz (69 kg)   SpO2 95%   BMI 23.13 kg/m²   BP (!) 141/94   Pulse 98   Temp 98.4 °F (36.9 °C) (Oral)   Resp 18   Ht 5' 8\" (1.727 m)   Wt 152 lb 1.9 oz (69 kg)   SpO2 95%   BMI 23.13 kg/m²     General appearance: on RA, lying in bed, holding conversations, alert awake  HEENT:  Conjunctivae/corneas clear. Neck: Supple, with full range of motion. Respiratory:  Normal respiratory effort. Clear to auscultation, bilaterally without Rales/Wheezes/Rhonchi. Cardiovascular: Regular rate and rhythm with normal S1/S2 without murmurs or rubs  Abdomen: Soft, non-tender, non-distended, normal bowel sounds. Musculoskeletal: No cyanosis or edema bilaterally  Neurologic:  without any focal sensory/motor deficits.  grossly non-focal.  Psychiatric: Alert and oriented, Normal mood  Peripheral Pulses: +2 palpable, equal bilaterally   No change in physical exam today    Data:     Scheduled Medications:    clindamycin (CLEOCIN) IV  900 mg IntraVENous Q8H    b complex-C-folic acid  1 capsule Oral Daily    sevelamer  800 mg Oral TID     ceftaroline fosamil (TEFLARO) IVPB  200 mg IntraVENous q8h    [Held by provider] heparin (porcine)  5,000 Units SubCUTAneous 3 times per day    lidocaine 1 % injection  5 mL IntraDERmal Once    sodium chloride flush  5-40 mL IntraVENous 2 times per day      PRN Medications: labetalol, morphine, HYDROcodone 5 mg - acetaminophen, heparin (porcine), LORazepam, sodium chloride, chlorhexidine, sodium chloride flush, sodium chloride, hydrOXYzine pamoate, naloxone, calcium carbonate, promethazine, promethazine **OR** ondansetron, acetaminophen **OR** acetaminophen, perflutren lipid microspheres  Diet: ADULT DIET;  Regular  ADULT ORAL NUTRITION SUPPLEMENT; Breakfast; Standard High Calorie/High Protein Oral Supplement  ADULT ORAL NUTRITION SUPPLEMENT; Dinner; Diabetic Oral Supplement    Continuous Infusions:   sodium chloride      sodium chloride 10 mL/hr at 09/29/22 1915         Intake/Output Summary (Last 24 hours) at 10/1/2022 1217  Last data filed at 10/1/2022 8258  Gross per 24 hour   Intake 0 ml   Output 600 ml   Net -600 ml         CBC:   Recent Labs     09/30/22  0550 10/01/22  0600   WBC 6.4 8.3   HGB 8.1* 7.3*    181       BMP:  Recent Labs     09/30/22  0550 10/01/22  0600    141   K 3.9 3.8    103   CO2 27 28   BUN 13 13   CREATININE 2.3* 2.6*   GLUCOSE 88 93       ABGs: No results found for: PHART, PO2ART, OVC9WJD    Assessment/plan     Patient Active Problem List:     Tear of medial cartilage or meniscus of knee, current     Plica syndrome     Boxer's metacarpal fracture, neck, closed     Chondromalacia of patella     Degeneration of lumbar or lumbosacral intervertebral disc     Varicose veins of lower extremity     Intractable nausea and vomiting     Septicemia (HCC)     IVDU (intravenous drug user)     MRSA bacteremia     Sepsis due to methicillin resistant Staphylococcus aureus (MRSA) without acute organ dysfunction ([facility] [address]     Bacterial infection due to Serratia     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 According to the patient's sister patient has been feeling sick for past few days, not feeling well. Patient was noticed to have fevers as well as chills.      Assessment  Sepsis POA - endocarditis +/- cavitary PNA - MRSA/Serratia bacteremia   Pulmonary septic emboli, cavitary nodules  Endocarditis  History of IV drug abuse  ADHD  Anemia    Plan  Continue atbx as per ID - d/w ID, plan to continue Abx inpt  Endocarditis  + cardiology -  no angiovac recommended, recs to continue IV abx   Nephrology concerned about Vancomycin toxicity, plan for IR catheter for Dialysis   Consulted hematology, r/u hemolysis, ordered haptoglobin - elevated  Added ensure with meals as she has low po intake   Pain control  Continue IV Abx  Continue HD per nephrology  Pain control  Will need longterm Abx  Patient has Drain by IR, IR service to determine timing of drain removal    Heparin for DVT Ppx - held for Renal Bx tomorrow    Full Code    Continue IV abx per ID, plan for renal Biopsy tomorrow, it was cancelled today, plan for HD tomorrow as well, dc 1-2 days after Bx  Patient has KRYSTLE drain    Stable for Discharge from medical standpoint, awaiting ID recs for abx at discharge  At dc will pan IV Daptomycin 8mg/kg with HD sessiosns x 3 mari a week for  6 weeks  Oral Clindamycin x  300 mg Q 8 hr x 6 weeks  Oral Levofloxacin x  250 mg daily x 6 weeks  She will need follow  up with me ( Dr. Justin Chacko)  in x 4 weeks     DC likely Tomorrow  HD as outpatient  CM following on the case    Ivonne Maldonado MD

## 2022-10-01 NOTE — PROGRESS NOTES
Occupational Therapy  Facility/Department: [address] [facility]  Occupational Therapy Initial Assessment    Name: Marie Eng  : 1980  MRN: [id number]  Date of Service: 10/1/2022    Discharge Recommendations:  24 hour supervision or assist, Home with Home health OT      Marie Eng scored a 19/24 on the AM-PAC ADL Inpatient form. Current research shows that an AM-PAC score of 18 or greater is typically associated with a discharge to the patient's home setting. Based on the patient's AM-PAC score, and their current ADL deficits, it is recommended that the patient have 2-3 sessions per week of Occupational Therapy at d/c to increase the patient's independence. At this time, this patient demonstrates the endurance and safety to discharge home with [facility] and a follow up treatment frequency of 2-3x/wk. Please see assessment section for further patient specific details. If patient discharges prior to next session this note will serve as a discharge summary. Please see below for the latest assessment towards goals. Patient Diagnosis(es): The primary encounter diagnosis was Septicemia ([facility] [address] Diagnoses of IVDU (intravenous drug user), Gluteal abscess, and Septic embolism ([address] were also pertinent to this visit. Past Medical History:  has a past medical history of ADHD (attention deficit hyperactivity disorder), Arthritis, Back pain, Depression, Migraine, and Neutrophilia. Past Surgical History:  has a past surgical history that includes Partial hysterectomy; Knee arthroscopy (10/05/2010); Tubal ligation (2004); IR INSERT NON TUNNELED CV CATH <5 YEARS (Right, 2022); IR NONTUNNELED VASCULAR CATHETER > 5 YEARS (2022); and Tunneled venous catheter placement (Right, 2022). Assessment   Performance deficits / Impairments: Decreased functional mobility ; Decreased safe awareness;Decreased balance;Decreased ADL status; Decreased high-level IADLs;Decreased endurance;Decreased strength  Assessment: Discussed with OTR am pac score is 19, improved from 17. Pt will bed safe to return home with 24/7 supervision and [address]. Patient completed SB/CGA for transfers, short distance mob using RW and min cues for safety. Pt doffed and donned footies seated from EOB, SBA and anticipate she would need overall Min A for ADL's. Pt has BSC delivered to room and walker to take home. Pt reports having a shower chair (dad's) at home. Prognosis: Good  History: 39 y/o female admitted 9/12/2022 with Sepsis and Pulmonary septic emboli, cavitary nodules. Pt with history of IV drug abuse. PTA pt lives at home with her parents and was independent with ADLs and functional mobility  REQUIRES OT FOLLOW-UP: Yes  Activity Tolerance  Activity Tolerance: Patient Tolerated treatment well        Plan   Occupational Therapy Plan  Times Per Week: 3-5  Current Treatment Recommendations: Strengthening, Balance training, Functional mobility training, Endurance training, Self-Care / ADL, Safety education & training, Gait training, Equipment evaluation, education, & procurement, Patient/Caregiver education & training     Restrictions  Restrictions/Precautions  Restrictions/Precautions: Fall Risk  Position Activity Restriction  Other position/activity restrictions: KRYSTLE drain for large retroperitoneal abcess    Subjective   General  Chart Reviewed: Yes, Orders, Progress Notes  Patient assessed for rehabilitation services?: Yes  Additional Pertinent Hx: Per H&P: \"40year-old female with past medical history of drug abuse, ADHD, who presents to the hospital due to feeling fatigue, pain in her legs as well as back, she has a history of IV drug abuse, per patient she has been sober for now, patient family is also on the bedside who also contributed to the patient history. According to the patient's sister patient has been feeling sick for past few days, not feeling well.   Patient was noticed to have fevers as well as chills. \" MRI showed OM of R SI joint and large retroperitoneal abscess. 9/16 Underwent drainage tube placement . Response to previous treatment: Patient with no complaints from previous session  Family / Caregiver Present: No  Referring Practitioner: Dr. Foster Media: Pt seen bedside and agreed to therapy. Pt without complaints. General Comment  Comments: Per RN ok to see     Social/Functional History  Social/Functional History  Lives With: Family (mom and dad)  Type of Home: House  Home Layout: Two level, Laundry in basement (one story and a basement)  Home Access: Stairs to enter with rails  Entrance Stairs - Number of Steps: 10-12 with rail  Entrance Stairs - Rails: Both  Bathroom Shower/Tub: Tub/Shower unit  Bathroom Toilet: Standard  Bathroom Equipment: Shower chair  Home Equipment:  (no DME)  Has the patient had two or more falls in the past year or any fall with injury in the past year?: Yes (fall about a week ago)  ADL Assistance: Independent  Homemaking Assistance: Independent  Ambulation Assistance: Independent (no AD)  Transfer Assistance: Independent  Active : No  Occupation: Unemployed       Objective         Safety Devices  Type of Devices: Gait belt (to dialysis per transport)     Toilet Transfers  Toilet - Technique: Ambulating  Equipment Used: Standard bedside commode  Toilet Transfer: Stand by assistance;Contact guard assistance  Toilet Transfers Comments: RW, cues for hand placement  Wheelchair Bed Transfers  Wheelchair/Bed - Technique: Ambulating  Equipment Used: Bed;Other (stretcher)  Level of Asssistance: Stand by assistance;Contact guard assistance  Wheelchair Transfers Comments: RW,cues for hand placement     ADL  LE Dressing: Stand by assistance  LE Dressing Skilled Clinical Factors: seated at EOB to doff footies and don new pair.   Additional Comments: Anticipate pt will be up to [address] [place] [address] for bathing/dressing and toileting based on ROM, endurance observed today Bed mobility  Supine to Sit: Stand by assistance  Sit to Supine: Stand by assistance (onto stretcher at end of tx.)        Cognition  Overall Cognitive Status: [facility]  Cognition Comment: flat affect; poor judgment but appears cognitively [facility]  Orientation  Overall Orientation Status: Within Normal Limits               Functional Mobility Circuit: Pt amb short distances between bed, BSC, and to stretcher at end of tx, with RW, light CGA  Static Sitting Balance: Supervision at EOB    Education Given To: Patient  Education Provided: Role of Therapy;Plan of Care;Transfer Training;ADL Adaptive Strategies  Education Method: Demonstration;Verbal  Education Outcome: Verbalized understanding;Demonstrated understanding;Continued education needed        AM-PAC Score        AM-PAC Inpatient Daily Activity Raw Score: 19 (10/01/22 1150)  AM-PAC Inpatient ADL T-Scale Score : 40.22 (10/01/22 1150)  ADL Inpatient CMS 0-100% Score: 42.8 (10/01/22 1150)  ADL Inpatient CMS G-Code Modifier : CK (10/01/22 1150)      Goals  Short Term Goals  Time Frame for Short Term Goals: Status: goals ongoing  Short Term Goal 1: Pt will complete ADL transfers with supervision  Short Term Goal 2: Pt will complete functional mobility with supervision  Short Term Goal 3: Pt will tolerate standing > 3 min for functional task with supervision  Short Term Goal 4: Pt will complete toileting with min A  Short Term Goal 5: Pt will complete UE exercises in all plane to inc strength endurance for ADLs  Patient Goals   Patient goals : to feel better       Therapy Time   Individual Concurrent Group Co-treatment   Time In 1132         Time Out 1159         Minutes 27               Maryse NASH/SRAVANTHI,515

## 2022-10-01 NOTE — PROGRESS NOTES
[facility]  Phone: [phone]  Fax: [phone]  [facility]. [facility]         39 y/o WF with h/o depression, IVDA and back pain admitted with feeling weak, fatigued and pain in her back She was seen in ER on 9/7/22 with back apin and diagnosed with UTI Received toradol and was discharged on Motrin and Cefdinir Urine CX grew klebsiella pan senstive However she continued to feel poorly with back pain, and fatigue   Had fever to 101 on admission Started on Vancomycin and Cefepime Cr was 1.5 mg on admission improved to 1.1 mg but has increased to 1.8 mg now [facility] from admission growing MRSA and Serratia CT chest with cavitation and septic emboli  She reports decreased UOP no dysuria hematuria  Has been taking Ibuprofen 800 mg TID prior to admission  MRI showed OM of R SI joint and large retroperitoneal abscess   Underwent drainage tube placement . HPI:  Breathing comfortably. No CP. Had HD earlier today. ROS:  In bed. [address]:  medications reviewed. Physical Exam:    VITALS:  BP (!) 158/102   Pulse 83   Temp 97 °F (36.1 °C)   Resp 18   Ht 5' 8\" (1.727 m)   Wt 148 lb 13 oz (67.5 kg)   SpO2 95%   BMI 22.63 kg/m²   24HR INTAKE/OUTPUT:    Intake/Output Summary (Last 24 hours) at 10/1/2022 1610  Last data filed at 10/1/2022 0925  Gross per 24 hour   Intake 0 ml   Output 600 ml   Net -600 ml         Constitutional:  awake   Respiratory:  Decrease BS at  bases   Gastrointestinal:  + tenderness. Normal Bowel Sounds  Cardiovascular:  S1, S2 RRR   Edema:  +  edema  Acc Rt TDC .     DATA:    CBC:  Lab Results   Component Value Date/Time    WBC 8.3 10/01/2022 06:00 AM    RBC 2.60 10/01/2022 06:00 AM    HGB 7.3 10/01/2022 06:00 AM    HCT 22.2 10/01/2022 06:00 AM    MCV 85.5 10/01/2022 06:00 AM    MCH 27.9 10/01/2022 06:00 AM    MCHC 32.6 10/01/2022 06:00 AM    RDW 15.7 10/01/2022 06:00 AM     10/01/2022 06:00 AM    MPV 7.6 10/01/2022 06:00 AM     CMP:  Lab Results   Component Value Date/Time     10/01/2022 06:00 AM    K 3.8 10/01/2022 06:00 AM    K 4.3 09/19/2022 05:51 AM     10/01/2022 06:00 AM    CO2 28 10/01/2022 06:00 AM    BUN 13 10/01/2022 06:00 AM    CREATININE 2.6 10/01/2022 06:00 AM    GFRAA 24 10/01/2022 06:00 AM    GFRAA >60 07/28/2012 11:20 PM    AGRATIO 0.4 09/12/2022 03:44 PM    LABGLOM 20 10/01/2022 06:00 AM    GLUCOSE 93 10/01/2022 06:00 AM    PROT 5.8 09/27/2022 04:40 AM    PROT 6.5 07/28/2012 11:20 PM    CALCIUM 8.2 10/01/2022 06:00 AM    BILITOT <0.2 09/27/2022 04:40 AM    ALKPHOS 85 09/27/2022 04:40 AM    AST 9 09/27/2022 04:40 AM    ALT 6 09/27/2022 04:40 AM      Hepatic Function Panel:   Lab Results   Component Value Date/Time    ALKPHOS 85 09/27/2022 04:40 AM    ALT 6 09/27/2022 04:40 AM    AST 9 09/27/2022 04:40 AM    PROT 5.8 09/27/2022 04:40 AM    PROT 6.5 07/28/2012 11:20 PM    BILITOT <0.2 09/27/2022 04:40 AM    BILIDIR <0.2 09/27/2022 04:40 AM    IBILI see below 09/27/2022 04:40 AM      Phosphorus:   Lab Results   Component Value Date/Time    PHOS 5.3 10/01/2022 06:00 AM       ASSESSMENT/PLAN:    1-MARYAN   suspect Vancomycin toxicity /ATN/ infectious GN . 1st HD 9/20  . S./p [facility]  9/23 . 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 Epo with HD. May need tx , recheck Hb in am.     Given question of kidney biopsy, I would hold discharge until Dr. Marta Oreilly sees the patient on Monday.

## 2022-10-02 ENCOUNTER — APPOINTMENT (OUTPATIENT)
Dept: CT IMAGING | Age: 42
DRG: 720 | End: 2022-10-02
Payer: MEDICAID

## 2022-10-02 ENCOUNTER — HOSPITAL ENCOUNTER (INPATIENT)
Age: 42
LOS: 8 days | Discharge: HOME | DRG: 720 | End: 2022-10-10
Attending: EMERGENCY MEDICINE | Admitting: INTERNAL MEDICINE
Payer: MEDICAID

## 2022-10-02 ENCOUNTER — APPOINTMENT (OUTPATIENT)
Dept: GENERAL RADIOLOGY | Age: 42
DRG: 720 | End: 2022-10-02
Payer: MEDICAID

## 2022-10-02 DIAGNOSIS — R56.9 SEIZURE (HCC): ICD-10-CM

## 2022-10-02 DIAGNOSIS — A41.9 SEPTICEMIA (HCC): Primary | ICD-10-CM

## 2022-10-02 DIAGNOSIS — K92.1 MELENA: ICD-10-CM

## 2022-10-02 DIAGNOSIS — N39.0 COMPLICATED UTI (URINARY TRACT INFECTION): ICD-10-CM

## 2022-10-02 DIAGNOSIS — B37.49 CANDIDURIA: ICD-10-CM

## 2022-10-02 DIAGNOSIS — J98.4 PULMONARY CAVITARY LESION: ICD-10-CM

## 2022-10-02 DIAGNOSIS — R06.03 RESPIRATORY DISTRESS: ICD-10-CM

## 2022-10-02 LAB
A/G RATIO: 0.6 (ref 1.1–2.2)
ALBUMIN SERPL-MCNC: 3 G/DL (ref 3.4–5)
ALP BLD-CCNC: 125 U/L (ref 40–129)
ALT SERPL-CCNC: 7 U/L (ref 10–40)
AMPHETAMINE SCREEN, URINE: ABNORMAL
ANION GAP SERPL CALCULATED.3IONS-SCNC: 14 MMOL/L (ref 3–16)
ANISOCYTOSIS: ABNORMAL
AST SERPL-CCNC: 13 U/L (ref 15–37)
BACTERIA: ABNORMAL /HPF
BARBITURATE SCREEN URINE: ABNORMAL
BASE EXCESS VENOUS: 0.8 MMOL/L
BASE EXCESS VENOUS: 4.7 MMOL/L
BASOPHILS ABSOLUTE: 0.2 K/UL (ref 0–0.2)
BASOPHILS RELATIVE PERCENT: 1 %
BENZODIAZEPINE SCREEN, URINE: ABNORMAL
BILIRUB SERPL-MCNC: 0.3 MG/DL (ref 0–1)
BILIRUBIN URINE: ABNORMAL
BLOOD, URINE: ABNORMAL
BUN BLDV-MCNC: 7 MG/DL (ref 7–20)
CALCIUM SERPL-MCNC: 8.6 MG/DL (ref 8.3–10.6)
CANNABINOID SCREEN URINE: ABNORMAL
CARBOXYHEMOGLOBIN: 2.2 %
CARBOXYHEMOGLOBIN: 2.5 %
CHLORIDE BLD-SCNC: 102 MMOL/L (ref 99–110)
CHP ED QC CHECK: YES
CLARITY: ABNORMAL
CO2: 25 MMOL/L (ref 21–32)
COCAINE METABOLITE SCREEN URINE: ABNORMAL
COLOR: ABNORMAL
CREAT SERPL-MCNC: 2 MG/DL (ref 0.6–1.1)
EOSINOPHILS ABSOLUTE: 1.1 K/UL (ref 0–0.6)
EOSINOPHILS RELATIVE PERCENT: 6 %
EPITHELIAL CELLS, UA: ABNORMAL /HPF (ref 0–5)
FENTANYL SCREEN, URINE: POSITIVE
FINE CASTS, UA: ABNORMAL /LPF (ref 0–2)
GFR AFRICAN AMERICAN: 33
GFR NON-AFRICAN AMERICAN: 27
GLUCOSE BLD-MCNC: 110 MG/DL
GLUCOSE BLD-MCNC: 110 MG/DL (ref 70–99)
GLUCOSE BLD-MCNC: 120 MG/DL (ref 70–99)
GLUCOSE URINE: NEGATIVE MG/DL
HCO3 VENOUS: 30 MMOL/L (ref 23–29)
HCO3 VENOUS: 30 MMOL/L (ref 23–29)
HCT VFR BLD CALC: 29.5 % (ref 36–48)
HEMOGLOBIN: 9.3 G/DL (ref 12–16)
HYALINE CASTS: ABNORMAL /LPF (ref 0–2)
KETONES, URINE: ABNORMAL MG/DL
LACTIC ACID, SEPSIS: 1 MMOL/L (ref 0.4–1.9)
LACTIC ACID, SEPSIS: 1 MMOL/L (ref 0.4–1.9)
LACTIC ACID, SEPSIS: 2.1 MMOL/L (ref 0.4–1.9)
LEUKOCYTE ESTERASE, URINE: ABNORMAL
LYMPHOCYTES ABSOLUTE: 3.7 K/UL (ref 1–5.1)
LYMPHOCYTES RELATIVE PERCENT: 20 %
Lab: ABNORMAL
MACROCYTES: ABNORMAL
MCH RBC QN AUTO: 27.6 PG (ref 26–34)
MCHC RBC AUTO-ENTMCNC: 31.6 G/DL (ref 31–36)
MCV RBC AUTO: 87.4 FL (ref 80–100)
METHADONE SCREEN, URINE: ABNORMAL
METHEMOGLOBIN VENOUS: 0.3 %
METHEMOGLOBIN VENOUS: 0.3 %
MICROSCOPIC EXAMINATION: YES
MONOCYTES ABSOLUTE: 1.3 K/UL (ref 0–1.3)
MONOCYTES RELATIVE PERCENT: 7 %
NEUTROPHILS ABSOLUTE: 12.3 K/UL (ref 1.7–7.7)
NEUTROPHILS RELATIVE PERCENT: 66 %
NITRITE, URINE: POSITIVE
O2 SAT, VEN: 54 %
O2 SAT, VEN: 84 %
O2 THERAPY: ABNORMAL
O2 THERAPY: ABNORMAL
OPIATE SCREEN URINE: POSITIVE
OVALOCYTES: ABNORMAL
OXYCODONE URINE: ABNORMAL
PCO2, VEN: 51.3 MMHG (ref 40–50)
PCO2, VEN: 73.1 MMHG (ref 40–50)
PDW BLD-RTO: 17.1 % (ref 12.4–15.4)
PERFORMED ON: ABNORMAL
PH UA: 7
PH UA: 7 (ref 5–8)
PH VENOUS: 7.22 (ref 7.35–7.45)
PH VENOUS: 7.38 (ref 7.35–7.45)
PHENCYCLIDINE SCREEN URINE: ABNORMAL
PLATELET # BLD: 373 K/UL (ref 135–450)
PLATELET SLIDE REVIEW: ABNORMAL
PMV BLD AUTO: 7.6 FL (ref 5–10.5)
PO2, VEN: 37 MMHG
PO2, VEN: 52 MMHG
POTASSIUM REFLEX MAGNESIUM: 3.9 MMOL/L (ref 3.5–5.1)
PROCALCITONIN: 0.25 NG/ML (ref 0–0.15)
PROTEIN UA: >=300 MG/DL
RBC # BLD: 3.37 M/UL (ref 4–5.2)
RBC UA: >100 /HPF (ref 0–4)
SLIDE REVIEW: ABNORMAL
SODIUM BLD-SCNC: 141 MMOL/L (ref 136–145)
SPECIFIC GRAVITY UA: 1.01 (ref 1–1.03)
TCO2 CALC VENOUS: 32 MMOL/L
TCO2 CALC VENOUS: 32 MMOL/L
TOTAL PROTEIN: 7.9 G/DL (ref 6.4–8.2)
URINE REFLEX TO CULTURE: YES
URINE TYPE: ABNORMAL
UROBILINOGEN, URINE: 1 E.U./DL
WBC # BLD: 18.6 K/UL (ref 4–11)
WBC UA: ABNORMAL /HPF (ref 0–5)
YEAST: PRESENT /HPF

## 2022-10-02 PROCEDURE — 6360000002 HC RX W HCPCS: Performed by: INTERNAL MEDICINE

## 2022-10-02 PROCEDURE — 87086 URINE CULTURE/COLONY COUNT: CPT

## 2022-10-02 PROCEDURE — 96365 THER/PROPH/DIAG IV INF INIT: CPT

## 2022-10-02 PROCEDURE — 70450 CT HEAD/BRAIN W/O DYE: CPT

## 2022-10-02 PROCEDURE — 2500000003 HC RX 250 WO HCPCS: Performed by: INTERNAL MEDICINE

## 2022-10-02 PROCEDURE — 83605 ASSAY OF LACTIC ACID: CPT

## 2022-10-02 PROCEDURE — 6370000000 HC RX 637 (ALT 250 FOR IP): Performed by: INTERNAL MEDICINE

## 2022-10-02 PROCEDURE — 2580000003 HC RX 258: Performed by: INTERNAL MEDICINE

## 2022-10-02 PROCEDURE — 81001 URINALYSIS AUTO W/SCOPE: CPT

## 2022-10-02 PROCEDURE — 36415 COLL VENOUS BLD VENIPUNCTURE: CPT

## 2022-10-02 PROCEDURE — 2060000000 HC ICU INTERMEDIATE R&B

## 2022-10-02 PROCEDURE — 85025 COMPLETE CBC W/AUTO DIFF WBC: CPT

## 2022-10-02 PROCEDURE — 96374 THER/PROPH/DIAG INJ IV PUSH: CPT

## 2022-10-02 PROCEDURE — 2580000003 HC RX 258

## 2022-10-02 PROCEDURE — 80053 COMPREHEN METABOLIC PANEL: CPT

## 2022-10-02 PROCEDURE — 93005 ELECTROCARDIOGRAM TRACING: CPT

## 2022-10-02 PROCEDURE — 82803 BLOOD GASES ANY COMBINATION: CPT

## 2022-10-02 PROCEDURE — 71045 X-RAY EXAM CHEST 1 VIEW: CPT

## 2022-10-02 PROCEDURE — 80307 DRUG TEST PRSMV CHEM ANLYZR: CPT

## 2022-10-02 PROCEDURE — 96361 HYDRATE IV INFUSION ADD-ON: CPT

## 2022-10-02 PROCEDURE — 99285 EMERGENCY DEPT VISIT HI MDM: CPT

## 2022-10-02 PROCEDURE — 84145 PROCALCITONIN (PCT): CPT

## 2022-10-02 PROCEDURE — 6360000002 HC RX W HCPCS

## 2022-10-02 PROCEDURE — 1200000000 HC SEMI PRIVATE

## 2022-10-02 PROCEDURE — 87040 BLOOD CULTURE FOR BACTERIA: CPT

## 2022-10-02 RX ORDER — SODIUM CHLORIDE 9 MG/ML
INJECTION, SOLUTION INTRAVENOUS CONTINUOUS
Status: DISCONTINUED | OUTPATIENT
Start: 2022-10-02 | End: 2022-10-04

## 2022-10-02 RX ORDER — NALOXONE HYDROCHLORIDE 1 MG/ML
INJECTION INTRAMUSCULAR; INTRAVENOUS; SUBCUTANEOUS
Status: DISPENSED
Start: 2022-10-02 | End: 2022-10-03

## 2022-10-02 RX ORDER — ACETAMINOPHEN 325 MG/1
650 TABLET ORAL EVERY 6 HOURS PRN
Status: DISCONTINUED | OUTPATIENT
Start: 2022-10-02 | End: 2022-10-10 | Stop reason: HOSPADM

## 2022-10-02 RX ORDER — HYDRALAZINE HYDROCHLORIDE 20 MG/ML
10 INJECTION INTRAMUSCULAR; INTRAVENOUS EVERY 6 HOURS PRN
Status: DISCONTINUED | OUTPATIENT
Start: 2022-10-02 | End: 2022-10-09

## 2022-10-02 RX ORDER — LEVETIRACETAM 5 MG/ML
500 INJECTION INTRAVASCULAR EVERY 12 HOURS
Status: DISCONTINUED | OUTPATIENT
Start: 2022-10-02 | End: 2022-10-03

## 2022-10-02 RX ORDER — SODIUM CHLORIDE 0.9 % (FLUSH) 0.9 %
5-40 SYRINGE (ML) INJECTION EVERY 12 HOURS SCHEDULED
Status: DISCONTINUED | OUTPATIENT
Start: 2022-10-02 | End: 2022-10-10 | Stop reason: HOSPADM

## 2022-10-02 RX ORDER — SODIUM CHLORIDE 0.9 % (FLUSH) 0.9 %
5-40 SYRINGE (ML) INJECTION PRN
Status: DISCONTINUED | OUTPATIENT
Start: 2022-10-02 | End: 2022-10-10 | Stop reason: HOSPADM

## 2022-10-02 RX ORDER — SODIUM CHLORIDE, SODIUM LACTATE, POTASSIUM CHLORIDE, AND CALCIUM CHLORIDE .6; .31; .03; .02 G/100ML; G/100ML; G/100ML; G/100ML
30 INJECTION, SOLUTION INTRAVENOUS ONCE
Status: DISCONTINUED | OUTPATIENT
Start: 2022-10-02 | End: 2022-10-04

## 2022-10-02 RX ORDER — NALOXONE HYDROCHLORIDE 0.4 MG/ML
0.2 INJECTION, SOLUTION INTRAMUSCULAR; INTRAVENOUS; SUBCUTANEOUS ONCE
Status: COMPLETED | OUTPATIENT
Start: 2022-10-02 | End: 2022-10-02

## 2022-10-02 RX ORDER — ENOXAPARIN SODIUM 100 MG/ML
40 INJECTION SUBCUTANEOUS NIGHTLY
Status: DISCONTINUED | OUTPATIENT
Start: 2022-10-02 | End: 2022-10-10 | Stop reason: HOSPADM

## 2022-10-02 RX ORDER — CLINDAMYCIN PHOSPHATE 900 MG/50ML
900 INJECTION INTRAVENOUS EVERY 8 HOURS
Status: DISCONTINUED | OUTPATIENT
Start: 2022-10-02 | End: 2022-10-03

## 2022-10-02 RX ORDER — SODIUM CHLORIDE 9 MG/ML
INJECTION, SOLUTION INTRAVENOUS PRN
Status: DISCONTINUED | OUTPATIENT
Start: 2022-10-02 | End: 2022-10-10 | Stop reason: HOSPADM

## 2022-10-02 RX ORDER — FLUCONAZOLE 2 MG/ML
200 INJECTION, SOLUTION INTRAVENOUS ONCE
Status: COMPLETED | OUTPATIENT
Start: 2022-10-02 | End: 2022-10-02

## 2022-10-02 RX ORDER — SODIUM CHLORIDE, SODIUM LACTATE, POTASSIUM CHLORIDE, AND CALCIUM CHLORIDE .6; .31; .03; .02 G/100ML; G/100ML; G/100ML; G/100ML
30 INJECTION, SOLUTION INTRAVENOUS ONCE
Status: COMPLETED | OUTPATIENT
Start: 2022-10-02 | End: 2022-10-02

## 2022-10-02 RX ORDER — ACETAMINOPHEN 650 MG/1
650 SUPPOSITORY RECTAL EVERY 6 HOURS PRN
Status: DISCONTINUED | OUTPATIENT
Start: 2022-10-02 | End: 2022-10-10 | Stop reason: HOSPADM

## 2022-10-02 RX ORDER — NALOXONE HYDROCHLORIDE 0.4 MG/ML
0.4 INJECTION, SOLUTION INTRAMUSCULAR; INTRAVENOUS; SUBCUTANEOUS PRN
Status: DISCONTINUED | OUTPATIENT
Start: 2022-10-02 | End: 2022-10-10 | Stop reason: HOSPADM

## 2022-10-02 RX ADMIN — VANCOMYCIN HYDROCHLORIDE 1000 MG: 1 INJECTION, POWDER, LYOPHILIZED, FOR SOLUTION INTRAVENOUS at 16:03

## 2022-10-02 RX ADMIN — ACETAMINOPHEN 650 MG: 325 TABLET, FILM COATED ORAL at 20:58

## 2022-10-02 RX ADMIN — CLINDAMYCIN PHOSPHATE 900 MG: 900 INJECTION, SOLUTION INTRAVENOUS at 20:54

## 2022-10-02 RX ADMIN — NALOXONE HYDROCHLORIDE 0.2 MG: 0.4 INJECTION, SOLUTION INTRAMUSCULAR; INTRAVENOUS; SUBCUTANEOUS at 12:35

## 2022-10-02 RX ADMIN — ENOXAPARIN SODIUM 40 MG: 100 INJECTION SUBCUTANEOUS at 20:54

## 2022-10-02 RX ADMIN — FLUCONAZOLE 200 MG: 200 INJECTION, SOLUTION INTRAVENOUS at 17:22

## 2022-10-02 RX ADMIN — NALOXONE HYDROCHLORIDE 0.2 MG: 0.4 INJECTION, SOLUTION INTRAMUSCULAR; INTRAVENOUS; SUBCUTANEOUS at 12:38

## 2022-10-02 RX ADMIN — SODIUM CHLORIDE: 9 INJECTION, SOLUTION INTRAVENOUS at 18:03

## 2022-10-02 RX ADMIN — HYDRALAZINE HYDROCHLORIDE 10 MG: 20 INJECTION INTRAMUSCULAR; INTRAVENOUS at 18:00

## 2022-10-02 RX ADMIN — CEFEPIME 2000 MG: 2 INJECTION, POWDER, FOR SOLUTION INTRAVENOUS at 14:15

## 2022-10-02 RX ADMIN — SODIUM CHLORIDE, POTASSIUM CHLORIDE, SODIUM LACTATE AND CALCIUM CHLORIDE 1917 ML: 600; 310; 30; 20 INJECTION, SOLUTION INTRAVENOUS at 12:26

## 2022-10-02 RX ADMIN — ACETAMINOPHEN 650 MG: 325 TABLET, FILM COATED ORAL at 16:14

## 2022-10-02 RX ADMIN — LEVETIRACETAM 500 MG: 5 INJECTION INTRAVENOUS at 19:01

## 2022-10-02 ASSESSMENT — PAIN SCALES - GENERAL
PAINLEVEL_OUTOF10: 4
PAINLEVEL_OUTOF10: 0
PAINLEVEL_OUTOF10: 3
PAINLEVEL_OUTOF10: 7

## 2022-10-02 ASSESSMENT — PAIN DESCRIPTION - DESCRIPTORS: DESCRIPTORS: ACHING

## 2022-10-02 ASSESSMENT — PAIN DESCRIPTION - ORIENTATION: ORIENTATION: MID

## 2022-10-02 ASSESSMENT — PAIN DESCRIPTION - PAIN TYPE: TYPE: ACUTE PAIN

## 2022-10-02 ASSESSMENT — PAIN DESCRIPTION - LOCATION
LOCATION: BACK
LOCATION: HEAD;BACK
LOCATION: BACK

## 2022-10-02 ASSESSMENT — PAIN - FUNCTIONAL ASSESSMENT: PAIN_FUNCTIONAL_ASSESSMENT: ACTIVITIES ARE NOT PREVENTED

## 2022-10-02 ASSESSMENT — PAIN DESCRIPTION - ONSET: ONSET: ON-GOING

## 2022-10-02 ASSESSMENT — PAIN DESCRIPTION - FREQUENCY: FREQUENCY: INTERMITTENT

## 2022-10-02 NOTE — ED PROVIDER NOTES
[address] [facility]        Pt Name: Rakesh Bah  MRN: [id number]  Augustogflm 1980  Date of evaluation: 10/2/2022  Provider: RUBI Gross CNP  PCP: No primary care provider on file. Note Started: 12:25 PM EDT       I have seen and evaluated this patient with my supervising physician Ger Severino MD.      Chidi Holt [address]. 50.       Chief Complaint   Patient presents with    Seizures     Family reported seizure activities around [id number]; that lasted 3 minutes with lower body shakes. EMS states she was postictal when arrival          HISTORY OF PRESENT ILLNESS   (Location/Symptom, Timing/Onset, Context/Setting, Quality, Duration, Modifying Factors, Severity)  Note limiting factors. Chief Complaint: Topher Bah is a 40 y.o. female who presents to the ED for reported lower body shaking approximately for 3 minutes at 11:45 AM.  Patient postictal on arrival unable to participate in HPI/ROS. EMS report and chart review used for this patient. Per EMS patient has recent discharge from hospital.  Not on current antiepileptic therapy. No medications given prior to arrival with a blood sugar of 180s prior to arrival  Update information received from mother at bedside at 1300 hrs. Reports patient was staring off into the right side for roughly 3 minutes earlier this morning. This was followed by her right leg only twitching. The patient was then briefly post ictal at which point mother called 46. She put a pulse oximeter on her at that time and states heart rate was in the 1 20-1 30 range and pulse ox was in low 90s    Nursing Notes were all reviewed and agreed with or any disagreements were addressed in the HPI.     REVIEW OF SYSTEMS    (2-9 systems for level 4, 10 or more for level 5)     Review of Systems   Unable to perform ROS: Other (Patient is postictal.  Only able to answer yes/no questions in a very limited way.  Unable to participate in ROS at this time)     PAST MEDICAL HISTORY     Past Medical History:   Diagnosis Date    ADHD (attention deficit hyperactivity disorder)     Arthritis     Back pain     Depression     Migraine     Neutrophilia 9/15/2022       SURGICAL HISTORY     Past Surgical History:   Procedure Laterality Date    IR INSERT NON TUNNELED CV CATH LESS THAN 5 YEARS Right 09/20/2022    Saud Lopez; RIJ access; 15cm; Dr. Estefania Osborn    IR NONTUNNELED VASCULAR CATHETER  09/20/2022    IR NONTUNNELED VASCULAR CATHETER 9/20/2022 [facility] PROCED[facility]    KNEE ARTHROSCOPY  10/05/2010    PARTIAL HYSTERECTOMY (CERVIX NOT REMOVED)      TUBAL LIGATION  01/01/2004    TUNNELED VENOUS CATHETER PLACEMENT Right 09/23/2022    Permacath; RIJ access; 19cm; Dr. Divina Alcaraz       Previous Medications    CALCIUM CARBONATE (TUMS) 500 MG CHEWABLE TABLET    Take 1 tablet by mouth 3 times daily as needed for Heartburn    CLINDAMYCIN (CLEOCIN) 300 MG CAPSULE    Take 1 capsule by mouth 3 times daily    HYDROCODONE-ACETAMINOPHEN (NORCO) 5-325 MG PER TABLET    Take 1 tablet by mouth every 6 hours as needed for Pain for up to 3 days.     LEVOFLOXACIN (LEVAQUIN) 250 MG TABLET    Take 1 tablet by mouth daily       ALLERGIES     Ultram [tramadol hcl], Robaxin [methocarbamol], and Penicillins    FAMILYHISTORY       Family History   Problem Relation Age of Onset    Breast Cancer Maternal Grandmother 45    Arthritis Other     Asthma Other     Cancer Other     Diabetes Other     Seizures Other     Stroke Other         SOCIAL HISTORY       Social History     Socioeconomic History    Marital status: Single   Tobacco Use    Smoking status: Every Day     Packs/day: 0.50     Years: 2.00     Pack years: 1.00     Types: Cigarettes    Smokeless tobacco: Never   Substance and Sexual Activity    Alcohol use: Yes     Comment: occas    Drug use: No       SCREENINGS             PHYSICAL EXAM    (up to 7 for level 4, 8 or more for level 5) ED Triage Vitals [10/02/22 1221]   BP Temp Temp src Heart Rate Resp SpO2 Height Weight   (!) 137/92 -- -- (!) 128 (!) 8 100 % 5' 8\" (1.727 m) --       Physical Exam  Vitals and nursing note reviewed. Constitutional:       General: She is in acute distress. Appearance: Normal appearance. She is normal weight. She is ill-appearing and toxic-appearing. She is not diaphoretic. HENT:      Head: Normocephalic and atraumatic. Right Ear: External ear normal.      Left Ear: External ear normal.      Nose: Nose normal. No congestion or rhinorrhea. Mouth/Throat:      Mouth: Mucous membranes are dry. Pharynx: Oropharynx is clear. No oropharyngeal exudate or posterior oropharyngeal erythema. Eyes:      General: No scleral icterus. Right eye: No discharge. Left eye: No discharge. Extraocular Movements: Extraocular movements intact. Conjunctiva/sclera: Conjunctivae normal.      Pupils: Pupils are equal, round, and reactive to light. Cardiovascular:      Rate and Rhythm: Regular rhythm. Tachycardia present. Pulses: Normal pulses. Heart sounds: Murmur heard. No friction rub. No gallop. Pulmonary:      Effort: Respiratory distress present. Breath sounds: No stridor. Rhonchi present. No wheezing or rales. Abdominal:      General: Abdomen is flat. Bowel sounds are normal. There is no distension. Palpations: Abdomen is soft. Tenderness: There is no abdominal tenderness. There is no right CVA tenderness, left CVA tenderness or guarding. Genitourinary:     Comments: Deferred  Musculoskeletal:         General: No deformity. Normal range of motion. Cervical back: Normal range of motion and neck supple. No rigidity or tenderness. Skin:     General: Skin is warm and dry. Capillary Refill: Capillary refill takes 2 to 3 seconds. Coloration: Skin is pale. Skin is not jaundiced. Findings: Bruising present. No rash.    Neurological: Mental Status: She is lethargic. GCS: GCS eye subscore is 3. GCS verbal subscore is 4. GCS motor subscore is 6. Cranial Nerves: No facial asymmetry. Motor: No tremor or seizure activity.    Psychiatric:      Comments: Unable       DIAGNOSTIC RESULTS   LABS:    Labs Reviewed   CBC WITH AUTO DIFFERENTIAL - Abnormal; Notable for the following components:       Result Value    WBC 18.6 (*)     RBC 3.37 (*)     Hemoglobin 9.3 (*)     Hematocrit 29.5 (*)     RDW 17.1 (*)     Neutrophils Absolute 12.3 (*)     Eosinophils Absolute 1.1 (*)     Anisocytosis Occasional (*)     Macrocytes Occasional (*)     Ovalocytes Occasional (*)     All other components within normal limits   COMPREHENSIVE METABOLIC PANEL W/ REFLEX TO MG FOR LOW K - Abnormal; Notable for the following components:    Glucose 120 (*)     Creatinine 2.0 (*)     GFR Non- 27 (*)     GFR  33 (*)     Albumin 3.0 (*)     Albumin/Globulin Ratio 0.6 (*)     ALT 7 (*)     AST 13 (*)     All other components within normal limits   PROCALCITONIN - Abnormal; Notable for the following components:    Procalcitonin 0.25 (*)     All other components within normal limits   LACTATE, SEPSIS - Abnormal; Notable for the following components:    Lactic Acid, Sepsis 2.1 (*)     All other components within normal limits   URINALYSIS WITH REFLEX TO CULTURE - Abnormal; Notable for the following components:    Color, UA CANDELARIA (*)     Clarity, UA CLOUDY (*)     Bilirubin Urine SMALL (*)     Ketones, Urine TRACE (*)     Blood, Urine LARGE (*)     Protein, UA >=300 (*)     Nitrite, Urine POSITIVE (*)     Leukocyte Esterase, Urine MODERATE (*)     All other components within normal limits   URINE DRUG SCREEN - Abnormal; Notable for the following components:    Opiate Scrn, Ur POSITIVE (*)     FENTANYL SCREEN, URINE POSITIVE (*)     All other components within normal limits   BLOOD GAS, VENOUS - Abnormal; Notable for the following components: pH, Moose 7.220 (*)     pCO2, Moose 73.1 (*)     HCO3, Venous 30 (*)     All other components within normal limits   MICROSCOPIC URINALYSIS - Abnormal; Notable for the following components:    WBC, UA 10-20 (*)     RBC, UA >100 (*)     Bacteria, UA 4+ (*)     Yeast, UA Present (*)     All other components within normal limits   BLOOD GAS, VENOUS - Abnormal; Notable for the following components:    pCO2, Moose 51.3 (*)     HCO3, Venous 30 (*)     All other components within normal limits   POCT GLUCOSE - Abnormal; Notable for the following components:    POC Glucose 110 (*)     All other components within normal limits   POCT GLUCOSE - Normal   CULTURE, BLOOD 1   CULTURE, URINE   LACTATE, SEPSIS       When ordered, only abnormal lab results are displayed. All other labs were within normal range or not returned as of this dictation. EKG: When ordered, EKG's are interpreted by the Emergency Department Physician in the absence of a cardiologist.  Please see their note for interpretation of EKG. RADIOLOGY:   Non-plain film images such as CT, Ultrasound and MRI are read by the radiologist. Plain radiographic images are visualized andpreliminarily interpreted by the  ED Provider with the below findings:        Interpretation perthe Radiologist below, if available at the time of this note:    CT HEAD WO CONTRAST   Final Result   No acute intracranial abnormality. XR CHEST PORTABLE   Final Result   Developing small bilateral pleural effusions, left greater than right. Persistent multifocal pulmonary nodules, some of which are cavitary,   suggestive of septic emboli given patient history. No results found. PROCEDURES   Unless otherwise noted below, none     Procedures    CRITICAL CARE TIME   Favio CRYSTAL CNP, am the primary clinician of record  I provided 45 minutes of non concurrent Critical Care time, excluding separately reportable procedures.   There was a high probability of clinically significant/life threatening deterioration in the patient's condition which required my urgent intervention. This time spent assessing, reassessing patient, chart review and discussing case with other providers      CONSULTS:  PHARMACY TO DOSE VANCOMYCIN  PHARMACY TO DOSE MEDICATION  IP CONSULT TO INFECTIOUS DISEASES  IP CONSULT TO PHARMACY  IP CONSULT TO NEUROLOGY      EMERGENCY DEPARTMENT COURSE and DIFFERENTIAL DIAGNOSIS/MDM:   Vitals:    Vitals:    10/02/22 1515 10/02/22 1546 10/02/22 1602 10/02/22 1617   BP: (!) 158/110 (!) 178/129 (!) 166/116 (!) 161/118   Pulse: (!) 118 (!) 114 (!) 105 98   Resp: 22 18 18 17   Temp:       TempSrc:       SpO2: 97% 98% 100% 99%   Weight:       Height:           Patient was given thefollowing medications:  Medications   naloxone (NARCAN) 2 MG/2ML injection (has no administration in time range)   vancomycin 1000 mg IVPB in 250 mL D5W addavial (1,000 mg IntraVENous New Bag 10/2/22 1603)   acetaminophen (TYLENOL) tablet 650 mg (650 mg Oral Given 10/2/22 1614)     Or   acetaminophen (TYLENOL) suppository 650 mg ( Rectal See Alternative 10/2/22 1614)   fluconazole (DIFLUCAN) in 0.9 % sodium chloride IVPB 200 mg (has no administration in time range)   lactated ringers bolus (1,917 mLs IntraVENous New Bag 10/2/22 1226)   naloxone (NARCAN) injection 0.2 mg (0.2 mg IntraVENous Given 10/2/22 1238)   naloxone (NARCAN) injection 0.2 mg (0.2 mg IntraVENous Given 10/2/22 1235)   cefepime (MAXIPIME) 2000 mg IVPB minibag (0 mg IntraVENous Stopped 10/2/22 1444)         Is this patient to be included in the SEP-1 Core Measure due to severe sepsis or septic shock? Yes   SEP-1 CORE MEASURE DATA      Sepsis Criteria   Severe Sepsis Criteria   Septic Shock Criteria     Must be confirmed or suspected to move forward with diagnosis of sepsis.     Must meet 2:    [] Temperature > 100.9 F (38.3 C)        or < 96.8 F (36 C)  [x] HR > 90  [] RR > 20  [x] WBC > 12 or < 4 or 10% bands      AND:      [x] Infection Confirmed or        Suspected. Must meet 1:    [x] Lactate > 2       or   [x] Signs of Organ Dysfunction:    - SBP < 90 or MAP < 65  - Altered mental status  - Creatinine > 2 or increased from      baseline  - Urine Output < 0.5 ml/kg/hr  - Bilirubin > 2  - INR > 1.5 (not anticoagulated)  - Platelets < 940,911  - Acute Respiratory Failure as     evidenced by new need for NIPPV     or mechanical ventilation      [] No criteria met for Severe Sepsis. Must meet 1:    [] Lactate > 4        or   [] SBP < 90 or MAP < 65 for at        least two readings in the first        hour after fluid bolus        administration      [] Vasopressors initiated (if hypotension persists after fluid resuscitation)        [x] No criteria met for Septic Shock.    Patient Vitals for the past 6 hrs:   BP Temp Pulse Resp SpO2 Height Weight Weight Method Percent Weight Change   10/02/22 1221 (!) 137/92 100.3 °F (37.9 °C) (!) 128 (!) 8 100 % 5' 8\" (1.727 m) 148 lb 9.4 oz (67.4 kg) Actual;Bedside scale 0   10/02/22 1246 (!) 139/101 -- (!) 123 10 100 % -- -- -- --   10/02/22 1301 (!) 143/101 -- (!) 129 17 100 % -- -- -- --   10/02/22 1316 (!) 156/100 -- (!) 131 14 100 % -- -- -- --   10/02/22 1330 (!) 162/111 -- (!) 112 11 100 % -- -- -- --   10/02/22 1400 (!) 162/124 -- (!) 116 24 96 % -- -- -- --   10/02/22 1415 (!) 158/119 -- (!) 111 17 96 % -- -- -- --   10/02/22 1445 (!) 171/119 -- (!) 112 20 97 % -- -- -- --   10/02/22 1515 (!) 158/110 -- (!) 118 22 97 % -- -- -- --   10/02/22 1546 (!) 178/129 -- (!) 114 18 98 % -- -- -- --   10/02/22 1602 (!) 166/116 -- (!) 105 18 100 % -- -- -- --   10/02/22 1617 (!) 161/118 -- 98 17 99 % -- -- -- --      Recent Labs     09/30/22  0550 10/01/22  0600 10/02/22  1229   WBC 6.4 8.3 18.6*   CREATININE 2.3* 2.6* 2.0*   BILITOT  --   --  0.3    181 373         Time Severe Sepsis Identified: 1400    Fluid Resuscitation Rational: at least 30mL/kg based on entered actual weight at time of triage      Repeat lactate level: ordered and pending at this time    Reassessment Exam:   I have reassessed tissue perfusion and hemodynamic status after fluid bolus at this time: 1500. Improving mental status      56-year-old female with a history of IV drug use with pulmonary cavitary lesions, likely candiduria, bloodstream infection, potential endocarditis, recently discharged from hospital on multiple antibiotics presenting to ED with seizure-like activity as above. Arrives in acute distress. Tuesday, Thursday, Saturday hemodialysis with access right side chest    No response to Narcan. Started on fluid resuscitation. Placed on nonrebreather. Labs are concerning for acidosis with hypercarbia. Good response to nonrebreather. Goals of care discussed with mother at the bedside. She is okay with intubation if necessary. Patient has good response to therapy in the emergency department. Feeling a lot better. More responsive as time goes by. Labs from recent admission were reviewed. Found to have Candida albicans and pulmonary culture as well as budding yeast in urinalysis today. As such started on broad-spectrum antibiotics for UTI and antifungal for these infections. She does not get hypotensive in the emergency department after fluid resuscitation. Her heart rate improved significantly with fluid resuscitation and Tylenol. Based on the critical findings today she is to be admitted to the medicine service for further management of her acute illness. Labs:  Initial VBG with acidosis and elevated PCO2. This improves after the patient is on nonrebreather for roughly 1 hour. Blood glucose within normal limits. Urinalysis nitrite positive with leukoesterase. Significant protein in urine. Consistent with the patient's renal injury. UDS positive for opiates and fentanyl.       CBC with significant leukocytosis as above  CMP consistent patient's renal injury  Procalcitonin, lactate has above    CT head for the new seizure as above. Will defer antiepileptic regimen to inpatient team.  Chest x-ray as above    I am the Primary Clinician of Record. FINAL IMPRESSION      1. Septicemia ([facility] [address]    2. Respiratory distress    3. Seizure ([facility] [address]    4. Complicated UTI (urinary tract infection)    5. Candiduria    6. Pulmonary cavitary lesion          DISPOSITION/PLAN   DISPOSITION Admitted 10/02/2022 03:29:05 PM      PATIENT REFERREDTO:  No follow-up provider specified.     DISCHARGE MEDICATIONS:  New Prescriptions    No medications on file       DISCONTINUED MEDICATIONS:  Discontinued Medications    No medications on file              (Please note that portions ofthis note were completed with a voice recognition program.  Efforts were made to edit the dictations but occasionally words are mis-transcribed.)    RUBI Ray CNP (electronically signed)             RUBI Ray CNP  10/02/22 1832

## 2022-10-02 NOTE — LETTER
[facility] [address] [facility]  [address]  Phone: [phone]             October 5, 2022    Patient: Johnny Riggs   YOB: 1980   Date of Visit: 10/2/2022       To Whom It May Concern:    Johnny Riggs was seen and treated in our facility, beginning 09/12/2022-10/01/2022, and returned 10/02/2022, and is still under going medical   treatment today, 10/05/2022.         Sincerely,       Mikki Landis RN         Signature:__________________________________

## 2022-10-02 NOTE — PROGRESS NOTES
Patient is alert and orientated x4, orientation to the room was provided. VSS stable on 2L - removed oxygen, patient stating 94% on room air. R FA IV in place, BP was elevated, MD Cuco Irwin was notified and PRN orders were placed. All other needs meet at this time, will continue to monitor.      Electronically signed by Denny Ramos RN on 10/2/2022 at 5:25 PM

## 2022-10-02 NOTE — H&P
Hospital Medicine History & Physical      PCP: No primary care provider on file. Date of Admission: 10/2/2022    Date of Service: Pt seen/examined on 10/2/2022   and Admitted to Inpatient with expected LOS greater than two midnights due to medical therapy. Chief Complaint: Possible seizure      History Of Present Illness:   40 y.o. female who presented to [facility] [place].  Patient was just discharged from the hospital yesterday after a prolonged 19-day stay for infected endocarditis with septic emboli and MRSA Serratia bacteremia due to IV drug use. Patient also had SI joint osteomyelitis and retroperitoneal abscess for which he underwent drainage on last admission. patient is a dialysis patient and got hemodialysis yesterday prior to discharge    Patient was at home today when she passed out and had shaking jerking motions which her father thought might have been a seizure. She was brought back to the emergency room for care. Appears right leg was shaking and her gaze was deviated to the right. She was noted to be hypercarbic    While in the emergency room her mentation did improve  At the time of my assessment now she feels better. She states she does not know what happens.   She denies using drugs(normally does injection fentanyl)    After 19-day stay in the hospital her urine tox screen is showing up fentanyl    I confronted her on this and she states she does not know how that is possible    Past Medical History:          Diagnosis Date    ADHD (attention deficit hyperactivity disorder)     Arthritis     Back pain     Depression     Migraine     Neutrophilia 9/15/2022       Past Surgical History:          Procedure Laterality Date    IR INSERT NON TUNNELED CV CATH LESS THAN 5 YEARS Right 09/20/2022    Lennox Pucker; MAKENZIE access; 15cm; Dr. Kash Escoto    IR NONTUNNELED VASCULAR CATHETER  09/20/2022    IR NONTUNNELED VASCULAR CATHETER 9/20/2022 [facility] SPECIAL PROCEDURES    KNEE ARTHROSCOPY  10/05/2010 PARTIAL HYSTERECTOMY (CERVIX NOT REMOVED)      TUBAL LIGATION  01/01/2004    TUNNELED VENOUS CATHETER PLACEMENT Right 09/23/2022    Permacath; RIJ access; 19cm; Dr. Dwane Prader       Medications Prior to Admission:      Prior to Admission medications    Medication Sig Start Date End Date Taking? Authorizing Provider   clindamycin (CLEOCIN) 300 MG capsule Take 1 capsule by mouth 3 times daily 10/1/22 11/12/22  Zoltan Barber MD   [facility]) 250 MG tablet Take 1 tablet by mouth daily 10/1/22 11/12/22  Zoltan Barber MD   calcium carbonate (TUMS) 500 MG chewable tablet Take 1 tablet by mouth 3 times daily as needed for Heartburn 9/29/22 10/29/22  Tamir Whatley MD   HYDROcodone-acetaminophen (NORCO) 5-325 MG per tablet Take 1 tablet by mouth every 6 hours as needed for Pain for up to 3 days. 9/29/22 10/2/22  Tamir Whatley MD       Allergies:  Gwenyth Sosa hcl], Robaxin [methocarbamol], and Penicillins    Social History:      The patient currently living with family    TOBACCO:   reports that she has been smoking cigarettes. She has a 1.00 pack-year smoking history. She has never used smokeless tobacco.  ETOH:   reports current alcohol use. E-cigarette/Vaping       Questions Responses    E-cigarette/Vaping Use     Start Date     Passive Exposure     Quit Date     Counseling Given     Comments               Family History:       Reviewed and negative in regards to presenting illness/complaint. Problem Relation Age of Onset    Breast Cancer Maternal Grandmother 39    Arthritis Other     Asthma Other     Cancer Other     Diabetes Other     Seizures Other     Stroke Other        REVIEW OF SYSTEMS COMPLETED:   Pertinent positives as noted in the HPI. All other systems reviewed and negative.     PHYSICAL EXAM PERFORMED:    BP (!) 178/129   Pulse (!) 114   Temp 100.3 °F (37.9 °C) (Rectal)   Resp 18   Ht 5' 8\" (1.727 m)   Wt 148 lb 9.4 oz (67.4 kg)   SpO2 98%   BMI 22.59 kg/m² General appearance: Thin emaciated female sitting in stretcher she is pleasant and appropriate  HEENT:  Normal cephalic, atraumatic without obvious deformity. Pupils equal, round, and reactive to light. Extra ocular muscles intact. Conjunctivae/corneas clear. Neck: Supple, with full range of motion. No jugular venous distention. Trachea midline. Respiratory: Scattered Rales and rhonchi  Cardiovascular:  Regular rate and rhythm with normal S1/S2 without murmurs, rubs or gallops. Abdomen: Soft, non-tender, non-distended with normal bowel sounds. Musculoskeletal:  No clubbing, cyanosis or edema bilaterally. Full range of motion without deformity. Skin: Skin color, texture, turgor normal.  No rashes or lesions. Neurologic:  Neurovascularly intact without any focal sensory/motor deficits. Cranial nerves: II-XII intact, grossly non-focal.  Psychiatric:  Alert and oriented, thought content appropriate, normal insight  Capillary Refill: Brisk,3 seconds, normal  Peripheral Pulses: +2 palpable, equal bilaterally       Labs:     Recent Labs     09/30/22  0550 10/01/22  0600 10/02/22  1229   WBC 6.4 8.3 18.6*   HGB 8.1* 7.3* 9.3*   HCT 24.2* 22.2* 29.5*    181 373     Recent Labs     09/30/22  0550 10/01/22  0600 10/02/22  1229    141 141   K 3.9 3.8 3.9    103 102   CO2 27 28 25   BUN 13 13 7   CREATININE 2.3* 2.6* 2.0*   CALCIUM 8.0* 8.2* 8.6   PHOS 4.6 5.3*  --      Recent Labs     10/02/22  1229   AST 13*   ALT 7*   BILITOT 0.3   ALKPHOS 125     No results for input(s): INR in the last 72 hours. No results for input(s): [place] in the last 72 hours.     Urinalysis:      Lab Results   Component Value Date/Time    NITRU POSITIVE 10/02/2022 12:56 PM    WBCUA 10-20 10/02/2022 12:56 PM    BACTERIA 4+ 10/02/2022 12:56 PM    RBCUA >100 10/02/2022 12:56 PM    BLOODU LARGE 10/02/2022 12:56 PM    SPECGRAV 1.015 10/02/2022 12:56 PM    GLUCOSEU Negative 10/02/2022 12:56 PM    GLUCOSEU NEGATIVE 07/31/2010 02:59 PM       Radiology:     Independently reviewed by me    CT HEAD WO CONTRAST   Final Result   No acute intracranial abnormality. XR CHEST PORTABLE   Final Result   Developing small bilateral pleural effusions, left greater than right. Persistent multifocal pulmonary nodules, some of which are cavitary,   suggestive of septic emboli given patient history. Consults:    PHARMACY TO DOSE VANCOMYCIN  PHARMACY TO DOSE MEDICATION  IP CONSULT TO INFECTIOUS DISEASES  IP CONSULT TO PHARMACY    ASSESSMENT:    Seizure-possibly due to hypercarbia, respiratory depression from active drug use  No prior history  Continue monitoring with neuro checks  Will give Keppra 500 every 12  Neurology consult  Not convinced patient will need long-term antiepileptic therapy I think that this is more likely related to drug use  Will defer neuroimaging to neurology    Acute hypercarbic respiratory failure  Improving. Repeat VBG's show improvement of acidemia  Narcan as needed  Continue oxygen  I am convinced this is all related to active drug use with respiratory depression from fentanyl      Septic emboli  Continue Vanco and cefepime for now  Will ask infectious disease to render opinion  Very possibly could be continued back on her discharge antibiotics    End-stage renal disease on hemodialysis  Last dialysis yesterday  No evidence of fluid overload  Will consult Dr Anita Sharp from [facility] who is familiar with this patient from yesterday      IV drug use  Patient is clearly making bad choices about her health  I informed the patient that she is going to die if she continues to use drugs.   She seems to be in some denial and is not truthful with me  Unfortunately I am not optimistic about her long-term prognosis and ability to stay clean and she could end up being a statistic like many others from our opioid epidemic          DVT Prophylaxis: Heparin subcu  Diet: No diet orders on file  Code Status: Prior    Dispo -2 to 3 days       Renée Cazares MD    Thank you No primary care provider on file. for the opportunity to be involved in this patient's care. If you have any questions or concerns please feel free to contact me at [phone].

## 2022-10-02 NOTE — ED NOTES
ED SBAR report provider to BLAISE Capps. Patient to be transported to Room 5252 via stretcher by RN. Patient transported with bedside cardiac monitor and with IV medications infusing. IV site clean, dry, and intact. MEWS score and pain assessed as 4 and documented. Updated patient on plan of care.          Jhony Beatty RN  10/02/22 3943

## 2022-10-02 NOTE — ED NOTES
Left upper arm PIV infiltrated, Swelling noted from elbow to shoulder.  Pt c/o tightness in arm  Dr. Quincy Castellanos made aware, pressure dressing applied             Janeal Nyhan, RN  10/02/22 5718

## 2022-10-02 NOTE — CONSULTS
Clinical Pharmacy Note  Vancomycin Consult    Pharmacy consult received for one-time dose of vancomycin in the Emergency Department per Eusebio Richardson, APRN-CNP. Ht Readings from Last 1 Encounters:   10/02/22 5' 8\" (1.727 m)        Wt Readings from Last 1 Encounters:   10/02/22 148 lb 9.4 oz (67.4 kg)         Assessment/Plan:  Vancomycin 1000 mg IV x 1 in ED. If Vancomycin is to continue on admission and pharmacy is to manage dosing, please re-consult with admission orders.       [id number] [facility] [place], PharmD  10/2/2022 2:32 PM

## 2022-10-02 NOTE — ED PROVIDER NOTES
ED Attending Attestation Note    This patient was seen by the advanced practice provider. I personally saw the patient and performed a substantive portion of the visit including all aspects of the medical decision making. Briefly, 40 y.o. female presents with EMS for evaluation of seizure like activity today. Seizure activity was right lower extremity shaking lasted approximately 3 minutes and gaze deviation to the right. Episode happened at 0911 34 76 33. No history of seizures. Does have history of IV drug use and was recently hospitalized for septic emboli and sepsis. Was discharged yesterday. Focused exam:   Gen: 40 y.o. female, ill-appearing  HEENT: NCAT. Pupils 2 mm and sluggish bilaterally. EOMI. CV: Tachycardic, regular rhythm w/o MRG  Lungs: Rhonchi throughout all lung fields, no wheezing  Abdomen: Soft, nontender, nondistended. No rebound/guarding. Neuro: GCS 10, lethargic, moving all extremities, no facial droop,    The Ekg interpreted by me shows  Sinus tachycardia with a rate of 123, normal axis, , QRS 78, QTc 458, no ST elevations, nonspecific ST changes, rhythm change from normal sinus rhythm to sinus tachycardia with compared EKG from 9/18/2022. MDM:   Patient seen and evaluated. History and physical as above. Patient arrives tachycardic and febrile and ill-appearing. Recent history of admission for sepsis and septic emboli. Patient found to have UTI. Leukocytosis at 18. 6. Hemoglobin 9.3. Patient's creatinine 2.0 improved from yesterday 2.6. Lactic acid 2.1. Patient does meet criteria for sepsis. Broad-spectrum antibiotics started. 1:36 PM EDT  Patient's pH 7.22 with PCO2 of 73.1. Patient reassessed. Patient is much more alert and was removed from nonrebreather and placed on nasal cannula. Will allow patient to breathe on nasal cannula and recheck VBG shortly. If patient decompensates, did discuss with mother that patient will need to be intubated.   Mother states understanding. 2:47 PM EDT  Patient's repeat pH on VBG 7.38 with PCO2 of 51.3. We will continue with nasal cannula at this time. Plan for admission for sepsis. CRITICAL CARE  I personally saw the patient and independently provided 20 minutes of non-concurrent critical care out of the total shared critical care time provided. This excludes seperately billable procedures. Critical care time was provided for sepsis and hypoxia that required close evaluation and/or intervention with concern for potential patient decompensation. For further details of the patient's emergency department visit, please see the advanced practice provider's documentation. Char Zamora MD     This report has been produced using speech recognition software and may contain errors related to that system including errors in grammar, punctuation, and spelling, as well as words and phrases that may be inappropriate. If there are any questions or concerns please feel free to contact the dictating provider for clarification.         Char Zamora MD  10/02/22 0448

## 2022-10-02 NOTE — ED NOTES
Patient being transported up via ED stretcher with ED Tech up to room 5252;   Patient is:  A&O x4   HR 94 NSR  O2 100% on 2L NC  BP: 152/113 - EDMD and admitting Doc is aware of increase BP  Temp: 97.9  States pain is improving from 4/10 with tylenol;    1915 Ashley Maki RN  10/02/22 0977

## 2022-10-02 NOTE — PROGRESS NOTES
4 Eyes Skin Assessment     NAME:  Claudette Manning  YOB: 1980  MEDICAL RECORD NUMBER:  [id number]    The patient is being assess for  Admission    I agree that 2 RN's have performed a thorough Head to Toe Skin Assessment on the patient. ALL assessment sites listed below have been assessed. Areas assessed by both nurses:    Head, Face, Ears, Shoulders, Back, Chest, Arms, Elbows, Hands, Sacrum. Buttock, Coccyx, Ischium, and Legs. Feet and Heels        Does the Patient have a Wound?    RLQ drain        Jefry Prevention initiated:  Yes   Wound Care Orders initiated:  No    Pressure Injury (Stage 3,4, Unstageable, DTI, NWPT, and Complex wounds) if present place referral/consult order under [de-identified] No    New and Established Ostomies if present place consult order under : No      Nurse 1 eSignature: Electronically signed by Leena Carr RN on 10/2/22 at 6:22 PM EDT    **SHARE this note so that the co-signing nurse is able to place an eSignature**    Nurse 2 eSignature: Electronically signed by Amaya Jamison RN on 10/3/22 at 6:48 AM EDT

## 2022-10-03 ENCOUNTER — APPOINTMENT (OUTPATIENT)
Dept: MRI IMAGING | Age: 42
DRG: 720 | End: 2022-10-03
Payer: MEDICAID

## 2022-10-03 PROBLEM — N17.9 AKI (ACUTE KIDNEY INJURY) (HCC): Status: ACTIVE | Noted: 2022-10-03

## 2022-10-03 PROBLEM — J98.4 PULMONARY CAVITARY LESION: Status: ACTIVE | Noted: 2022-10-03

## 2022-10-03 PROBLEM — A48.8 SERRATIA MARCESCENS INFECTION: Status: ACTIVE | Noted: 2022-09-15

## 2022-10-03 PROBLEM — R78.81 BACTEREMIA: Status: ACTIVE | Noted: 2022-09-15

## 2022-10-03 PROBLEM — M46.28 ACUTE OSTEOMYELITIS OF SACRUM (HCC): Status: ACTIVE | Noted: 2022-10-03

## 2022-10-03 LAB
ABO/RH: NORMAL
ALBUMIN SERPL-MCNC: 2.2 G/DL (ref 3.4–5)
ANION GAP SERPL CALCULATED.3IONS-SCNC: 10 MMOL/L (ref 3–16)
ANTIBODY SCREEN: NORMAL
BASOPHILS ABSOLUTE: 0.1 K/UL (ref 0–0.2)
BASOPHILS ABSOLUTE: 0.1 K/UL (ref 0–0.2)
BASOPHILS RELATIVE PERCENT: 0.9 %
BASOPHILS RELATIVE PERCENT: 1.3 %
BLOOD BANK DISPENSE STATUS: NORMAL
BLOOD BANK PRODUCT CODE: NORMAL
BPU ID: NORMAL
BUN BLDV-MCNC: 10 MG/DL (ref 7–20)
CALCIUM SERPL-MCNC: 7.6 MG/DL (ref 8.3–10.6)
CHLORIDE BLD-SCNC: 106 MMOL/L (ref 99–110)
CO2: 24 MMOL/L (ref 21–32)
CREAT SERPL-MCNC: 2.1 MG/DL (ref 0.6–1.1)
DESCRIPTION BLOOD BANK: NORMAL
EKG ATRIAL RATE: 123 BPM
EKG DIAGNOSIS: NORMAL
EKG P AXIS: 61 DEGREES
EKG P-R INTERVAL: 132 MS
EKG Q-T INTERVAL: 320 MS
EKG QRS DURATION: 78 MS
EKG QTC CALCULATION (BAZETT): 458 MS
EKG R AXIS: 14 DEGREES
EKG T AXIS: 35 DEGREES
EKG VENTRICULAR RATE: 123 BPM
EOSINOPHILS ABSOLUTE: 0.2 K/UL (ref 0–0.6)
EOSINOPHILS ABSOLUTE: 0.3 K/UL (ref 0–0.6)
EOSINOPHILS RELATIVE PERCENT: 2.1 %
EOSINOPHILS RELATIVE PERCENT: 2.6 %
GFR AFRICAN AMERICAN: 31
GFR NON-AFRICAN AMERICAN: 26
GLUCOSE BLD-MCNC: 102 MG/DL (ref 70–99)
HCT VFR BLD CALC: 19.8 % (ref 36–48)
HCT VFR BLD CALC: 19.9 % (ref 36–48)
HCT VFR BLD CALC: 23.3 % (ref 36–48)
HEMOGLOBIN: 6.4 G/DL (ref 12–16)
HEMOGLOBIN: 6.6 G/DL (ref 12–16)
HEMOGLOBIN: 7.4 G/DL (ref 12–16)
LACTIC ACID, SEPSIS: 1.7 MMOL/L (ref 0.4–1.9)
LACTIC ACID, SEPSIS: 1.8 MMOL/L (ref 0.4–1.9)
LYMPHOCYTES ABSOLUTE: 1.8 K/UL (ref 1–5.1)
LYMPHOCYTES ABSOLUTE: 2.3 K/UL (ref 1–5.1)
LYMPHOCYTES RELATIVE PERCENT: 15.9 %
LYMPHOCYTES RELATIVE PERCENT: 22.2 %
MCH RBC QN AUTO: 28.2 PG (ref 26–34)
MCH RBC QN AUTO: 28.4 PG (ref 26–34)
MCHC RBC AUTO-ENTMCNC: 32.2 G/DL (ref 31–36)
MCHC RBC AUTO-ENTMCNC: 33.3 G/DL (ref 31–36)
MCV RBC AUTO: 85.2 FL (ref 80–100)
MCV RBC AUTO: 87.5 FL (ref 80–100)
MONOCYTES ABSOLUTE: 1.3 K/UL (ref 0–1.3)
MONOCYTES ABSOLUTE: 1.4 K/UL (ref 0–1.3)
MONOCYTES RELATIVE PERCENT: 11.8 %
MONOCYTES RELATIVE PERCENT: 12.2 %
NEUTROPHILS ABSOLUTE: 6.4 K/UL (ref 1.7–7.7)
NEUTROPHILS ABSOLUTE: 8 K/UL (ref 1.7–7.7)
NEUTROPHILS RELATIVE PERCENT: 61.7 %
NEUTROPHILS RELATIVE PERCENT: 69.3 %
ORGANISM: ABNORMAL
PDW BLD-RTO: 16.4 % (ref 12.4–15.4)
PDW BLD-RTO: 16.5 % (ref 12.4–15.4)
PHOSPHORUS: 4.1 MG/DL (ref 2.5–4.9)
PLATELET # BLD: 218 K/UL (ref 135–450)
PLATELET # BLD: 250 K/UL (ref 135–450)
PMV BLD AUTO: 8 FL (ref 5–10.5)
PMV BLD AUTO: 8.2 FL (ref 5–10.5)
POTASSIUM SERPL-SCNC: 3.5 MMOL/L (ref 3.5–5.1)
RBC # BLD: 2.26 M/UL (ref 4–5.2)
RBC # BLD: 2.33 M/UL (ref 4–5.2)
SODIUM BLD-SCNC: 140 MMOL/L (ref 136–145)
URINE CULTURE, ROUTINE: ABNORMAL
URINE CULTURE, ROUTINE: NORMAL
WBC # BLD: 10.3 K/UL (ref 4–11)
WBC # BLD: 11.6 K/UL (ref 4–11)

## 2022-10-03 PROCEDURE — 36415 COLL VENOUS BLD VENIPUNCTURE: CPT

## 2022-10-03 PROCEDURE — 6360000002 HC RX W HCPCS: Performed by: INTERNAL MEDICINE

## 2022-10-03 PROCEDURE — C9113 INJ PANTOPRAZOLE SODIUM, VIA: HCPCS | Performed by: INTERNAL MEDICINE

## 2022-10-03 PROCEDURE — 83605 ASSAY OF LACTIC ACID: CPT

## 2022-10-03 PROCEDURE — 86923 COMPATIBILITY TEST ELECTRIC: CPT

## 2022-10-03 PROCEDURE — 85018 HEMOGLOBIN: CPT

## 2022-10-03 PROCEDURE — 6370000000 HC RX 637 (ALT 250 FOR IP): Performed by: INTERNAL MEDICINE

## 2022-10-03 PROCEDURE — 2580000003 HC RX 258: Performed by: INTERNAL MEDICINE

## 2022-10-03 PROCEDURE — 99255 IP/OBS CONSLTJ NEW/EST HI 80: CPT | Performed by: INTERNAL MEDICINE

## 2022-10-03 PROCEDURE — 86901 BLOOD TYPING SEROLOGIC RH(D): CPT

## 2022-10-03 PROCEDURE — P9016 RBC LEUKOCYTES REDUCED: HCPCS

## 2022-10-03 PROCEDURE — 94760 N-INVAS EAR/PLS OXIMETRY 1: CPT

## 2022-10-03 PROCEDURE — 93010 ELECTROCARDIOGRAM REPORT: CPT | Performed by: INTERNAL MEDICINE

## 2022-10-03 PROCEDURE — 2060000000 HC ICU INTERMEDIATE R&B

## 2022-10-03 PROCEDURE — 86850 RBC ANTIBODY SCREEN: CPT

## 2022-10-03 PROCEDURE — 80069 RENAL FUNCTION PANEL: CPT

## 2022-10-03 PROCEDURE — 85014 HEMATOCRIT: CPT

## 2022-10-03 PROCEDURE — 70551 MRI BRAIN STEM W/O DYE: CPT

## 2022-10-03 PROCEDURE — 2500000003 HC RX 250 WO HCPCS: Performed by: INTERNAL MEDICINE

## 2022-10-03 PROCEDURE — 85025 COMPLETE CBC W/AUTO DIFF WBC: CPT

## 2022-10-03 PROCEDURE — 36430 TRANSFUSION BLD/BLD COMPNT: CPT

## 2022-10-03 PROCEDURE — 86900 BLOOD TYPING SEROLOGIC ABO: CPT

## 2022-10-03 PROCEDURE — 72195 MRI PELVIS W/O DYE: CPT

## 2022-10-03 RX ORDER — LEVETIRACETAM 5 MG/ML
500 INJECTION INTRAVASCULAR DAILY
Status: DISCONTINUED | OUTPATIENT
Start: 2022-10-04 | End: 2022-10-10 | Stop reason: HOSPADM

## 2022-10-03 RX ORDER — SODIUM CHLORIDE 9 MG/ML
INJECTION, SOLUTION INTRAVENOUS PRN
Status: DISCONTINUED | OUTPATIENT
Start: 2022-10-03 | End: 2022-10-10 | Stop reason: HOSPADM

## 2022-10-03 RX ORDER — CLINDAMYCIN HYDROCHLORIDE 150 MG/1
300 CAPSULE ORAL EVERY 8 HOURS SCHEDULED
Status: DISCONTINUED | OUTPATIENT
Start: 2022-10-03 | End: 2022-10-10 | Stop reason: HOSPADM

## 2022-10-03 RX ADMIN — CEFTAROLINE FOSAMIL 400 MG: 600 POWDER, FOR SOLUTION INTRAVENOUS at 11:11

## 2022-10-03 RX ADMIN — ACETAMINOPHEN 650 MG: 325 TABLET, FILM COATED ORAL at 08:17

## 2022-10-03 RX ADMIN — SODIUM CHLORIDE: 9 INJECTION, SOLUTION INTRAVENOUS at 06:31

## 2022-10-03 RX ADMIN — SODIUM CHLORIDE: 9 INJECTION, SOLUTION INTRAVENOUS at 00:07

## 2022-10-03 RX ADMIN — CEFEPIME 2000 MG: 2 INJECTION, POWDER, FOR SOLUTION INTRAVENOUS at 02:19

## 2022-10-03 RX ADMIN — CLINDAMYCIN PHOSPHATE 900 MG: 900 INJECTION, SOLUTION INTRAVENOUS at 04:41

## 2022-10-03 RX ADMIN — CLINDAMYCIN HYDROCHLORIDE 300 MG: 150 CAPSULE ORAL at 14:13

## 2022-10-03 RX ADMIN — HYDRALAZINE HYDROCHLORIDE 10 MG: 20 INJECTION INTRAMUSCULAR; INTRAVENOUS at 11:09

## 2022-10-03 RX ADMIN — LEVETIRACETAM 500 MG: 5 INJECTION INTRAVENOUS at 06:31

## 2022-10-03 RX ADMIN — ACETAMINOPHEN 650 MG: 325 TABLET, FILM COATED ORAL at 14:13

## 2022-10-03 RX ADMIN — CLINDAMYCIN HYDROCHLORIDE 300 MG: 150 CAPSULE ORAL at 21:48

## 2022-10-03 RX ADMIN — CEFTAROLINE FOSAMIL 400 MG: 600 POWDER, FOR SOLUTION INTRAVENOUS at 21:52

## 2022-10-03 RX ADMIN — Medication 40 MG: at 14:14

## 2022-10-03 ASSESSMENT — PAIN DESCRIPTION - DESCRIPTORS
DESCRIPTORS: ACHING
DESCRIPTORS: ACHING;DISCOMFORT;THROBBING

## 2022-10-03 ASSESSMENT — PAIN DESCRIPTION - LOCATION
LOCATION: HEAD
LOCATION: HEAD

## 2022-10-03 ASSESSMENT — PAIN SCALES - GENERAL
PAINLEVEL_OUTOF10: 5
PAINLEVEL_OUTOF10: 10
PAINLEVEL_OUTOF10: 0

## 2022-10-03 ASSESSMENT — PAIN - FUNCTIONAL ASSESSMENT
PAIN_FUNCTIONAL_ASSESSMENT: ACTIVITIES ARE NOT PREVENTED
PAIN_FUNCTIONAL_ASSESSMENT: PREVENTS OR INTERFERES SOME ACTIVE ACTIVITIES AND ADLS

## 2022-10-03 ASSESSMENT — PAIN DESCRIPTION - PAIN TYPE: TYPE: ACUTE PAIN

## 2022-10-03 ASSESSMENT — PAIN DESCRIPTION - ONSET: ONSET: GRADUAL

## 2022-10-03 ASSESSMENT — PAIN DESCRIPTION - FREQUENCY: FREQUENCY: INTERMITTENT

## 2022-10-03 NOTE — PLAN OF CARE
Problem: Discharge Planning  Goal: Discharge to home or other facility with appropriate resources  10/3/2022 0922 by Sunny Iraheta RN  Outcome: Progressing  10/3/2022 0051 by Shanda Beaver RN  Outcome: Not Progressing     Problem: Pain  Goal: Verbalizes/displays adequate comfort level or baseline comfort level  10/3/2022 0922 by Sunny Iraheta RN  Outcome: Progressing  10/3/2022 0051 by Shanda Beaver RN  Outcome: Not Progressing     Problem: Skin/Tissue Integrity  Goal: Absence of new skin breakdown  Description: 1. Monitor for areas of redness and/or skin breakdown  2. Assess vascular access sites hourly  3. Every 4-6 hours minimum:  Change oxygen saturation probe site  4. Every 4-6 hours:  If on nasal continuous positive airway pressure, respiratory therapy assess nares and determine need for appliance change or resting period. 10/3/2022 3846 by Sunny Iraheta RN  Outcome: Progressing  10/3/2022 0051 by Shanda Beaver RN  Outcome: Progressing     Problem: Safety - Adult  Goal: Free from fall injury  10/3/2022 0922 by Sunny Iraheta RN  Outcome: Progressing  Note: Fall precautions in place. Bed locked and in lowest position. Alarm on. Call light within reach.    10/3/2022 0051 by Shanda Beaver RN  Outcome: Progressing     Problem: ABCDS Injury Assessment  Goal: Absence of physical injury  10/3/2022 6856 by Sunny Iraheta RN  Outcome: Progressing  10/3/2022 0051 by Shanda Beaver RN  Outcome: Progressing     Problem: Discharge Planning  Goal: Discharge to home or other facility with appropriate resources  10/3/2022 0922 by Sunny Iraheta RN  Outcome: Progressing  10/3/2022 0051 by Shanda Beaver RN  Outcome: Not Progressing     Problem: Pain  Goal: Verbalizes/displays adequate comfort level or baseline comfort level  10/3/2022 0922 by Sunny Iraheta RN  Outcome: Progressing  10/3/2022 0051 by Shanda Beaver RN  Outcome: Not Progressing

## 2022-10-03 NOTE — CARE COORDINATION
10/03/22 0944   Readmission Assessment   Number of Days since last admission? 1-7 days   Previous Disposition Home with Family   Who is being Yvon Spar   (per chart review)   What was the patient's/caregiver's perception as to why they think they needed to return back to the hospital? Other (Comment)  (seizure like activity)   Did you visit your Primary Care Physician after you left the hospital, before you returned this time? No   Why weren't you able to visit your PCP? Did not have an appointment   Did you see a specialist, such as Cardiac, Pulmonary, Orthopedic Physician, etc. after you left the hospital? No   Who advised the patient to return to the hospital? Caregiver   Does the patient report anything that got in the way of taking their medications? No   In our efforts to provide the best possible care to you and others like you, can you think of anything that we could have done to help you after you left the hospital the first time, so that you might not have needed to return so soon?  Other (Comment)  (n/a)

## 2022-10-03 NOTE — PROGRESS NOTES
Hospitalist Progress Note      PCP: No primary care provider on file. Date of Admission: 10/2/2022      Subjective: feels tired,  no SOB or chest pain, she is having headache, denies using fentanyl. Medications:  Reviewed    Infusion Medications    sodium chloride      sodium chloride 150 mL/hr at 10/03/22 0631     Scheduled Medications    ceftaroline fosamil (TEFLARO) IVPB  400 mg IntraVENous Q12H    clindamycin  300 mg Oral 3 times per day    sodium chloride flush  5-40 mL IntraVENous 2 times per day    enoxaparin  40 mg SubCUTAneous Nightly    lactated ringers bolus  30 mL/kg IntraVENous Once    levETIRAcetam  500 mg IntraVENous Q12H    [START ON 10/4/2022] daptomycin (CUBICIN) IVPB  8 mg/kg IntraVENous Once per day on Tue Thu Sat     PRN Meds: sodium chloride flush, sodium chloride, acetaminophen **OR** acetaminophen, naloxone, hydrALAZINE      Intake/Output Summary (Last 24 hours) at 10/3/2022 1222  Last data filed at 10/3/2022 0435  Gross per 24 hour   Intake 300 ml   Output 150 ml   Net 150 ml       Physical Exam Performed:    BP (!) 160/106   Pulse 91   Temp 97.5 °F (36.4 °C) (Oral)   Resp 26   Ht 5' 8\" (1.727 m)   Wt 148 lb 13 oz (67.5 kg)   SpO2 97%   BMI 22.63 kg/m²     General appearance: No apparent distress  Neck: Supple  Respiratory:  Normal respiratory effort. Clear to auscultation, bilaterally without Rales/Wheezes/Rhonchi. Cardiovascular: Regular rate and rhythm with normal S1/S2 without murmurs, rubs or gallops. Abdomen: Soft, non-tender,  Musculoskeletal: No clubbing, cyanosis o  Skin: Skin color, texture, turgor normal.  No rashes or lesions.   Neurologic:  No focal weakness  Psychiatric: Alert and oriented  Capillary Refill: Brisk, 3 seconds, normal   Peripheral Pulses: +2 palpable, equal bilaterally       Labs:   Recent Labs     10/01/22  0600 10/02/22  1229   WBC 8.3 18.6*   HGB 7.3* 9.3*   HCT 22.2* 29.5*    373     Recent Labs     10/01/22  0600 10/02/22  1229  141   K 3.8 3.9    102   CO2 28 25   BUN 13 7   CREATININE 2.6* 2.0*   CALCIUM 8.2* 8.6   PHOS 5.3*  --      Recent Labs     10/02/22  1229   AST 13*   ALT 7*   BILITOT 0.3   ALKPHOS 125     No results for input(s): INR in the last 72 hours. No results for input(s): [place] in the last 72 hours. Urinalysis:      Lab Results   Component Value Date/Time    NITRU POSITIVE 10/02/2022 12:56 PM    WBCUA 10-20 10/02/2022 12:56 PM    BACTERIA 4+ 10/02/2022 12:56 PM    RBCUA >100 10/02/2022 12:56 PM    BLOODU LARGE 10/02/2022 12:56 PM    SPECGRAV 1.015 10/02/2022 12:56 PM    GLUCOSEU Negative 10/02/2022 12:56 PM    GLUCOSEU NEGATIVE 07/31/2010 02:59 PM       Radiology:  CT HEAD WO CONTRAST   Final Result   No acute intracranial abnormality. XR CHEST PORTABLE   Final Result   Developing small bilateral pleural effusions, left greater than right. Persistent multifocal pulmonary nodules, some of which are cavitary,   suggestive of septic emboli given patient history. MRI PELVIS WO CONTRAST    (Results Pending)           Assessment/Plan:    Active Hospital Problems    Diagnosis     Sepsis ([facility] [address] [A41.9]      Priority: Medium     Seizures episode, one episode happening after discharge from the hospital, likely related to use of fentanyl, patient denies, neurology consulted given Keppra. MRI brain to rule out intracranial abscess. Acute respiratory failure with hypercarbia, improved  Septic emboli, continue regimen with cefepime and vancomycin  End-stage renal disease nephrology consulted on dialysis  IV drug use, counseled again. Anemia, worsening, no obvious source of bleeding, transfusing, frequent H&H, consulted GI, added pantoprazole, discussed with nurse       Diet: ADULT DIET;  Regular  Code Status: Full Code          Colette Banegas MD

## 2022-10-03 NOTE — PLAN OF CARE
Problem: Discharge Planning  Goal: Discharge to home or other facility with appropriate resources  Outcome: Not Progressing     Problem: Pain  Goal: Verbalizes/displays adequate comfort level or baseline comfort level  Outcome: Not Progressing     Problem: Skin/Tissue Integrity  Goal: Absence of new skin breakdown  Description: 1. Monitor for areas of redness and/or skin breakdown  2. Assess vascular access sites hourly  3. Every 4-6 hours minimum:  Change oxygen saturation probe site  4. Every 4-6 hours:  If on nasal continuous positive airway pressure, respiratory therapy assess nares and determine need for appliance change or resting period.   Outcome: Progressing     Problem: Safety - Adult  Goal: Free from fall injury  Outcome: Progressing     Problem: ABCDS Injury Assessment  Goal: Absence of physical injury  Outcome: Progressing     Problem: Discharge Planning  Goal: Discharge to home or other facility with appropriate resources  Outcome: Not Progressing     Problem: Pain  Goal: Verbalizes/displays adequate comfort level or baseline comfort level  Outcome: Not Progressing

## 2022-10-03 NOTE — PROGRESS NOTES
Limited IV access on pt-call placed to on call Nephro by BLAISE Villeda to obtain approval for picc or midline. Awaiting response.   Electronically signed by Dontrell Morse RN on 10/3/22 at 5:57 PM EDT

## 2022-10-03 NOTE — CONSULTS
GASTROENTEROLOGY INPATIENT CONSULTATION        IDENTIFYING DATA/REASON FOR CONSULTATION   PATIENT:  Ernesto Luke  MRN:  [id number]  ADMIT DATE: 10/2/2022  TIME OF EVALUATION: 10/3/2022 2:12 PM  HOSPITAL STAY:   LOS: 1 day     REASON FOR CONSULTATION:  anemia, reported black stool    HISTORY OF PRESENT ILLNESS   Ernesto Luke is a 40 y.o. female with a PMH of IVDU, depression, back pain, partial hysterectomy, tubal ligation who presented on 10/2/2022 with suspected seizures. We have been consulted regarding anemia and reported black stools. Patient was just discharged from the hospital two days ago after a prolonged 19-day stay for infected endocarditis with septic emboli and MRSA Serratia bacteremia due to IV drug use. Patient also had SI joint osteomyelitis and retroperitoneal abscess for which he underwent drainage on last admission. Patient is a dialysis patient. Patient was at home when she passed out and had shaking jerking motions which her father thought might have been a seizure. She was brought back to the emergency room for care. She states she had some dark brown stools recently. She reports a nose bleed approximately one week ago. Denies any vaginal bleeding or rectal bleeding. No abdominal pain, nausea, vomiting. No lightheadedness. Labs showed a hgb of 6.4. but during my visit patient's CBC was getting redrawn to ensure hgb was not a lab error. She does also report diarrhea which started during her hospital admission. LFTs are normal. Lactic acid was normal. Cr 2.1 and BUN was 10. Tox screen was positive for opiates and fentanyl. Denies any prior endoscopic evaluation.          PAST MEDICAL, SURGICAL, FAMILY, and SOCIAL HISTORY     Past Medical History:   Diagnosis Date    ADHD (attention deficit hyperactivity disorder)     Arthritis     Back pain     Depression     Migraine     Neutrophilia 9/15/2022     Past Surgical History:   Procedure Laterality Date    IR INSERT NON TUNNELED CV CATH LESS THAN 5 YEARS Right 09/20/2022    Cathalene Hams; RIJ access; 15cm; Dr. Edin Griggs    IR NONTUNNELED VASCULAR CATHETER  09/20/2022    IR NONTUNNELED VASCULAR CATHETER 9/20/2022 [id number]    KNEE ARTHROSCOPY  10/05/2010    PARTIAL HYSTERECTOMY (CERVIX NOT REMOVED)      TUBAL LIGATION  01/01/2004    TUNNELED VENOUS CATHETER PLACEMENT Right 09/23/2022    Permacath; RIJ access; 19cm; Dr. Theone Crigler     Family History   Problem Relation Age of Onset    Breast Cancer Maternal Grandmother 39    Arthritis Other     Asthma Other     Cancer Other     Diabetes Other     Seizures Other     Stroke Other      Social History     Socioeconomic History    Marital status: Single   Tobacco Use    Smoking status: Every Day     Packs/day: 0.50     Years: 2.00     Pack years: 1.00     Types: Cigarettes    Smokeless tobacco: Never   Substance and Sexual Activity    Alcohol use: Yes     Comment: occas    Drug use: No       MEDICATIONS   SCHEDULED:  ceftaroline fosamil (TEFLARO) IVPB, 400 mg, Q12H  clindamycin, 300 mg, 3 times per day  pantoprazole (PROTONIX) 40 mg injection, 40 mg, Q12H  sodium chloride flush, 5-40 mL, 2 times per day  enoxaparin, 40 mg, Nightly  lactated ringers bolus, 30 mL/kg, Once  levETIRAcetam, 500 mg, Q12H  [START ON 10/4/2022] daptomycin (CUBICIN) IVPB, 8 mg/kg, Once per day on Tue Thu Sat      FLUIDS/DRIPS:     sodium chloride      sodium chloride      sodium chloride 150 mL/hr at 10/03/22 0631     PRNs: sodium chloride, , PRN  sodium chloride flush, 5-40 mL, PRN  sodium chloride, , PRN  acetaminophen, 650 mg, Q6H PRN   Or  acetaminophen, 650 mg, Q6H PRN  naloxone, 0.4 mg, PRN  hydrALAZINE, 10 mg, Q6H PRN      ALLERGIES:  She   Allergies   Allergen Reactions    Ultram [Tramadol Hcl] Swelling    Robaxin [Methocarbamol] Other (See Comments)     Made patient very dizzy    Penicillins        REVIEW OF SYSTEMS   Pertinent ROS noted in HPI    PHYSICAL EXAM     Vitals:    10/03/22 0308 10/03/22 0800 10/03/22 3442 10/03/22 1100   BP: 139/89 (!) 154/108  (!) 160/106   Pulse: 91 85 91 91   Resp: 21 19 24 26   Temp: 98.3 °F (36.8 °C) 97.9 °F (36.6 °C)  97.5 °F (36.4 °C)   TempSrc: Oral Oral  Oral   SpO2: 95% 97%  97%   Weight: 148 lb 13 oz (67.5 kg)      Height:           I/O last 3 completed shifts: In: 300 [P.O.:300]  Out: 150 [Urine:150]      Physical Exam:  General appearance: alert, cooperative, no distress, appears stated age  Eyes: Anicteric  Head: Normocephalic, without obvious abnormality  Lungs: clear to auscultation bilaterally, Normal Effort  Heart: regular rate and rhythm, normal S1 and S2, no murmurs or rubs  Abdomen: soft, non-distended, non-tender. Bowel sounds normal. No masses,  no organomegaly. Extremities: atraumatic, no cyanosis or edema  Skin: warm and dry, no jaundice  Neuro: Grossly intact, A&OX3      LABS AND IMAGING   Laboratory   Recent Labs     10/01/22  0600 10/02/22  1229 10/03/22  0350   WBC 8.3 18.6* 10.3   HGB 7.3* 9.3* 6.4*   HCT 22.2* 29.5* 19.8*   MCV 85.5 87.4 87.5    373 218     Recent Labs     10/01/22  0600 10/02/22  1229 10/03/22  0350    141 140   K 3.8 3.9 3.5    102 106   CO2 28 25 24   PHOS 5.3*  --  4.1   BUN 13 7 10   CREATININE 2.6* 2.0* 2.1*     Recent Labs     10/02/22  1229   AST 13*   ALT 7*   BILITOT 0.3   ALKPHOS 125     No results for input(s): LIPASE, AMYLASE in the last 72 hours. No results for input(s): PROTIME, INR in the last 72 hours. Imaging  CT HEAD WO CONTRAST   Final Result   No acute intracranial abnormality. XR CHEST PORTABLE   Final Result   Developing small bilateral pleural effusions, left greater than right. Persistent multifocal pulmonary nodules, some of which are cavitary,   suggestive of septic emboli given patient history.          MRI PELVIS WO CONTRAST    (Results Pending)   MRI BRAIN WO CONTRAST    (Results Pending)         ASSESSMENT AND RECOMMENDATIONS   Josefa Abrams is a 40 y.o. female with a PMH of IVDU, depression, back pain, partial hysterectomy, tubal ligation who presented on 10/2/2022 with suspected seizures. We have been consulted regarding anemia and reported black stools. Patient was just discharged from the hospital two days ago after a prolonged 19-day stay for infected endocarditis with septic emboli and MRSA Serratia bacteremia due to IV drug use. Patient also had SI joint osteomyelitis and retroperitoneal abscess for which he underwent drainage on last admission. Patient is a dialysis patient. Patient was at home when she passed out and had shaking jerking motions which her father thought might have been a seizure. She was brought back to the emergency room for care. She states she had some dark brown stools recently. She reports a nose bleed approximately one week ago. Denies any vaginal bleeding or rectal bleeding. No abdominal pain, nausea, vomiting. No lightheadedness. Labs showed a hgb of 6.4. but during my visit patient's CBC was getting redrawn to ensure hgb was not a lab error. She does also report diarrhea which started during her hospital admission. LFTs are normal. Lactic acid was normal. Cr 2.1 and BUN was 10. Tox screen was positive for opiates and fentanyl. Denies any prior endoscopic evaluation. IMPRESSION:    Anemia  Seizure, neurology following  Acute hypercarbic respiratory failure  Septic emboli, on vanc and cefepime  ESRD on HD  IVD    RECOMMENDATIONS:    -Will await redrawn CBC results.  -No signs of overt GI bleed. Could consider EGD evaluation due to reported black stools to prior provider. If you have any questions or need any further information, please feel free to contact our consult team.  Thank you for allowing us to participate in the care of [facility]. The note was completed using Dragon voice recognition transcription.  Every effort was made to ensure accuracy; however, inadvertent transcription errors may be present despite my best efforts to edit errors.       Vidya Ward PA-C

## 2022-10-03 NOTE — CONSULTS
[facility]  Phone: [phone]  Fax: [phone]  [email or web address]         40 y.o. female who presented to [facility].  Patient was just discharged from the hospital yesterday after a prolonged 19-day stay for infected endocarditis with septic emboli and MRSA Serratia bacteremia due to IV drug use. Patient also had SI joint osteomyelitis and retroperitoneal abscess for which he underwent drainage on last admission. patient is a dialysis patient and got hemodialysis yesterday prior to discharge. Patient was at home today when she passed out and had shaking jerking motions which her father thought might have been a seizure. Admitted for further work up and management  . Renal cons called for MARYAN . ROS:  C/o weakness   No CP /SOB /GEIGER . Fever or chills   Denies  hematuria or dysuria . Had muscle twitching . [address]:  medications reviewed. Physical Exam:    VITALS:  BP (!) 154/108   Pulse 91   Temp 97.9 °F (36.6 °C) (Oral)   Resp 24   Ht 5' 8\" (1.727 m)   Wt 148 lb 13 oz (67.5 kg)   SpO2 97%   BMI 22.63 kg/m²   24HR INTAKE/OUTPUT:    Intake/Output Summary (Last 24 hours) at 10/3/2022 1032  Last data filed at 10/3/2022 0435  Gross per 24 hour   Intake 300 ml   Output 150 ml   Net 150 ml         Constitutional:  awake   Respiratory:  Decrease BS at  bases   Gastrointestinal:  + tenderness. Normal Bowel Sounds  Cardiovascular:  S1, S2 RRR   Edema:  +  edema  Acc Rt TDC .     DATA:    CBC:  Lab Results   Component Value Date/Time    WBC 18.6 10/02/2022 12:29 PM    RBC 3.37 10/02/2022 12:29 PM    HGB 9.3 10/02/2022 12:29 PM    HCT 29.5 10/02/2022 12:29 PM    MCV 87.4 10/02/2022 12:29 PM    MCH 27.6 10/02/2022 12:29 PM    MCHC 31.6 10/02/2022 12:29 PM    RDW 17.1 10/02/2022 12:29 PM     10/02/2022 12:29 PM    MPV 7.6 10/02/2022 12:29 PM     CMP:  Lab Results   Component Value Date/Time     10/02/2022 12:29 PM    K 3.9 10/02/2022 12:29 PM     10/02/2022 12:29 PM    CO2 25 10/02/2022 12:29 PM    BUN 7 10/02/2022 12:29 PM    CREATININE 2.0 10/02/2022 12:29 PM    GFRAA 33 10/02/2022 12:29 PM    GFRAA >60 07/28/2012 11:20 PM    AGRATIO 0.6 10/02/2022 12:29 PM    LABGLOM 27 10/02/2022 12:29 PM    GLUCOSE 110 10/02/2022 01:17 PM    PROT 7.9 10/02/2022 12:29 PM    PROT 6.5 07/28/2012 11:20 PM    CALCIUM 8.6 10/02/2022 12:29 PM    BILITOT 0.3 10/02/2022 12:29 PM    ALKPHOS 125 10/02/2022 12:29 PM    AST 13 10/02/2022 12:29 PM    ALT 7 10/02/2022 12:29 PM      Hepatic Function Panel:   Lab Results   Component Value Date/Time    ALKPHOS 125 10/02/2022 12:29 PM    ALT 7 10/02/2022 12:29 PM    AST 13 10/02/2022 12:29 PM    PROT 7.9 10/02/2022 12:29 PM    PROT 6.5 07/28/2012 11:20 PM    BILITOT 0.3 10/02/2022 12:29 PM    BILIDIR <0.2 09/27/2022 04:40 AM    IBILI see below 09/27/2022 04:40 AM      Phosphorus:   Lab Results   Component Value Date/Time    PHOS 5.3 10/01/2022 06:00 AM       ASSESSMENT/PLAN:    1-MARYAN   suspect Vancomycin toxicity /ATN/ infectious GN . 1st HD 9/20  . S./p [facility]  9/23 . Kidney Bx on hold  Monitor closely for renal recovery . Will get BX .     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 Monitor level  . 3-IVDA     4 Anemia   Epo with HD    4-Xinulam-ogzpomnx due to hypercarbia, respiratory depression from active drug use  Plan per neurology     6- Acute hypercarbic respiratory failure  Resolved . Etiology drug use ??        Marta Celestin MD,FACP

## 2022-10-03 NOTE — CONSULTS
Neurology Consult Note  Reason for Consult: Possible seizure    Chief complaint: Seizures     Dr Romel Siegel MD asked me to see Hyun Doyle in consultation for evaluation of seizure. History of Present Illness:  Hyun Doyle is a 40 y.o. female who presents with reported seizure activity. I obtained my information via chart review and discussion w/ the patient. Discharged yesterday after 19-day stay for infected endocarditis with septic emboli and MRSA Serratia bacteremia due to IV drug use. Patient also had SI joint osteomyelitis and retroperitoneal abscess for which he underwent drainage on last admission. Patient receives dialysis which she received 10/1 prior to discharge. Pt Reports walking outside with her walker when she felt increasing weakness and needed to come inside where her father helped her up 15 steps. She remembers sitting down to eat and next thing she recalls is waking up in the hospital unsure of what happened. Pt states her father witnessed seizure activity around 1145  where she was staring off to the right side, not responding, followed by shaking/jerking of her right leg that lasted 3 minutes. He then called 911. Upon arrival to the ED patient appeared to be postictal. Pt is unsure of what medication she took or what type of seizures she had prior. /92, , in acute respiratory distress hypercarbic on non-rebreather, meets sepsis criteria, broad spectrum abx with port access. ED reports mentation improved after arrival. Post 19 day hospital stay pt urine tox + fentanyl on recent admission. On assessment patient alert, appearing stable. Denies loss of bowel, bladder, or tongue biting at time of event. Reports feeling weak and tired, does not remember events prior to arriving in the ED. States she has headaches and sees dark spots that come and go which she relates to elevated BP, visual fields intact at time of assessment.  Not currently on any AEDs but does report a history of AED use with seizures as a child which was discontinued at age 12. Medical History:  Past Medical History:   Diagnosis Date    ADHD (attention deficit hyperactivity disorder)     Arthritis     Back pain     Depression     Migraine     Neutrophilia 9/15/2022     Past Surgical History:   Procedure Laterality Date    IR INSERT NON TUNNELED CV CATH LESS THAN 5 YEARS Right 2022    Eunice Hanna; RIJ access; 15cm; Dr. Farheen Soto    IR NONTUNNELED VASCULAR CATHETER  2022    IR NONTUNNELED VASCULAR CATHETER 2022 [facility]    KNEE ARTHROSCOPY  10/05/2010    PARTIAL HYSTERECTOMY (CERVIX NOT REMOVED)      TUBAL LIGATION  2004    TUNNELED VENOUS CATHETER PLACEMENT Right 2022    Permacath; RIJ access; 19cm; Dr. Hardik Mueller Meds:   sodium chloride flush  5-40 mL IntraVENous 2 times per day    enoxaparin  40 mg SubCUTAneous Nightly    lactated ringers bolus  30 mL/kg IntraVENous Once    levETIRAcetam  500 mg IntraVENous Q12H    cefepime  2,000 mg IntraVENous Q12H    [START ON 10/4/2022] daptomycin (CUBICIN) IVPB  8 mg/kg IntraVENous Once per day on  Sat    clindamycin (CLEOCIN) IV  900 mg IntraVENous Q8H     Continuous Infusions:   sodium chloride      sodium chloride 150 mL/hr at 10/03/22 0631     PRN Meds:.sodium chloride flush, sodium chloride, acetaminophen **OR** acetaminophen, naloxone, hydrALAZINE    Medications Prior to Admission: clindamycin (CLEOCIN) 300 MG capsule, Take 1 capsule by mouth 3 times daily  levoFLOXacin (LEVAQUIN) 250 MG tablet, Take 1 tablet by mouth daily  calcium carbonate (TUMS) 500 MG chewable tablet, Take 1 tablet by mouth 3 times daily as needed for Heartburn  [] HYDROcodone-acetaminophen (NORCO) 5-325 MG per tablet, Take 1 tablet by mouth every 6 hours as needed for Pain for up to 3 days.     Allergies   Allergen Reactions    Ultram [Tramadol Hcl] Swelling    Robaxin [Methocarbamol] Other (See Comments)     Made patient very dizzy    Penicillins        Family History   Problem Relation Age of Onset    Breast Cancer Maternal Grandmother 39    Arthritis Other     Asthma Other     Cancer Other     Diabetes Other     Seizures Other     Stroke Other        Social History     Tobacco Use   Smoking Status Every Day    Packs/day: 0.50    Years: 2.00    Pack years: 1.00    Types: Cigarettes   Smokeless Tobacco Never     Social History     Substance and Sexual Activity   Drug Use No     Social History     Substance and Sexual Activity   Alcohol Use Yes    Comment: occas       ROS:  Constitutional- no fever, no weight loss  Eyes- No diplopia. No photophobia. Ears/nose/throat- No dysphagia. No Dysarthria  Cardiovascular- No palpitations. No chest pain  Respiratory- No dyspnea. No Cough  Gastrointestinal- RLQ drain. No Abdominal pain. No Vomiting. Genitourinary- No incontinence. No urinary retention  Musculoskeletal- No myalgia. No arthralgia  Skin-  No easy bruising. Psychiatric- No depression. No anxiety  Endocrine- No diabetes. No thyroid issues. Hematologic- No bleeding difficulty. No fatigue  Neurologic- + weakness, +headache. Exam:  Blood pressure (!) 154/108, pulse 91, temperature 97.9 °F (36.6 °C), temperature source Oral, resp. rate 24, height 5' 8\" (1.727 m), weight 148 lb 13 oz (67.5 kg), SpO2 97 %. Constitutional   Vital signs: BP, HR, and RR reviewed   General Alert, no distress, emaciated, pale  Eyes: unable to visualize the fundi  Cardiovascular: Regular rate and rhythm, No peripheral edema. Psychiatric: cooperative with examination, no  psychotic behavior noted. Neurologic  Mental status:   orientation to person, place, time and situation.     General fund of knowledge grossly intact   Memory grossly intact   Attention intact as able to attend well to the exam     Language fluent in conversation   Comprehension intact; follows simple commands  Cranial nerves:   CN2: Visual Fields full w/o extinction on confrontational testing,   CN 3,4,6: + left gaze nystagmus, extraocular muscles intact. CN5: facial sensation symmetric   CN7:face symmetric without dysarthria,   CN8: hearing grossly intact  CN9: palate elevated symmetrically  CN11: trap full strength on shoulder shrug  CN12: tongue midline with protrusion  Strength: No pronator drift. Generalized weakness in all 4 extremities   Sensory: light touch intact in all 4 extremities. No sensory extinction on double simultaneous stimulation  Cerebellar/coordination: finger nose finger normal without ataxia  Tone: normal in all 4 extremities  Gait: Deferred for safety     Labs  Na 141  K 3.9  Cr 2.0  Folate 6.22    Lactic 2.1  Procalcitonin 0.25    AST 13  ALT 7     Urine tox positive Opiate, fentanyl    UA positive     WBC 18.6  Hgb 9.3  Plt 373    VBG   pH 7.2  CO2 73.1  HCO3 30  O2 54      Studies  CT head wo contrast 10/2/22  Impression   No acute intracranial abnormality. MRI wo 10/3/22  Impression   1. No acute findings or etiology identified to explain the patient's seizures. 2. Several nonspecific foci of white matter signal abnormality. Although   nonspecific, these are likely of doubtful clinical significance. Impression  1. Possible provoked focal motor seizure with altered awareness effecting her RLE. Pt with known osteomyelitis- septic arthritis involving the right sacroiliac joint. In addition to MRSA bacteremia, UTI, IVDU, and MARYAN. It would not be unusual in this setting to have a reoccurrence of childhood seizures. Pt started on Keppra 500 mg BID for seizure prophylaxis pending test results. Recommendations  EEG pending, MRI negative. Decrease Keppra dosing to 500 mg QD d/t renal function. Will reevaluate AEDs at discharge.      Freda Chan NP  [address] [place] [address] [id number] Neurology    A copy of this note was provided for Dr Yvonne Rene MD

## 2022-10-03 NOTE — CONSULTS
Infectious Diseases Inpatient Consult Note      Reason for Consult:  IVDA, MRSA bacteremia, Septic embolism TV  Vegetation and Serratia Bacteremia     Requesting Physician:  Malik Cheatham     Primary Care Physician:  No primary care provider on file. History Obtained From:  [facility] and Patient     CHIEF COMPLAINT:     Chief Complaint   Patient presents with    Seizures     Family reported seizure activities around [id number]; that lasted 3 minutes with lower body shakes. EMS states she was postictal when arrival          HISTORY OF PRESENT ILLNESS:  40 y.o. woman with a history of IV drug abuse, ADHD, back pain, depression and recent admission for MRSA bacteremia secondary to bacteremia secondary to IV drug abuse with tricuspid valve endocarditis with septic emboli. He also developed large abscess in the gluteal area connected to the pelvic area concerning for sacral osteomyelitis. She has a KRYSTLE drain placement by interventional radiology. Culture was positive for MRSA as well as Serratia. She had a prolonged hospitalization secondary to acute kidney injury she required hemodialysis on previous admission. She was discharged home on 10/1/22, was supposed to continue IV daptomycin with hemodialysis plus taking oral clindamycin and levofloxacin. She is now admitted from home with possible seizure-like activities she denies any current drug use but UDS on this admission came positive for fentanyl. Admission labs indicate creatinine at 2 lactic acid 2.1 procalcitonin 0.2 5 repeat blood culture in process, WBC elevated 18.6 hemoglobin 9.3. We are consulted for IV antibiotic recommendations.       Past Medical History:    Past Medical History:   Diagnosis Date    ADHD (attention deficit hyperactivity disorder)     Arthritis     Back pain     Depression     Migraine     Neutrophilia 9/15/2022       Past Surgical History:    Past Surgical History:   Procedure Laterality Date    IR INSERT NON TUNNELED CV CATH LESS THAN 5 YEARS Right 09/20/2022    Andie Asif access; 15cm; Dr. Farheen Soto    IR NONTUNNELED VASCULAR CATHETER  09/20/2022    IR NONTUNNELED VASCULAR CATHETER 9/20/2022 [facility]    KNEE ARTHROSCOPY  10/05/2010    PARTIAL HYSTERECTOMY (CERVIX NOT REMOVED)      TUBAL LIGATION  01/01/2004    TUNNELED VENOUS CATHETER PLACEMENT Right 09/23/2022    Permacath; RIJ access; 19cm; Dr. Hardik Nelson       Current Medications:    No outpatient medications have been marked as taking for the 10/2/22 encounter [facility] Encounter).        Allergies:  Ultram [tramadol hcl], Robaxin [methocarbamol], and Penicillins    Immunizations :   Immunization History   Administered Date(s) Administered    Tdap (Boostrix, Adacel) 02/11/2015         Social History:     Social History     Tobacco Use    Smoking status: Every Day     Packs/day: 0.50     Years: 2.00     Pack years: 1.00     Types: Cigarettes    Smokeless tobacco: Never   Substance Use Topics    Alcohol use: Yes     Comment: occas    Drug use: No     Social History     Tobacco Use   Smoking Status Every Day    Packs/day: 0.50    Years: 2.00    Pack years: 1.00    Types: Cigarettes   Smokeless Tobacco Never      Family History   Problem Relation Age of Onset    Breast Cancer Maternal Grandmother 45    Arthritis Other     Asthma Other     Cancer Other     Diabetes Other     Seizures Other     Stroke Other          REVIEW OF SYSTEMS:     Constitutional:  negative for fevers, chills, night sweats  Eyes:  negative for blurred vision, eye discharge, visual disturbance   HEENT:  negative for hearing loss, ear drainage,nasal congestion  Respiratory:  negative for cough, shortness of breath or hemoptysis   Cardiovascular:  negative for chest pain, palpitations, syncope  Gastrointestinal:  negative for nausea, vomiting, diarrhea, constipation, abdominal pain+ BACK Pain+   Genitourinary:  negative for frequency, dysuria, urinary incontinence, hematuria  Hematologic/Lymphatic:  negative for easy bruising, bleeding and lymphadenopathy  Allergic/Immunologic:  negative for recurrent infections, angioedema, anaphylaxis   Endocrine:  negative for weight changes, polyuria, polydipsia and polyphagia  Musculoskeletal:  negative for joint  pain, swelling, decreased range of motion  Integumentary: No rashes, skin lesions  Neurological:  negative for headaches, slurred speech, unilateral weakness  Psychiatric: negative for hallucinations,confusion,agitation.      PHYSICAL EXAM:      Vitals:  T max  100.3   BP (!) 154/108   Pulse 91   Temp 97.9 °F (36.6 °C) (Oral)   Resp 24   Ht 5' 8\" (1.727 m)   Wt 148 lb 13 oz (67.5 kg)   SpO2 97%   BMI 22.63 kg/m²     General Appearance: alert,in some acute distress, ++ pallor, no icterus  old needle tracks+  Skin: warm and dry, no rash or erythema  Head: normocephalic and atraumatic  Eyes: pupils equal, round, and reactive to light, conjunctivae normal  ENT: tympanic membrane, external ear and ear canal normal bilaterally, nose without deformity, nasal mucosa and turbinates normal without polyps  Neck: supple and non-tender without mass, no thyromegaly  no cervical lymphadenopathy  Pulmonary/Chest: Bi basal crepts+ wheezes, rales or rhonchi, normal air movement, no respiratory distress  Cardiovascular: normal rate, regular rhythm, normal S1 and S2, no murmurs, rubs, clicks, or gallops, no carotid bruits  Abdomen: soft, non-tender, non-distended, normal bowel sounds, no masses or organomegaly  Extremities: no cyanosis, clubbing or edema  Musculoskeletal: normal range of motion, no joint swelling, deformity or tenderness  Integumentary: No rashes, no abnormal skin lesions, no petechiae  Neurologic: reflexes normal and symmetric, no cranial nerve deficit  Psych:  Orientation, sensorium, mood normal   Lines: HD line   KRYSTLE drain in place Rt     DATA:    CBC:   Lab Results   Component Value Date    WBC 18.6 (H) 10/02/2022    HGB 9.3 (L) 10/02/2022    HCT 29.5 (L) 10/02/2022    MCV 87.4 10/02/2022     10/02/2022     RENAL:   Lab Results   Component Value Date    CREATININE 2.0 (H) 10/02/2022    BUN 7 10/02/2022     10/02/2022    K 3.9 10/02/2022     10/02/2022    CO2 25 10/02/2022     SED RATE:   Lab Results   Component Value Date/Time    SEDRATE 23 09/25/2022 05:32 AM     CK:   Lab Results   Component Value Date/Time    CKTOTAL 10 09/18/2022 04:55 PM     CRP:   Lab Results   Component Value Date/Time    CRP 49.8 09/25/2022 08:51 PM     Hepatic Function Panel:   Lab Results   Component Value Date/Time    ALKPHOS 125 10/02/2022 12:29 PM    ALT 7 10/02/2022 12:29 PM    AST 13 10/02/2022 12:29 PM    PROT 7.9 10/02/2022 12:29 PM    PROT 6.5 07/28/2012 11:20 PM    BILITOT 0.3 10/02/2022 12:29 PM    BILIDIR <0.2 09/27/2022 04:40 AM    IBILI see below 09/27/2022 04:40 AM    LABALBU 3.0 10/02/2022 12:29 PM     UA:  Lab Results   Component Value Date/Time    COLORU CANDELARIA 10/02/2022 12:56 PM    CLARITYU CLOUDY 10/02/2022 12:56 PM    GLUCOSEU Negative 10/02/2022 12:56 PM    GLUCOSEU NEGATIVE 07/31/2010 02:59 PM    BILIRUBINUR SMALL 10/02/2022 12:56 PM    BILIRUBINUR NEGATIVE 07/31/2010 02:59 PM    KETUA TRACE 10/02/2022 12:56 PM    SPECGRAV 1.015 10/02/2022 12:56 PM    BLOODU LARGE 10/02/2022 12:56 PM    PHUR 7.0 10/02/2022 12:56 PM    PHUR 7.0 10/02/2022 12:56 PM    PROTEINU >=300 10/02/2022 12:56 PM    UROBILINOGEN 1.0 10/02/2022 12:56 PM    NITRU POSITIVE 10/02/2022 12:56 PM    LEUKOCYTESUR MODERATE 10/02/2022 12:56 PM    LABMICR YES 10/02/2022 12:56 PM    URINETYPE Other 10/02/2022 12:56 PM      Urine Microscopic:   Lab Results   Component Value Date/Time    BACTERIA 4+ 10/02/2022 12:56 PM    COMU see below 09/07/2022 01:55 AM    HYALCAST 0-2 10/02/2022 12:56 PM    WBCUA 10-20 10/02/2022 12:56 PM    RBCUA >100 10/02/2022 12:56 PM    EPIU 2-5 10/02/2022 12:56 PM     Urine Reflex to Culture:   Lab Results   Component Value Date/Time    URRFLXCULT Yes 10/02/2022 12:56 PM     Lactic acid  2.1     Creat  2   Component Ref Range & Units 10/02/22 1256   Amphetamine Screen, Urine Negative <1000ng/mL Neg    Barbiturate Screen, Ur Negative <200 ng/mL Neg    Benzodiazepine Screen, Urine Negative <200 ng/mL Neg    Cannabinoid Scrn, Ur Negative <50 ng/mL Neg    Cocaine Metabolite Screen, Urine Negative <300 ng/mL Neg    Opiate Scrn, Ur Negative <300 ng/mL POSITIVE Abnormal     Comment: \"Therapeutic levels of pain medication, especially oxycontin and synthetic   opioids, may not be detected by this Methodology. Pain management screen   panel  Drug panel-PM-Hi Res Ur, Interp (PAIN) should be considered for drug   monitoring \". PCP Screen, Urine Negative <25 ng/mL Neg    Methadone Screen, Urine Negative <300 ng/mL Neg    Oxycodone Urine Negative <100 ng/ml Neg    FENTANYL SCREEN, URINE Negative <5 ng/mL POSITIVE Abnormal     pH, UA  7.0    Comment: Urine pH less than 5.0 or greater than 8.0 may indicate sample adulteration. Another sample should be collected if clinically        MICRO: cultures reviewed and updated by me     Procedure Component Value Units Date/Time   Culture, Blood 2 [[id number]] Collected: 10/02/22 1713   Order Status: Sent Specimen: Blood Updated: 10/02/22 1729   Culture, Urine [[id number]] Collected: 10/02/22 1256   Order Status: No result Updated: 10/02/22 1406   Culture, Blood 1 [[id number]] Collected: 10/02/22 1256   Order Status: Sent Specimen: Blood Updated: 10/02/22 1317   Culture, Blood 2 [[id number]]    Order Status: Canceled Specimen: Blood    Culture, Blood 1 [[id number]] Collected: 10/02/22 0000   Order Status: Canceled Specimen: Blood      Blood Culture:   Lab Results   Component Value Date/Time    BC No Growth after 4 days of incubation. 09/14/2022 04:36 AM    BLOODCULT2 No Growth after 4 days of incubation.  09/14/2022 05:25 AM       Viral Culture:    Lab Results   Component Value Date/Time    COVID19 Not Detected 09/07/2022 12:00 AM    COVID19 Not Detected 07/20/2022 11:20 PM     Urine Culture: No results for input(s): LABURIN in the last 72 hours. Scheduled Meds:   sodium chloride flush  5-40 mL IntraVENous 2 times per day    enoxaparin  40 mg SubCUTAneous Nightly    lactated ringers bolus  30 mL/kg IntraVENous Once    levETIRAcetam  500 mg IntraVENous Q12H    cefepime  2,000 mg IntraVENous Q12H    [START ON 10/4/2022] daptomycin (CUBICIN) IVPB  8 mg/kg IntraVENous Once per day on Tue Thu Sat    clindamycin (CLEOCIN) IV  900 mg IntraVENous Q8H       Continuous Infusions:   sodium chloride      sodium chloride 150 mL/hr at 10/03/22 0631       PRN Meds:  sodium chloride flush, sodium chloride, acetaminophen **OR** acetaminophen, naloxone, hydrALAZINE    Imaging:   CT HEAD WO CONTRAST   Final Result   No acute intracranial abnormality. XR CHEST PORTABLE   Final Result   Developing small bilateral pleural effusions, left greater than right. Persistent multifocal pulmonary nodules, some of which are cavitary,   suggestive of septic emboli given patient history. CT L spine 9/29/22   Impression   1. Acute osteomyelitis-septic arthritis noted involving the right sacroiliac   joint with osteomyelitis process extending to the right iliac wing. 2. Patient had a large right iliac fossa fluid collection on previous   examination. A drainage catheter is noted in this region. The right iliac   fossa is incompletely covered on this examination and there is limited   resolution to comment upon the residual fluid collection. A 2nd collection   is noted along the right sacral canal.  Soft tissue fullness noted in this   region. There is limited resolution. Patient will benefit from a contrast enhanced CT or MR of the pelvis   targeting the right iliac fossa and the pelvis. 3. Moderate bilateral pleural effusions. 4. Normal appearing bony lumbar spine.        RECOMMENDATIONS:   Recommend obtaining contrast enhanced CT/MR of the pelvis for complete   coverage of the right iliac fossa and pelvis. CT chest :  9/27/22  FINDINGS:   Mediastinum: Thyroid gland unremarkable. Tip of PICC and central line projects   in region of SVC. Small pericardial effusion is seen. Small hiatal hernia   seen. There is nonspecific thickening at the GE junction. Small mediastinal   nodes are noted, likely reactive       Lungs/pleura: There are bilateral pleural effusions seen, left greater than   right, increased compared to prior. There is increased lung consolidation,   left greater than right. Cavitary and non cavitary nodules in the left lung are again identified,   overall decreased compared to prior. .  For example, an index cavitary nodule   in the left upper lobe measures 1.3 cm x 1.3 cm, previously measuring 2.0 cm   x 2.1 cm. On the right, a bilobed cavitary nodule which previously measured 6.9 cm by   4.2 cm, now measures 5.6 cm x 2.7 cm and now appears as 2 separate nodules. Upper Abdomen: Right adrenal gland is normal.  Left adrenal gland is normal       Soft Tissues/Bones: Bones appear unchanged. There is body wall anasarca           Impression   Widespread cavitary nodules, decreased on the right and left. , in keeping   with the diagnosis of septic emboli. Increased pleural effusions with increasing consolidative change at the lung   bases. All pertinent images and reports for the current Hospitalization were reviewed by me.     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 Creatinine seems to be slowly improving awaiting for recovery. Nephrology is following. UDS on this admission positive for fentanyl patient denies any drug use but this is somewhat concerning given the urine test is positive,    Will adjust antibiotic to treat MRSA bacteremia Serratia bacteremia and associated complications. Labs, Microbiology, Radiology and pertinent results from current hospitalization and care every where were reviewed by me as a part of the consultation. PLAN :  Cont IV Daptomycin x  550 mg x  3 times a week for now awaiting Renal Recovery  IV Ceftaroline x  400 mg q 12HRS  3 Change to oral Clindamycin x  300  mg q 8 hrs  4. ESR. CRP  5. Blood cx repeat  6. Awaiting Renal recovery   7. Check MRI pelvis with out contrast     Discussed with patient/Family and Nursing   Risk of Complications/Morbidity: High      Illness(es)/ Infection present that pose threat to bodily function. There is potential for severe exacerbation of infection/side effects of treatment. Therapy requires intensive monitoring for antimicrobial agent toxicity. Thanks for allowing me to participate in your patient's care please call me with any questions or concerns.     Dr. Peggy Hassan MD  [facility] Physician  Phone: [phone]   Fax : [phone]

## 2022-10-03 NOTE — CONSULTS
See consult note from today.      Marissa Polk NP  [address] [facility] [address] Neurology

## 2022-10-04 ENCOUNTER — APPOINTMENT (OUTPATIENT)
Dept: GENERAL RADIOLOGY | Age: 42
DRG: 720 | End: 2022-10-04
Payer: MEDICAID

## 2022-10-04 ENCOUNTER — ANESTHESIA EVENT (OUTPATIENT)
Dept: ENDOSCOPY | Age: 42
DRG: 720 | End: 2022-10-04
Payer: MEDICAID

## 2022-10-04 ENCOUNTER — ANESTHESIA (OUTPATIENT)
Dept: ENDOSCOPY | Age: 42
DRG: 720 | End: 2022-10-04
Payer: MEDICAID

## 2022-10-04 LAB
A/G RATIO: 0.6 (ref 1.1–2.2)
ALBUMIN SERPL-MCNC: 2.6 G/DL (ref 3.4–5)
ALP BLD-CCNC: 132 U/L (ref 40–129)
ALT SERPL-CCNC: 7 U/L (ref 10–40)
AMPHETAMINE SCREEN, URINE: POSITIVE
ANION GAP SERPL CALCULATED.3IONS-SCNC: 10 MMOL/L (ref 3–16)
ANION GAP SERPL CALCULATED.3IONS-SCNC: 12 MMOL/L (ref 3–16)
AST SERPL-CCNC: 18 U/L (ref 15–37)
BACTERIA: ABNORMAL /HPF
BARBITURATE SCREEN URINE: ABNORMAL
BASE EXCESS VENOUS: 1.4 MMOL/L
BASOPHILS ABSOLUTE: 0.1 K/UL (ref 0–0.2)
BASOPHILS ABSOLUTE: 0.1 K/UL (ref 0–0.2)
BASOPHILS RELATIVE PERCENT: 0.5 %
BASOPHILS RELATIVE PERCENT: 0.6 %
BENZODIAZEPINE SCREEN, URINE: ABNORMAL
BILIRUB SERPL-MCNC: <0.2 MG/DL (ref 0–1)
BILIRUBIN URINE: NEGATIVE
BLOOD, URINE: ABNORMAL
BUDDING YEAST: PRESENT
BUN BLDV-MCNC: 10 MG/DL (ref 7–20)
BUN BLDV-MCNC: 11 MG/DL (ref 7–20)
CALCIUM SERPL-MCNC: 7.7 MG/DL (ref 8.3–10.6)
CALCIUM SERPL-MCNC: 7.7 MG/DL (ref 8.3–10.6)
CANNABINOID SCREEN URINE: ABNORMAL
CARBOXYHEMOGLOBIN: 1.9 %
CHLORIDE BLD-SCNC: 101 MMOL/L (ref 99–110)
CHLORIDE BLD-SCNC: 106 MMOL/L (ref 99–110)
CLARITY: ABNORMAL
CO2: 23 MMOL/L (ref 21–32)
CO2: 23 MMOL/L (ref 21–32)
COCAINE METABOLITE SCREEN URINE: ABNORMAL
COLOR: ABNORMAL
CREAT SERPL-MCNC: 1.6 MG/DL (ref 0.6–1.1)
CREAT SERPL-MCNC: 2.3 MG/DL (ref 0.6–1.1)
EOSINOPHILS ABSOLUTE: 0 K/UL (ref 0–0.6)
EOSINOPHILS ABSOLUTE: 0.2 K/UL (ref 0–0.6)
EOSINOPHILS RELATIVE PERCENT: 0.1 %
EOSINOPHILS RELATIVE PERCENT: 1.8 %
EPITHELIAL CELLS, UA: 3 /HPF (ref 0–5)
FENTANYL SCREEN, URINE: POSITIVE
GFR AFRICAN AMERICAN: 28
GFR AFRICAN AMERICAN: 43
GFR NON-AFRICAN AMERICAN: 23
GFR NON-AFRICAN AMERICAN: 35
GLUCOSE BLD-MCNC: 146 MG/DL (ref 70–99)
GLUCOSE BLD-MCNC: 150 MG/DL (ref 70–99)
GLUCOSE BLD-MCNC: 82 MG/DL (ref 70–99)
GLUCOSE URINE: NEGATIVE MG/DL
GRANULAR CASTS: PRESENT
HAPTOGLOBIN: 296 MG/DL (ref 30–200)
HCO3 VENOUS: 27 MMOL/L (ref 23–29)
HCT VFR BLD CALC: 21.5 % (ref 36–48)
HCT VFR BLD CALC: 22.5 % (ref 36–48)
HCT VFR BLD CALC: 24 % (ref 36–48)
HCT VFR BLD CALC: 24.7 % (ref 36–48)
HCT VFR BLD CALC: 29.4 % (ref 36–48)
HEMOGLOBIN: 7.3 G/DL (ref 12–16)
HEMOGLOBIN: 7.3 G/DL (ref 12–16)
HEMOGLOBIN: 7.6 G/DL (ref 12–16)
HEMOGLOBIN: 8.1 G/DL (ref 12–16)
HEMOGLOBIN: 9.5 G/DL (ref 12–16)
HYALINE CASTS: 53 /LPF (ref 0–8)
HYALINE CASTS: PRESENT
KETONES, URINE: NEGATIVE MG/DL
LACTATE DEHYDROGENASE: 316 U/L (ref 100–190)
LEUKOCYTE ESTERASE, URINE: ABNORMAL
LYMPHOCYTES ABSOLUTE: 1.7 K/UL (ref 1–5.1)
LYMPHOCYTES ABSOLUTE: 1.9 K/UL (ref 1–5.1)
LYMPHOCYTES RELATIVE PERCENT: 14.7 %
LYMPHOCYTES RELATIVE PERCENT: 9.8 %
Lab: ABNORMAL
MAGNESIUM: 1.7 MG/DL (ref 1.8–2.4)
MCH RBC QN AUTO: 27.1 PG (ref 26–34)
MCH RBC QN AUTO: 27.8 PG (ref 26–34)
MCHC RBC AUTO-ENTMCNC: 31.7 G/DL (ref 31–36)
MCHC RBC AUTO-ENTMCNC: 32.3 G/DL (ref 31–36)
MCV RBC AUTO: 85.3 FL (ref 80–100)
MCV RBC AUTO: 86.1 FL (ref 80–100)
METHADONE SCREEN, URINE: ABNORMAL
METHEMOGLOBIN VENOUS: 0 %
MICROSCOPIC EXAMINATION: YES
MONOCYTES ABSOLUTE: 0.3 K/UL (ref 0–1.3)
MONOCYTES ABSOLUTE: 1.1 K/UL (ref 0–1.3)
MONOCYTES RELATIVE PERCENT: 1.7 %
MONOCYTES RELATIVE PERCENT: 9.5 %
NEUTROPHILS ABSOLUTE: 16.7 K/UL (ref 1.7–7.7)
NEUTROPHILS ABSOLUTE: 8.5 K/UL (ref 1.7–7.7)
NEUTROPHILS RELATIVE PERCENT: 73.4 %
NEUTROPHILS RELATIVE PERCENT: 87.9 %
NITRITE, URINE: NEGATIVE
O2 SAT, VEN: >100 %
O2 THERAPY: NORMAL
OPIATE SCREEN URINE: POSITIVE
OXYCODONE URINE: ABNORMAL
PCO2, VEN: 47.5 MMHG (ref 40–50)
PDW BLD-RTO: 17.1 % (ref 12.4–15.4)
PDW BLD-RTO: 17.7 % (ref 12.4–15.4)
PERFORMED ON: ABNORMAL
PH UA: 5
PH UA: 6 (ref 5–8)
PH VENOUS: 7.37 (ref 7.35–7.45)
PHENCYCLIDINE SCREEN URINE: ABNORMAL
PLATELET # BLD: 254 K/UL (ref 135–450)
PLATELET # BLD: 279 K/UL (ref 135–450)
PMV BLD AUTO: 7.8 FL (ref 5–10.5)
PMV BLD AUTO: 8.7 FL (ref 5–10.5)
PO2, VEN: 157 MMHG
POTASSIUM SERPL-SCNC: 3.8 MMOL/L (ref 3.5–5.1)
POTASSIUM SERPL-SCNC: 4.9 MMOL/L (ref 3.5–5.1)
PROTEIN UA: >=1000 MG/DL
RBC # BLD: 2.81 M/UL (ref 4–5.2)
RBC # BLD: 3.42 M/UL (ref 4–5.2)
RBC UA: 1642 /HPF (ref 0–4)
SODIUM BLD-SCNC: 136 MMOL/L (ref 136–145)
SODIUM BLD-SCNC: 139 MMOL/L (ref 136–145)
SPECIFIC GRAVITY UA: 1.01 (ref 1–1.03)
TCO2 CALC VENOUS: 29 MMOL/L
TOTAL PROTEIN: 7 G/DL (ref 6.4–8.2)
URINE TYPE: ABNORMAL
UROBILINOGEN, URINE: 0.2 E.U./DL
VANCOMYCIN RANDOM: 7.4 UG/ML
WBC # BLD: 11.5 K/UL (ref 4–11)
WBC # BLD: 19 K/UL (ref 4–11)
WBC UA: 113 /HPF (ref 0–5)

## 2022-10-04 PROCEDURE — 6360000002 HC RX W HCPCS: Performed by: INTERNAL MEDICINE

## 2022-10-04 PROCEDURE — 3609012400 HC EGD TRANSORAL BIOPSY SINGLE/MULTIPLE: Performed by: INTERNAL MEDICINE

## 2022-10-04 PROCEDURE — 82803 BLOOD GASES ANY COMBINATION: CPT

## 2022-10-04 PROCEDURE — 6370000000 HC RX 637 (ALT 250 FOR IP): Performed by: INTERNAL MEDICINE

## 2022-10-04 PROCEDURE — C9113 INJ PANTOPRAZOLE SODIUM, VIA: HCPCS | Performed by: INTERNAL MEDICINE

## 2022-10-04 PROCEDURE — 83010 ASSAY OF HAPTOGLOBIN QUANT: CPT

## 2022-10-04 PROCEDURE — 36415 COLL VENOUS BLD VENIPUNCTURE: CPT

## 2022-10-04 PROCEDURE — 5A1D70Z PERFORMANCE OF URINARY FILTRATION, INTERMITTENT, LESS THAN 6 HOURS PER DAY: ICD-10-PCS | Performed by: INTERNAL MEDICINE

## 2022-10-04 PROCEDURE — 2580000003 HC RX 258: Performed by: INTERNAL MEDICINE

## 2022-10-04 PROCEDURE — 80307 DRUG TEST PRSMV CHEM ANLYZR: CPT

## 2022-10-04 PROCEDURE — 80053 COMPREHEN METABOLIC PANEL: CPT

## 2022-10-04 PROCEDURE — 2580000003 HC RX 258: Performed by: NURSE ANESTHETIST, CERTIFIED REGISTERED

## 2022-10-04 PROCEDURE — 85025 COMPLETE CBC W/AUTO DIFF WBC: CPT

## 2022-10-04 PROCEDURE — 85730 THROMBOPLASTIN TIME PARTIAL: CPT

## 2022-10-04 PROCEDURE — 95819 EEG AWAKE AND ASLEEP: CPT

## 2022-10-04 PROCEDURE — 90935 HEMODIALYSIS ONE EVALUATION: CPT

## 2022-10-04 PROCEDURE — 83615 LACTATE (LD) (LDH) ENZYME: CPT

## 2022-10-04 PROCEDURE — 83735 ASSAY OF MAGNESIUM: CPT

## 2022-10-04 PROCEDURE — 7100000000 HC PACU RECOVERY - FIRST 15 MIN: Performed by: INTERNAL MEDICINE

## 2022-10-04 PROCEDURE — 85014 HEMATOCRIT: CPT

## 2022-10-04 PROCEDURE — 3700000001 HC ADD 15 MINUTES (ANESTHESIA): Performed by: INTERNAL MEDICINE

## 2022-10-04 PROCEDURE — 80048 BASIC METABOLIC PNL TOTAL CA: CPT

## 2022-10-04 PROCEDURE — 80202 ASSAY OF VANCOMYCIN: CPT

## 2022-10-04 PROCEDURE — 85018 HEMOGLOBIN: CPT

## 2022-10-04 PROCEDURE — 0DB58ZX EXCISION OF ESOPHAGUS, VIA NATURAL OR ARTIFICIAL OPENING ENDOSCOPIC, DIAGNOSTIC: ICD-10-PCS | Performed by: INTERNAL MEDICINE

## 2022-10-04 PROCEDURE — 2709999900 HC NON-CHARGEABLE SUPPLY: Performed by: INTERNAL MEDICINE

## 2022-10-04 PROCEDURE — 99233 SBSQ HOSP IP/OBS HIGH 50: CPT | Performed by: INTERNAL MEDICINE

## 2022-10-04 PROCEDURE — 3700000000 HC ANESTHESIA ATTENDED CARE: Performed by: INTERNAL MEDICINE

## 2022-10-04 PROCEDURE — 2500000003 HC RX 250 WO HCPCS: Performed by: NURSE ANESTHETIST, CERTIFIED REGISTERED

## 2022-10-04 PROCEDURE — 85610 PROTHROMBIN TIME: CPT

## 2022-10-04 PROCEDURE — 81001 URINALYSIS AUTO W/SCOPE: CPT

## 2022-10-04 PROCEDURE — 2060000000 HC ICU INTERMEDIATE R&B

## 2022-10-04 PROCEDURE — 88305 TISSUE EXAM BY PATHOLOGIST: CPT

## 2022-10-04 PROCEDURE — 71045 X-RAY EXAM CHEST 1 VIEW: CPT

## 2022-10-04 PROCEDURE — 7100000001 HC PACU RECOVERY - ADDTL 15 MIN: Performed by: INTERNAL MEDICINE

## 2022-10-04 PROCEDURE — 6360000002 HC RX W HCPCS: Performed by: NURSE ANESTHETIST, CERTIFIED REGISTERED

## 2022-10-04 RX ORDER — SODIUM CHLORIDE 9 MG/ML
INJECTION, SOLUTION INTRAVENOUS CONTINUOUS PRN
Status: DISCONTINUED | OUTPATIENT
Start: 2022-10-04 | End: 2022-10-04 | Stop reason: SDUPTHER

## 2022-10-04 RX ORDER — ONDANSETRON 2 MG/ML
INJECTION INTRAMUSCULAR; INTRAVENOUS PRN
Status: DISCONTINUED | OUTPATIENT
Start: 2022-10-04 | End: 2022-10-04 | Stop reason: SDUPTHER

## 2022-10-04 RX ORDER — ONDANSETRON 2 MG/ML
4 INJECTION INTRAMUSCULAR; INTRAVENOUS
Status: DISCONTINUED | OUTPATIENT
Start: 2022-10-04 | End: 2022-10-04

## 2022-10-04 RX ORDER — HEPARIN SODIUM 1000 [USP'U]/ML
3200 INJECTION, SOLUTION INTRAVENOUS; SUBCUTANEOUS PRN
Status: DISCONTINUED | OUTPATIENT
Start: 2022-10-04 | End: 2022-10-10 | Stop reason: HOSPADM

## 2022-10-04 RX ORDER — SODIUM CHLORIDE 9 MG/ML
INJECTION, SOLUTION INTRAVENOUS CONTINUOUS
Status: DISCONTINUED | OUTPATIENT
Start: 2022-10-04 | End: 2022-10-04

## 2022-10-04 RX ORDER — CALCIUM CARBONATE 500 MG/1
500 TABLET, CHEWABLE ORAL 3 TIMES DAILY PRN
Status: DISCONTINUED | OUTPATIENT
Start: 2022-10-04 | End: 2022-10-10 | Stop reason: HOSPADM

## 2022-10-04 RX ORDER — LIDOCAINE HYDROCHLORIDE 20 MG/ML
INJECTION, SOLUTION EPIDURAL; INFILTRATION; INTRACAUDAL; PERINEURAL PRN
Status: DISCONTINUED | OUTPATIENT
Start: 2022-10-04 | End: 2022-10-04 | Stop reason: SDUPTHER

## 2022-10-04 RX ORDER — MAGNESIUM SULFATE 1 G/100ML
1000 INJECTION INTRAVENOUS ONCE
Status: COMPLETED | OUTPATIENT
Start: 2022-10-04 | End: 2022-10-05

## 2022-10-04 RX ORDER — DEXAMETHASONE SODIUM PHOSPHATE 4 MG/ML
INJECTION, SOLUTION INTRA-ARTICULAR; INTRALESIONAL; INTRAMUSCULAR; INTRAVENOUS; SOFT TISSUE PRN
Status: DISCONTINUED | OUTPATIENT
Start: 2022-10-04 | End: 2022-10-04 | Stop reason: SDUPTHER

## 2022-10-04 RX ORDER — PROPOFOL 10 MG/ML
INJECTION, EMULSION INTRAVENOUS PRN
Status: DISCONTINUED | OUTPATIENT
Start: 2022-10-04 | End: 2022-10-04 | Stop reason: SDUPTHER

## 2022-10-04 RX ORDER — GLYCOPYRROLATE 0.2 MG/ML
INJECTION INTRAMUSCULAR; INTRAVENOUS PRN
Status: DISCONTINUED | OUTPATIENT
Start: 2022-10-04 | End: 2022-10-04 | Stop reason: SDUPTHER

## 2022-10-04 RX ADMIN — ACETAMINOPHEN 650 MG: 325 TABLET, FILM COATED ORAL at 07:39

## 2022-10-04 RX ADMIN — CLINDAMYCIN HYDROCHLORIDE 300 MG: 150 CAPSULE ORAL at 14:41

## 2022-10-04 RX ADMIN — CLINDAMYCIN HYDROCHLORIDE 300 MG: 150 CAPSULE ORAL at 21:40

## 2022-10-04 RX ADMIN — CLINDAMYCIN HYDROCHLORIDE 300 MG: 150 CAPSULE ORAL at 05:32

## 2022-10-04 RX ADMIN — PROPOFOL 50 MG: 10 INJECTION, EMULSION INTRAVENOUS at 13:32

## 2022-10-04 RX ADMIN — Medication 40 MG: at 14:35

## 2022-10-04 RX ADMIN — CEFTAROLINE FOSAMIL 400 MG: 600 POWDER, FOR SOLUTION INTRAVENOUS at 21:46

## 2022-10-04 RX ADMIN — PROPOFOL 50 MG: 10 INJECTION, EMULSION INTRAVENOUS at 13:29

## 2022-10-04 RX ADMIN — ONDANSETRON 4 MG: 2 INJECTION INTRAMUSCULAR; INTRAVENOUS at 13:26

## 2022-10-04 RX ADMIN — LEVETIRACETAM 500 MG: 5 INJECTION INTRAVENOUS at 14:34

## 2022-10-04 RX ADMIN — LIDOCAINE HYDROCHLORIDE 60 MG: 20 INJECTION, SOLUTION EPIDURAL; INFILTRATION; INTRACAUDAL; PERINEURAL at 13:26

## 2022-10-04 RX ADMIN — PROPOFOL 100 MG: 10 INJECTION, EMULSION INTRAVENOUS at 13:26

## 2022-10-04 RX ADMIN — SODIUM CHLORIDE: 9 INJECTION, SOLUTION INTRAVENOUS at 01:36

## 2022-10-04 RX ADMIN — Medication 40 MG: at 01:35

## 2022-10-04 RX ADMIN — DEXAMETHASONE SODIUM PHOSPHATE 8 MG: 4 INJECTION, SOLUTION INTRAMUSCULAR; INTRAVENOUS at 13:26

## 2022-10-04 RX ADMIN — CEFTAROLINE FOSAMIL 400 MG: 600 POWDER, FOR SOLUTION INTRAVENOUS at 14:52

## 2022-10-04 RX ADMIN — SODIUM CHLORIDE: 9 INJECTION, SOLUTION INTRAVENOUS at 13:22

## 2022-10-04 RX ADMIN — PROPOFOL 50 MG: 10 INJECTION, EMULSION INTRAVENOUS at 13:34

## 2022-10-04 RX ADMIN — SODIUM CHLORIDE: 9 INJECTION, SOLUTION INTRAVENOUS at 14:32

## 2022-10-04 RX ADMIN — DAPTOMYCIN 550 MG: 500 INJECTION, POWDER, LYOPHILIZED, FOR SOLUTION INTRAVENOUS at 15:57

## 2022-10-04 RX ADMIN — GLYCOPYRROLATE 0.2 MG: 0.2 INJECTION, SOLUTION INTRAMUSCULAR; INTRAVENOUS at 13:26

## 2022-10-04 ASSESSMENT — PAIN DESCRIPTION - DESCRIPTORS
DESCRIPTORS: ACHING;GNAWING
DESCRIPTORS: ACHING
DESCRIPTORS: DISCOMFORT

## 2022-10-04 ASSESSMENT — PAIN SCALES - GENERAL
PAINLEVEL_OUTOF10: 4
PAINLEVEL_OUTOF10: 6
PAINLEVEL_OUTOF10: 3
PAINLEVEL_OUTOF10: 0

## 2022-10-04 ASSESSMENT — PAIN - FUNCTIONAL ASSESSMENT
PAIN_FUNCTIONAL_ASSESSMENT: 0-10
PAIN_FUNCTIONAL_ASSESSMENT: PREVENTS OR INTERFERES SOME ACTIVE ACTIVITIES AND ADLS
PAIN_FUNCTIONAL_ASSESSMENT: PREVENTS OR INTERFERES SOME ACTIVE ACTIVITIES AND ADLS

## 2022-10-04 ASSESSMENT — PAIN DESCRIPTION - ONSET: ONSET: ON-GOING

## 2022-10-04 ASSESSMENT — PAIN DESCRIPTION - ORIENTATION: ORIENTATION: LOWER

## 2022-10-04 ASSESSMENT — PAIN DESCRIPTION - LOCATION
LOCATION: ABDOMEN
LOCATION: BACK
LOCATION: BACK

## 2022-10-04 ASSESSMENT — PAIN DESCRIPTION - PAIN TYPE: TYPE: ACUTE PAIN;CHRONIC PAIN

## 2022-10-04 ASSESSMENT — PAIN DESCRIPTION - FREQUENCY: FREQUENCY: CONTINUOUS

## 2022-10-04 ASSESSMENT — ENCOUNTER SYMPTOMS: SHORTNESS OF BREATH: 0

## 2022-10-04 NOTE — PROGRESS NOTES
Infectious Disease Follow up Notes  Admit Date: 10/2/2022  Hospital Day: 3    Antibiotics :   IV Ceftaroline  IV Daptomycin   Clindamycin     CHIEF COMPLAINT:      Seizures  MRSA bacteremia  MRSA Abscess  Serratia bacteremia   UTI    Subjective interval History :  40 y. o.woman with a history of IV drug abuse, ADHD, back pain, depression and recent admission for MRSA bacteremia secondary to bacteremia secondary to IV drug abuse with tricuspid valve endocarditis with septic emboli. He also developed large abscess in the gluteal area connected to the pelvic area concerning for sacral osteomyelitis. She has a KRYSTLE drain placement by interventional radiology. Culture was positive for MRSA as well as Serratia. She had a prolonged hospitalization secondary to acute kidney injury she required hemodialysis on previous admission. She was discharged home on 10/1/22, was supposed to continue IV daptomycin with hemodialysis plus taking oral clindamycin and levofloxacin. She is now admitted from home with possible seizure-like activities she denies any current drug use but UDS on this admission came positive for fentanyl. Admission labs indicate creatinine at 2 lactic acid 2.1 procalcitonin 0.2 5 repeat blood culture in process, WBC elevated 18.6 hemoglobin 9.3.   We are consulted for IV antibiotic recommendations    Interval History : Just came back from HD and EGD tolerating IV abx ok and MRI pelvis results d/W pt no nausea, no vomiting and KRYSTLE drain in place       Past Medical History:    Past Medical History:   Diagnosis Date    ADHD (attention deficit hyperactivity disorder)     Arthritis     Back pain     Depression     Migraine     Neutrophilia 9/15/2022       Past Surgical History:    Past Surgical History:   Procedure Laterality Date    IR INSERT NON TUNNELED CV CATH LESS THAN 5 YEARS Right 09/20/2022    Vascatkinsey; RIZENON access; 15cm;  Gopi    IR NONTUNNELED VASCULAR CATHETER  09/20/2022    IR NONTUNNELED VASCULAR CATHETER 9/20/2022 [facility] PROCEDURES    KNEE ARTHROSCOPY  10/05/2010    PARTIAL HYSTERECTOMY (CERVIX NOT REMOVED)      TUBAL LIGATION  01/01/2004    TUNNELED VENOUS CATHETER PLACEMENT Right 09/23/2022    Permacath; RIJ access; 19cm; Dr. Dwane Prader       Current Medications:    No outpatient medications have been marked as taking for the 10/2/22 encounter [facility] Encounter).        Allergies:  Ultram [tramadol hcl], Robaxin [methocarbamol], and Penicillins    Immunizations :   Immunization History   Administered Date(s) Administered    Tdap (Boostrix, Adacel) 02/11/2015       Social History:    Social History     Tobacco Use    Smoking status: Every Day     Packs/day: 0.50     Years: 2.00     Pack years: 1.00     Types: Cigarettes    Smokeless tobacco: Never   Substance Use Topics    Alcohol use: Yes     Comment: occas    Drug use: No     Social History     Tobacco Use   Smoking Status Every Day    Packs/day: 0.50    Years: 2.00    Pack years: 1.00    Types: Cigarettes   Smokeless Tobacco Never      Family History   Problem Relation Age of Onset    Breast Cancer Maternal Grandmother 45    Arthritis Other     Asthma Other     Cancer Other     Diabetes Other     Seizures Other     Stroke Other          REVIEW OF SYSTEMS:     Constitutional:  negative for fevers, chills, night sweats  Eyes:  negative for blurred vision, eye discharge, visual disturbance   HEENT:  negative for hearing loss, ear drainage,nasal congestion  Respiratory:  negative for cough, shortness of breath+  or hemoptysis   Cardiovascular:  negative for chest pain, palpitations, syncope  Gastrointestinal:  negative for nausea, vomiting, diarrhea, constipation, abdominal pain+   Genitourinary:  negative for frequency, dysuria, urinary incontinence, hematuria  Hematologic/Lymphatic:  negative for easy bruising, bleeding and lymphadenopathy  Allergic/Immunologic: negative for recurrent infections, angioedema, anaphylaxis   Endocrine:  negative for weight changes, polyuria, polydipsia and polyphagia  Musculoskeletal:  Back   pain ++ , swelling, decreased range of motion  Integumentary: No rashes, skin lesions  Neurological:  negative for headaches, slurred speech, unilateral weakness  Psychiatric: negative for hallucinations,confusion,agitation.                 PHYSICAL EXAM:      Vitals:    BP (!) 149/99   Pulse 86   Temp 98.3 °F (36.8 °C) (Oral)   Resp 23   Ht 5' 8\" (1.727 m)   Wt 158 lb 8.2 oz (71.9 kg)   SpO2 94%   BMI 24.10 kg/m²     General Appearance: alert,in some acute distress, ++ pallor, no icterus  old needle tracks+  Skin: warm and dry, no rash or erythema  Head: normocephalic and atraumatic  Eyes: pupils equal, round, and reactive to light, conjunctivae normal  ENT: tympanic membrane, external ear and ear canal normal bilaterally, nose without deformity, nasal mucosa and turbinates normal without polyps  Neck: supple and non-tender without mass, no thyromegaly  no cervical lymphadenopathy  Pulmonary/Chest: Bi basal crepts+ wheezes, rales or rhonchi, normal air movement, no respiratory distress  Cardiovascular: normal rate, regular rhythm, normal S1 and S2, no murmurs, rubs, clicks, or gallops, no carotid bruits  Abdomen: soft, non-tender, non-distended, normal bowel sounds, no masses or organomegaly  Extremities: no cyanosis, clubbing or edema  Musculoskeletal: normal range of motion, no joint swelling, deformity or tenderness  Integumentary: No rashes, no abnormal skin lesions, no petechiae  Neurologic: reflexes normal and symmetric, no cranial nerve deficit  Psych:  Orientation, sensorium, mood normal            Lines: HD line   KRYSTLE drain in place Rt   Data Review:    CBC:   Lab Results   Component Value Date    WBC 11.5 (H) 10/04/2022    HGB 7.3 (L) 10/04/2022    HCT 21.5 (L) 10/04/2022    MCV 85.3 10/04/2022     10/04/2022     RENAL:   Lab Results   Component Value Date    CREATININE 2.3 (H) 10/04/2022    BUN 11 10/04/2022     10/04/2022    K 3.8 10/04/2022     10/04/2022    CO2 23 10/04/2022     SED RATE:   Lab Results   Component Value Date/Time    SEDRATE 23 09/25/2022 05:32 AM     CK:   Lab Results   Component Value Date/Time    CKTOTAL 10 09/18/2022 04:55 PM     CRP:   Lab Results   Component Value Date/Time    CRP 49.8 09/25/2022 08:51 PM     Hepatic Function Panel:   Lab Results   Component Value Date/Time    ALKPHOS 125 10/02/2022 12:29 PM    ALT 7 10/02/2022 12:29 PM    AST 13 10/02/2022 12:29 PM    PROT 7.9 10/02/2022 12:29 PM    PROT 6.5 07/28/2012 11:20 PM    BILITOT 0.3 10/02/2022 12:29 PM    BILIDIR <0.2 09/27/2022 04:40 AM    IBILI see below 09/27/2022 04:40 AM    LABALBU 2.2 10/03/2022 03:50 AM     UA:  Lab Results   Component Value Date/Time    COLORU CANDELARIA 10/02/2022 12:56 PM    CLARITYU CLOUDY 10/02/2022 12:56 PM    GLUCOSEU Negative 10/02/2022 12:56 PM    GLUCOSEU NEGATIVE 07/31/2010 02:59 PM    BILIRUBINUR SMALL 10/02/2022 12:56 PM    BILIRUBINUR NEGATIVE 07/31/2010 02:59 PM    KETUA TRACE 10/02/2022 12:56 PM    SPECGRAV 1.015 10/02/2022 12:56 PM    BLOODU LARGE 10/02/2022 12:56 PM    PHUR 7.0 10/02/2022 12:56 PM    PHUR 7.0 10/02/2022 12:56 PM    PROTEINU >=300 10/02/2022 12:56 PM    UROBILINOGEN 1.0 10/02/2022 12:56 PM    NITRU POSITIVE 10/02/2022 12:56 PM    LEUKOCYTESUR MODERATE 10/02/2022 12:56 PM    LABMICR YES 10/02/2022 12:56 PM    URINETYPE Other 10/02/2022 12:56 PM      Urine Microscopic:   Lab Results   Component Value Date/Time    BACTERIA 4+ 10/02/2022 12:56 PM    COMU see below 09/07/2022 01:55 AM    HYALCAST 0-2 10/02/2022 12:56 PM    WBCUA 10-20 10/02/2022 12:56 PM    RBCUA >100 10/02/2022 12:56 PM    EPIU 2-5 10/02/2022 12:56 PM     Urine Reflex to Culture:   Lab Results   Component Value Date/Time    URRFLXCULT Yes 10/02/2022 12:56 PM     Creat 2.3     Lactic acid  2.1       Lactic acid  2.1 Creat  2   Component Ref Range & Units 10/02/22 1256   Amphetamine Screen, Urine Negative <1000ng/mL Neg    Barbiturate Screen, Ur Negative <200 ng/mL Neg    Benzodiazepine Screen, Urine Negative <200 ng/mL Neg    Cannabinoid Scrn, Ur Negative <50 ng/mL Neg    Cocaine Metabolite Screen, Urine Negative <300 ng/mL Neg    Opiate Scrn, Ur Negative <300 ng/mL POSITIVE Abnormal     Comment: \"Therapeutic levels of pain medication, especially oxycontin and synthetic   opioids, may not be detected by this Methodology. Pain management screen   panel  Drug panel-PM-Hi Res Ur, Interp (PAIN) should be considered for drug   monitoring \". PCP Screen, Urine Negative <25 ng/mL Neg    Methadone Screen, Urine Negative <300 ng/mL Neg    Oxycodone Urine Negative <100 ng/ml Neg    FENTANYL SCREEN, URINE Negative <5 ng/mL POSITIVE Abnormal     pH, UA   7.0        MICRO: cultures reviewed and updated by me   Blood Culture:   Procedure Component Value Units Date/Time   Culture, Blood 2 [[id number]] Collected: 10/02/22 1713   Order Status: Completed Specimen: Blood Updated: 10/03/22 1815    Culture, Blood 2 No Growth to date. Any change in status will be called. Narrative:     ORDER#: [id number]                          ORDERED BY: BRICE EDEN   SOURCE: Blood                              COLLECTED:  10/02/22 17:13   ANTIBIOTICS AT [facility].:                      RECEIVED :  10/02/22 17:28   If child <=2 yrs old please draw pediatric bottle. ~Blood Culture #2   GI Bacterial Pathogens By PCR [[id number]]    Order Status: Sent Specimen: Stool    Clostridium Difficile Toxin/Antigen [[id number]]    Order Status: Sent Specimen: Stool    Fecal Leukocytes [[id number]]    Order Status: Sent Specimen: Stool    Culture, Blood 1 [[id number]] Collected: 10/02/22 1256   Order Status: Completed Specimen: Blood Updated: 10/03/22 1418    Blood Culture, Routine No Growth to date. Any change in status will be called.    Narrative:     ORDER#: [id number] ORDERED BY: Hilton Du   SOURCE: Blood                              COLLECTED:  10/02/22 12:56   ANTIBIOTICS AT [facility]A.:                      RECEIVED :  10/02/22 13:10   If child <=2 yrs old please draw pediatric bottle. ~Blood Culture 1   Culture, Urine [[id number]] Collected: 10/02/22 1256   Order Status: Completed Specimen: Urine, clean catch Updated: 10/03/22 1034    Urine Culture, Routine No growth at 18 to 36 hours   Narrative:     ORDER#: [id number]                          ORDERED BY: Hilton Du   SOURCE: Urine Clean Catch                  COLLECTED:  10/02/22 12:56   ANTIBIOTICS AT [facility].:                      RECEIVED :  10/02/22 14:06   Culture, Blood 2 [[id number]]    Order Status: Canceled Specimen: Blood    Culture, Blood 1 [[id number]] Collected: 10/02/22 0000   Order Status: Canceled Specimen: Blood      Lab Results   Component Value Date/Time    [facility]  10/02/2022 12:56 PM     No Growth to date. Any change in status will be called. Keven Duque  10/02/2022 05:13 PM     No Growth to date. Any change in status will be called. Respiratory Culture:  Lab Results   Component Value Date/Time    CULTRESP Light growth normal respiratory libra  including   09/29/2022 04:00 AM    CULTRESP Light growth  No further workup   09/29/2022 04:00 AM    LABGRAM 1+ Epithelial Cells  1+ Yeast with pseudohyphae   09/29/2022 04:00 AM     AFB:No results found for: [facility]  Viral Culture:  Lab Results   Component Value Date/Time    COVID19 Not Detected 09/07/2022 12:00 AM    COVID19 Not Detected 07/20/2022 11:20 PM     Urine Culture:   Recent Labs     10/02/22  1256   LABURIN No growth at 18 to 36 hours     Time       Culture, Blood 2 [[id number]] Collected: 10/02/22 1713   Order Status: Completed Specimen: Blood Updated: 10/03/22 1815    Culture, Blood 2 No Growth to date. Any change in status will be called.    Narrative:     ORDER#: [id number]                          ORDERED BY: BRICE EDEN SOURCE: Blood                              COLLECTED:  10/02/22 17:13   ANTIBIOTICS AT [facility]A.:                      RECEIVED :  10/02/22 17:28   If child <=2 yrs old please draw pediatric bottle. ~Blood Culture #2   GI Bacterial Pathogens By PCR [[id number]]    Order Status: Sent Specimen: Stool    Clostridium Difficile Toxin/Antigen [[id number]]    Order Status: Sent Specimen: Stool    Fecal Leukocytes [[id number]]    Order Status: Sent Specimen: Stool    Culture, Blood 1 [[id number]] Collected: 10/02/22 1256   Order Status: Completed Specimen: Blood Updated: 10/03/22 1418    Blood Culture, Routine No Growth to date. Any change in status will be called. Narrative:     ORDER#: [id number]                          ORDERED BY: Althea Adkins   SOURCE: Blood                              COLLECTED:  10/02/22 12:56   ANTIBIOTICS AT [facility]LLA.:                      RECEIVED :  10/02/22 13:10   If child <=2 yrs old please draw pediatric bottle. ~Blood Culture 1   Culture, Urine [[id number]] Collected: 10/02/22 1256   Order Status: Completed Specimen: Urine, clean catch Updated: 10/03/22 1034    Urine Culture, Routine No growth at 18 to 36 hours   Narrative:     ORDER#: [id number]                          ORDERED BY: Althea Adkins        IMAGING:    MRI BRAIN WO CONTRAST   Final Result   1. No acute findings or etiology identified to explain the patient's seizures. 2. Several nonspecific foci of white matter signal abnormality. Although   nonspecific, these are likely of doubtful clinical significance. MRI PELVIS WO CONTRAST   Final Result   1. Extensive marrow edema is seen within the right sacrum and iliac bone   extending into the right acetabulum. Findings are concerning for   osteomyelitis/septic arthritis involving the right sacroiliac joint.   The   degree of marrow edema appears similar to the comparison MRI.      2.  2.5 x 0.9 x 2.0 cm ovoid fluid collection within the gluteus minimus   muscle adjacent to the right iliac bone compatible with a small abscess. 3.  Persistent edema within the right iliacus muscle with no clear evidence   of persistent iliacus muscle abscess. CT HEAD WO CONTRAST   Final Result   No acute intracranial abnormality. XR CHEST PORTABLE   Final Result   Developing small bilateral pleural effusions, left greater than right. Persistent multifocal pulmonary nodules, some of which are cavitary,   suggestive of septic emboli given patient history. CT L spine 9/29/22   Impression   1. Acute osteomyelitis-septic arthritis noted involving the right sacroiliac   joint with osteomyelitis process extending to the right iliac wing. 2. Patient had a large right iliac fossa fluid collection on previous   examination. A drainage catheter is noted in this region. The right iliac   fossa is incompletely covered on this examination and there is limited   resolution to comment upon the residual fluid collection. A 2nd collection   is noted along the right sacral canal.  Soft tissue fullness noted in this   region. There is limited resolution. Patient will benefit from a contrast enhanced CT or MR of the pelvis   targeting the right iliac fossa and the pelvis. 3. Moderate bilateral pleural effusions. 4. Normal appearing bony lumbar spine. RECOMMENDATIONS:   Recommend obtaining contrast enhanced CT/MR of the pelvis for complete   coverage of the right iliac fossa and pelvis. CT chest :  9/27/22  FINDINGS:   Mediastinum: Thyroid gland unremarkable. Tip of PICC and central line projects   in region of SVC. Small pericardial effusion is seen. Small hiatal hernia   seen. There is nonspecific thickening at the GE junction. Small mediastinal   nodes are noted, likely reactive       Lungs/pleura: There are bilateral pleural effusions seen, left greater than   right, increased compared to prior. There is increased lung consolidation,   left greater than right. Cavitary and non cavitary nodules in the left lung are again identified,   overall decreased compared to prior. .  For example, an index cavitary nodule   in the left upper lobe measures 1.3 cm x 1.3 cm, previously measuring 2.0 cm   x 2.1 cm. On the right, a bilobed cavitary nodule which previously measured 6.9 cm by   4.2 cm, now measures 5.6 cm x 2.7 cm and now appears as 2 separate nodules. Upper Abdomen: Right adrenal gland is normal.  Left adrenal gland is normal       Soft Tissues/Bones: Bones appear unchanged. There is body wall anasarca           Impression   Widespread cavitary nodules, decreased on the right and left. , in keeping   with the diagnosis of septic emboli. Increased pleural effusions with increasing consolidative change at the lung   bases.        All the pertinent images and reports for the current Hospitalization were reviewed by me     Scheduled Meds:   ceftaroline fosamil (TEFLARO) IVPB  400 mg IntraVENous Q12H    clindamycin  300 mg Oral 3 times per day    pantoprazole (PROTONIX) 40 mg injection  40 mg IntraVENous Q12H    levETIRAcetam  500 mg IntraVENous Daily    sodium chloride flush  5-40 mL IntraVENous 2 times per day    [Held by provider] enoxaparin  40 mg SubCUTAneous Nightly    lactated ringers bolus  30 mL/kg IntraVENous Once    daptomycin (CUBICIN) IVPB  8 mg/kg IntraVENous Once per day on Tue Thu Sat       Continuous Infusions:   sodium chloride      sodium chloride      sodium chloride      sodium chloride 40 mL/hr at 10/04/22 0136       PRN Meds:  heparin (porcine), sodium chloride, sodium chloride, sodium chloride flush, sodium chloride, acetaminophen **OR** acetaminophen, naloxone, hydrALAZINE      Assessment:     Patient Active Problem List   Diagnosis    Tear of medial cartilage or meniscus of knee, current    Plica syndrome    Boxer's metacarpal fracture, neck, closed    Chondromalacia of patella    Degeneration of lumbar or lumbosacral intervertebral disc    Varicose veins of lower extremity    Intractable nausea and vomiting    Bacteremia    Drug use    MRSA bacteremia    Sepsis due to methicillin resistant Staphylococcus aureus (MRSA) without acute organ dysfunction (HCC)    Serratia marcescens infection    Acute bacterial endocarditis    Septic embolism (HCC)    Abnormal CT of the chest    Neutrophilia    Fever and chills    Hep C w/o coma, chronic (HCC)    Sepsis (HCC)    Pulmonary cavitary lesion    MARYAN (acute kidney injury) (HCC)    Acute osteomyelitis of sacrum (HCC)       Sepsis  Lactic acidosis  Fevers  WBC elevation  UDS+v for Fentanyl  IVDA history  Recent prolonged hospitalization from MRSA bacteremia, sepsis, Serratia bacteremia  Tricuspid valve endocarditis  Septic emboli  Cavitary pneumonia  Abnormal CT chest  Sacral osteomyelitis  Acute kidney injury requiring hemodialysis  Hep C+       Given the ongoing infection bacteremia with endocarditis and septic embolism which she will need IV antibiotics. Creatinine seems to be slowly improving awaiting for recovery. Nephrology is following. UDS on this admission positive for fentanyl patient denies any drug use but this is somewhat concerning given the urine test is positive,     Will adjust antibiotic to treat MRSA bacteremia Serratia bacteremia and associated complications. MRI Pelvis results noted d/w pt and will ask IR to check the drain in AM       Labs, Microbiology, Radiology and all the pertinent results from current hospitalization and  care every where were reviewed  by me as a part of the evaluation   Plan:   Cont IV Daptomycin x  550 mg x  3 times a week for now awaiting Renal Recovery  IV Ceftaroline x  400 mg q 12HRS  3    CONT  oral Clindamycin x  300  mg q 8 hrs  4. ESR. CRP  5. Blood cx repeat IN process   6. Awaiting Renal recovery   7. MRI pelvis noted and d/w pt  8.  Will ask IR to check the drain in AM      Discussed with patient/Family and Nursing   Risk of Complications/Morbidity: High      Illness(es)/ Infection present that pose threat to bodily function. There is potential for severe exacerbation of infection/side effects of treatment. Therapy requires intensive monitoring for antimicrobial agent toxicity. Discussed with patient/Family and Nursing staff     Thanks for allowing me to participate in your patient's care and please call me with any questions or concerns.     Kindra Schneider MD  Infectious Disease  [facility] ([facility]) Physician  Phone: [phone]   Fax : [phone]

## 2022-10-04 NOTE — PROCEDURES
[facility]           [address] [place] [address]                          ELECTROENCEPHALOGRAM REPORT    PATIENT NAME: Uriel Robbins                  :        1980  MED REC NO:   [id number]                          ROOM:       5252  ACCOUNT NO:   [de-identified]                           ADMIT DATE: 10/02/2022  PROVIDER:     Makenna Choudhary DO    DATE OF EEG:  10/04/2022    REFERRING PROVIDER:  Amando Mac NP    REASON FOR STUDY:  Seizure. BRIEF HISTORY AND NEUROLOGIC FINDINGS:  The patient is a 58-year-old  female being evaluated for reported seizures. MEDICATIONS:  The patient's centrally-acting medications listed include  Keppra and Narcan. EEG FINDINGS:  This is a 20-channel digital EEG performed utilizing  bipolar and referential montages. Wakefulness and sleep were obtained  during the recording. During maximum wakefulness there was a moderate  voltage, fairly symmetric, disorganized though reactive maximum  posterior background of 7 Hz theta activity. Superimposed slower theta  and delta frequencies were also noted throughout the recording. The  anterior background consists of low to moderate voltage mixed  frequencies. Drowsiness is manifested by attenuation of the waking  background rhythms. Sleep was manifested by poorly formed K-complexes  and sleep spindles. Occasional poorly organized sharp waves were noted in the left temporal  region, which were not definitely epileptiform. A few these had after  going slow waves. There was also intermixed bihemispheric  triphasic-appearing waveforms as well. No electrographic seizures were  recorded. Photic stimulation was performed at various flash frequencies and did  not evoke any significant posterior driving response. Hyperventilation  exercise was not performed due to the patient's age and/or clinical  history.     Hyperventilation exercise was performed and did not appear to result in  any significant alteration of the underlying background rhythms. A 1-channel EKG rhythm strip was reviewed and showed no obvious cardiac  dysrhythmias. Significant myogenic artifact was present at times during the recording,  which obscured some of the underlying background rhythms. This was most  prominent in the right frontotemporal region. EEG DIAGNOSIS:  Abnormal wake and asleep EEG secondary to mild  background slowing and disorganization, frontally dominant  triphasic-appearing waveforms, and occasionally sharply contoured theta  in the left temporal region. CLINICAL INTERPRETATION:  The sharply contoured theta discharges in the  left temporal region are nonspecific and not definitely epileptiform in  nature, however, suggest the possibility of an underlying area of focal  cortical irritability. Periodic discharges with triphasic morphology  are also nonspecific and often seen in the setting of hepatic, uremic,  or hypoxic encephalopathy though can be seen in the setting of ictal  processes as well. The background slowing and disorganization is also  nonspecific and consistent with a mild encephalopathy. This may be seen  in multiple settings including toxic, metabolic, or degenerative  conditions as well as with medication effect. No seizures were recorded  during the study. If seizures remain a clinical concern, then a repeat  EEG may be of further benefit. Clinical correlation is advised.         Mary Michaels DO    D: 10/04/2022 15:48:00       T: 10/04/2022 15:51:11     [email or web address]  Job#: [id number]     Doc#: [id number]    CC:

## 2022-10-04 NOTE — CARE COORDINATION
INITIAL CASE MANAGEMENT ASSESSMENT     Plan: home with daughter at discharge, 0 MARINA. DME: patient has a walker and bedside commode  POSSIBLE NEEDS Shower chair    HEMODIALYSIS: Yes  Location: [facility]  [facility] [address] [facility]. [place] [address]   # [phone]  Fax# [phone]     Schedule: T,TH,SA --11:30AM chair time  Patient states her father will transport her to and from her HD appts. Substance Abuse Treatment: Patient plans to follow up with Apolonia at [facility].      JAROD Irizarry, STEVEN, Social Work/Case Management   [phone]  Electronically signed by JAROD Irizarry, STEVEN on 10/4/2022 at 4:17 PM

## 2022-10-04 NOTE — PROGRESS NOTES
Physician Progress Note      PATIENT:               Nessa Lemus  CSN #:                  [id number]  :                       1980  ADMIT DATE:       10/2/2022 12:10 PM  [address] [place] [place] DATE:  RESPONDING  PROVIDER #:        Oscar Quiroz MD          QUERY TEXT:    Patient admitted with seizure . Noted documentation of ESRD per h&p and pn's   and MARYAN per Nephrology consult . If possible, please document in progress notes and discharge summary if you   are evaluating and /or treating any of the following: The medical record reflects the following:  Risk Factors: septic emboli  Clinical Indicators: Documentation per h&p and pn's of End-stage renal disease   on hemodialysis. Per Nephrology consult MARYAN  suspect Vancomycin toxicity /ATN/ infectious GN. Monitor closely for renal   recovery . Will get BX . On admission bun-7, creat-2.0 gfr-27  , repeat   bun-10, creat-2.1,  Treatment: Nephrology consult, monitor labs    Thank you, UlRodrigo Renteria [address] Work RN CDS CRCR [email or web address]  Options provided:  -- ESRD  confirmed and MARYAN ruled out  -- MARYAN  confirmed and ESRD  ruled out  -- Other - I will add my own diagnosis  -- Disagree - Not applicable / Not valid  -- Disagree - Clinically unable to determine / Unknown  -- Refer to Clinical Documentation Reviewer    PROVIDER RESPONSE TEXT:    Would you kindly ask the authors of the conflicting notes to explain these   things as they could have seen what I cant see now. Query created by:  Dilia Savage on 10/4/2022 9:46 AM      Electronically signed by:  Oscar Quiroz MD 10/4/2022 10:22 AM

## 2022-10-04 NOTE — PROGRESS NOTES
Physician Progress Note      PATIENT:               Gary Thorne  CSN #:                  [id number]  :                       1980  ADMIT DATE:       10/2/2022 12:10 PM  [address] [place] DATE:  RESPONDING  PROVIDER #:        Dallin Ramirez MD          QUERY TEXT:    Patient admitted with  seizure . Documentation reflects Sepsis  in ED note. If possible, please document in the progress notes and discharge summary if   Sepsis  was: The medical record reflects the following:  Risk Factors: hx of MRSA and serratia bacteremia with retroperitoneal abscess    septic pulmonary emboli and possible TV endocarditis, drug abuse  Clinical Indicators: Documentation per ED of Sepsis and per ID consult note of   Sepsis Lactic acidosis Fevers WBC elevation. On admission hr 128, t  max   100.3, wbc 18.6,  lactic acid-2.1, procal-.25,  CXR-Developing small bilateral   pleural effusions, left greater than right. Persistent multifocal pulmonary   nodules, some of which are cavitary, suggestive of septic emboli given patient   history  Treatment: ID consult, ivf, tylenol, iv maxipime, iv teflaro, iv cleocin, iv   cubicin, iv diflucan, iv vanc, monitor labs    Thank you, Wilner Savage RN CDS CRCR CCDS  [email or web address]  Options provided:  -- Sepsis  confirmed after study  -- Sepsis treated and resolved  -- Sepsis ruled out after study  -- Other - I will add my own diagnosis  -- Disagree - Not applicable / Not valid  -- Disagree - Clinically unable to determine / Unknown  -- Refer to Clinical Documentation Reviewer    PROVIDER RESPONSE TEXT:    Sepsis confirmed after study. Query created by:  Work, Blain Duane on 10/4/2022 9:54 AM      Electronically signed by:  Dallin Ramirez MD 10/4/2022 11:09 AM

## 2022-10-04 NOTE — PROGRESS NOTES
Hospitalist Progress Note      PCP: No primary care provider on file. Date of Admission: 10/2/2022    Hospital course    40 y.o. female who presented to [facility] [place].  Patient was Dcd a day prior after a prolonged 19-day stay for infective  endocarditis with septic emboli and MRSA  + Serratia bacteremia due to IV drug use. Patient also had SI joint osteomyelitis and retroperitoneal abscess for which he underwent drainage on last admission. She has ESRD on HD. She passed out and had shaking jerking motions which her father thought might have been a seizure. She was brought back to the emergency room for care. Appears right leg was shaking and her gaze was deviated to the right. She was noted to be hypercarbic     While in the emergency room her mentation did improve  At the time of my assessment now she feels better. She states she does not know what happens.   She denies using drugs(normally does injection fentanyl)     After 19-day stay in the hospital her urine tox screen is showing up fentanyl     I confronted her on this and she states she does not know how that is possible    Subjective:   feels tired  Went for EEG/HD/EGD         Medications:  Reviewed    Infusion Medications    sodium chloride 50 mL/hr at 10/04/22 1432    sodium chloride      sodium chloride      sodium chloride      sodium chloride 40 mL/hr at 10/04/22 0136     Scheduled Medications    ceftaroline fosamil (TEFLARO) IVPB  400 mg IntraVENous Q12H    clindamycin  300 mg Oral 3 times per day    pantoprazole (PROTONIX) 40 mg injection  40 mg IntraVENous Q12H    levETIRAcetam  500 mg IntraVENous Daily    sodium chloride flush  5-40 mL IntraVENous 2 times per day    [Held by provider] enoxaparin  40 mg SubCUTAneous Nightly    lactated ringers bolus  30 mL/kg IntraVENous Once    daptomycin (CUBICIN) IVPB  8 mg/kg IntraVENous Once per day on Tue Thu Sat     PRN Meds: heparin (porcine), sodium chloride, sodium chloride, sodium chloride flush, sodium chloride, acetaminophen **OR** acetaminophen, naloxone, hydrALAZINE      Intake/Output Summary (Last 24 hours) at 10/4/2022 1435  Last data filed at 10/4/2022 1401  Gross per 24 hour   Intake 3351.32 ml   Output 4324 ml   Net -972.68 ml         Physical Exam Performed:    /89   Pulse 97   Temp 98.7 °F (37.1 °C) (Oral)   Resp 15   Ht 5' 8\" (1.727 m)   Wt 154 lb 15.7 oz (70.3 kg)   SpO2 96%   BMI 23.57 kg/m²     General appearance: No apparent distress  Neck: Supple  Respiratory:  Normal respiratory effort. Clear to auscultation, bilaterally without Rales/Wheezes/Rhonchi. Cardiovascular: Regular rate and rhythm with normal S1/S2 without murmurs, rubs or gallops. Abdomen: Soft, non-tender,  Musculoskeletal: No clubbing, cyanosis o  Skin: Skin color, texture, turgor normal.  No rashes or lesions. Neurologic:  No focal weakness  Psychiatric: Alert and oriented  Capillary Refill: Brisk, 3 seconds, normal   Peripheral Pulses: +2 palpable, equal bilaterally       Labs:   Recent Labs     10/03/22  0350 10/03/22  1418 10/03/22  2029 10/04/22  0138 10/04/22  0447 10/04/22  0847   WBC 10.3 11.6*  --   --  11.5*  --    HGB 6.4* 6.6*   < > 7.3* 7.6* 7.3*   HCT 19.8* 19.9*   < > 22.5* 24.0* 21.5*    250  --   --  254  --     < > = values in this interval not displayed. Recent Labs     10/02/22  1229 10/03/22  0350 10/04/22  0447    140 139   K 3.9 3.5 3.8    106 106   CO2 25 24 23   BUN 7 10 11   CREATININE 2.0* 2.1* 2.3*   CALCIUM 8.6 7.6* 7.7*   PHOS  --  4.1  --        Recent Labs     10/02/22  1229   AST 13*   ALT 7*   BILITOT 0.3   ALKPHOS 125       No results for input(s): INR in the last 72 hours. No results for input(s): [place] Loss in the last 72 hours.     Urinalysis:      Lab Results   Component Value Date/Time    NITRU POSITIVE 10/02/2022 12:56 PM    WBCUA 10-20 10/02/2022 12:56 PM    BACTERIA 4+ 10/02/2022 12:56 PM    RBCUA >100 10/02/2022 12:56 PM BLOODU LARGE 10/02/2022 12:56 PM    SPECGRAV 1.015 10/02/2022 12:56 PM    GLUCOSEU Negative 10/02/2022 12:56 PM    GLUCOSEU NEGATIVE 07/31/2010 02:59 PM       Radiology:  MRI BRAIN WO CONTRAST   Final Result   1. No acute findings or etiology identified to explain the patient's seizures. 2. Several nonspecific foci of white matter signal abnormality. Although   nonspecific, these are likely of doubtful clinical significance. MRI PELVIS WO CONTRAST   Final Result   1. Extensive marrow edema is seen within the right sacrum and iliac bone   extending into the right acetabulum. Findings are concerning for   osteomyelitis/septic arthritis involving the right sacroiliac joint. The   degree of marrow edema appears similar to the comparison MRI.      2.  2.5 x 0.9 x 2.0 cm ovoid fluid collection within the gluteus minimus   muscle adjacent to the right iliac bone compatible with a small abscess. 3.  Persistent edema within the right iliacus muscle with no clear evidence   of persistent iliacus muscle abscess. CT HEAD WO CONTRAST   Final Result   No acute intracranial abnormality. XR CHEST PORTABLE   Final Result   Developing small bilateral pleural effusions, left greater than right. Persistent multifocal pulmonary nodules, some of which are cavitary,   suggestive of septic emboli given patient history.                  Assessment/Plan:    Active Hospital Problems    Diagnosis     Pulmonary cavitary lesion [J98.4]      Priority: Medium    MARYAN (acute kidney injury) ([address] [N17.9]      Priority: Medium    Acute osteomyelitis of sacrum ([address] [M46.28]      Priority: Medium    Sepsis ([address] [A41.9]      Priority: Medium    Bacteremia [R78.81]      Priority: Medium    Drug use [F19.90]      Priority: Medium    Serratia marcescens infection [A48.8]      Priority: Medium    Acute bacterial endocarditis [I33.0]      Priority: Medium     Seizures episode, one episode happening after discharge from the hospital, likely related to use of fentanyl, patient denies, neurology consulted given Keppra. MRI brain to rule out intracranial abscess. Acute respiratory failure with hypercarbia, improved  Septic emboli, continue regimen with cefepime and vancomycin  End-stage renal disease nephrology consulted on dialysis  IV drug use, counseled again. Acute on chronic anemia, worsening, no obvious source of bleeding, transfusing, frequent H&H, consulted GI, added pantoprazole, discussed with nurse . EGD done 10/4/22 showed LA grade a esophagitis and a somewhat COVID mucosa extending from the proximal age of the gastric folds from the incisors suspicious for Day's esophagus    A picture of sepsis with fever, tachycardia, hypoxic respiratory failure  8. Anemia with Hb drop from 9-->6  EGD shows LA Grade A esophagitis + [place]-colored mucosa extending from the proximal edge of the gastric folds (Day's esophagus)          Diet: ADULT DIET;  Regular  Code Status: Full Code          Nelly Rae MD

## 2022-10-04 NOTE — ANESTHESIA PRE PROCEDURE
[facility] Department of Anesthesiology  Pre-Anesthesia Evaluation/Consultation       Name:  Claudette Manning  : 1980  Age:  40 y.o.                                            MRN:  [id number]  Date: 10/4/2022           Surgeon: Surgeon(s):  Jorge Alberto Fry MD    Procedure: Procedure(s):  EGD ESOPHAGOGASTRODUODENOSCOPY     Allergies   Allergen Reactions    Ultram [Tramadol Hcl] Swelling    Robaxin [Methocarbamol] Other (See Comments)     Made patient very dizzy    Penicillins      Patient Active Problem List   Diagnosis    Tear of medial cartilage or meniscus of knee, current    Plica syndrome    Boxer's metacarpal fracture, neck, closed    Chondromalacia of patella    Degeneration of lumbar or lumbosacral intervertebral disc    Varicose veins of lower extremity    Intractable nausea and vomiting    Bacteremia    Drug use    MRSA bacteremia    Sepsis due to methicillin resistant Staphylococcus aureus (MRSA) without acute organ dysfunction (HCC)    Serratia marcescens infection    Acute bacterial endocarditis    Septic embolism (HCC)    Abnormal CT of the chest    Neutrophilia    Fever and chills    Hep C w/o coma, chronic ([address]    Sepsis ([address]    Pulmonary cavitary lesion    MARYAN (acute kidney injury) ([address]    Acute osteomyelitis of sacrum ([address]     Past Medical History:   Diagnosis Date    ADHD (attention deficit hyperactivity disorder)     Arthritis     Back pain     Depression     Migraine     Neutrophilia 9/15/2022     Past Surgical History:   Procedure Laterality Date    IR INSERT NON TUNNELED CV CATH LESS THAN 5 YEARS Right 2022    Vascath; RIJ access; 15cm; Dr. Uyen Laboy    IR NONTUNNELED VASCULAR CATHETER  2022    IR NONTUNNELED VASCULAR CATHETER 2022 [facility] SPECIAL PROCEDURES    KNEE ARTHROSCOPY  10/05/2010    PARTIAL HYSTERECTOMY (CERVIX NOT REMOVED)      TUBAL LIGATION  2004    TUNNELED VENOUS CATHETER PLACEMENT Right 2022    Permacath; [facility] access; 19cm; Dr. Carrera Police History     Tobacco Use    Smoking status: Every Day     Packs/day: 0.50     Years: 2.00     Pack years: 1.00     Types: Cigarettes    Smokeless tobacco: Never   Substance Use Topics    Alcohol use: Yes     Comment: occas    Drug use: No     Medications  No current facility-administered medications on file prior to encounter.      Current Outpatient Medications on File Prior to Encounter   Medication Sig Dispense Refill    clindamycin (CLEOCIN) 300 MG capsule Take 1 capsule by mouth 3 times daily 126 capsule 0    levoFLOXacin (LEVAQUIN) 250 MG tablet Take 1 tablet by mouth daily 42 tablet 0    calcium carbonate (TUMS) 500 MG chewable tablet Take 1 tablet by mouth 3 times daily as needed for Heartburn 30 tablet 0     Current Facility-Administered Medications   Medication Dose Route Frequency Provider Last Rate Last Admin    heparin (porcine) injection 3,200 Units  3,200 Units IntraCATHeter PRN Kayla Youssef MD        0.9 % sodium chloride infusion   IntraVENous Continuous Kayla Youssef MD        ceftaroline fosamil (TEFLARO) 400 mg in dextrose 5 % 50 mL IVPB  400 mg IntraVENous Q12H Kahlil Fontaine MD   Stopped at 10/03/22 2152    clindamycin (CLEOCIN) capsule 300 mg  300 mg Oral 3 times per day Kahlil Fontaine MD   300 mg at 10/04/22 0532    0.9 % sodium chloride infusion   IntraVENous PRN Praveen Ewing MD        pantoprazole (PROTONIX) 40 mg in sodium chloride (PF) 10 mL injection  40 mg IntraVENous Q12H Praveen Ewing MD   40 mg at 10/04/22 0135    0.9 % sodium chloride infusion   IntraVENous PRN Praveen Ewing MD        levETIRAcetam (KEPPRA) 500 mg/100 mL IVPB  500 mg IntraVENous Daily Fartun Vance, APRN - CNP        sodium chloride flush 0.9 % injection 5-40 mL  5-40 mL IntraVENous 2 times per day Romy Marcano MD        sodium chloride flush 0.9 % injection 5-40 mL  5-40 mL IntraVENous PRN Romy Marcano MD        0.9 % sodium chloride infusion IntraVENous PRN Bianka Figueroa MD        [Held by provider] enoxaparin (LOVENOX) injection 40 mg  40 mg SubCUTAneous Nightly Bianka Figueroa MD   40 mg at 10/02/22 2054    acetaminophen (TYLENOL) tablet 650 mg  650 mg Oral Q6H PRN Bianka Figueroa MD   650 mg at 10/04/22 2354    Or    acetaminophen (TYLENOL) suppository 650 mg  650 mg Rectal Q6H PRN Bianka Figueroa MD        lactated ringers bolus  30 mL/kg IntraVENous Once Bianka Figueroa MD        0.9 % sodium chloride infusion   IntraVENous Continuous Veronica Barrett MD 40 mL/hr at 10/04/22 0136 New Bag at 10/04/22 0136    naloxone (NARCAN) injection 0.4 mg  0.4 mg IntraVENous PRN Bianka Figueroa MD        hydrALAZINE (APRESOLINE) injection 10 mg  10 mg IntraVENous Q6H PRN Bianka Figueroa MD   10 mg at 10/03/22 1109    DAPTOmycin (CUBICIN) 550 mg in sodium chloride 0.9 % 50 mL IVPB  8 mg/kg IntraVENous Once per day on  Sat Bianka Figueroa MD         Vital Signs (Current)   Vitals:    10/04/22 0915 10/04/22 1046 10/04/22 1130 10/04/22 1308   BP:  (!) 151/96  (!) 152/94   Pulse: 86 84 90 94   Resp:    Temp:  98.2 °F (36.8 °C)  97.6 °F (36.4 °C)   TempSrc:    Temporal   SpO2:    94%   Weight:  154 lb 15.7 oz (70.3 kg)     Height:                                                Vital Signs Statistics (for past 48 hrs)     Temp  Av.1 °F (36.7 °C)  Min: 97.5 °F (36.4 °C)   Min taken time: 10/03/22 1100  Max: 98.7 °F (37.1 °C)   Max taken time: 10/03/22 1525  Pulse  Av.1  Min: 80   Min taken time: 10/04/22 1046  Max: 131   Max taken time: 10/02/22 1316  Resp  Av.1  Min: 6   Min taken time: 10/02/22 1330  Max: 32   Max taken time: 10/04/22 0815  BP  Min: 134/84   Min taken time: 10/03/22 1525  Max: 178/129   Max taken time: 10/02/22 1546  MAP (mmHg)  Av.3  Min: 80   Min taken time: 10/03/22 1525  Max: 145   Max taken time: 10/02/22 1546  SpO2  Av.7 %  Min: 94 %   Min taken time: 10/04/22 1308  Max: 100 %   Max taken time: 10/02/22 1602  BP Readings from Last 3 Encounters:   10/04/22 (!) 152/94   10/01/22 (!) 158/102   09/07/22 120/80       BMI  Body mass index is 23.57 kg/m². Estimated body mass index is 23.57 kg/m² as calculated from the following:    Height as of this encounter: 5' 8\" (1.727 m). Weight as of this encounter: 154 lb 15.7 oz (70.3 kg).     CBC   Lab Results   Component Value Date/Time    WBC 11.5 10/04/2022 04:47 AM    RBC 2.81 10/04/2022 04:47 AM    HGB 7.3 10/04/2022 08:47 AM    HCT 21.5 10/04/2022 08:47 AM    MCV 85.3 10/04/2022 04:47 AM    RDW 17.7 10/04/2022 04:47 AM     10/04/2022 04:47 AM     CMP    Lab Results   Component Value Date/Time     10/04/2022 04:47 AM    K 3.8 10/04/2022 04:47 AM    K 3.9 10/02/2022 12:29 PM     10/04/2022 04:47 AM    CO2 23 10/04/2022 04:47 AM    BUN 11 10/04/2022 04:47 AM    CREATININE 2.3 10/04/2022 04:47 AM    GFRAA 28 10/04/2022 04:47 AM    GFRAA >60 07/28/2012 11:20 PM    AGRATIO 0.6 10/02/2022 12:29 PM    LABGLOM 23 10/04/2022 04:47 AM    GLUCOSE 82 10/04/2022 04:47 AM    PROT 7.9 10/02/2022 12:29 PM    PROT 6.5 07/28/2012 11:20 PM    CALCIUM 7.7 10/04/2022 04:47 AM    BILITOT 0.3 10/02/2022 12:29 PM    ALKPHOS 125 10/02/2022 12:29 PM    AST 13 10/02/2022 12:29 PM    ALT 7 10/02/2022 12:29 PM     BMP    Lab Results   Component Value Date/Time     10/04/2022 04:47 AM    K 3.8 10/04/2022 04:47 AM    K 3.9 10/02/2022 12:29 PM     10/04/2022 04:47 AM    CO2 23 10/04/2022 04:47 AM    BUN 11 10/04/2022 04:47 AM    CREATININE 2.3 10/04/2022 04:47 AM    CALCIUM 7.7 10/04/2022 04:47 AM    GFRAA 28 10/04/2022 04:47 AM    GFRAA >60 07/28/2012 11:20 PM    LABGLOM 23 10/04/2022 04:47 AM    GLUCOSE 82 10/04/2022 04:47 AM     POCGlucose  Recent Labs     10/02/22  1317 10/03/22  0350 10/04/22  0447   GLUCOSE 110 102* 82      [facility]    Lab Results   Component Value Date/Time    PROTIME 15.5 09/28/2022 04:25 AM    INR 1.24 09/28/2022 04:25 AM    APTT 33.4 09/28/2022 04:25 AM     HCG (If Jimmy De Santiago MD  October 4, 2022  1:11 PM

## 2022-10-04 NOTE — OP NOTE
Endoscopy Note    Patient: Lg Collier  : 1980  Acct#:     Procedure: Esophagogastroduodenoscopy with biopsy                         Date:  10/4/2022     Surgeon:   Alden Duenas MD    Referring Physician:  No primary care provider on file. Indications: This is a 40 y.o. female  who presents for EGD due to acute on chronic anemia, and concern for possible melena. Postoperative Diagnosis:    LA Grade A esophagitis  [place]-colored mucosa extending from the proximal edge of the gastric folds located 41 cm from the incisor, suspicious for C1 M2 Day's esophagus. The distal esophagus was examined with white light and NBI, and targeted biopsies were obtained. Anesthesia:  MAC    Consent:  The patient or their legal guardian has signed an informed consent, and is aware of the potential risks, benefits, alternatives, and potential complications of this procedure. These include, but are not limited to hemorrhage, bleeding, post procedural pain, perforation, phlebitis, aspiration, hypotension, hypoxia, cardiovascular events such as arryhthmia, and possibly death. Description of Procedure: The patient was then taken to the endoscopy suite, placed in the left lateral decubitus position and the above IV sedation was administrered. The Olympus video endoscope was placed through the patient's oropharynx without difficulty to the extent of the 2nd portion of the duodenum. Both forward and retroflexed views of the stomach were obtained. Findings:    Esophagus: The esophagus was notable for LA Grade A esophagitis. [place]-colored mucosa was see extending from the proximal edge of the gastric folds located 41 cm from the incisors, with two islands, suspicious for C1 M2 Day's esophagus. The distal esophagus was examined with white light and NBI, and targeted biopsies were obtained. Stomach:  The stomach appeared normal on forward and retroflexed views  Duodenum: The first and 2nd portions of the duodenum appeared normal with normal villous pattern    The scope was then withdrawn back into the stomach, it was decompressed, and the scope was completely withdrawn. The patient tolerated the procedure well and was taken to the post anesthesia care unit in good condition. Estimated Blood Loss: Minimal     Impression:   1) See post procedure diagnoses    Recommendations:   - Follow-up pathology results in 7 days, by calling the office at [phone].  - Resume regular medications. - Resume diet as tolerated. - Repeat EGD based upon pathology. - Based upon EGD findings, no evidence of GI bleeding. Will order LDH and haptoglobin to evaluate for hemolysis with known endocarditis.       QING [address] [facility]

## 2022-10-04 NOTE — PROGRESS NOTES
Treatment time: 2.5 hours  Net UF: 1500 ml     Pre weight: 70.3 kg  Post weight:69.9 kg  EDW: tbd kg      Access used: RTDC    Access function: well with  ml/min     Medications or blood products given: heparin dwells     Regular outpatient schedule: TTS     Summary of response to treatment: Patient tolerated treatment well and without any complications.  Patient remained stable throughout entire treatment       10/04/22 0808 10/04/22 1046   Treatment   Time On 0817  --    Time Off  --  1046   Vital Signs   BP (!) 149/99 (!) 151/96   Temp 98.2 °F (36.8 °C) 98.2 °F (36.8 °C)   Heart Rate (!) 112 84   Resp 18 18   Weight  --  154 lb 15.7 oz (70.3 kg)

## 2022-10-04 NOTE — PROGRESS NOTES
[facility]  Phone: [phone]  Fax: [phone]  [facility]         40 y.o. female who presented to [facility] [place].  Patient was just discharged from the hospital yesterday after a prolonged 19-day stay for infected endocarditis with septic emboli and MRSA Serratia bacteremia due to IV drug use. Patient also had SI joint osteomyelitis and retroperitoneal abscess for which he underwent drainage on last admission. patient is a dialysis patient and got hemodialysis yesterday prior to discharge. Patient was at home today when she passed out and had shaking jerking motions which her father thought might have been a seizure. Admitted for further work up and management  . Renal cons called for MARYAN . Seen on HD , tolerating well . UOP/labs noted . HPI:  Breathing comfortably. No CP.  Haim Gilbert . ROS:  In bed. [address]:  medications reviewed. Physical Exam:    VITALS:  BP (!) 151/96   Pulse 90   Temp 98.2 °F (36.8 °C)   Resp 21   Ht 5' 8\" (1.727 m)   Wt 154 lb 15.7 oz (70.3 kg)   SpO2 94%   BMI 23.57 kg/m²   24HR INTAKE/OUTPUT:    Intake/Output Summary (Last 24 hours) at 10/4/2022 1200  Last data filed at 10/4/2022 1134  Gross per 24 hour   Intake 2851.32 ml   Output 3599 ml   Net -747.68 ml         Constitutional:  awake   Respiratory:  Decrease BS at  bases   Gastrointestinal:  + tenderness. Normal Bowel Sounds  Cardiovascular:  S1, S2 RRR   Edema:  +  edema  Acc Rt TDC .     DATA:    CBC:  Lab Results   Component Value Date/Time    WBC 11.5 10/04/2022 04:47 AM    RBC 2.81 10/04/2022 04:47 AM    HGB 7.3 10/04/2022 08:47 AM    HCT 21.5 10/04/2022 08:47 AM    MCV 85.3 10/04/2022 04:47 AM    MCH 27.1 10/04/2022 04:47 AM    MCHC 31.7 10/04/2022 04:47 AM    RDW 17.7 10/04/2022 04:47 AM     10/04/2022 04:47 AM    MPV 7.8 10/04/2022 04:47 AM     CMP:  Lab Results   Component Value Date/Time     10/04/2022 04:47 AM    K 3.8 10/04/2022 04:47 AM    K 3.9 10/02/2022 12:29 PM     10/04/2022 04:47 AM    CO2 23 10/04/2022 04:47 AM    BUN 11 10/04/2022 04:47 AM    CREATININE 2.3 10/04/2022 04:47 AM    GFRAA 28 10/04/2022 04:47 AM    GFRAA >60 07/28/2012 11:20 PM    AGRATIO 0.6 10/02/2022 12:29 PM    LABGLOM 23 10/04/2022 04:47 AM    GLUCOSE 82 10/04/2022 04:47 AM    PROT 7.9 10/02/2022 12:29 PM    PROT 6.5 07/28/2012 11:20 PM    CALCIUM 7.7 10/04/2022 04:47 AM    BILITOT 0.3 10/02/2022 12:29 PM    ALKPHOS 125 10/02/2022 12:29 PM    AST 13 10/02/2022 12:29 PM    ALT 7 10/02/2022 12:29 PM      Hepatic Function Panel:   Lab Results   Component Value Date/Time    ALKPHOS 125 10/02/2022 12:29 PM    ALT 7 10/02/2022 12:29 PM    AST 13 10/02/2022 12:29 PM    PROT 7.9 10/02/2022 12:29 PM    PROT 6.5 07/28/2012 11:20 PM    BILITOT 0.3 10/02/2022 12:29 PM    BILIDIR <0.2 09/27/2022 04:40 AM    IBILI see below 09/27/2022 04:40 AM      Phosphorus:   Lab Results   Component Value Date/Time    PHOS 4.1 10/03/2022 03:50 AM       ASSESSMENT/PLAN:    1-MARYAN   suspect Vancomycin toxicity /ATN/ infectious GN . 1st HD 9/20  . S./p [facility]  9/23 . HD as ordered , cut the tx time short . Increase UOP noted . Monitor closely for renal recovery . 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 Monitor level  . 3-IVDA     4 Anemia   Epo with HD    2-Fxxraak-kzyxpbnr due to hypercarbia, respiratory depression from active drug use  Plan per neurology     6- Acute hypercarbic respiratory failure  Resolved . Etiology drug use , drug abuse .            Bishop Leonidas TALBERT,FACP

## 2022-10-04 NOTE — SIGNIFICANT EVENT
Rapid response called for patient apparently mainly for accelerated heart rate in the 120s, nursing then came to the bedside and noted that patient was lethargic but not obtunded and she was notably saturating in the 40% O2 all of a sudden, she was placed on nonrebreather. Blood sugar was checked and noted to be 150. Nursing gave reported bedside and noted that patient had been doing fine all day. Apparently she had just had an EGD done earlier this afternoon and she was sedated for the procedure but uncertain as to what patient may have received. She has not received any other sedating medications and no narcotics since admission. She was only given Tylenol for any pain at this time. Does have a history of IV drug abuse however but she used her last drugs prior to admission 2 days ago. Only other concern was that patient apparently had a visitor, daughter, who came to visit her today but nursing does not verify that patient was given any illegal substance during the visitation. I came to evaluate patient at bedside, she did have some mild basilar crackles and diminished breath sounds but overall did not hear any wheezing. Chest x-ray obtained demonstrated bilateral pleural effusions similar to previous x-ray 2 days ago. Patient was recovering on her oxygenation status and was able to be weaned off of oxygen throughout the night. Repeat labs were obtained and patient was noted to be with normal pH and was not hypercarbic at this time. The rest of labs demonstrate increased leukocytosis but patient is already on multiple antibiotics per infectious disease. She already received dialysis for ESRD per nephrology. Could not find any other explanation as to patient's worsening hypoxia or mental status change.   Urine drug screen was obtained as a repeat study and although still confirmed the fentanyl and opiates that patient had on the previous UDS 2 days ago, she apparently now screened positive for amphetamines. Patient during my interview with her when she was more awake and alert denies that she was given any illicit substances by family member and nor was she using while she was here. She does remark that prior to the event she did use her vape pen that she had on her person but it was not witnessed by nursing staff. I told nursing staff to lock the vape in her room cupboard and told her to not use this while she is currently receiving treatment in the hospital and that likely it may not be a good option going forward given her multiple comorbid conditions. I do not feel patient warrants CT imaging of the chest at this time as her oxygenation requirements are improving. There is some suspicion for possible new illicit substance use given the amphetamine positive but this may need to be addressed on day shift. Patient denied any new use of illicit substances to me. All sedating medications have been placed on hold. Patient does not seem to need Narcan at this time and I am hesitant to give her Narcan unless she becomes more obtunded or if her respiratory status was not improving.

## 2022-10-04 NOTE — ANESTHESIA POSTPROCEDURE EVALUATION
Department of Anesthesiology  Postprocedure Note    Patient: Joseph Rodriguez  MRN: [id number]  [place]: 1980  Date of evaluation: 10/4/2022      Procedure Summary     Date: 10/04/22 Room / Location: [address] [place] [address]    Anesthesia Start: [place] Anesthesia Stop: 1021    Procedure: EGD BIOPSY Diagnosis:       Melena      (Melena [K92.1])    Surgeons: Manju Vaughan MD Responsible Provider: Latha Lopez MD    Anesthesia Type: MAC ASA Status: 3          Anesthesia Type: MAC    Mario Phase I: Mario Score: 10    Mario Phase II:        Anesthesia Post Evaluation    Patient location during evaluation: PACU  Level of consciousness: awake and alert  Airway patency: patent  Nausea & Vomiting: no nausea and no vomiting  Complications: no  Cardiovascular status: blood pressure returned to baseline  Respiratory status: acceptable  Hydration status: euvolemic  Comments: Postoperative Anesthesia Note    Name:    Joseph Rodriguez  MRN:      [id number]    Patient Vitals in the past 12 hrs:  10/04/22 1423, BP:132/89, Pulse:97, SpO2:96 %  10/04/22 1415, Temp:98.7 °F (37.1 °C), Temp src:Oral, Pulse:94  10/04/22 1410, Pulse:93, Resp:15, SpO2:91 %  10/04/22 1405, BP:(!) 131/95, Pulse:93, Resp:15, SpO2:92 %  10/04/22 1404, Pulse:97, Resp:18, SpO2:92 %  10/04/22 1400, BP:124/86, Pulse:96, Resp:21, SpO2:92 %  10/04/22 1358, BP:133/88, Pulse:96, Resp:15, SpO2:90 %  10/04/22 1350, BP:(!) 133/93, Pulse:88, Resp:16, SpO2:99 %  10/04/22 1345, BP:131/81, Pulse:85, Resp:15, SpO2:99 %  10/04/22 1339, BP:133/88, Temp:98.3 °F (36.8 °C), Temp src:Temporal, Pulse:86, Resp:13, SpO2:99 %  10/04/22 1308, BP:(!) 152/94, Temp:97.6 °F (36.4 °C), Temp src:Temporal, Pulse:94, Resp:18, SpO2:94 %  10/04/22 1130, Pulse:90, Resp:21  10/04/22 1046, BP:(!) 151/96, Temp:98.2 °F (36.8 °C), Pulse:84, Resp:18, Weight:154 lb 15.7 oz (70.3 kg)  10/04/22 0915, Pulse:86, Resp:23  10/04/22 0843, Pulse:97  10/04/22 0815, BP:(!) 149/99, Temp:98.3 °F (36.8 °C), Temp src:Oral, Pulse:97, Resp:27, SpO2:94 %  10/04/22 0808, BP:(!) 149/99, Temp:98.2 °F (36.8 °C), Pulse:(!) 112, Resp:18, Weight:158 lb 8.2 oz (71.9 kg)  10/04/22 0730, Pulse:98, Resp:19  10/04/22 0502, Weight:158 lb 8.2 oz (71.9 kg)  10/04/22 0310, BP:(!) 150/85, Temp:98.4 °F (36.9 °C), Temp src:Oral, Pulse:87, Resp:20, SpO2:96 %     LABS:    CBC  Lab Results       Component                Value               Date/Time                  WBC                      11.5 (H)            10/04/2022 04:47 AM        HGB                      7.3 (L)             10/04/2022 08:47 AM        HCT                      21.5 (L)            10/04/2022 08:47 AM        PLT                      254                 10/04/2022 04:47 AM   RENAL  Lab Results       Component                Value               Date/Time                  NA                       139                 10/04/2022 04:47 AM        K                        3.8                 10/04/2022 04:47 AM        K                        3.9                 10/02/2022 12:29 PM        CL                       106                 10/04/2022 04:47 AM        CO2                      23                  10/04/2022 04:47 AM        BUN                      11                  10/04/2022 04:47 AM        CREATININE               2.3 (H)             10/04/2022 04:47 AM        GLUCOSE                  82                  10/04/2022 04:47 AM   COAGS  Lab Results       Component                Value               Date/Time                  PROTIME                  15.5 (H)            09/28/2022 04:25 AM        INR                      1.24 (H)            09/28/2022 04:25 AM        APTT                     33.4                09/28/2022 04:25 AM     Intake & Output:  @61RVFJ@    Nausea & Vomiting:  No    Level of Consciousness:  Awake    Pain Assessment:  Adequate analgesia    Anesthesia Complications:  No apparent anesthetic complications    SUMMARY Vital signs stable  OK to discharge from Stage I post anesthesia care.   Care transferred from Anesthesiology department on discharge from perioperative area

## 2022-10-04 NOTE — H&P
Pre-operative History and Physical    Patient: Yanick Bull  : 1980  Acct#:     Intended Procedure:  EGD    HISTORY OF PRESENT ILLNESS:  The patient is a 40 y.o. female  who presents for EGD due to acute on chronic anemia, and concern for possible melena. Past Medical History:        Diagnosis Date    ADHD (attention deficit hyperactivity disorder)     Arthritis     Back pain     Depression     Migraine     Neutrophilia 9/15/2022     Past Surgical History:        Procedure Laterality Date    IR INSERT NON TUNNELED CV CATH LESS THAN 5 YEARS Right 2022    Franny Kearns; RIJ access; 15cm; Dr. Jenny Bee    IR NONTUNNELED VASCULAR CATHETER  2022    IR NONTUNNELED VASCULAR CATHETER 2022 [facility]    KNEE ARTHROSCOPY  10/05/2010    PARTIAL HYSTERECTOMY (CERVIX NOT REMOVED)      TUBAL LIGATION  2004    TUNNELED VENOUS CATHETER PLACEMENT Right 2022    Permacath; RIJ access; 19cm; Dr. Adrian Mcgovern     Medications Prior to Admission:   No current facility-administered medications on file prior to encounter. Current Outpatient Medications on File Prior to Encounter   Medication Sig Dispense Refill    clindamycin (CLEOCIN) 300 MG capsule Take 1 capsule by mouth 3 times daily 126 capsule 0    levoFLOXacin (LEVAQUIN) 250 MG tablet Take 1 tablet by mouth daily 42 tablet 0    calcium carbonate (TUMS) 500 MG chewable tablet Take 1 tablet by mouth 3 times daily as needed for Heartburn 30 tablet 0        Allergies:  Ultram [tramadol hcl], Robaxin [methocarbamol], and Penicillins    Social History:   TOBACCO:   reports that she has been smoking cigarettes. She has a 1.00 pack-year smoking history. She has never used smokeless tobacco.  ETOH:   reports current alcohol use. DRUGS:   reports no history of drug use.     PHYSICAL EXAM:      Vital Signs: BP (!) 152/94   Pulse 94   Temp 97.6 °F (36.4 °C) (Temporal)   Resp 18   Ht 5' 8\" (1.727 m)   Wt 154 lb 15.7 oz (70.3 kg)   SpO2 94% BMI 23.57 kg/m²    Airway: No stridor or wheezing noted. Good air movement  Pulmonary: without wheezes. Clear to auscultation  Cardiac:regular rate and rhythm without loud murmurs  Abdomen:soft, nontender,  Bowel sounds present    Pre-Procedure Assessment / Plan:  1) EGD    ASA Grade:  ASA 3 - Patient with moderate systemic disease with functional limitations  Mallampati Classification:  Class II    Level of Sedation Plan: MAC    Post Procedure plan: Return to same level of care    I assessed the patient and find that the patient is in satisfactory condition to proceed with the planned procedure and sedation plan. I have explained the risk, benefits, and alternatives to the procedure; the patient understands and agrees to proceed.        Leigha Burgos MD  10/4/2022

## 2022-10-04 NOTE — PLAN OF CARE
Problem: Discharge Planning  Goal: Discharge to home or other facility with appropriate resources  Outcome: Not Progressing     Problem: Pain  Goal: Verbalizes/displays adequate comfort level or baseline comfort level  Outcome: Progressing     Problem: Skin/Tissue Integrity  Goal: Absence of new skin breakdown  Description: 1. Monitor for areas of redness and/or skin breakdown  2. Assess vascular access sites hourly  3. Every 4-6 hours minimum:  Change oxygen saturation probe site  4. Every 4-6 hours:  If on nasal continuous positive airway pressure, respiratory therapy assess nares and determine need for appliance change or resting period.   Outcome: Progressing     Problem: Safety - Adult  Goal: Free from fall injury  Outcome: Progressing     Problem: ABCDS Injury Assessment  Goal: Absence of physical injury  Outcome: Progressing     Problem: Discharge Planning  Goal: Discharge to home or other facility with appropriate resources  Outcome: Not Progressing

## 2022-10-04 NOTE — CARE COORDINATION
Attempted to meet with patient. Patient off the floor for dialysis. Per chart review, patient from home with family and outpatient dialysis with iv antibiotics. Will need to verify IV medications for discharge and verify plan with patient. Barrier to skilled facility is active substance use.    JAROD Frost, MILADW, Social Work/Case Management   [phone]  Electronically signed by JAROD Frost, STEVEN on 10/4/2022 at 11:07 AM

## 2022-10-05 LAB
ALBUMIN SERPL-MCNC: 2.4 G/DL (ref 3.4–5)
ANION GAP SERPL CALCULATED.3IONS-SCNC: 11 MMOL/L (ref 3–16)
APTT: 31.2 SEC (ref 23–34.3)
BUN BLDV-MCNC: 14 MG/DL (ref 7–20)
CALCIUM SERPL-MCNC: 7.7 MG/DL (ref 8.3–10.6)
CHLORIDE BLD-SCNC: 102 MMOL/L (ref 99–110)
CO2: 23 MMOL/L (ref 21–32)
CREAT SERPL-MCNC: 2 MG/DL (ref 0.6–1.1)
GFR AFRICAN AMERICAN: 33
GFR NON-AFRICAN AMERICAN: 27
GLUCOSE BLD-MCNC: 147 MG/DL (ref 70–99)
HCT VFR BLD CALC: 23.7 % (ref 36–48)
HCT VFR BLD CALC: 24 % (ref 36–48)
HEMOGLOBIN: 7.7 G/DL (ref 12–16)
HEMOGLOBIN: 7.7 G/DL (ref 12–16)
INR BLD: 1.2 (ref 0.87–1.14)
MCH RBC QN AUTO: 27.6 PG (ref 26–34)
MCHC RBC AUTO-ENTMCNC: 32.1 G/DL (ref 31–36)
MCV RBC AUTO: 85.8 FL (ref 80–100)
PDW BLD-RTO: 17.3 % (ref 12.4–15.4)
PHOSPHORUS: 3.8 MG/DL (ref 2.5–4.9)
PLATELET # BLD: 319 K/UL (ref 135–450)
PMV BLD AUTO: 7.8 FL (ref 5–10.5)
POTASSIUM SERPL-SCNC: 4.1 MMOL/L (ref 3.5–5.1)
PRO-BNP: 4446 PG/ML (ref 0–124)
PROTHROMBIN TIME: 15.2 SEC (ref 11.7–14.5)
RBC # BLD: 2.79 M/UL (ref 4–5.2)
SODIUM BLD-SCNC: 136 MMOL/L (ref 136–145)
TOTAL CK: 18 U/L (ref 26–192)
WBC # BLD: 13.2 K/UL (ref 4–11)

## 2022-10-05 PROCEDURE — 6360000002 HC RX W HCPCS: Performed by: INTERNAL MEDICINE

## 2022-10-05 PROCEDURE — 2580000003 HC RX 258: Performed by: INTERNAL MEDICINE

## 2022-10-05 PROCEDURE — 82550 ASSAY OF CK (CPK): CPT

## 2022-10-05 PROCEDURE — 2060000000 HC ICU INTERMEDIATE R&B

## 2022-10-05 PROCEDURE — 97530 THERAPEUTIC ACTIVITIES: CPT

## 2022-10-05 PROCEDURE — 85018 HEMOGLOBIN: CPT

## 2022-10-05 PROCEDURE — 85027 COMPLETE CBC AUTOMATED: CPT

## 2022-10-05 PROCEDURE — 6370000000 HC RX 637 (ALT 250 FOR IP): Performed by: INTERNAL MEDICINE

## 2022-10-05 PROCEDURE — 85014 HEMATOCRIT: CPT

## 2022-10-05 PROCEDURE — 83880 ASSAY OF NATRIURETIC PEPTIDE: CPT

## 2022-10-05 PROCEDURE — C9113 INJ PANTOPRAZOLE SODIUM, VIA: HCPCS | Performed by: INTERNAL MEDICINE

## 2022-10-05 PROCEDURE — 6360000002 HC RX W HCPCS: Performed by: STUDENT IN AN ORGANIZED HEALTH CARE EDUCATION/TRAINING PROGRAM

## 2022-10-05 PROCEDURE — 97162 PT EVAL MOD COMPLEX 30 MIN: CPT

## 2022-10-05 PROCEDURE — 99233 SBSQ HOSP IP/OBS HIGH 50: CPT | Performed by: INTERNAL MEDICINE

## 2022-10-05 PROCEDURE — 80069 RENAL FUNCTION PANEL: CPT

## 2022-10-05 PROCEDURE — 36415 COLL VENOUS BLD VENIPUNCTURE: CPT

## 2022-10-05 PROCEDURE — 94760 N-INVAS EAR/PLS OXIMETRY 1: CPT

## 2022-10-05 PROCEDURE — 97165 OT EVAL LOW COMPLEX 30 MIN: CPT

## 2022-10-05 RX ADMIN — Medication 40 MG: at 12:55

## 2022-10-05 RX ADMIN — CLINDAMYCIN HYDROCHLORIDE 300 MG: 150 CAPSULE ORAL at 21:53

## 2022-10-05 RX ADMIN — SODIUM CHLORIDE, PRESERVATIVE FREE 10 ML: 5 INJECTION INTRAVENOUS at 21:54

## 2022-10-05 RX ADMIN — Medication 40 MG: at 03:37

## 2022-10-05 RX ADMIN — MAGNESIUM SULFATE HEPTAHYDRATE 1000 MG: 1 INJECTION, SOLUTION INTRAVENOUS at 00:19

## 2022-10-05 RX ADMIN — CLINDAMYCIN HYDROCHLORIDE 300 MG: 150 CAPSULE ORAL at 05:43

## 2022-10-05 RX ADMIN — CEFTAROLINE FOSAMIL 400 MG: 600 POWDER, FOR SOLUTION INTRAVENOUS at 11:28

## 2022-10-05 RX ADMIN — ANTACID TABLETS 500 MG: 500 TABLET, CHEWABLE ORAL at 14:14

## 2022-10-05 RX ADMIN — LEVETIRACETAM 500 MG: 5 INJECTION INTRAVENOUS at 09:23

## 2022-10-05 RX ADMIN — CEFTAROLINE FOSAMIL 400 MG: 600 POWDER, FOR SOLUTION INTRAVENOUS at 21:57

## 2022-10-05 RX ADMIN — ANTACID TABLETS 500 MG: 500 TABLET, CHEWABLE ORAL at 23:25

## 2022-10-05 RX ADMIN — CLINDAMYCIN HYDROCHLORIDE 300 MG: 150 CAPSULE ORAL at 12:55

## 2022-10-05 NOTE — PROGRESS NOTES
Physical Therapy  Facility/Department: [facility]N PROGRESSIVE CARE  Physical Therapy Initial Assessment    Name: Marie Eng  : 1980  MRN: [id number]  Date of Service: 10/5/2022    Discharge Recommendations:  24 hour supervision or assist, Home with Home health PT   PT Equipment Recommendations  Equipment Needed: No  Other: pt reported having RW, crutch at home. Patient Diagnosis(es): The primary encounter diagnosis was Septicemia ([facility] [address] Diagnoses of Respiratory distress, Seizure ([facility] [address] Complicated UTI (urinary tract infection), Candiduria, Pulmonary cavitary lesion, and Melena were also pertinent to this visit. Past Medical History:  has a past medical history of ADHD (attention deficit hyperactivity disorder), Arthritis, Back pain, Depression, Migraine, and Neutrophilia. Past Surgical History:  has a past surgical history that includes Partial hysterectomy; Knee arthroscopy (10/05/2010); Tubal ligation (2004); IR INSERT NON TUNNELED CV CATH <5 YEARS (Right, 2022); IR NONTUNNELED VASCULAR CATHETER > 5 YEARS (2022); Tunneled venous catheter placement (Right, 2022); and Upper gastrointestinal endoscopy (N/A, 10/4/2022). Assessment   Body Structures, Functions, Activity Limitations Requiring Skilled Therapeutic Intervention: Decreased functional mobility ; Decreased endurance  Assessment: Pt presents with decreased functional mobility after admission for \"Seizures? \" Of note, per ED notation, pt also with \"Urinalysis nitrite positive with leukoesterase. Significant protein in urine. Consistent with the patient's renal injury. UDS positive for opiates and fentanyl. \"  Pt with history of longstanding hospitalization from 22 to 10/1/22 with readmission 10/1/22. Pt reported at home with family support (daughter) and that her father was to take her to/from new HD treatments. Today, pt is only at [facility] level for transfers/short distance amb with RW support.   Pt cont to be an elevated fall risk due to prior admission with limited recovery/at home time. At present time, anticipate with cont PT while hospitalized, hoepful pt can return Home with beneficial 24 hr S/A as needed and level 3Home PT follow up. Will cont to see and progress mobility as able while hospitalized. Nicole Garcia scored a 17/24 on the AM-PAC short mobility form. Current research shows that an AM-PAC score of 18 or greater is typically associated with a discharge to the patient's home setting. Based on the patient's AM-PAC score and their current functional mobility deficits, it is recommended that the patient have 2-3 sessions per week of Physical Therapy at d/c to increase the patient's independence. At this time, this patient demonstrates the endurance and safety to discharge home with 24 hr S/A and HOME PT. Please see assessment section for further patient specific details. If patient discharges prior to next session this note will serve as a discharge summary. Please see below for the latest assessment towards goals.      Therapy Prognosis: Fair;Good  Decision Making: High Complexity  History: as noted  Exam: as above  Clinical Presentation: evolving  Barriers to Learning: ?insight  Requires PT Follow-Up: Yes  Activity Tolerance  Activity Tolerance: Patient tolerated evaluation without incident;Patient limited by endurance     Plan   Physcial Therapy Plan  General Plan: 3-5 times per week  Current Treatment Recommendations: Functional mobility training  Safety Devices  Type of Devices: Call light within reach, Nurse notified, Left in chair, Chair alarm in place  Restraints  Restraints Initially in Place: No     Restrictions  Restrictions/Precautions  Restrictions/Precautions: Fall Risk, Contact Precautions  Position Activity Restriction  Other position/activity restrictions: luna, jerri drain     Subjective   General  Chart Reviewed: Yes  Patient assessed for rehabilitation services?: Yes  Additional Pertinent Hx: Per ED H&P, pt is \"40year-old female with a history of IV drug use with pulmonary cavitary lesions, likely candiduria, bloodstream infection, potential endocarditis, recently discharged from hospital on multiple antibiotics presenting to ED with seizure-like activity\"  In addition, \"Urinalysis nitrite positive with leukoesterase. Significant protein in urine. Consistent with the patient's renal injury. UDS positive for opiates and fentanyl. \"  Family / Caregiver Present: No  Subjective  Subjective: Pt up in recliner, eating, but many questions to PT regarding shower chair for d/c to her daughter's. Pt reported only home x 16 hours when she had \"a seizure\" and returned to the hospital.  Pt reported using RW at home at initial d/c. Social/Functional History  Social/Functional History  Lives With: Family (daughter and mom)  Type of Home: Apartment  Home Layout: One level (one story and a basement)  Home Access: Stairs to enter with rails  Entrance Stairs - Number of Steps: 4-5 steps down with 1 rial (pt reported had briefly used rail and 1 crutch to get into home after last admit x 16 hours--then re-admit)  Bathroom Shower/Tub: Tub/Shower unit  Bathroom Toilet: Standard  Bathroom Equipment: Commode  Home Equipment: Walker, rolling  Has the patient had two or more falls in the past year or any fall with injury in the past year?: Yes  ADL Assistance: [address] [place] [address] Avenue: Independent  Homemaking Responsibilities: Yes  Ambulation Assistance: Independent (RW recently)  Transfer Assistance: Independent  Active : No  Occupation: Unemployed  Additional Comments: Pt reported only Home x 16 hours then had \"a seizure\" and then re-admit. Pt asking about shower chair, and explained this would be out of pocket expense. Pt denied other concerns/needs.   Vision/Hearing  Vision  Vision: Within Functional Limits  Hearing  Hearing: Within functional limits    Cognition   Orientation  Overall Orientation Status: Within Functional Limits     Objective   Strength RLE  Comment: functinally fair  Strength LLE  Comment: functionally fair    Bed Mobility Training  Supine to Sit: Stand-by assistance  Scooting: Stand-by assistance  Balance  Sitting: Intact  Standing: With support (RW)  Transfer Training  Transfer Training: Yes    Bed mobility  Supine to Sit: Unable to assess  Sit to Supine: Unable to assess  Bed Mobility Comments: nt--pt up in recliner at start and end of session. Transfers  Sit to Stand: Contact guard assistance  Stand to Sit: Contact guard assistance  Ambulation  Surface: Level tile  Device: Rolling Walker  Assistance: Contact guard assistance  Quality of Gait: slow but steady for very short transfer distance only this date. Gait Deviations: Slow Tracey  Distance: x 5 ft recliner<>bed and back to recliner to finish eating, LEs elevated for comfort. Comments: PT very talkative about shower chair for d/c to Home--PT used computer in room to show pt some prices of shower chairs, but explained several times that this woud likley be out of pocket expense--as most insurance co. do not cover this. Pt then reported her dad may have one she may utilize--however, encouraged her to have family figure if specific chair would fit into her tub/shower. Pt VU and reported her daugher would be assisting her at home at d/c. Discussed Home therapy follow up as well, to ensure adequate equipment and ongoing mobility would continue. Stairs/Curb  Stairs?: No (verbally discussed steps---pt reported using 1 rail and her dad's crutch tin [facility] (however, only home x 16 hrs, thus limited practice with this). )     Balance  Sitting - Static: Good  Sitting - Dynamic: Good  Standing - Static: Fair  Standing - Dynamic: Fair  Comments: S sit balance, CGA stance/transfer with RW support.      AM-PAC Score  AM-PAC Inpatient Mobility Raw Score : 17 (10/05/22 1340)  AM-PAC Inpatient T-Scale Score : 42.13 (10/05/22 1340)  Mobility Inpatient CMS 0-100% Score: 50.57 (10/05/22 1340)  Mobility Inpatient CMS G-Code Modifier : CK (10/05/22 1340)    Goals  Short Term Goals  Time Frame for Short Term Goals: by acute d/c:  Short Term Goal 1: bed mobility S/I  Short Term Goal 2: transfers S/I  Short Term Goal 3: amb > 100 ft with RW and SBA/S, no lob. Short Term Goal 4: assess steps as needed for d/c plans.   Patient Goals   Patient Goals : pt's goal is home as prior     Education  Patient Education  Education Given To: Patient  Education Provided: Role of Therapy;Plan of Care;Equipment  Education Method: Verbal  Education Outcome: Continued education needed;Verbalized understanding  Therapy Time   Individual Concurrent Group Co-treatment   Time In 1300         Time Out 1330         Minutes 30          1 eval, 1 act charges   Damion Blackwood PT Electronically signed by Damion Blackwood PT on 10/5/2022 at 1:59 PM

## 2022-10-05 NOTE — PROGRESS NOTES
Hospitalist Progress Note      PCP: No primary care provider on file. Date of Admission: 10/2/2022    Hospital course    40 y.o. female who presented to [facility] [place].  Patient was Dcd a day prior after a prolonged 19-day stay for infective  endocarditis with septic emboli and MRSA  + Serratia bacteremia due to IV drug use. Patient also had SI joint osteomyelitis and retroperitoneal abscess for which he underwent drainage on last admission. She has ESRD on HD. She passed out and had shaking jerking motions which her father thought might have been a seizure. She was brought back to the emergency room for care. Appears right leg was shaking and her gaze was deviated to the right. She was noted to be hypercarbic     While in the emergency room her mentation did improve  At the time of my assessment now she feels better. She states she does not know what happens. She denies using drugs(normally does injection fentanyl)     After 19-day stay in the hospital her urine tox screen is showing up fentanyl. Admitting physician comfronted her on this and she states she does not know how that is possible. Family members visited 10/4/22 in the day and a rapid response was called in the evening for resp distress and hypoxia and patient said she was \"vaping\" whilst in hospital and tested positive for \"amphetamines\" that were not there on the admission UDS. Subjective:   feels tired  Went for EEG/HD/EGD 10/4/22.            Medications:  Reviewed    Infusion Medications    sodium chloride      sodium chloride      sodium chloride       Scheduled Medications    ceftaroline fosamil (TEFLARO) IVPB  400 mg IntraVENous Q12H    clindamycin  300 mg Oral 3 times per day    pantoprazole (PROTONIX) 40 mg injection  40 mg IntraVENous Q12H    levETIRAcetam  500 mg IntraVENous Daily    sodium chloride flush  5-40 mL IntraVENous 2 times per day    [Held by provider] enoxaparin  40 mg SubCUTAneous Nightly    daptomycin (CUBICIN) IVPB  8 mg/kg IntraVENous Once per day on Tue Thu Sat     PRN Meds: heparin (porcine), calcium carbonate, sodium chloride, sodium chloride, sodium chloride flush, sodium chloride, acetaminophen **OR** acetaminophen, naloxone, hydrALAZINE      Intake/Output Summary (Last 24 hours) at 10/5/2022 0957  Last data filed at 10/5/2022 0911  Gross per 24 hour   Intake 3212.56 ml   Output 3162 ml   Net 50.56 ml         Physical Exam Performed:    BP (!) 136/96   Pulse 90   Temp 97.9 °F (36.6 °C) (Oral)   Resp 21   Ht 5' 8\" (1.727 m)   Wt 162 lb 0.6 oz (73.5 kg)   SpO2 94%   BMI 24.64 kg/m²     General appearance: No apparent distress  Neck: Supple  Respiratory:  Normal respiratory effort. Clear to auscultation, bilaterally without Rales/Wheezes/Rhonchi. Cardiovascular: Regular rate and rhythm with normal S1/S2 without murmurs, rubs or gallops. Abdomen: Soft, non-tender,  Musculoskeletal: No clubbing, cyanosis o  Skin: Skin color, texture, turgor normal.  No rashes or lesions. Neurologic:  No focal weakness  Psychiatric: Alert and oriented  Capillary Refill: Brisk, 3 seconds, normal   Peripheral Pulses: +2 palpable, equal bilaterally       Labs:   Recent Labs     10/04/22  0447 10/04/22  0847 10/04/22  1925 10/04/22  2006 10/05/22  0518   WBC 11.5*  --   --  19.0* 13.2*   HGB 7.6*   < > 8.1* 9.5* 7.7*  7.7*   HCT 24.0*   < > 24.7* 29.4* 24.0*  23.7*     --   --  279 319    < > = values in this interval not displayed.        Recent Labs     10/03/22  0350 10/04/22  0447 10/04/22  2006 10/05/22  0518    139 136 136   K 3.5 3.8 4.9 4.1    106 101 102   CO2 24 23 23 23   BUN 10 11 10 14   CREATININE 2.1* 2.3* 1.6* 2.0*   CALCIUM 7.6* 7.7* 7.7* 7.7*   PHOS 4.1  --   --  3.8       Recent Labs     10/02/22  1229 10/04/22  2006   AST 13* 18   ALT 7* 7*   BILITOT 0.3 <0.2   ALKPHOS 125 132*       Recent Labs     10/04/22  2346   INR 1.20*     No results for input(s): Collins Smith in the last 72 hours. Urinalysis:      Lab Results   Component Value Date/Time    NITRU Negative 10/04/2022 02:44 PM    WBCUA 113 10/04/2022 02:44 PM    BACTERIA None Seen 10/04/2022 02:44 PM    RBCUA 1642 10/04/2022 02:44 PM    BLOODU LARGE 10/04/2022 02:44 PM    SPECGRAV 1.015 10/04/2022 02:44 PM    GLUCOSEU Negative 10/04/2022 02:44 PM    GLUCOSEU NEGATIVE 07/31/2010 02:59 PM       Radiology:  XR CHEST PORTABLE   Final Result   1. Moderate left and small right pleural effusions, with multifocal   parenchymal infiltrates as well as some demonstrating nodularity related to   known septic pulmonary emboli and multilobar pneumonia. Multilead lobar   infiltrates have progressed from the previous exam.         MRI BRAIN WO CONTRAST   Final Result   1. No acute findings or etiology identified to explain the patient's seizures. 2. Several nonspecific foci of white matter signal abnormality. Although   nonspecific, these are likely of doubtful clinical significance. MRI PELVIS WO CONTRAST   Final Result   1. Extensive marrow edema is seen within the right sacrum and iliac bone   extending into the right acetabulum. Findings are concerning for   osteomyelitis/septic arthritis involving the right sacroiliac joint. The   degree of marrow edema appears similar to the comparison MRI.      2.  2.5 x 0.9 x 2.0 cm ovoid fluid collection within the gluteus minimus   muscle adjacent to the right iliac bone compatible with a small abscess. 3.  Persistent edema within the right iliacus muscle with no clear evidence   of persistent iliacus muscle abscess. CT HEAD WO CONTRAST   Final Result   No acute intracranial abnormality. XR CHEST PORTABLE   Final Result   Developing small bilateral pleural effusions, left greater than right. Persistent multifocal pulmonary nodules, some of which are cavitary,   suggestive of septic emboli given patient history.                  Assessment/Plan:    JEN/Aura Jacobs 6612 Problems    Diagnosis     Pulmonary cavitary lesion [J98.4]      Priority: Medium    MARYAN (acute kidney injury) ([facility] [address] [address] [N17.9]      Priority: Medium    Acute osteomyelitis of sacrum ([facility] [M46.28]      Priority: Medium    Sepsis ([facility] [address] [A41.9]      Priority: Medium    Bacteremia [R78.81]      Priority: Medium    Drug use [F19.90]      Priority: Medium    Serratia marcescens infection [A48.8]      Priority: Medium    Acute bacterial endocarditis [I33.0]      Priority: Medium     Seizures episode, one episode happening after discharge from the hospital, likely related to use of fentanyl, patient denies, neurology consulted given Keppra. MRI brain to rule out intracranial abscess. Acute respiratory failure with hypercarbia, improved  Septic emboli, continue regimen with cefepime and vancomycin  End-stage renal disease nephrology consulted on dialysis  IV drug use, counseled again. Acute on chronic anemia, worsening, no obvious source of bleeding, transfusing, frequent H&H, consulted GI, added pantoprazole, discussed with nurse . EGD done 10/4/22 showed LA grade a esophagitis and a somewhat COVID mucosa extending from the proximal age of the gastric folds from the incisors suspicious for Day's esophagus    A picture of sepsis with fever, tachycardia, hypoxic respiratory failure  8. Anemia with Hb drop from 9-->6  EGD shows LA Grade A esophagitis + [place]-colored mucosa extending from the proximal edge of the gastric folds (Day's esophagus)          Diet: ADULT DIET;  Regular  Code Status: Full Code          Sari Ortega MD

## 2022-10-05 NOTE — PROGRESS NOTES
This RN was notified by [facility] that the patient was in sinus tach sustaining in the 130's. RN found patient to be minimally responsive upon assessment. SPO2 checked for 49% on room air. 15 L high flow applied to patient and rapid response was called. Patient quickly recovered to 100% and became more responsive. Dr. Adeola Gill and [facility] [place] NP at bedside to evaluate. Patient able to hold a slow but appropriate conversation. Patient stated that her daughter, Oliver Powell, was here to visit earlier and brought her some pizza. A vape was found in the bed with the patient and confiscated. The room was searched and bed sheets changed, no other paraphernalia was recovered. Patient denied any drug use and requested that her family not be called about this event at this time. Placed on continuous SPO2. See orders from Dr. Adeola Gill.      Electronically signed by Rebbeca Claude, RN on 10/4/2022 at 11:18 PM

## 2022-10-05 NOTE — PROGRESS NOTES
INPATIENT CONSULTATION:    IDENTIFYING DATA/REASON FOR CONSULTATION   PATIENT:  Nicole Garcia  MRN:  [id number]  ADMIT DATE: 10/2/2022  TIME OF EVALUATION: 10/5/2022 6:52 AM  HOSPITAL STAY:   LOS: 3 days   CONSULTING PHYSICIAN: Kenya Samson MD   REASON FOR CONSULTATION: Anemia    Subjective:    Patient seen and examined in follow-up. Patient reports she is feeling well. She denies any further melena. No bowel movements over the past 24 hours. Hgb stable. MEDICATIONS   SCHEDULED:  ceftaroline fosamil (TEFLARO) IVPB, 400 mg, Q12H  clindamycin, 300 mg, 3 times per day  pantoprazole (PROTONIX) 40 mg injection, 40 mg, Q12H  levETIRAcetam, 500 mg, Daily  sodium chloride flush, 5-40 mL, 2 times per day  [Held by provider] enoxaparin, 40 mg, Nightly  daptomycin (CUBICIN) IVPB, 8 mg/kg, Once per day on Tue Thu Sat    FLUIDS/DRIPS:     sodium chloride      sodium chloride      sodium chloride       PRNs: heparin (porcine), 3,200 Units, PRN  calcium carbonate, 500 mg, TID PRN  sodium chloride, , PRN  sodium chloride, , PRN  sodium chloride flush, 5-40 mL, PRN  sodium chloride, , PRN  acetaminophen, 650 mg, Q6H PRN   Or  acetaminophen, 650 mg, Q6H PRN  naloxone, 0.4 mg, PRN  hydrALAZINE, 10 mg, Q6H PRN    ALLERGIES:    Allergies   Allergen Reactions    Ultram [Tramadol Hcl] Swelling    Robaxin [Methocarbamol] Other (See Comments)     Made patient very dizzy    Penicillins          PHYSICAL EXAM     Vitals:    10/04/22 2352 10/05/22 0025 10/05/22 0335 10/05/22 0516   BP: (!) 130/96  (!) 132/94    Pulse: 87  89    Resp: 20  22    Temp: 98.2 °F (36.8 °C)  97.8 °F (36.6 °C)    TempSrc: Oral  Oral    SpO2: 97% 95% 94% 99%   Weight:   162 lb 0.6 oz (73.5 kg)    Height:           I/O last 3 completed shifts: In: 4689.4 [P.O.:2340; I.V.:1102.1;  Blood:293.8; IV Piggyback:553.5]  Out: 4374 [Urine:2325; Drains:12]    Physical Exam:  Gen: Resting in bed, NAD  HEENT: Normocephalic, atraumatic, no scleral icterus   CV: Tachycardic but regular, systolic murmur, no rubs or gallops  Pul: CTAB, normal work of breathing without wheezing  Abd: Good bowel sounds throughout, soft, NT/ND, no masses, no HSM. KRYSTLE drain in right pelvis in place with scant output in collection device  Ext: No edema, moves all 4 extremities. Neuro:  Moves all four extremities, no gross deficits, follows commands   Skin: No jaundice, spider angiomas, palmar erythema    LABS AND IMAGING     Recent Results (from the past 24 hour(s))   Hemoglobin and Hematocrit    Collection Time: 10/04/22  8:47 AM   Result Value Ref Range    Hemoglobin 7.3 (L) 12.0 - 16.0 g/dL    Hematocrit 21.5 (L) 36.0 - 48.0 %   Urinalysis    Collection Time: 10/04/22  2:44 PM   Result Value Ref Range    Color, UA CANDELARIA (A) Straw/Yellow    Clarity, UA TURBID (A) Clear    Glucose, Ur Negative Negative mg/dL    Bilirubin Urine Negative Negative    Ketones, Urine Negative Negative mg/dL    Specific Gravity, UA 1.015 1.005 - 1.030    Blood, Urine LARGE (A) Negative    pH, UA 6.0 5.0 - 8.0    Protein, UA >=1000 Negative mg/dL    Urobilinogen, Urine 0.2 <2.0 E.U./dL    Nitrite, Urine Negative Negative    Leukocyte Esterase, Urine LARGE (A) Negative    Microscopic Examination YES     Urine Type NotGiven    Microscopic Urinalysis    Collection Time: 10/04/22  2:44 PM   Result Value Ref Range    Bacteria, UA None Seen None Seen /HPF    Hyaline Casts, UA 53 (H) 0 - 8 /LPF    WBC,  (H) 0 - 5 /HPF    RBC, UA 1642 (H) 0 - 4 /HPF    Epithelial Cells, UA 3 0 - 5 /HPF    Hyaline Casts, UA Present (A) None Seen    Granular Casts, UA Present (A) None Seen    Budding Yeast Present (A) None Seen   Hemoglobin and Hematocrit    Collection Time: 10/04/22  7:25 PM   Result Value Ref Range    Hemoglobin 8.1 (L) 12.0 - 16.0 g/dL    Hematocrit 24.7 (L) 36.0 - 48.0 %   POCT Glucose    Collection Time: 10/04/22  7:46 PM   Result Value Ref Range    POC Glucose 150 (H) 70 - 99 mg/dl    Performed on ACCU-CHEK    CBC with Auto Differential    Collection Time: 10/04/22  8:06 PM   Result Value Ref Range    WBC 19.0 (H) 4.0 - 11.0 K/uL    RBC 3.42 (L) 4.00 - 5.20 M/uL    Hemoglobin 9.5 (L) 12.0 - 16.0 g/dL    Hematocrit 29.4 (L) 36.0 - 48.0 %    MCV 86.1 80.0 - 100.0 fL    MCH 27.8 26.0 - 34.0 pg    MCHC 32.3 31.0 - 36.0 g/dL    RDW 17.1 (H) 12.4 - 15.4 %    Platelets 636 127 - 115 K/uL    MPV 8.7 5.0 - 10.5 fL    Neutrophils % 87.9 %    Lymphocytes % 9.8 %    Monocytes % 1.7 %    Eosinophils % 0.1 %    Basophils % 0.5 %    Neutrophils Absolute 16.7 (H) 1.7 - 7.7 K/uL    Lymphocytes Absolute 1.9 1.0 - 5.1 K/uL    Monocytes Absolute 0.3 0.0 - 1.3 K/uL    Eosinophils Absolute 0.0 0.0 - 0.6 K/uL    Basophils Absolute 0.1 0.0 - 0.2 K/uL   Comprehensive Metabolic Panel    Collection Time: 10/04/22  8:06 PM   Result Value Ref Range    Sodium 136 136 - 145 mmol/L    Potassium 4.9 3.5 - 5.1 mmol/L    Chloride 101 99 - 110 mmol/L    CO2 23 21 - 32 mmol/L    Anion Gap 12 3 - 16    Glucose 146 (H) 70 - 99 mg/dL    BUN 10 7 - 20 mg/dL    Creatinine 1.6 (H) 0.6 - 1.1 mg/dL    GFR Non-African American 35 (A) >60    GFR  43 (A) >60    Calcium 7.7 (L) 8.3 - 10.6 mg/dL    Total Protein 7.0 6.4 - 8.2 g/dL    Albumin 2.6 (L) 3.4 - 5.0 g/dL    Albumin/Globulin Ratio 0.6 (L) 1.1 - 2.2    Total Bilirubin <0.2 0.0 - 1.0 mg/dL    Alkaline Phosphatase 132 (H) 40 - 129 U/L    ALT 7 (L) 10 - 40 U/L    AST 18 15 - 37 U/L   Magnesium    Collection Time: 10/04/22  8:06 PM   Result Value Ref Range    Magnesium 1.70 (L) 1.80 - 2.40 mg/dL   Blood Gas, Venous    Collection Time: 10/04/22  8:06 PM   Result Value Ref Range    pH, Moose 7.365 7.350 - 7.450    pCO2, Moose 47.5 40.0 - 50.0 mmHg    pO2, Moose 157 Not Established mmHg    HCO3, Venous 27 23 - 29 mmol/L    Base Excess, Moose 1.4 Not Established mmol/L    O2 Sat, Moose >100 Not Established %    Carboxyhemoglobin 1.9 %    MetHgb, Moose 0.0 <1.5 %    TC02 (Calc), Moose 29 Not Established mmol/L    O2 Therapy Unknown    Urine Drug Screen    Collection Time: 10/04/22  9:50 PM   Result Value Ref Range    Amphetamine Screen, Urine POSITIVE (A) Negative <1000ng/mL    Barbiturate Screen, Ur Neg Negative <200 ng/mL    Benzodiazepine Screen, Urine Neg Negative <200 ng/mL    Cannabinoid Scrn, Ur Neg Negative <50 ng/mL    Cocaine Metabolite Screen, Urine Neg Negative <300 ng/mL    Opiate Scrn, Ur POSITIVE (A) Negative <300 ng/mL    PCP Screen, Urine Neg Negative <25 ng/mL    Methadone Screen, Urine Neg Negative <300 ng/mL    Oxycodone Urine Neg Negative <100 ng/ml    FENTANYL SCREEN, URINE POSITIVE (A) Negative <5 ng/mL    pH, UA 5.0     Drug Screen Comment: see below    APTT    Collection Time: 10/04/22 11:46 PM   Result Value Ref Range    aPTT 31.2 23.0 - 34.3 sec   Protime-INR    Collection Time: 10/04/22 11:46 PM   Result Value Ref Range    Protime 15.2 (H) 11.7 - 14.5 sec    INR 1.20 (H) 0.87 - 1.14   CBC    Collection Time: 10/05/22  5:18 AM   Result Value Ref Range    WBC 13.2 (H) 4.0 - 11.0 K/uL    RBC 2.79 (L) 4.00 - 5.20 M/uL    Hemoglobin 7.7 (L) 12.0 - 16.0 g/dL    Hematocrit 24.0 (L) 36.0 - 48.0 %    MCV 85.8 80.0 - 100.0 fL    MCH 27.6 26.0 - 34.0 pg    MCHC 32.1 31.0 - 36.0 g/dL    RDW 17.3 (H) 12.4 - 15.4 %    Platelets 381 526 - 901 K/uL    MPV 7.8 5.0 - 10.5 fL   Renal Function Panel    Collection Time: 10/05/22  5:18 AM   Result Value Ref Range    Sodium 136 136 - 145 mmol/L    Potassium 4.1 3.5 - 5.1 mmol/L    Chloride 102 99 - 110 mmol/L    CO2 23 21 - 32 mmol/L    Anion Gap 11 3 - 16    Glucose 147 (H) 70 - 99 mg/dL    BUN 14 7 - 20 mg/dL    Creatinine 2.0 (H) 0.6 - 1.1 mg/dL    GFR Non-African American 27 (A) >60    GFR  33 (A) >60    Calcium 7.7 (L) 8.3 - 10.6 mg/dL    Phosphorus 3.8 2.5 - 4.9 mg/dL    Albumin 2.4 (L) 3.4 - 5.0 g/dL   Brain Natriuretic Peptide    Collection Time: 10/05/22  5:18 AM   Result Value Ref Range    Pro-BNP 4,446 (H) 0 - 124 pg/mL   Hemoglobin and Hematocrit Collection Time: 10/05/22  5:18 AM   Result Value Ref Range    Hemoglobin 7.7 (L) 12.0 - 16.0 g/dL    Hematocrit 23.7 (L) 36.0 - 48.0 %     Other Labs    Imaging  XR CHEST PORTABLE   Final Result   1. Moderate left and small right pleural effusions, with multifocal   parenchymal infiltrates as well as some demonstrating nodularity related to   known septic pulmonary emboli and multilobar pneumonia. Multilead lobar   infiltrates have progressed from the previous exam.         MRI BRAIN WO CONTRAST   Final Result   1. No acute findings or etiology identified to explain the patient's seizures. 2. Several nonspecific foci of white matter signal abnormality. Although   nonspecific, these are likely of doubtful clinical significance. MRI PELVIS WO CONTRAST   Final Result   1. Extensive marrow edema is seen within the right sacrum and iliac bone   extending into the right acetabulum. Findings are concerning for   osteomyelitis/septic arthritis involving the right sacroiliac joint. The   degree of marrow edema appears similar to the comparison MRI.      2.  2.5 x 0.9 x 2.0 cm ovoid fluid collection within the gluteus minimus   muscle adjacent to the right iliac bone compatible with a small abscess. 3.  Persistent edema within the right iliacus muscle with no clear evidence   of persistent iliacus muscle abscess. CT HEAD WO CONTRAST   Final Result   No acute intracranial abnormality. XR CHEST PORTABLE   Final Result   Developing small bilateral pleural effusions, left greater than right. Persistent multifocal pulmonary nodules, some of which are cavitary,   suggestive of septic emboli given patient history. EGD 10/4/22:  LA Grade A esophagitis  Isabella-colored mucosa extending from the proximal edge of the gastric folds located 41 cm from the incisor, suspicious for C1 M2 Day's esophagus.   The distal esophagus was examined with white light and NBI, and targeted biopsies were obtained. ASSESSMENT AND RECOMMENDATIONS   Jason Cotter is a 69-year-old female with past medical history of IV drug abuse, ESRD on HD, depression, partial hysterectomy, infective endocarditis with septic emboli, osteomyelitis who presented to [facility] [place] [facility] [place] following concern for seizure activity. Acute on chronic anemia: Concern for upper GI bleed due to reported melena. EGD 10/4/2022 notable for esophagitis without active bleeding. Continue IV PPI. Monitor H&H, and transfuse to hemoglobin >7. No evidence of hemolysis  Seizure: Patient with a history of seizure disorder, with possible seizure activity prior to admission. Neurology consulted. ESRD on HD  IVDA: Counseled on complete cessation of substance abuse. RECOMMENDATIONS:    -IV PPI  -Monitor H&H, and transfuse to Hgb >7    Thank you for allowing me to participate in this patient's care. No further GI work-up needed at this time. We will sign off, however please contact us with any additional questions at [phone]. Samantha Snyder.  [address]

## 2022-10-05 NOTE — PROGRESS NOTES
Progress Note    Updates  Rapid response overnight related to acute respiratory distress. No reports of seizure like activity since admission.        Medical History:  Past Medical History:   Diagnosis Date    ADHD (attention deficit hyperactivity disorder)     Arthritis     Back pain     Depression     Migraine     Neutrophilia 9/15/2022     Past Surgical History:   Procedure Laterality Date    IR INSERT NON TUNNELED CV CATH LESS THAN 5 YEARS Right 09/20/2022    Colt Gather; RIJ access; 15cm; Dr. Flor Haskins    IR NONTUNNELED VASCULAR CATHETER  09/20/2022    IR NONTUNNELED VASCULAR CATHETER 9/20/2022 [facility] PROCEDURES    KNEE ARTHROSCOPY  10/05/2010    PARTIAL HYSTERECTOMY (CERVIX NOT REMOVED)      TUBAL LIGATION  01/01/2004    TUNNELED VENOUS CATHETER PLACEMENT Right 09/23/2022    Permacath; RIJ access; 19cm; Dr. Richy Lopez N/A 10/4/2022    EGD BIOPSY performed by Remi White MD at [address] [place] [address]:   ceftaroline fosamil (TEFLARO) IVPB  400 mg IntraVENous Q12H    clindamycin  300 mg Oral 3 times per day    pantoprazole (PROTONIX) 40 mg injection  40 mg IntraVENous Q12H    levETIRAcetam  500 mg IntraVENous Daily    sodium chloride flush  5-40 mL IntraVENous 2 times per day    [Held by provider] enoxaparin  40 mg SubCUTAneous Nightly    daptomycin (CUBICIN) IVPB  8 mg/kg IntraVENous Once per day on Tue Thu Sat     Continuous Infusions:   sodium chloride      sodium chloride      sodium chloride       PRN Meds:.heparin (porcine), calcium carbonate, sodium chloride, sodium chloride, sodium chloride flush, sodium chloride, acetaminophen **OR** acetaminophen, naloxone, hydrALAZINE    Medications Prior to Admission: clindamycin (CLEOCIN) 300 MG capsule, Take 1 capsule by mouth 3 times daily  levoFLOXacin (LEVAQUIN) 250 MG tablet, Take 1 tablet by mouth daily  calcium carbonate (TUMS) 500 MG chewable tablet, Take 1 tablet by mouth 3 times daily as needed for Heartburn  [] HYDROcodone-acetaminophen (NORCO) 5-325 MG per tablet, Take 1 tablet by mouth every 6 hours as needed for Pain for up to 3 days. Allergies   Allergen Reactions    Ultram [Tramadol Hcl] Swelling    Robaxin [Methocarbamol] Other (See Comments)     Made patient very dizzy    Penicillins        Family History   Problem Relation Age of Onset    Breast Cancer Maternal Grandmother 39    Arthritis Other     Asthma Other     Cancer Other     Diabetes Other     Seizures Other     Stroke Other        Social History     Tobacco Use   Smoking Status Every Day    Packs/day: 0.50    Years: 2.00    Pack years: 1.00    Types: Cigarettes   Smokeless Tobacco Never     Social History     Substance and Sexual Activity   Drug Use No     Social History     Substance and Sexual Activity   Alcohol Use Yes    Comment: occas       ROS:  Constitutional- no fever, no weight loss  Eyes- No diplopia. No photophobia. Ears/nose/throat- No dysphagia. No Dysarthria  Cardiovascular- No palpitations. No chest pain  Respiratory- No dyspnea. No Cough  Gastrointestinal- RLQ drain. No Abdominal pain. No Vomiting. Genitourinary- No incontinence. No urinary retention  Musculoskeletal- No myalgia. No arthralgia  Skin-  No easy bruising. Psychiatric- No depression. No anxiety  Endocrine- No diabetes. No thyroid issues. Hematologic- No bleeding difficulty. No fatigue  Neurologic- + weakness, +headache. Exam:  Blood pressure (!) 136/96, pulse 89, temperature 97.9 °F (36.6 °C), temperature source Oral, resp. rate 25, height 5' 8\" (1.727 m), weight 162 lb 0.6 oz (73.5 kg), SpO2 92 %. Constitutional   Vital signs: BP, HR, and RR reviewed   General Alert, no distress, emaciated, pale  Eyes: unable to visualize the fundi  Cardiovascular: Regular rate and rhythm, No peripheral edema. Psychiatric: cooperative with examination, no  psychotic behavior noted. Neurologic  Mental status:   orientation to person, place, time and situation. General fund of knowledge grossly intact   Memory grossly intact   Attention intact as able to attend well to the exam     Language fluent in conversation   Comprehension intact; follows simple commands  Cranial nerves:   CN2: Visual Fields full w/o extinction on confrontational testing,   CN 3,4,6: + left gaze nystagmus, extraocular muscles intact. CN5: facial sensation symmetric   CN7:face symmetric without dysarthria,   CN8: hearing grossly intact  CN9: palate elevated symmetrically  CN11: trap full strength on shoulder shrug  CN12: tongue midline with protrusion  Strength: No pronator drift. Generalized weakness in all 4 extremities   Sensory: light touch intact in all 4 extremities. No sensory extinction on double simultaneous stimulation  Cerebellar/coordination: finger nose finger normal without ataxia  Tone: normal in all 4 extremities  Gait: Deferred for safety     Labs  Na 136  K 4.1  Cr 2.0  Glucose 147  Ca 7.7    BNP 4,446    Urine tox + Amphetamines, opiates, fentanyl. WBC 13.2  Hgb 7.7  Plt 319    Studies  CT head wo contrast 10/2/22  Impression   No acute intracranial abnormality. MRI wo 10/3/22  Impression   1. No acute findings or etiology identified to explain the patient's seizures. 2. Several nonspecific foci of white matter signal abnormality. Although   nonspecific, these are likely of doubtful clinical significance. EEG 10/04/2022  Sharply contoured theta discharges in the  left temporal region are nonspecific and not definitely epileptiform in  nature, however, suggest the possibility of an underlying area of focal  cortical irritability. No seizures were recorded during the study. If seizures remain a clinical concern, then a repeat EEG may be of further benefit. Clinical correlation is advised. Impression / Recommendations  The possibility of a provoked seizure in the setting of bacteremia, UTI, MARYAN and illicit drug use is not unlikely.  EEG findings show focal slowing in the left temporal region, with reports of RLE shaking. Will continue reduced Keppra dose of 500 mg daily d/t kidney function with 250 mg after dialysis. Follow up with neurology outpatient.        Freda Chan NP  [address] [place] [address] [id number] Neurology    A copy of this note was provided for Dr Rodriguez Moyer MD

## 2022-10-05 NOTE — CARE COORDINATION
Messaged nephrologist the following:  \"With this patient being an active drug user, we can't find a facility to accept her & she can't dc home w/ vas cath even though this was done past 2 admissions. Any ideas for fistula? Are you thinking her kidneys will recover if still using drugs? From a case management angle patient is not able to leave with her current line. Hospitalist was supposed to reach out to you but unsure if he did. Please advise. \"  Await response or clarification on plan. Tentative discharge plan is home but again she can't leave with this current line? Referral made to Brittney Zapien at [facility] to see if able to accept, Brittney Zapien to have her team review. Electronically signed by Loretta Murphy RN Case Management [phone] on 10/5/2022 at 2:51 PM    Dr Namrata Ortega responded to laura kerr. MD: Nabeel Jeffries am hoping to she comes off dialysis and we can remove the dialysis catheter. \"  CM will continue to follow for discharge planning.   Electronically signed by Loretta Murphy RN Case Management on 10/5/2022 at 3:17 PM

## 2022-10-05 NOTE — PROGRESS NOTES
[facility]  Phone: [phone]  Fax: [phone]  [facility]. [facility]         40 y.o. female who presented to [facility] [place].  Patient was just discharged from the hospital yesterday after a prolonged 19-day stay for infected endocarditis with septic emboli and MRSA Serratia bacteremia due to IV drug use. Patient also had SI joint osteomyelitis and retroperitoneal abscess for which he underwent drainage on last admission. patient is a dialysis patient and got hemodialysis yesterday prior to discharge. Patient was at home today when she passed out and had shaking jerking motions which her father thought might have been a seizure. Admitted for further work up and management  . Renal cons called for MARYAN . Feels good . UOP/labs noted . HPI:  Breathing comfortably. No CP.  [place] Stain . ROS:  In bed. [address]:  medications reviewed. Physical Exam:    VITALS:  BP (!) 135/92   Pulse 81   Temp 98.7 °F (37.1 °C) (Oral)   Resp 20   Ht 5' 8\" (1.727 m)   Wt 162 lb 0.6 oz (73.5 kg)   SpO2 94%   BMI 24.64 kg/m²   24HR INTAKE/OUTPUT:    Intake/Output Summary (Last 24 hours) at 10/5/2022 1203  Last data filed at 10/5/2022 1151  Gross per 24 hour   Intake 2931.54 ml   Output 1375 ml   Net 1556.54 ml         Constitutional:  awake   Respiratory:  Decrease BS at  bases   Gastrointestinal:  + tenderness. Normal Bowel Sounds  Cardiovascular:  S1, S2 RRR   Edema:  +  edema  Acc Rt TDC .     DATA:    CBC:  Lab Results   Component Value Date/Time    WBC 13.2 10/05/2022 05:18 AM    RBC 2.79 10/05/2022 05:18 AM    HGB 7.7 10/05/2022 05:18 AM    HGB 7.7 10/05/2022 05:18 AM    HCT 24.0 10/05/2022 05:18 AM    HCT 23.7 10/05/2022 05:18 AM    MCV 85.8 10/05/2022 05:18 AM    MCH 27.6 10/05/2022 05:18 AM    MCHC 32.1 10/05/2022 05:18 AM    RDW 17.3 10/05/2022 05:18 AM     10/05/2022 05:18 AM    MPV 7.8 10/05/2022 05:18 AM     CMP:  Lab Results   Component Value Date/Time     10/05/2022 05:18 AM    K 4.1 10/05/2022 05:18 AM    K 3.9 10/02/2022 12:29 PM     10/05/2022 05:18 AM    CO2 23 10/05/2022 05:18 AM    BUN 14 10/05/2022 05:18 AM    CREATININE 2.0 10/05/2022 05:18 AM    GFRAA 33 10/05/2022 05:18 AM    GFRAA >60 07/28/2012 11:20 PM    AGRATIO 0.6 10/04/2022 08:06 PM    LABGLOM 27 10/05/2022 05:18 AM    GLUCOSE 147 10/05/2022 05:18 AM    PROT 7.0 10/04/2022 08:06 PM    PROT 6.5 07/28/2012 11:20 PM    CALCIUM 7.7 10/05/2022 05:18 AM    BILITOT <0.2 10/04/2022 08:06 PM    ALKPHOS 132 10/04/2022 08:06 PM    AST 18 10/04/2022 08:06 PM    ALT 7 10/04/2022 08:06 PM      Hepatic Function Panel:   Lab Results   Component Value Date/Time    ALKPHOS 132 10/04/2022 08:06 PM    ALT 7 10/04/2022 08:06 PM    AST 18 10/04/2022 08:06 PM    PROT 7.0 10/04/2022 08:06 PM    PROT 6.5 07/28/2012 11:20 PM    BILITOT <0.2 10/04/2022 08:06 PM    BILIDIR <0.2 09/27/2022 04:40 AM    IBILI see below 09/27/2022 04:40 AM      Phosphorus:   Lab Results   Component Value Date/Time    PHOS 3.8 10/05/2022 05:18 AM       ASSESSMENT/PLAN:    1-MARYAN   suspect Vancomycin toxicity /ATN/ infectious GN . 1st HD 9/20  . S./p [facility]  9/23 . Hold HD in am. Check labs  Increase UOP noted . Monitor closely for renal recovery . 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 Monitor level  . 3-IVDA     4 Anemia   Low Hb noted ,may need Tx   Epo with HD    5-[id number]-[id number] due to hypercarbia, respiratory depression from active drug use  Plan per neurology     6- Acute hypercarbic respiratory failure  Resolved . Etiology drug use , drug abuse .            Arturo Ontiveros MD,FACP

## 2022-10-05 NOTE — PROGRESS NOTES
Patient successfully weaned back down to room air, satting 95%. Continuous SPO2 sensor still in place.      Electronically signed by Zen Schuler RN on 10/5/2022 at 12:29 AM

## 2022-10-05 NOTE — PROGRESS NOTES
Infectious Disease Follow up Notes  Admit Date: 10/2/2022  Hospital Day: 4    Antibiotics :   IV Ceftaroline  IV Daptomycin   Clindamycin     CHIEF COMPLAINT:      Seizures  MRSA bacteremia  MRSA Abscess  Serratia bacteremia   UTI    Subjective interval History :  40 y. o.woman with a history of IV drug abuse, ADHD, back pain, depression and recent admission for MRSA bacteremia secondary to bacteremia secondary to IV drug abuse with tricuspid valve endocarditis with septic emboli. He also developed large abscess in the gluteal area connected to the pelvic area concerning for sacral osteomyelitis. She has a KRYSTLE drain placement by interventional radiology. Culture was positive for MRSA as well as Serratia. She had a prolonged hospitalization secondary to acute kidney injury she required hemodialysis on previous admission. She was discharged home on 10/1/22, was supposed to continue IV daptomycin with hemodialysis plus taking oral clindamycin and levofloxacin. She is now admitted from home with possible seizure-like activities she denies any current drug use but UDS on this admission came positive for fentanyl. Admission labs indicate creatinine at 2 lactic acid 2.1 procalcitonin 0.2 5 repeat blood culture in process, WBC elevated 18.6 hemoglobin 9.3. We are consulted for IV antibiotic recommendations    Interval History : Complains of ongoing right hip pain and right leg pain. Fevers resolved, ongoing intermittent cough with some shortness of breath sputum production is improved. Tolerating antibiotic therapy okay creatinine slowly improving Jones catheter in place.   KRYSTLE drain minimal output      Past Medical History:    Past Medical History:   Diagnosis Date    ADHD (attention deficit hyperactivity disorder)     Arthritis     Back pain     Depression     Migraine     Neutrophilia 9/15/2022       Past Surgical History:    Past Surgical History:   Procedure Laterality Date    IR INSERT NON TUNNELED CV CATH LESS THAN 5 YEARS Right 09/20/2022    Vascath; RIJ access; 15cm; Dr. Edin Griggs    IR NONTUNNELED VASCULAR CATHETER  09/20/2022    IR NONTUNNELED VASCULAR CATHETER 9/20/2022 [facility] PROCEDURES    KNEE ARTHROSCOPY  10/05/2010    PARTIAL HYSTERECTOMY (CERVIX NOT REMOVED)      TUBAL LIGATION  01/01/2004    TUNNELED VENOUS CATHETER PLACEMENT Right 09/23/2022    Permacath; RIJ access; 19cm; Dr. Adams Pap N/A 10/4/2022    EGD BIOPSY performed by Xiomy Baum MD at [facility]       Current Medications:    No outpatient medications have been marked as taking for the 10/2/22 encounter [facility] Encounter).        Allergies:  Ultram [tramadol hcl], Robaxin [methocarbamol], and Penicillins    Immunizations :   Immunization History   Administered Date(s) Administered    Tdap (Boostrix, Adacel) 02/11/2015       Social History:    Social History     Tobacco Use    Smoking status: Every Day     Packs/day: 0.50     Years: 2.00     Pack years: 1.00     Types: Cigarettes    Smokeless tobacco: Never   Substance Use Topics    Alcohol use: Yes     Comment: occas    Drug use: No     Social History     Tobacco Use   Smoking Status Every Day    Packs/day: 0.50    Years: 2.00    Pack years: 1.00    Types: Cigarettes   Smokeless Tobacco Never      Family History   Problem Relation Age of Onset    Breast Cancer Maternal Grandmother 45    Arthritis Other     Asthma Other     Cancer Other     Diabetes Other     Seizures Other     Stroke Other          REVIEW OF SYSTEMS:     Constitutional:  negative for fevers, chills, night sweats  Eyes:  negative for blurred vision, eye discharge, visual disturbance   HEENT:  negative for hearing loss, ear drainage,nasal congestion  Respiratory:  negative for cough, shortness of breath+  or hemoptysis   Cardiovascular:  negative for chest pain, palpitations, syncope  Gastrointestinal: negative for nausea, vomiting, diarrhea, constipation, abdominal pain+   Genitourinary:  negative for frequency, dysuria, urinary incontinence, hematuria  Hematologic/Lymphatic:  negative for easy bruising, bleeding and lymphadenopathy  Allergic/Immunologic:  negative for recurrent infections, angioedema, anaphylaxis   Endocrine:  negative for weight changes, polyuria, polydipsia and polyphagia  Musculoskeletal:  Back   pain ++ , swelling, decreased range of motion  Integumentary: No rashes, skin lesions  Neurological:  negative for headaches, slurred speech, unilateral weakness  Psychiatric: negative for hallucinations,confusion,agitation.                 PHYSICAL EXAM:      Vitals:    BP (!) 135/92   Pulse 91   Temp 98.7 °F (37.1 °C) (Oral)   Resp 14   Ht 5' 8\" (1.727 m)   Wt 162 lb 0.6 oz (73.5 kg)   SpO2 96%   BMI 24.64 kg/m²     General Appearance: alert,in some acute distress, ++ pallor, no icterus  old needle tracks+  Skin: warm and dry, no rash or erythema  Head: normocephalic and atraumatic  Eyes: pupils equal, round, and reactive to light, conjunctivae normal  ENT: tympanic membrane, external ear and ear canal normal bilaterally, nose without deformity, nasal mucosa and turbinates normal without polyps  Neck: supple and non-tender without mass, no thyromegaly  no cervical lymphadenopathy  Pulmonary/Chest: Bi basal crepts+ wheezes, rales or rhonchi, normal air movement, no respiratory distress  Cardiovascular: normal rate, regular rhythm, normal S1 and S2, no murmurs, rubs, clicks, or gallops, no carotid bruits  Abdomen: soft, non-tender, non-distended, normal bowel sounds, no masses or organomegaly  Extremities: no cyanosis, clubbing or edema  Musculoskeletal: normal range of motion, no joint swelling, deformity or tenderness  Integumentary: No rashes, no abnormal skin lesions, no petechiae  Neurologic: reflexes normal and symmetric, no cranial nerve deficit  Psych:  Orientation, sensorium, mood normal            Lines: HD line   KRYSTLE drain in place Rt   Data Review:    CBC:   Lab Results   Component Value Date    WBC 13.2 (H) 10/05/2022    HGB 7.7 (L) 10/05/2022    HGB 7.7 (L) 10/05/2022    HCT 24.0 (L) 10/05/2022    HCT 23.7 (L) 10/05/2022    MCV 85.8 10/05/2022     10/05/2022     RENAL:   Lab Results   Component Value Date    CREATININE 2.0 (H) 10/05/2022    BUN 14 10/05/2022     10/05/2022    K 4.1 10/05/2022     10/05/2022    CO2 23 10/05/2022     SED RATE:   Lab Results   Component Value Date/Time    SEDRATE 23 09/25/2022 05:32 AM     CK:   Lab Results   Component Value Date/Time    CKTOTAL 18 10/05/2022 05:18 AM     CRP:   Lab Results   Component Value Date/Time    CRP 49.8 09/25/2022 08:51 PM     Hepatic Function Panel:   Lab Results   Component Value Date/Time    ALKPHOS 132 10/04/2022 08:06 PM    ALT 7 10/04/2022 08:06 PM    AST 18 10/04/2022 08:06 PM    PROT 7.0 10/04/2022 08:06 PM    PROT 6.5 07/28/2012 11:20 PM    BILITOT <0.2 10/04/2022 08:06 PM    BILIDIR <0.2 09/27/2022 04:40 AM    IBILI see below 09/27/2022 04:40 AM    LABALBU 2.4 10/05/2022 05:18 AM     UA:  Lab Results   Component Value Date/Time    COLORU CANDELARIA 10/04/2022 02:44 PM    CLARITYU TURBID 10/04/2022 02:44 PM    GLUCOSEU Negative 10/04/2022 02:44 PM    GLUCOSEU NEGATIVE 07/31/2010 02:59 PM    BILIRUBINUR Negative 10/04/2022 02:44 PM    BILIRUBINUR NEGATIVE 07/31/2010 02:59 PM    KETUA Negative 10/04/2022 02:44 PM    SPECGRAV 1.015 10/04/2022 02:44 PM    BLOODU LARGE 10/04/2022 02:44 PM    PHUR 5.0 10/04/2022 09:50 PM    PROTEINU >=1000 10/04/2022 02:44 PM    UROBILINOGEN 0.2 10/04/2022 02:44 PM    NITRU Negative 10/04/2022 02:44 PM    LEUKOCYTESUR LARGE 10/04/2022 02:44 PM    LABMICR YES 10/04/2022 02:44 PM    URINETYPE NotGiven 10/04/2022 02:44 PM      Urine Microscopic:   Lab Results   Component Value Date/Time    BACTERIA None Seen 10/04/2022 02:44 PM    COMU see below 09/07/2022 01:55 AM    HYALCAST 53 10/04/2022 02:44 PM    HYALCAST Present 10/04/2022 02:44 PM    WBCUA 113 10/04/2022 02:44 PM    RBCUA 1642 10/04/2022 02:44 PM    EPIU 3 10/04/2022 02:44 PM     Urine Reflex to Culture:   Lab Results   Component Value Date/Time    URRFLXCULT Yes 10/02/2022 12:56 PM     Creat 2.3     Lactic acid  2.1       Lactic acid  2.1      Creat  2   Component Ref Range & Units 10/02/22 1256   Amphetamine Screen, Urine Negative <1000ng/mL Neg    Barbiturate Screen, Ur Negative <200 ng/mL Neg    Benzodiazepine Screen, Urine Negative <200 ng/mL Neg    Cannabinoid Scrn, Ur Negative <50 ng/mL Neg    Cocaine Metabolite Screen, Urine Negative <300 ng/mL Neg    Opiate Scrn, Ur Negative <300 ng/mL POSITIVE Abnormal     Comment: \"Therapeutic levels of pain medication, especially oxycontin and synthetic   opioids, may not be detected by this Methodology. Pain management screen   panel  Drug panel-PM-Hi Res Ur, Interp (PAIN) should be considered for drug   monitoring \". PCP Screen, Urine Negative <25 ng/mL Neg    Methadone Screen, Urine Negative <300 ng/mL Neg    Oxycodone Urine Negative <100 ng/ml Neg    FENTANYL SCREEN, URINE Negative <5 ng/mL POSITIVE Abnormal     pH, UA   7.0        MICRO: cultures reviewed and updated by me   Blood Culture:   Procedure Component Value Units Date/Time   Culture, Blood 2 [[id number]] Collected: 10/02/22 1713   Order Status: Completed Specimen: Blood Updated: 10/03/22 1815    Culture, Blood 2 No Growth to date. Any change in status will be called. Narrative:     ORDER#: [id number]                          ORDERED BY: BRICE EDEN   SOURCE: Blood                              COLLECTED:  10/02/22 17:13   ANTIBIOTICS AT [facility].:                      RECEIVED :  10/02/22 17:28   If child <=2 yrs old please draw pediatric bottle. ~Blood Culture #2   GI Bacterial Pathogens By PCR [[id number]]    Order Status: Sent Specimen: Stool    Clostridium Difficile Toxin/Antigen [[id number]]    Order Status: Sent Specimen: Stool    Fecal Leukocytes [[id number]]    Order Status: Sent Specimen: Stool    Culture, Blood 1 [[id number]] Collected: 10/02/22 1256   Order Status: Completed Specimen: Blood Updated: 10/03/22 1418    Blood Culture, Routine No Growth to date. Any change in status will be called. Narrative:     ORDER#: [id number]                          ORDERED BY: Sergei Parra   SOURCE: Blood                              COLLECTED:  10/02/22 12:56   ANTIBIOTICS AT [facility].:                      RECEIVED :  10/02/22 13:10   If child <=2 yrs old please draw pediatric bottle. ~Blood Culture 1   Culture, Urine [[id number]] Collected: 10/02/22 1256   Order Status: Completed Specimen: Urine, clean catch Updated: 10/03/22 1034    Urine Culture, Routine No growth at 18 to 36 hours   Narrative:     ORDER#: [id number]                          ORDERED BY: Sergei Parra   SOURCE: Urine Clean Catch                  COLLECTED:  10/02/22 12:56   ANTIBIOTICS AT [facility].:                      RECEIVED :  10/02/22 14:06   Culture, Blood 2 [[id number]]    Order Status: Canceled Specimen: Blood    Culture, Blood 1 [[id number]] Collected: 10/02/22 0000   Order Status: Canceled Specimen: Blood      Lab Results   Component Value Date/Time    [facility]  10/02/2022 12:56 PM     No Growth to date. Any change in status [facility] be called[facility]  10/02/2022 05:13 PM     No Growth to date. Any change in status will be called.        Respiratory Culture:  Lab Results   Component Value Date/Time    CULTRESP Light growth normal respiratory libra  including   09/29/2022 04:00 AM    CULTRESP Light growth  No further workup   09/29/2022 04:00 AM    LABGRAM 1+ Epithelial Cells  1+ Yeast with pseudohyphae   09/29/2022 04:00 AM     AFB:No results found for: [facility]  Viral Culture:  Lab Results   Component Value Date/Time    COVID19 Not Detected 09/07/2022 12:00 AM    COVID19 Not Detected 07/20/2022 11:20 PM     Urine Culture:   No results for input(s): Fabio Massey in the last 72 hours. Time       Culture, Blood 2 [[id number]] Collected: 10/02/22 1713   Order Status: Completed Specimen: Blood Updated: 10/03/22 1815    Culture, Blood 2 No Growth to date. Any change in status will be called. Narrative:     ORDER#: [id number]                          ORDERED BY: BRICE EDEN   SOURCE: Blood                              COLLECTED:  10/02/22 17:13   ANTIBIOTICS AT [facility].:                      RECEIVED :  10/02/22 17:28   If child <=2 yrs old please draw pediatric bottle. ~Blood Culture #2   GI Bacterial Pathogens By PCR [[id number]]    Order Status: Sent Specimen: Stool    Clostridium Difficile Toxin/Antigen [[id number]]    Order Status: Sent Specimen: Stool    Fecal Leukocytes [[id number]]    Order Status: Sent Specimen: Stool    Culture, Blood 1 [[id number]] Collected: 10/02/22 1256   Order Status: Completed Specimen: Blood Updated: 10/03/22 1418    Blood Culture, Routine No Growth to date. Any change in status will be called. Narrative:     ORDER#: [id number]                          ORDERED BY: Agnieszka Ayala   SOURCE: Blood                              COLLECTED:  10/02/22 12:56   ANTIBIOTICS AT [facility]A.:                      RECEIVED :  10/02/22 13:10   If child <=2 yrs old please draw pediatric bottle. ~Blood Culture 1   Culture, Urine [[id number]] Collected: 10/02/22 1256   Order Status: Completed Specimen: Urine, clean catch Updated: 10/03/22 1034    Urine Culture, Routine No growth at 18 to 36 hours   Narrative:     ORDER#: [id number]                          ORDERED BY: Agnieszka Ayala        IMAGING:    XR CHEST PORTABLE   Final Result   1. Moderate left and small right pleural effusions, with multifocal   parenchymal infiltrates as well as some demonstrating nodularity related to   known septic pulmonary emboli and multilobar pneumonia. Multilead lobar   infiltrates have progressed from the previous exam.         MRI BRAIN WO CONTRAST   Final Result   1.  No acute findings or etiology identified to explain the patient's seizures. 2. Several nonspecific foci of white matter signal abnormality. Although   nonspecific, these are likely of doubtful clinical significance. MRI PELVIS WO CONTRAST   Final Result   1. Extensive marrow edema is seen within the right sacrum and iliac bone   extending into the right acetabulum. Findings are concerning for   osteomyelitis/septic arthritis involving the right sacroiliac joint. The   degree of marrow edema appears similar to the comparison MRI.      2.  2.5 x 0.9 x 2.0 cm ovoid fluid collection within the gluteus minimus   muscle adjacent to the right iliac bone compatible with a small abscess. 3.  Persistent edema within the right iliacus muscle with no clear evidence   of persistent iliacus muscle abscess. CT HEAD WO CONTRAST   Final Result   No acute intracranial abnormality. XR CHEST PORTABLE   Final Result   Developing small bilateral pleural effusions, left greater than right. Persistent multifocal pulmonary nodules, some of which are cavitary,   suggestive of septic emboli given patient history. CT L spine 9/29/22   Impression   1. Acute osteomyelitis-septic arthritis noted involving the right sacroiliac   joint with osteomyelitis process extending to the right iliac wing. 2. Patient had a large right iliac fossa fluid collection on previous   examination. A drainage catheter is noted in this region. The right iliac   fossa is incompletely covered on this examination and there is limited   resolution to comment upon the residual fluid collection. A 2nd collection   is noted along the right sacral canal.  Soft tissue fullness noted in this   region. There is limited resolution. Patient will benefit from a contrast enhanced CT or MR of the pelvis   targeting the right iliac fossa and the pelvis. 3. Moderate bilateral pleural effusions.    4. Normal appearing bony lumbar spine.       RECOMMENDATIONS:   Recommend obtaining contrast enhanced CT/MR of the pelvis for complete   coverage of the right iliac fossa and pelvis. CT chest :  9/27/22  FINDINGS:   Mediastinum: Thyroid gland unremarkable. Tip of PICC and central line projects   in region of SVC. Small pericardial effusion is seen. Small hiatal hernia   seen. There is nonspecific thickening at the GE junction. Small mediastinal   nodes are noted, likely reactive       Lungs/pleura: There are bilateral pleural effusions seen, left greater than   right, increased compared to prior. There is increased lung consolidation,   left greater than right. Cavitary and non cavitary nodules in the left lung are again identified,   overall decreased compared to prior. .  For example, an index cavitary nodule   in the left upper lobe measures 1.3 cm x 1.3 cm, previously measuring 2.0 cm   x 2.1 cm. On the right, a bilobed cavitary nodule which previously measured 6.9 cm by   4.2 cm, now measures 5.6 cm x 2.7 cm and now appears as 2 separate nodules. Upper Abdomen: Right adrenal gland is normal.  Left adrenal gland is normal       Soft Tissues/Bones: Bones appear unchanged. There is body wall anasarca           Impression   Widespread cavitary nodules, decreased on the right and left. , in keeping   with the diagnosis of septic emboli. Increased pleural effusions with increasing consolidative change at the lung   bases.        All the pertinent images and reports for the current Hospitalization were reviewed by me     Scheduled Meds:   ceftaroline fosamil (TEFLARO) IVPB  400 mg IntraVENous Q12H    clindamycin  300 mg Oral 3 times per day    pantoprazole (PROTONIX) 40 mg injection  40 mg IntraVENous Q12H    levETIRAcetam  500 mg IntraVENous Daily    sodium chloride flush  5-40 mL IntraVENous 2 times per day    [Held by provider] enoxaparin  40 mg SubCUTAneous Nightly    daptomycin (CUBICIN) IVPB  8 mg/kg noted d/w pt and will ask IR to check the drain     Awaiting renal function improvement will decide about course of antibiotics    WBC count still elevated slow to resolve,   she had an episode of tachycardia and unresponsiveness UDS was positive for amphetamine-patient denies any drug use in the hospital  d/w RN during rounds          Labs, Microbiology, Radiology and all the pertinent results from current hospitalization and  care every where were reviewed  by me as a part of the evaluation   Plan:   Cont IV Daptomycin x  550 mg x  3 times a week for now awaiting Renal Recovery  IV Ceftaroline x  400 mg q 12HRS  3    CONT  oral Clindamycin x  300  mg q 8 hrs  4. ESR. CRP  5. Blood cx repeat IN process  NGTD  6. Awaiting Renal recovery   7. MRI pelvis noted and d/w pt  8. Will ask IR to remove drain in  AM      Discussed with patient/Family and Nursing   Risk of Complications/Morbidity: High      Illness(es)/ Infection present that pose threat to bodily function. There is potential for severe exacerbation of infection/side effects of treatment. Therapy requires intensive monitoring for antimicrobial agent toxicity. Discussed with patient/Family and Nursing staff     Thanks for allowing me to participate in your patient's care and please call me with any questions or concerns.     José Remy MD  Infectious Disease  [facility] ([facility]) Physician  Phone: [phone]   Fax : [phone]

## 2022-10-06 ENCOUNTER — APPOINTMENT (OUTPATIENT)
Dept: INTERVENTIONAL RADIOLOGY/VASCULAR | Age: 42
DRG: 720 | End: 2022-10-06
Payer: MEDICAID

## 2022-10-06 PROBLEM — R56.9 SEIZURE (HCC): Status: ACTIVE | Noted: 2022-10-06

## 2022-10-06 LAB
ALBUMIN SERPL-MCNC: 2.3 G/DL (ref 3.4–5)
ANION GAP SERPL CALCULATED.3IONS-SCNC: 7 MMOL/L (ref 3–16)
BLOOD CULTURE, ROUTINE: NORMAL
BUN BLDV-MCNC: 14 MG/DL (ref 7–20)
CALCIUM SERPL-MCNC: 7.5 MG/DL (ref 8.3–10.6)
CHLORIDE BLD-SCNC: 105 MMOL/L (ref 99–110)
CO2: 27 MMOL/L (ref 21–32)
CREAT SERPL-MCNC: 2 MG/DL (ref 0.6–1.1)
CULTURE, BLOOD 2: NORMAL
GFR AFRICAN AMERICAN: 33
GFR NON-AFRICAN AMERICAN: 27
GLUCOSE BLD-MCNC: 99 MG/DL (ref 70–99)
HCT VFR BLD CALC: 22.6 % (ref 36–48)
HEMOGLOBIN: 7.1 G/DL (ref 12–16)
MAGNESIUM: 1.7 MG/DL (ref 1.8–2.4)
MCH RBC QN AUTO: 27 PG (ref 26–34)
MCHC RBC AUTO-ENTMCNC: 31.6 G/DL (ref 31–36)
MCV RBC AUTO: 85.5 FL (ref 80–100)
OCCULT BLOOD SCREENING: NORMAL
PDW BLD-RTO: 16.8 % (ref 12.4–15.4)
PHOSPHORUS: 3.2 MG/DL (ref 2.5–4.9)
PLATELET # BLD: 325 K/UL (ref 135–450)
PMV BLD AUTO: 7.5 FL (ref 5–10.5)
POTASSIUM SERPL-SCNC: 3.9 MMOL/L (ref 3.5–5.1)
RBC # BLD: 2.65 M/UL (ref 4–5.2)
SODIUM BLD-SCNC: 139 MMOL/L (ref 136–145)
WBC # BLD: 12.9 K/UL (ref 4–11)
WHITE BLOOD CELLS (WBC), STOOL: NORMAL

## 2022-10-06 PROCEDURE — 85027 COMPLETE CBC AUTOMATED: CPT

## 2022-10-06 PROCEDURE — 2580000003 HC RX 258: Performed by: INTERNAL MEDICINE

## 2022-10-06 PROCEDURE — C9113 INJ PANTOPRAZOLE SODIUM, VIA: HCPCS | Performed by: INTERNAL MEDICINE

## 2022-10-06 PROCEDURE — 83630 LACTOFERRIN FECAL (QUAL): CPT

## 2022-10-06 PROCEDURE — 6370000000 HC RX 637 (ALT 250 FOR IP): Performed by: INTERNAL MEDICINE

## 2022-10-06 PROCEDURE — 6360000002 HC RX W HCPCS: Performed by: INTERNAL MEDICINE

## 2022-10-06 PROCEDURE — 83735 ASSAY OF MAGNESIUM: CPT

## 2022-10-06 PROCEDURE — 82270 OCCULT BLOOD FECES: CPT

## 2022-10-06 PROCEDURE — 36415 COLL VENOUS BLD VENIPUNCTURE: CPT

## 2022-10-06 PROCEDURE — 2060000000 HC ICU INTERMEDIATE R&B

## 2022-10-06 PROCEDURE — 0WPGX3Z REMOVAL OF INFUSION DEVICE FROM PERITONEAL CAVITY, EXTERNAL APPROACH: ICD-10-PCS | Performed by: INTERNAL MEDICINE

## 2022-10-06 PROCEDURE — 2709999900 IR ABSCESS EVAL THRU EXISTING CATH

## 2022-10-06 PROCEDURE — 99233 SBSQ HOSP IP/OBS HIGH 50: CPT | Performed by: INTERNAL MEDICINE

## 2022-10-06 PROCEDURE — 94760 N-INVAS EAR/PLS OXIMETRY 1: CPT

## 2022-10-06 PROCEDURE — 10030 IMG GID FLU COLL DRG SFT TIS: CPT

## 2022-10-06 PROCEDURE — 80069 RENAL FUNCTION PANEL: CPT

## 2022-10-06 RX ORDER — MAGNESIUM SULFATE IN WATER 40 MG/ML
2000 INJECTION, SOLUTION INTRAVENOUS ONCE
Status: COMPLETED | OUTPATIENT
Start: 2022-10-06 | End: 2022-10-06

## 2022-10-06 RX ADMIN — LEVETIRACETAM 500 MG: 5 INJECTION INTRAVENOUS at 08:20

## 2022-10-06 RX ADMIN — CLINDAMYCIN HYDROCHLORIDE 300 MG: 150 CAPSULE ORAL at 06:20

## 2022-10-06 RX ADMIN — SODIUM CHLORIDE, PRESERVATIVE FREE 10 ML: 5 INJECTION INTRAVENOUS at 22:28

## 2022-10-06 RX ADMIN — CEFTAROLINE FOSAMIL 400 MG: 600 POWDER, FOR SOLUTION INTRAVENOUS at 22:31

## 2022-10-06 RX ADMIN — MAGNESIUM SULFATE HEPTAHYDRATE 2000 MG: 40 INJECTION, SOLUTION INTRAVENOUS at 09:29

## 2022-10-06 RX ADMIN — CLINDAMYCIN HYDROCHLORIDE 300 MG: 150 CAPSULE ORAL at 13:54

## 2022-10-06 RX ADMIN — CEFTAROLINE FOSAMIL 400 MG: 600 POWDER, FOR SOLUTION INTRAVENOUS at 12:19

## 2022-10-06 RX ADMIN — NALOXONE HYDROCHLORIDE 0.4 MG: 0.4 INJECTION, SOLUTION INTRAMUSCULAR; INTRAVENOUS; SUBCUTANEOUS at 01:15

## 2022-10-06 RX ADMIN — CLINDAMYCIN HYDROCHLORIDE 300 MG: 150 CAPSULE ORAL at 22:31

## 2022-10-06 RX ADMIN — DAPTOMYCIN 550 MG: 500 INJECTION, POWDER, LYOPHILIZED, FOR SOLUTION INTRAVENOUS at 13:56

## 2022-10-06 RX ADMIN — Medication 40 MG: at 01:57

## 2022-10-06 RX ADMIN — Medication 40 MG: at 13:54

## 2022-10-06 NOTE — BRIEF OP NOTE
Brief Postoperative Note    Nicole Garcia  YOB: 1980  [id number]      Pre-operative Diagnosis: Resolution of abscess    Post-operative Diagnosis: Same    Procedure: Drainage catheter removal    Anesthesia: None    Surgeons/Assistants: Mau Romero DO    Estimated Blood Loss: less than 50     Complications: None    Specimens: Was Not Obtained    Findings:   Successful removal of RLQ drainage catheter.        Mau Romero DO  10/6/2022, 3:03 PM  Interventional Radiology  [phone] ([phone])

## 2022-10-06 NOTE — CARE COORDINATION
Discharge Planning:  Call received from [facility] with [place], requested they initiate precert. Met with patient at bedside. Discussed discharge plan. Patient aware she may not need dialysis at discharge and will need iv antibiotics course completed. Patient agreeable to skilled nursing facility at discharge. Notified of accepting facility. Patient agreeable. Reviewed list and location with patient. The Plan for Transition of Care is related to the following treatment goals: SNF     The Patient was provided with a choice of provider and agrees   with the discharge plan. [x] Yes [] No    Freedom of choice list was provided with basic dialogue that supports the patient's individualized plan of care/goals, treatment preferences and shares the quality data associated with the providers.  [x] Yes [] No    JAROD Cowart, MILADW, Social Work/Case Management   [phone]  Electronically signed by JAROD Cowart, STEVEN on 10/6/2022 at 2:08 PM

## 2022-10-06 NOTE — PLAN OF CARE
Problem: Discharge Planning  Goal: Discharge to home or other facility with appropriate resources  Outcome: Progressing  Flowsheets (Taken 10/5/2022 2030)  Discharge to home or other facility with appropriate resources: Identify barriers to discharge with patient and caregiver     Problem: Pain  Goal: Verbalizes/displays adequate comfort level or baseline comfort level  Outcome: Progressing     Problem: Skin/Tissue Integrity  Goal: Absence of new skin breakdown  Description: 1. Monitor for areas of redness and/or skin breakdown  2. Assess vascular access sites hourly  3. Every 4-6 hours minimum:  Change oxygen saturation probe site  4. Every 4-6 hours:  If on nasal continuous positive airway pressure, respiratory therapy assess nares and determine need for appliance change or resting period.   Outcome: Progressing     Problem: Safety - Adult  Goal: Free from fall injury  Outcome: Progressing     Problem: ABCDS Injury Assessment  Goal: Absence of physical injury  Outcome: Progressing

## 2022-10-06 NOTE — PROGRESS NOTES
Hospital Medicine Progress Note     Date:  10/6/2022    PCP: No primary care provider on file. (Tel: None)    Date of Admission: 10/2/2022    Chief complaint:   Chief Complaint   Patient presents with    Seizures     Family reported seizure activities around [id number]; that lasted 3 minutes with lower body shakes. 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 She had a KRYSTLE drain placement by interventional radiologist and culture at the time was positive for methicillin-resistant Staphylococcus aureus and Serratia marcescens. Hospitalization at the time was complicated by acute kidney injury eventually required hemodialysis during hospital stay. She was discharged on October 1, 2022 on IV daptomycin during dialysis, oral Levaquin and clindamycin. She was readmitted 10/2/2022 with possible seizure, described as passing out and then having generalized shaking of her extremities. She was brought to the emergency room patient was noted to have \"shaking\" in the right lower extremity, rightward gaze deviation. She was initially confused but her mentation did improve while in the emergency room. She denied using illicit drug on admission and urine drug screen was positive for opiate and fentanyl; however, she had family members visit her on 10/4/2022 and shortly after work, rapid response was called in the evening for respiratory distress and hypoxia.   Patient reported that she had been vaping while in the hospital.  Urine drug screen tested positive for amphetamines, which was not present on admission urine drug screen. Assessment/plan:  Seizure. Could be secondary to illicit drug use. She is currently on Keppra. MRI-brain on 10/30/2022 was negative for acute etiology; reveals \"several nonspecific foci of white matter signal abnormality. \"  Continue seizure precautions. Acute respiratory failure with hypercapnia, present on admission. This has since improved. Septic pulmonary emboli. Continue IV antibiotics. Infectious disease on board and assisting with management. End-stage renal disease on hemodialysis. Nephrology on board and assisting with dialysis needs. Acute on chronic micro/normocytic anemia. GI on board. EGD on 10/4/2022 demonstrates LA grade a esophagitis, salmon-colored mucosa extending from the proximal edge of the gastric folds located 41 cm from the incisor, suspicious for C1 M2 Day's esophagus. No evidence of bleeding on endoscopy. Continue PPI. Outpatient follow-up with GI for biopsy result. Monitor hemoglobin closely, transfuse for hemoglobin less than 7.0. IV drug use. \"  Counseled about cessation of illicit drug use. Other comorbidities: history of IV drug use/IV fentanyl abuse, migraine headaches, depression, osteoarthritis, chronic back pain, ADHD. Disposition. Possible KRYSTLE drain removal today (per discussion with nursing staff). Urine output improving - nephro considering holding dialysis today. Diet: ADULT DIET; Regular    Code status: Full Code   ----------  Subjective  No shortness of breath. Denies any discomfort. Objective  Physical exam:  Vitals: BP (!) 143/97   Pulse 97   Temp 97.7 °F (36.5 °C) (Oral)   Resp 17   Ht 5' 8\" (1.727 m)   Wt 164 lb 0.4 oz (74.4 kg)   SpO2 98%   BMI 24.94 kg/m²   Gen/overall appearance: Not in acute distress. Alert. Oriented X3  Head: Normocephalic, atraumatic  Eyes: EOMI, good acuity  ENT: Oral mucosa moist  Neck: No JVD, thyromegaly  CVS: Nml S1S2, no MRG, RRR  Pulm: Fine crackles in lung bases.  No wheezes. Gastrointestinal: Soft, NT/ND, +BS  Musculoskeletal: +bilateral lower extremity edema. Warm  Neuro: No focal deficit. Moves extremity spontaneously. Psychiatry: Appropriate affect. Not agitated. Skin: Warm, dry with normal turgor. No rash  Capillary refill: Brisk,< 3 seconds   Peripheral Pulses: +2 palpable, equal bilaterally      24HR INTAKE/OUTPUT:    Intake/Output Summary (Last 24 hours) at 10/6/2022 0925  Last data filed at 10/6/2022 0749  Gross per 24 hour   Intake 1434.78 ml   Output 1600 ml   Net -165.22 ml     I/O last 3 completed shifts: In: 2599.3 [P.O.:2200; I.V.:208.3; IV Piggyback:190.9]  Out: 1950 [BYV:5864]  I/O this shift:   In: 48 [IV Piggyback:50]  Out: -   Meds:    magnesium sulfate  2,000 mg IntraVENous Once    ceftaroline fosamil (TEFLARO) IVPB  400 mg IntraVENous Q12H    clindamycin  300 mg Oral 3 times per day    pantoprazole (PROTONIX) 40 mg injection  40 mg IntraVENous Q12H    levETIRAcetam  500 mg IntraVENous Daily    sodium chloride flush  5-40 mL IntraVENous 2 times per day    [Held by provider] enoxaparin  40 mg SubCUTAneous Nightly    daptomycin (CUBICIN) IVPB  8 mg/kg IntraVENous Once per day on Tue Thu Sat     Infusions:    sodium chloride      sodium chloride      sodium chloride       PRN Meds: heparin (porcine), calcium carbonate, sodium chloride, sodium chloride, sodium chloride flush, sodium chloride, acetaminophen **OR** acetaminophen, naloxone, hydrALAZINE    Labs/imaging:  CBC:   Recent Labs     10/04/22  2006 10/05/22  0518 10/06/22  0749   WBC 19.0* 13.2* 12.9*   HGB 9.5* 7.7*  7.7* 7.1*    319 325     BMP:    Recent Labs     10/04/22  2006 10/05/22  0518 10/06/22  0749    136 139   K 4.9 4.1 3.9    102 105   CO2 23 23 27   BUN 10 14 14   CREATININE 1.6* 2.0* 2.0*   GLUCOSE 146* 147* 99     Hepatic:   Recent Labs     10/04/22  2006   AST 18   ALT 7*   BILITOT <0.2   ALKPHOS 132*       Jaya Merlos, MD  -------------------------------  Rounding hospitalist

## 2022-10-06 NOTE — PROGRESS NOTES
12:29 PM     10/06/2022 07:49 AM    CO2 27 10/06/2022 07:49 AM    BUN 14 10/06/2022 07:49 AM    CREATININE 2.0 10/06/2022 07:49 AM    GFRAA 33 10/06/2022 07:49 AM    GFRAA >60 07/28/2012 11:20 PM    AGRATIO 0.6 10/04/2022 08:06 PM    LABGLOM 27 10/06/2022 07:49 AM    GLUCOSE 99 10/06/2022 07:49 AM    PROT 7.0 10/04/2022 08:06 PM    PROT 6.5 07/28/2012 11:20 PM    CALCIUM 7.5 10/06/2022 07:49 AM    BILITOT <0.2 10/04/2022 08:06 PM    ALKPHOS 132 10/04/2022 08:06 PM    AST 18 10/04/2022 08:06 PM    ALT 7 10/04/2022 08:06 PM      Hepatic Function Panel:   Lab Results   Component Value Date/Time    ALKPHOS 132 10/04/2022 08:06 PM    ALT 7 10/04/2022 08:06 PM    AST 18 10/04/2022 08:06 PM    PROT 7.0 10/04/2022 08:06 PM    PROT 6.5 07/28/2012 11:20 PM    BILITOT <0.2 10/04/2022 08:06 PM    BILIDIR <0.2 09/27/2022 04:40 AM    IBILI see below 09/27/2022 04:40 AM      Phosphorus:   Lab Results   Component Value Date/Time    PHOS 3.2 10/06/2022 07:49 AM       ASSESSMENT/PLAN:    1-MARYAN   suspect Vancomycin toxicity /ATN/ infectious GN . 1st HD 9/20  . S./p [facility]  9/23 . Hold HD today. Check labs  Increase UOP noted . Monitor closely for renal recovery . 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 Monitor level  . 3-IVDA     4 Anemia   Hb noted . Tx as needed . 5-Seizure ? ? On meds   Plan per neurology     6- Acute hypercarbic respiratory failure  Resolved . Etiology drug use , drug abuse .            Saud Yoon MD,FACP

## 2022-10-06 NOTE — PROGRESS NOTES
RN in room for pts O2 75%. RN found pt very drowsy and respirations of 6. RN pulled back sheet to find pts vape pen. O2 placed on pt. PRN narcan given per order, see MAR. Pt more alert once narcan administered. Respirations increased to 22. Pt denies taking any medication. Vape pen sent to security. VSS. MD and NP notified.      Electronically signed by Salud Olivier RN on 10/6/2022 at 1:41 AM

## 2022-10-06 NOTE — PROGRESS NOTES
Patient with long multiple periods of apnea, breathing shallow at 5-6  respirs per minute after her RN found a vape in her bed with her. This RN and Manuelito Guzmán RN administered narcan to the patient. Patient is now breathing 24 breaths per minute.

## 2022-10-06 NOTE — PROGRESS NOTES
Physical Therapy  Attempted to see pt for PT treatment. Pt is currently CHELSIE getting a procedure. Will reattempt to see pt for PT treatment as schedule allows.    Thank you,  Love Rosa PT, DPT [id number]

## 2022-10-06 NOTE — PROGRESS NOTES
Infectious Disease Follow up Notes  Admit Date: 10/2/2022  Hospital Day: 5    Antibiotics :   IV Ceftaroline  IV Daptomycin   Clindamycin     CHIEF COMPLAINT:      Seizures  MRSA bacteremia  MRSA Abscess  Serratia bacteremia   UTI    Subjective interval History :  40 y. o.woman with a history of IV drug abuse, ADHD, back pain, depression and recent admission for MRSA bacteremia secondary to bacteremia secondary to IV drug abuse with tricuspid valve endocarditis with septic emboli. He also developed large abscess in the gluteal area connected to the pelvic area concerning for sacral osteomyelitis. She has a KRYSTLE drain placement by interventional radiology. Culture was positive for MRSA as well as Serratia. She had a prolonged hospitalization secondary to acute kidney injury she required hemodialysis on previous admission. She was discharged home on 10/1/22, was supposed to continue IV daptomycin with hemodialysis plus taking oral clindamycin and levofloxacin. She is now admitted from home with possible seizure-like activities she denies any current drug use but UDS on this admission came positive for fentanyl. Admission labs indicate creatinine at 2 lactic acid 2.1 procalcitonin 0.2 5 repeat blood culture in process, WBC elevated 18.6 hemoglobin 9.3. We are consulted for IV antibiotic recommendations    Interval History : Status post Krystle drain removal complains of ongoing right leg pain and right hip pain. Tolerating antibiotic therapy okay she remains on room air.   Creatinine still elevated -     Past Medical History:    Past Medical History:   Diagnosis Date    ADHD (attention deficit hyperactivity disorder)     Arthritis     Back pain     Depression     Migraine     Neutrophilia 9/15/2022       Past Surgical History:    Past Surgical History:   Procedure Laterality Date    IR INSERT NON TUNNELED CV CATH LESS THAN 5 YEARS Right 09/20/2022    Vascath; RIJ access; 15cm; Dr. Arsenio Castillo    IR NONTUNNELED VASCULAR CATHETER  09/20/2022    IR NONTUNNELED VASCULAR CATHETER 9/20/2022 [id number]    KNEE ARTHROSCOPY  10/05/2010    PARTIAL HYSTERECTOMY (CERVIX NOT REMOVED)      TUBAL LIGATION  01/01/2004    TUNNELED VENOUS CATHETER PLACEMENT Right 09/23/2022    Permacath; RIJ access; 19cm; Dr. Bryson Hernandez N/A 10/4/2022    EGD BIOPSY performed by Flores Sherman MD at [address] [facility]       Current Medications:    No outpatient medications have been marked as taking for the 10/2/22 encounter [facility] Encounter).        Allergies:  Ultram [tramadol hcl], Robaxin [methocarbamol], and Penicillins    Immunizations :   Immunization History   Administered Date(s) Administered    Tdap (Boostrix, Adacel) 02/11/2015       Social History:    Social History     Tobacco Use    Smoking status: Every Day     Packs/day: 0.50     Years: 2.00     Pack years: 1.00     Types: Cigarettes    Smokeless tobacco: Never   Substance Use Topics    Alcohol use: Yes     Comment: occas    Drug use: No     Social History     Tobacco Use   Smoking Status Every Day    Packs/day: 0.50    Years: 2.00    Pack years: 1.00    Types: Cigarettes   Smokeless Tobacco Never      Family History   Problem Relation Age of Onset    Breast Cancer Maternal Grandmother 45    Arthritis Other     Asthma Other     Cancer Other     Diabetes Other     Seizures Other     Stroke Other          REVIEW OF SYSTEMS:     Constitutional:  negative for fevers, chills, night sweats  Eyes:  negative for blurred vision, eye discharge, visual disturbance   HEENT:  negative for hearing loss, ear drainage,nasal congestion  Respiratory:  negative for cough, shortness of breath+  or hemoptysis   Cardiovascular:  negative for chest pain, palpitations, syncope  Gastrointestinal:  negative for nausea, vomiting, diarrhea, constipation, abdominal pain+   Genitourinary:  negative for frequency, dysuria, urinary incontinence, hematuria  Hematologic/Lymphatic:  negative for easy bruising, bleeding and lymphadenopathy  Allergic/Immunologic:  negative for recurrent infections, angioedema, anaphylaxis   Endocrine:  negative for weight changes, polyuria, polydipsia and polyphagia  Musculoskeletal:  Back   pain ++ , swelling, decreased range of motion  Integumentary: No rashes, skin lesions  Neurological:  negative for headaches, slurred speech, unilateral weakness  Psychiatric: negative for hallucinations,confusion,agitation.                 PHYSICAL EXAM:      Vitals:    BP (!) 143/97   Pulse 97   Temp 97.7 °F (36.5 °C) (Oral)   Resp 17   Ht 5' 8\" (1.727 m)   Wt 164 lb 0.4 oz (74.4 kg)   SpO2 98%   BMI 24.94 kg/m²     General Appearance: alert,in some acute distress, ++ pallor, no icterus  old needle tracks+  Skin: warm and dry, no rash or erythema  Head: normocephalic and atraumatic  Eyes: pupils equal, round, and reactive to light, conjunctivae normal  ENT: tympanic membrane, external ear and ear canal normal bilaterally, nose without deformity, nasal mucosa and turbinates normal without polyps  Neck: supple and non-tender without mass, no thyromegaly  no cervical lymphadenopathy  Pulmonary/Chest: Bi basal crepts+ wheezes, rales or rhonchi, normal air movement, no respiratory distress  Cardiovascular: normal rate, regular rhythm, normal S1 and S2, no murmurs, rubs, clicks, or gallops, no carotid bruits  Abdomen: soft, non-tender, non-distended, normal bowel sounds, no masses or organomegaly  Extremities: no cyanosis, clubbing or edema  Musculoskeletal: normal range of motion, no joint swelling, deformity or tenderness  Integumentary: No rashes, no abnormal skin lesions, no petechiae  Neurologic: reflexes normal and symmetric, no cranial nerve deficit  Psych:  Orientation, sensorium, mood normal            Lines: HD line   KRYSTLE drain in place Rt   Data Review:    CBC:   Lab Results   Component Urine Reflex to Culture:   Lab Results   Component Value Date/Time    URRFLXCULT Yes 10/02/2022 12:56 PM     Creat 2.3     Lactic acid  2.1       Lactic acid  2.1      Creat  2   Component Ref Range & Units 10/02/22 1256   Amphetamine Screen, Urine Negative <1000ng/mL Neg    Barbiturate Screen, Ur Negative <200 ng/mL Neg    Benzodiazepine Screen, Urine Negative <200 ng/mL Neg    Cannabinoid Scrn, Ur Negative <50 ng/mL Neg    Cocaine Metabolite Screen, Urine Negative <300 ng/mL Neg    Opiate Scrn, Ur Negative <300 ng/mL POSITIVE Abnormal     Comment: \"Therapeutic levels of pain medication, especially oxycontin and synthetic   opioids, may not be detected by this Methodology. Pain management screen   panel  Drug panel-PM-Hi Res Ur, Interp (PAIN) should be considered for drug   monitoring \". PCP Screen, Urine Negative <25 ng/mL Neg    Methadone Screen, Urine Negative <300 ng/mL Neg    Oxycodone Urine Negative <100 ng/ml Neg    FENTANYL SCREEN, URINE Negative <5 ng/mL POSITIVE Abnormal     pH, UA   7.0        MICRO: cultures reviewed and updated by me   Blood Culture:   Procedure Component Value Units Date/Time   Culture, Blood 2 [[id number]] Collected: 10/02/22 1713   Order Status: Completed Specimen: Blood Updated: 10/03/22 1815    Culture, Blood 2 No Growth to date. Any change in status will be called. Narrative:     ORDER#: [id number]                          ORDERED BY: BRICE EDEN   SOURCE: Blood                              COLLECTED:  10/02/22 17:13   ANTIBIOTICS AT [facility].:                      RECEIVED :  10/02/22 17:28   If child <=2 yrs old please draw pediatric bottle. ~Blood Culture #2   GI Bacterial Pathogens By PCR [[id number]]    Order Status: Sent Specimen: Stool    Clostridium Difficile Toxin/Antigen [[id number]]    Order Status: Sent Specimen: Stool    Fecal Leukocytes [[id number]]    Order Status: Sent Specimen: Stool    Culture, Blood 1 [[id number]] Collected: 10/02/22 1256   Order Status: Completed Specimen: Blood Updated: 10/03/22 1418    Blood Culture, Routine No Growth to date. Any change in status will be called. Narrative:     ORDER#: [id number]                          ORDERED BY: Eileen Rg   SOURCE: Blood                              COLLECTED:  10/02/22 12:56   ANTIBIOTICS AT [facility].:                      RECEIVED :  10/02/22 13:10   If child <=2 yrs old please draw pediatric bottle. ~Blood Culture 1   Culture, Urine [[id number]] Collected: 10/02/22 1256   Order Status: Completed Specimen: Urine, clean catch Updated: 10/03/22 1034    Urine Culture, Routine No growth at 18 to 36 hours   Narrative:     ORDER#: [id number]                          ORDERED BY: Eileen Rg   SOURCE: Urine Clean Catch                  COLLECTED:  10/02/22 12:56   ANTIBIOTICS AT [facility].:                      RECEIVED :  10/02/22 14:06   Culture, Blood 2 [[id number]]    Order Status: Canceled Specimen: Blood    Culture, Blood 1 [[id number]] Collected: 10/02/22 0000   Order Status: Canceled Specimen: Blood      Lab Results   Component Value Date/Time    [facility]  10/02/2022 12:56 PM     No Growth to date. Any change in status will be called. Katia [place]  10/02/2022 05:13 PM     No Growth to date. Any change in status will be called. Respiratory Culture:  Lab Results   Component Value Date/Time    CULTRESP Light growth normal respiratory libra  including   09/29/2022 04:00 AM    CULTRESP Light growth  No further workup   09/29/2022 04:00 AM    LABGRAM 1+ Epithelial Cells  1+ Yeast with pseudohyphae   09/29/2022 04:00 AM     AFB:No results found for: AFBSMEAR  Viral Culture:  Lab Results   Component Value Date/Time    COVID19 Not Detected 09/07/2022 12:00 AM    COVID19 Not Detected 07/20/2022 11:20 PM     Urine Culture:   No results for input(s): LABURIN in the last 72 hours.     Time       Culture, Blood 2 [[id number]] Collected: 10/02/22 1713   Order Status: Completed Specimen: Blood Updated: 10/03/22 1815 Culture, Blood 2 No Growth to date. Any change in status will be called. Narrative:     ORDER#: [id number]                          ORDERED BY: BRICE EDEN   SOURCE: Blood                              COLLECTED:  10/02/22 17:13   ANTIBIOTICS AT [facility].:                      RECEIVED :  10/02/22 17:28   If child <=2 yrs old please draw pediatric bottle. ~Blood Culture #2   GI Bacterial Pathogens By PCR [[id number]]    Order Status: Sent Specimen: Stool    Clostridium Difficile Toxin/Antigen [[id number]]    Order Status: Sent Specimen: Stool    Fecal Leukocytes [[id number]]    Order Status: Sent Specimen: Stool    Culture, Blood 1 [[id number]] Collected: 10/02/22 1256   Order Status: Completed Specimen: Blood Updated: 10/03/22 1418    Blood Culture, Routine No Growth to date. Any change in status will be called. Narrative:     ORDER#: [id number]                          ORDERED BY: Althea Adkins   SOURCE: Blood                              COLLECTED:  10/02/22 12:56   ANTIBIOTICS AT [facility].:                      RECEIVED :  10/02/22 13:10   If child <=2 yrs old please draw pediatric bottle. ~Blood Culture 1   Culture, Urine [[id number]] Collected: 10/02/22 1256   Order Status: Completed Specimen: Urine, clean catch Updated: 10/03/22 1034    Urine Culture, Routine No growth at 18 to 36 hours   Narrative:     ORDER#: [id number]                          ORDERED BY: Althea Adkins        IMAGING:    XR CHEST PORTABLE   Final Result   1. Moderate left and small right pleural effusions, with multifocal   parenchymal infiltrates as well as some demonstrating nodularity related to   known septic pulmonary emboli and multilobar pneumonia. Multilead lobar   infiltrates have progressed from the previous exam.         MRI BRAIN WO CONTRAST   Final Result   1. No acute findings or etiology identified to explain the patient's seizures. 2. Several nonspecific foci of white matter signal abnormality.   Although   nonspecific, these are likely of doubtful clinical significance. MRI PELVIS WO CONTRAST   Final Result   1. Extensive marrow edema is seen within the right sacrum and iliac bone   extending into the right acetabulum. Findings are concerning for   osteomyelitis/septic arthritis involving the right sacroiliac joint. The   degree of marrow edema appears similar to the comparison MRI.      2.  2.5 x 0.9 x 2.0 cm ovoid fluid collection within the gluteus minimus   muscle adjacent to the right iliac bone compatible with a small abscess. 3.  Persistent edema within the right iliacus muscle with no clear evidence   of persistent iliacus muscle abscess. CT HEAD WO CONTRAST   Final Result   No acute intracranial abnormality. XR CHEST PORTABLE   Final Result   Developing small bilateral pleural effusions, left greater than right. Persistent multifocal pulmonary nodules, some of which are cavitary,   suggestive of septic emboli given patient history. IR ABSCESS DRAINAGE PERC    (Results Pending)     CT L spine 9/29/22   Impression   1. Acute osteomyelitis-septic arthritis noted involving the right sacroiliac   joint with osteomyelitis process extending to the right iliac wing. 2. Patient had a large right iliac fossa fluid collection on previous   examination. A drainage catheter is noted in this region. The right iliac   fossa is incompletely covered on this examination and there is limited   resolution to comment upon the residual fluid collection. A 2nd collection   is noted along the right sacral canal.  Soft tissue fullness noted in this   region. There is limited resolution. Patient will benefit from a contrast enhanced CT or MR of the pelvis   targeting the right iliac fossa and the pelvis. 3. Moderate bilateral pleural effusions. 4. Normal appearing bony lumbar spine.        RECOMMENDATIONS:   Recommend obtaining contrast enhanced CT/MR of the pelvis for complete   coverage of the right iliac fossa and pelvis. CT chest :  9/27/22  FINDINGS:   Mediastinum: Thyroid gland unremarkable. Tip of PICC and central line projects   in region of SVC. Small pericardial effusion is seen. Small hiatal hernia   seen. There is nonspecific thickening at the GE junction. Small mediastinal   nodes are noted, likely reactive       Lungs/pleura: There are bilateral pleural effusions seen, left greater than   right, increased compared to prior. There is increased lung consolidation,   left greater than right. Cavitary and non cavitary nodules in the left lung are again identified,   overall decreased compared to prior. .  For example, an index cavitary nodule   in the left upper lobe measures 1.3 cm x 1.3 cm, previously measuring 2.0 cm   x 2.1 cm. On the right, a bilobed cavitary nodule which previously measured 6.9 cm by   4.2 cm, now measures 5.6 cm x 2.7 cm and now appears as 2 separate nodules. Upper Abdomen: Right adrenal gland is normal.  Left adrenal gland is normal       Soft Tissues/Bones: Bones appear unchanged. There is body wall anasarca           Impression   Widespread cavitary nodules, decreased on the right and left. , in keeping   with the diagnosis of septic emboli. Increased pleural effusions with increasing consolidative change at the lung   bases.        All the pertinent images and reports for the current Hospitalization were reviewed by me     Scheduled Meds:   magnesium sulfate  2,000 mg IntraVENous Once    ceftaroline fosamil (TEFLARO) IVPB  400 mg IntraVENous Q12H    clindamycin  300 mg Oral 3 times per day    pantoprazole (PROTONIX) 40 mg injection  40 mg IntraVENous Q12H    levETIRAcetam  500 mg IntraVENous Daily    sodium chloride flush  5-40 mL IntraVENous 2 times per day    [Held by provider] enoxaparin  40 mg SubCUTAneous Nightly    daptomycin (CUBICIN) IVPB  8 mg/kg IntraVENous Once per day on Tue Thu Sat       Continuous Infusions:   sodium chloride sodium chloride      sodium chloride         PRN Meds:  heparin (porcine), calcium carbonate, sodium chloride, sodium chloride, sodium chloride flush, sodium chloride, acetaminophen **OR** acetaminophen, naloxone, hydrALAZINE      Assessment:     Patient Active Problem List   Diagnosis    Tear of medial cartilage or meniscus of knee, current    Plica syndrome    Boxer's metacarpal fracture, neck, closed    Chondromalacia of patella    Degeneration of lumbar or lumbosacral intervertebral disc    Varicose veins of lower extremity    Intractable nausea and vomiting    Bacteremia    Drug use    MRSA bacteremia    Sepsis due to methicillin resistant Staphylococcus aureus (MRSA) without acute organ dysfunction (HCC)    Serratia marcescens infection    Acute bacterial endocarditis    Septic embolism (HCC)    Abnormal CT of the chest    Neutrophilia    Fever and chills    Hep C w/o coma, chronic (HCC)    Sepsis ([address]    Pulmonary cavitary lesion    MARYAN (acute kidney injury) ([address]    Acute osteomyelitis of sacrum (HCC)       Sepsis resolving   Lactic acidosis resolved  Fevers improved  WBC elevation  UDS+v for Fentanyl  IVDA history  Recent prolonged hospitalization from MRSA bacteremia, sepsis, Serratia bacteremia  Tricuspid valve endocarditis  Septic emboli  Cavitary pneumonia  Abnormal CT chest  Sacral osteomyelitis  Acute kidney injury requiring hemodialysis  Hep C+       Given the ongoing infection bacteremia with endocarditis and septic embolism which she will need IV antibiotics. Creatinine seems to be slowly improving awaiting for recovery. Nephrology is following. UDS on this admission positive for fentanyl patient denies any drug use but this is somewhat concerning given the urine test is positive,     Will adjust antibiotic to treat MRSA bacteremia Serratia bacteremia and associated complications.      MRI Pelvis results noted d/w pt and will ask IR to check the drain     Awaiting renal function improvement will decide about course of antibiotics    WBC count still elevated slow to resolve,   she had an episode of tachycardia and unresponsiveness UDS was positive for amphetamine-patient denies any drug use in the hospital  d/w RN during rounds          Labs, Microbiology, Radiology and all the pertinent results from current hospitalization and  care every where were reviewed  by me as a part of the evaluation   Plan:   Cont IV Daptomycin x  550 mg x  3 times a week for now awaiting Renal Recovery  IV Ceftaroline x  400 mg q 12HRS  3    CONT  oral Clindamycin x  300  mg q 8 hrs  4. ESR. CRP  5. Blood cx repeat IN process  NGTD  6. Awaiting Renal recovery   7. MRI pelvis noted and d/w pt  8. S/P IR DRAIN removal    9/ Corky be able to choose oral abx when ready      Discussed with patient/Family and Nursing   Risk of Complications/Morbidity: High      Illness(es)/ Infection present that pose threat to bodily function. There is potential for severe exacerbation of infection/side effects of treatment. Therapy requires intensive monitoring for antimicrobial agent toxicity. Discussed with patient/Family and Nursing staff     Thanks for allowing me to participate in your patient's care and please call me with any questions or concerns.     Kindra Schneider MD  Infectious Disease  [facility]) Physician  Phone: [phone]   Fax : [phone]

## 2022-10-06 NOTE — CARE COORDINATION
Spoke to Zenaida at [facility], facility is able to accept and will start pre cert today. SW to speak to patient about discharge plan.   Electronically signed by Adeola Das RN Case Management [phone] on 10/6/2022 at 1:21 PM

## 2022-10-07 LAB
ALBUMIN SERPL-MCNC: 2.2 G/DL (ref 3.4–5)
ALBUMIN SERPL-MCNC: 2.3 G/DL (ref 3.4–5)
ANION GAP SERPL CALCULATED.3IONS-SCNC: 10 MMOL/L (ref 3–16)
ANION GAP SERPL CALCULATED.3IONS-SCNC: 8 MMOL/L (ref 3–16)
BUN BLDV-MCNC: 13 MG/DL (ref 7–20)
BUN BLDV-MCNC: 13 MG/DL (ref 7–20)
CALCIUM SERPL-MCNC: 7.7 MG/DL (ref 8.3–10.6)
CALCIUM SERPL-MCNC: 7.8 MG/DL (ref 8.3–10.6)
CHLORIDE BLD-SCNC: 106 MMOL/L (ref 99–110)
CHLORIDE BLD-SCNC: 107 MMOL/L (ref 99–110)
CO2: 25 MMOL/L (ref 21–32)
CO2: 26 MMOL/L (ref 21–32)
CREAT SERPL-MCNC: 2 MG/DL (ref 0.6–1.1)
CREAT SERPL-MCNC: 2 MG/DL (ref 0.6–1.1)
GFR AFRICAN AMERICAN: 33
GFR AFRICAN AMERICAN: 33
GFR NON-AFRICAN AMERICAN: 27
GFR NON-AFRICAN AMERICAN: 27
GLUCOSE BLD-MCNC: 104 MG/DL (ref 70–99)
GLUCOSE BLD-MCNC: 91 MG/DL (ref 70–99)
HCT VFR BLD CALC: 21.6 % (ref 36–48)
HEMOGLOBIN: 7 G/DL (ref 12–16)
MCH RBC QN AUTO: 27.7 PG (ref 26–34)
MCHC RBC AUTO-ENTMCNC: 32.3 G/DL (ref 31–36)
MCV RBC AUTO: 85.5 FL (ref 80–100)
PDW BLD-RTO: 17.8 % (ref 12.4–15.4)
PHOSPHORUS: 3.8 MG/DL (ref 2.5–4.9)
PHOSPHORUS: 4 MG/DL (ref 2.5–4.9)
PLATELET # BLD: 327 K/UL (ref 135–450)
PMV BLD AUTO: 7.4 FL (ref 5–10.5)
POTASSIUM SERPL-SCNC: 4.1 MMOL/L (ref 3.5–5.1)
POTASSIUM SERPL-SCNC: 4.2 MMOL/L (ref 3.5–5.1)
PRO-BNP: 3606 PG/ML (ref 0–124)
RBC # BLD: 2.52 M/UL (ref 4–5.2)
SODIUM BLD-SCNC: 140 MMOL/L (ref 136–145)
SODIUM BLD-SCNC: 142 MMOL/L (ref 136–145)
WBC # BLD: 8.6 K/UL (ref 4–11)

## 2022-10-07 PROCEDURE — C9113 INJ PANTOPRAZOLE SODIUM, VIA: HCPCS | Performed by: INTERNAL MEDICINE

## 2022-10-07 PROCEDURE — 2060000000 HC ICU INTERMEDIATE R&B

## 2022-10-07 PROCEDURE — 6360000002 HC RX W HCPCS: Performed by: INTERNAL MEDICINE

## 2022-10-07 PROCEDURE — 6370000000 HC RX 637 (ALT 250 FOR IP): Performed by: INTERNAL MEDICINE

## 2022-10-07 PROCEDURE — 80069 RENAL FUNCTION PANEL: CPT

## 2022-10-07 PROCEDURE — 2580000003 HC RX 258: Performed by: INTERNAL MEDICINE

## 2022-10-07 PROCEDURE — 36415 COLL VENOUS BLD VENIPUNCTURE: CPT

## 2022-10-07 PROCEDURE — 99232 SBSQ HOSP IP/OBS MODERATE 35: CPT | Performed by: INTERNAL MEDICINE

## 2022-10-07 PROCEDURE — 97116 GAIT TRAINING THERAPY: CPT

## 2022-10-07 PROCEDURE — 83880 ASSAY OF NATRIURETIC PEPTIDE: CPT

## 2022-10-07 PROCEDURE — 94760 N-INVAS EAR/PLS OXIMETRY 1: CPT

## 2022-10-07 PROCEDURE — 6370000000 HC RX 637 (ALT 250 FOR IP): Performed by: NURSE PRACTITIONER

## 2022-10-07 PROCEDURE — 85027 COMPLETE CBC AUTOMATED: CPT

## 2022-10-07 PROCEDURE — 97530 THERAPEUTIC ACTIVITIES: CPT

## 2022-10-07 RX ORDER — CLONIDINE HYDROCHLORIDE 0.1 MG/1
0.1 TABLET ORAL ONCE
Status: COMPLETED | OUTPATIENT
Start: 2022-10-07 | End: 2022-10-07

## 2022-10-07 RX ORDER — FUROSEMIDE 20 MG/1
20 TABLET ORAL DAILY
Status: DISCONTINUED | OUTPATIENT
Start: 2022-10-07 | End: 2022-10-08

## 2022-10-07 RX ORDER — HYDROXYZINE PAMOATE 25 MG/1
50 CAPSULE ORAL ONCE
Status: COMPLETED | OUTPATIENT
Start: 2022-10-07 | End: 2022-10-07

## 2022-10-07 RX ADMIN — ANTACID TABLETS 500 MG: 500 TABLET, CHEWABLE ORAL at 15:19

## 2022-10-07 RX ADMIN — Medication 40 MG: at 15:17

## 2022-10-07 RX ADMIN — Medication 40 MG: at 02:03

## 2022-10-07 RX ADMIN — CLINDAMYCIN HYDROCHLORIDE 300 MG: 150 CAPSULE ORAL at 23:15

## 2022-10-07 RX ADMIN — SODIUM CHLORIDE, PRESERVATIVE FREE 10 ML: 5 INJECTION INTRAVENOUS at 10:02

## 2022-10-07 RX ADMIN — LEVETIRACETAM 500 MG: 5 INJECTION INTRAVENOUS at 10:06

## 2022-10-07 RX ADMIN — CEFTAROLINE FOSAMIL 400 MG: 600 POWDER, FOR SOLUTION INTRAVENOUS at 13:13

## 2022-10-07 RX ADMIN — CLINDAMYCIN HYDROCHLORIDE 300 MG: 150 CAPSULE ORAL at 15:17

## 2022-10-07 RX ADMIN — CEFTAROLINE FOSAMIL 400 MG: 600 POWDER, FOR SOLUTION INTRAVENOUS at 23:14

## 2022-10-07 RX ADMIN — CLINDAMYCIN HYDROCHLORIDE 300 MG: 150 CAPSULE ORAL at 06:12

## 2022-10-07 RX ADMIN — CLONIDINE HYDROCHLORIDE 0.1 MG: 0.1 TABLET ORAL at 23:15

## 2022-10-07 RX ADMIN — HYDRALAZINE HYDROCHLORIDE 10 MG: 20 INJECTION INTRAMUSCULAR; INTRAVENOUS at 18:00

## 2022-10-07 RX ADMIN — HYDRALAZINE HYDROCHLORIDE 10 MG: 20 INJECTION INTRAMUSCULAR; INTRAVENOUS at 11:49

## 2022-10-07 RX ADMIN — FUROSEMIDE 20 MG: 20 TABLET ORAL at 15:17

## 2022-10-07 RX ADMIN — ACETAMINOPHEN 650 MG: 325 TABLET, FILM COATED ORAL at 03:49

## 2022-10-07 RX ADMIN — ACETAMINOPHEN 650 MG: 325 TABLET, FILM COATED ORAL at 20:01

## 2022-10-07 RX ADMIN — ANTACID TABLETS 500 MG: 500 TABLET, CHEWABLE ORAL at 03:49

## 2022-10-07 RX ADMIN — ANTACID TABLETS 500 MG: 500 TABLET, CHEWABLE ORAL at 20:01

## 2022-10-07 RX ADMIN — HYDROXYZINE PAMOATE 50 MG: 25 CAPSULE ORAL at 23:15

## 2022-10-07 RX ADMIN — SODIUM CHLORIDE, PRESERVATIVE FREE 10 ML: 5 INJECTION INTRAVENOUS at 23:16

## 2022-10-07 ASSESSMENT — PAIN DESCRIPTION - LOCATION
LOCATION: CHEST
LOCATION: BACK

## 2022-10-07 ASSESSMENT — PAIN DESCRIPTION - ONSET: ONSET: ON-GOING

## 2022-10-07 ASSESSMENT — PAIN DESCRIPTION - PAIN TYPE: TYPE: ACUTE PAIN

## 2022-10-07 ASSESSMENT — PAIN - FUNCTIONAL ASSESSMENT: PAIN_FUNCTIONAL_ASSESSMENT: PREVENTS OR INTERFERES SOME ACTIVE ACTIVITIES AND ADLS

## 2022-10-07 ASSESSMENT — PAIN DESCRIPTION - FREQUENCY: FREQUENCY: CONTINUOUS

## 2022-10-07 ASSESSMENT — PAIN DESCRIPTION - DESCRIPTORS
DESCRIPTORS: ACHING
DESCRIPTORS: ACHING

## 2022-10-07 ASSESSMENT — PAIN DESCRIPTION - ORIENTATION: ORIENTATION: LOWER

## 2022-10-07 ASSESSMENT — PAIN SCALES - GENERAL: PAINLEVEL_OUTOF10: 3

## 2022-10-07 ASSESSMENT — PAIN SCALES - WONG BAKER: WONGBAKER_NUMERICALRESPONSE: 0

## 2022-10-07 NOTE — PLAN OF CARE
Problem: Discharge Planning  Goal: Discharge to home or other facility with appropriate resources  10/7/2022 1636 by Hattie Mata RN  Outcome: Progressing     Problem: Pain  Goal: Verbalizes/displays adequate comfort level or baseline comfort level  10/7/2022 1636 by Hattie Mata RN  Outcome: Progressing     Problem: Safety - Adult  Goal: Free from fall injury  10/7/2022 1636 by Hattie Mata RN  Outcome: Progressing

## 2022-10-07 NOTE — PROGRESS NOTES
Infectious Disease Follow up Notes  Admit Date: 10/2/2022  Hospital Day: 6    Antibiotics :   IV Ceftaroline  IV Daptomycin   Clindamycin     CHIEF COMPLAINT:      Seizures  MRSA bacteremia  MRSA Abscess  Serratia bacteremia   UTI    Subjective interval History :  40 y. o.woman with a history of IV drug abuse, ADHD, back pain, depression and recent admission for MRSA bacteremia secondary to bacteremia secondary to IV drug abuse with tricuspid valve endocarditis with septic emboli. He also developed large abscess in the gluteal area connected to the pelvic area concerning for sacral osteomyelitis. She has a KRYSTLE drain placement by interventional radiology. Culture was positive for MRSA as well as Serratia. She had a prolonged hospitalization secondary to acute kidney injury she required hemodialysis on previous admission. She was discharged home on 10/1/22, was supposed to continue IV daptomycin with hemodialysis plus taking oral clindamycin and levofloxacin. She is now admitted from home with possible seizure-like activities she denies any current drug use but UDS on this admission came positive for fentanyl. Admission labs indicate creatinine at 2 lactic acid 2.1 procalcitonin 0.2 5 repeat blood culture in process, WBC elevated 18.6 hemoglobin 9.3.   We are consulted for IV antibiotic recommendations    Interval History : cough + no chills and Jones in place creat stable at 2 and tolerating IV abx  ok    Past Medical History:    Past Medical History:   Diagnosis Date    ADHD (attention deficit hyperactivity disorder)     Arthritis     Back pain     Depression     Migraine     Neutrophilia 9/15/2022    Seizure ([address] 10/6/2022       Past Surgical History:    Past Surgical History:   Procedure Laterality Date    IR INSERT NON TUNNELED CV CATH LESS THAN 5 YEARS Right 09/20/2022    Zia; RIJ access; 15cm; Dr. Bran Pu    IR NONTUNNELED VASCULAR CATHETER  09/20/2022    IR NONTUNNELED VASCULAR CATHETER 9/20/2022 [facility] SPECIAL PROCEDURES    KNEE ARTHROSCOPY  10/05/2010    PARTIAL HYSTERECTOMY (CERVIX NOT REMOVED)      TUBAL LIGATION  01/01/2004    TUNNELED VENOUS CATHETER PLACEMENT Right 09/23/2022    Permacath; RIJ access; 19cm; Dr. Bryson Hernandez N/A 10/4/2022    EGD BIOPSY performed by Flores Sherman MD at [facility]       Current Medications:    No outpatient medications have been marked as taking for the 10/2/22 encounter [facility] Encounter).        Allergies:  Ultram [tramadol hcl], Robaxin [methocarbamol], and Penicillins    Immunizations :   Immunization History   Administered Date(s) Administered    Tdap (Boostrix, Adacel) 02/11/2015       Social History:    Social History     Tobacco Use    Smoking status: Every Day     Packs/day: 0.50     Years: 2.00     Pack years: 1.00     Types: Cigarettes    Smokeless tobacco: Never   Substance Use Topics    Alcohol use: Yes     Comment: occas    Drug use: No     Social History     Tobacco Use   Smoking Status Every Day    Packs/day: 0.50    Years: 2.00    Pack years: 1.00    Types: Cigarettes   Smokeless Tobacco Never      Family History   Problem Relation Age of Onset    Breast Cancer Maternal Grandmother 45    Arthritis Other     Asthma Other     Cancer Other     Diabetes Other     Seizures Other     Stroke Other          REVIEW OF SYSTEMS:     Constitutional:  negative for fevers, chills, night sweats  Eyes:  negative for blurred vision, eye discharge, visual disturbance   HEENT:  negative for hearing loss, ear drainage,nasal congestion  Respiratory:  negative for cough, shortness of breath+  or hemoptysis   Cardiovascular:  negative for chest pain, palpitations, syncope  Gastrointestinal:  negative for nausea, vomiting, diarrhea, constipation, abdominal pain+   Genitourinary:  negative for frequency, dysuria, urinary incontinence, hematuria  Hematologic/Lymphatic: negative for easy bruising, bleeding and lymphadenopathy  Allergic/Immunologic:  negative for recurrent infections, angioedema, anaphylaxis   Endocrine:  negative for weight changes, polyuria, polydipsia and polyphagia  Musculoskeletal:  Back   pain ++ , swelling, decreased range of motion  Integumentary: No rashes, skin lesions  Neurological:  negative for headaches, slurred speech, unilateral weakness  Psychiatric: negative for hallucinations,confusion,agitation.                 PHYSICAL EXAM:      Vitals:    BP (!) 153/104   Pulse 92   Temp 98 °F (36.7 °C) (Oral)   Resp 24   Ht 5' 8\" (1.727 m)   Wt 164 lb 7.4 oz (74.6 kg)   SpO2 100%   BMI 25.01 kg/m²     General Appearance: alert,in some acute distress, ++ pallor, no icterus  old needle tracks+  Skin: warm and dry, no rash or erythema  Head: normocephalic and atraumatic  Eyes: pupils equal, round, and reactive to light, conjunctivae normal  ENT: tympanic membrane, external ear and ear canal normal bilaterally, nose without deformity, nasal mucosa and turbinates normal without polyps  Neck: supple and non-tender without mass, no thyromegaly  no cervical lymphadenopathy  Pulmonary/Chest: Bi basal crepts+ wheezes, rales or rhonchi, normal air movement, no respiratory distress  Cardiovascular: normal rate, regular rhythm, normal S1 and S2, no murmurs, rubs, clicks, or gallops, no carotid bruits  Abdomen: soft, non-tender, non-distended, normal bowel sounds, no masses or organomegaly  Extremities: no cyanosis, clubbing or edema  Musculoskeletal: normal range of motion, no joint swelling, deformity or tenderness  Integumentary: No rashes, no abnormal skin lesions, no petechiae  Neurologic: reflexes normal and symmetric, no cranial nerve deficit  Psych:  Orientation, sensorium, mood normal            Lines: HD line   Data Review:    CBC:   Lab Results   Component Value Date    WBC 8.6 10/07/2022    HGB 7.0 (L) 10/07/2022    HCT 21.6 (L) 10/07/2022    MCV 85.5 Creat 2.3     Lactic acid  2.1       Lactic acid  2.1      Creat  2   Component Ref Range & Units 10/02/22 1256   Amphetamine Screen, Urine Negative <1000ng/mL Neg    Barbiturate Screen, Ur Negative <200 ng/mL Neg    Benzodiazepine Screen, Urine Negative <200 ng/mL Neg    Cannabinoid Scrn, Ur Negative <50 ng/mL Neg    Cocaine Metabolite Screen, Urine Negative <300 ng/mL Neg    Opiate Scrn, Ur Negative <300 ng/mL POSITIVE Abnormal     Comment: \"Therapeutic levels of pain medication, especially oxycontin and synthetic   opioids, may not be detected by this Methodology. Pain management screen   panel  Drug panel-PM-Hi Res Ur, Interp (PAIN) should be considered for drug   monitoring \". PCP Screen, Urine Negative <25 ng/mL Neg    Methadone Screen, Urine Negative <300 ng/mL Neg    Oxycodone Urine Negative <100 ng/ml Neg    FENTANYL SCREEN, URINE Negative <5 ng/mL POSITIVE Abnormal     pH, UA   7.0        MICRO: cultures reviewed and updated by me   Blood Culture:   Procedure Component Value Units Date/Time   Culture, Blood 2 [[id number]] Collected: 10/02/22 1713   Order Status: Completed Specimen: Blood Updated: 10/03/22 1815    Culture, Blood 2 No Growth to date. Any change in status will be called. Narrative:     ORDER#: [id number]                          ORDERED BY: BRICE EDEN   SOURCE: Blood                              COLLECTED:  10/02/22 17:13   ANTIBIOTICS AT [facility].:                      RECEIVED :  10/02/22 17:28   If child <=2 yrs old please draw pediatric bottle. ~Blood Culture #2   GI Bacterial Pathogens By PCR [[id number]]    Order Status: Sent Specimen: Stool    Clostridium Difficile Toxin/Antigen [[id number]]    Order Status: Sent Specimen: Stool    Fecal Leukocytes [[id number]]    Order Status: Sent Specimen: Stool    Culture, Blood 1 [[id number]] Collected: 10/02/22 1256   Order Status: Completed Specimen: Blood Updated: 10/03/22 1418    Blood Culture, Routine No Growth to date.   Any change in status will be called. Narrative:     ORDER#: [id number]                          ORDERED BY: Agnieszka Ayala   SOURCE: Blood                              COLLECTED:  10/02/22 12:56   ANTIBIOTICS AT [facility].:                      RECEIVED :  10/02/22 13:10   If child <=2 yrs old please draw pediatric bottle. ~Blood Culture 1   Culture, Urine [[id number]] Collected: 10/02/22 1256   Order Status: Completed Specimen: Urine, clean catch Updated: 10/03/22 1034    Urine Culture, Routine No growth at 18 to 36 hours   Narrative:     ORDER#: [id number]                          ORDERED BY: Agnieszka Ayala   SOURCE: Urine Clean Catch                  COLLECTED:  10/02/22 12:56   ANTIBIOTICS AT [facility].:                      RECEIVED :  10/02/22 14:06   Culture, Blood 2 [[id number]]    Order Status: Canceled Specimen: Blood    Culture, Blood 1 [[id number]] Collected: 10/02/22 0000   Order Status: Canceled Specimen: Blood      Lab Results   Component Value Date/Time    BC No Growth after 4 days of incubation. 10/02/2022 12:56 PM    BLOODCULT2 No Growth after 4 days of incubation. 10/02/2022 05:13 PM       Respiratory Culture:  Lab Results   Component Value Date/Time    CULTRESP Light growth normal respiratory libra  including   09/29/2022 04:00 AM    CULTRESP Light growth  No further workup   09/29/2022 04:00 AM    LABGRAM 1+ Epithelial Cells  1+ Yeast with pseudohyphae   09/29/2022 04:00 AM     AFB:No results found for: AFBSMEAR  Viral Culture:  Lab Results   Component Value Date/Time    COVID19 Not Detected 09/07/2022 12:00 AM    COVID19 Not Detected 07/20/2022 11:20 PM     Urine Culture:   No results for input(s): LABURIN in the last 72 hours. Time       Culture, Blood 2 [[id number]] Collected: 10/02/22 1713   Order Status: Completed Specimen: Blood Updated: 10/03/22 1815    Culture, Blood 2 No Growth to date. Any change in status will be called.    Narrative:     ORDER#: [id number]                          ORDERED BY: BRICE EDEN SOURCE: Blood                              COLLECTED:  10/02/22 17:13   ANTIBIOTICS AT [facility].:                      RECEIVED :  10/02/22 17:28   If child <=2 yrs old please draw pediatric bottle. ~Blood Culture #2   GI Bacterial Pathogens By PCR [[id number]]    Order Status: Sent Specimen: Stool    Clostridium Difficile Toxin/Antigen [[id number]]    Order Status: Sent Specimen: Stool    Fecal Leukocytes [[id number]]    Order Status: Sent Specimen: Stool    Culture, Blood 1 [[id number]] Collected: 10/02/22 1256   Order Status: Completed Specimen: Blood Updated: 10/03/22 1418    Blood Culture, Routine No Growth to date. Any change in status will be called. Narrative:     ORDER#: [id number]                          ORDERED BY: Lili Rene   SOURCE: Blood                              COLLECTED:  10/02/22 12:56   ANTIBIOTICS AT [facility].:                      RECEIVED :  10/02/22 13:10   If child <=2 yrs old please draw pediatric bottle. ~Blood Culture 1   Culture, Urine [[id number]] Collected: 10/02/22 1256   Order Status: Completed Specimen: Urine, clean catch Updated: 10/03/22 1034    Urine Culture, Routine No growth at 18 to 36 hours   Narrative:     ORDER#: [id number]                          ORDERED BY: Lili Rene        IMAGING:    IR ABSCESS EVAL THRU EXISTING CATH   Final Result   Successful removal of a right lower quadrant percutaneous drainage catheter. XR CHEST PORTABLE   Final Result   1. Moderate left and small right pleural effusions, with multifocal   parenchymal infiltrates as well as some demonstrating nodularity related to   known septic pulmonary emboli and multilobar pneumonia. Multilead lobar   infiltrates have progressed from the previous exam.         MRI BRAIN WO CONTRAST   Final Result   1. No acute findings or etiology identified to explain the patient's seizures. 2. Several nonspecific foci of white matter signal abnormality.   Although   nonspecific, these are likely of doubtful clinical significance. MRI PELVIS WO CONTRAST   Final Result   1. Extensive marrow edema is seen within the right sacrum and iliac bone   extending into the right acetabulum. Findings are concerning for   osteomyelitis/septic arthritis involving the right sacroiliac joint. The   degree of marrow edema appears similar to the comparison MRI.      2.  2.5 x 0.9 x 2.0 cm ovoid fluid collection within the gluteus minimus   muscle adjacent to the right iliac bone compatible with a small abscess. 3.  Persistent edema within the right iliacus muscle with no clear evidence   of persistent iliacus muscle abscess. CT HEAD WO CONTRAST   Final Result   No acute intracranial abnormality. XR CHEST PORTABLE   Final Result   Developing small bilateral pleural effusions, left greater than right. Persistent multifocal pulmonary nodules, some of which are cavitary,   suggestive of septic emboli given patient history. CT L spine 9/29/22   Impression   1. Acute osteomyelitis-septic arthritis noted involving the right sacroiliac   joint with osteomyelitis process extending to the right iliac wing. 2. Patient had a large right iliac fossa fluid collection on previous   examination. A drainage catheter is noted in this region. The right iliac   fossa is incompletely covered on this examination and there is limited   resolution to comment upon the residual fluid collection. A 2nd collection   is noted along the right sacral canal.  Soft tissue fullness noted in this   region. There is limited resolution. Patient will benefit from a contrast enhanced CT or MR of the pelvis   targeting the right iliac fossa and the pelvis. 3. Moderate bilateral pleural effusions. 4. Normal appearing bony lumbar spine. RECOMMENDATIONS:   Recommend obtaining contrast enhanced CT/MR of the pelvis for complete   coverage of the right iliac fossa and pelvis.          CT chest : 9/27/22  FINDINGS:   Mediastinum: Thyroid gland unremarkable. Tip of PICC and central line projects   in region of SVC. Small pericardial effusion is seen. Small hiatal hernia   seen. There is nonspecific thickening at the GE junction. Small mediastinal   nodes are noted, likely reactive       Lungs/pleura: There are bilateral pleural effusions seen, left greater than   right, increased compared to prior. There is increased lung consolidation,   left greater than right. Cavitary and non cavitary nodules in the left lung are again identified,   overall decreased compared to prior. .  For example, an index cavitary nodule   in the left upper lobe measures 1.3 cm x 1.3 cm, previously measuring 2.0 cm   x 2.1 cm. On the right, a bilobed cavitary nodule which previously measured 6.9 cm by   4.2 cm, now measures 5.6 cm x 2.7 cm and now appears as 2 separate nodules. Upper Abdomen: Right adrenal gland is normal.  Left adrenal gland is normal       Soft Tissues/Bones: Bones appear unchanged. There is body wall anasarca           Impression   Widespread cavitary nodules, decreased on the right and left. , in keeping   with the diagnosis of septic emboli. Increased pleural effusions with increasing consolidative change at the lung   bases.        All the pertinent images and reports for the current Hospitalization were reviewed by me     Scheduled Meds:   ceftaroline fosamil (TEFLARO) IVPB  400 mg IntraVENous Q12H    clindamycin  300 mg Oral 3 times per day    pantoprazole (PROTONIX) 40 mg injection  40 mg IntraVENous Q12H    levETIRAcetam  500 mg IntraVENous Daily    sodium chloride flush  5-40 mL IntraVENous 2 times per day    [Held by provider] enoxaparin  40 mg SubCUTAneous Nightly    daptomycin (CUBICIN) IVPB  8 mg/kg IntraVENous Once per day on Tue Thu Sat       Continuous Infusions:   sodium chloride      sodium chloride      sodium chloride         PRN Meds:  heparin (porcine), calcium carbonate, sodium chloride, sodium chloride, sodium chloride flush, sodium chloride, acetaminophen **OR** acetaminophen, naloxone, hydrALAZINE      Assessment:     Patient Active Problem List   Diagnosis    Tear of medial cartilage or meniscus of knee, current    Plica syndrome    Boxer's metacarpal fracture, neck, closed    Chondromalacia of patella    Degeneration of lumbar or lumbosacral intervertebral disc    Varicose veins of lower extremity    Intractable nausea and vomiting    Bacteremia    Drug use    MRSA bacteremia    Sepsis due to methicillin resistant Staphylococcus aureus (MRSA) without acute organ dysfunction (HCC)    Serratia marcescens infection    Acute bacterial endocarditis    Septic embolism (HCC)    Abnormal CT of the chest    Neutrophilia    Fever and chills    Hep C w/o coma, chronic (HCC)    Sepsis ([address]    Pulmonary cavitary lesion    MARYAN (acute kidney injury) ([address]    Acute osteomyelitis of sacrum (HCC)    Seizure (HCC)       Sepsis resolving   Lactic acidosis resolved  Fevers improved  WBC elevation  UDS+v for Fentanyl  IVDA history  Recent prolonged hospitalization from MRSA bacteremia, sepsis, Serratia bacteremia  Tricuspid valve endocarditis  Septic emboli  Cavitary pneumonia  Abnormal CT chest  Sacral osteomyelitis  Acute kidney injury requiring hemodialysis  Hep C+       Given the ongoing infection bacteremia with endocarditis and septic embolism which she will need IV antibiotics. Creatinine seems to be slowly improving awaiting for recovery. Nephrology is following. UDS on this admission positive for fentanyl patient denies any drug use but this is somewhat concerning given the urine test is positive,     Will adjust antibiotic to treat MRSA bacteremia Serratia bacteremia and associated complications.      MRI Pelvis results noted d/w pt and will ask IR to check the drain     Awaiting renal function improvement will decide about course of antibiotics    WBC count still elevated slow to resolve,   she had an episode of tachycardia and unresponsiveness UDS was positive for amphetamine-patient denies any drug use in the hospital  d/w RN during rounds    S/p Rt side KRYSTLE removal by IR      We can choose oral abx when ready for d/c once Renal function improves            Labs, Microbiology, Radiology and all the pertinent results from current hospitalization and  care every where were reviewed  by me as a part of the evaluation   Plan:   Cont IV Daptomycin x  550 mg x  3 times a week for now awaiting Renal Recovery as in patient   IV Ceftaroline x  400 mg q 12HRS as in patient   3    CONT  oral Clindamycin x  300  mg q 8 hrs  4. ESR. CRP noted    5. Blood cx repeat IN process  NGTD  6. Awaiting Renal recovery   7. MRI pelvis noted and d/w pt  8. S/P IR DRAIN removal    9/ Corky be able to choose oral abx when ready   10. Plan to cont Clindamycin  for 2 weeks    And  oral   Doxycycline  + Levofloxacin at d/c for x  4 weeks       Discussed with patient/Family and Nursing   Risk of Complications/Morbidity: High      Illness(es)/ Infection present that pose threat to bodily function. There is potential for severe exacerbation of infection/side effects of treatment. Therapy requires intensive monitoring for antimicrobial agent toxicity. Discussed with patient/Family and Nursing staff     Thanks for allowing me to participate in your patient's care and please call me with any questions or concerns.     Roderick Joshua MD  Infectious Disease  [facility] ([facility]) Physician  Phone: [phone]   Fax : [phone]

## 2022-10-07 NOTE — PROGRESS NOTES
Physical Therapy  Facility/Department: [address] PROGRESSIVE CARE  Daily Treatment Note    This note serves as patient discharge summary if pt discharges prior to next PT visit      Name: Slade Bassett  : 1980  MRN: [id number]  Date of Service: 10/7/2022    Discharge Recommendations:  24 hour supervision or assist, Home with Home health PT   Slade Bassett scored a 22/24 on the AM-PAC short mobility form. Current research shows that an AM-PAC score of 18 or greater is typically associated with a discharge to the patient's home setting. Based on the patient's AM-PAC score and their current functional mobility deficits, it is recommended that the patient have 2-3 sessions per week of Physical Therapy at d/c to increase the patient's independence. PT Equipment Recommendations  Equipment Needed: No  Other: pt reported having RW      Patient Diagnosis(es): The primary encounter diagnosis was Septicemia ([address] Diagnoses of Respiratory distress, Seizure ([address] Complicated UTI (urinary tract infection), Candiduria, Pulmonary cavitary lesion, and Melena were also pertinent to this visit. Past Medical History:  has a past medical history of ADHD (attention deficit hyperactivity disorder), Arthritis, Back pain, Depression, Migraine, Neutrophilia, and Seizure ([address] Past Surgical History:  has a past surgical history that includes Partial hysterectomy; Knee arthroscopy (10/05/2010); Tubal ligation (2004); IR INSERT NON TUNNELED CV CATH <5 YEARS (Right, 2022); IR NONTUNNELED VASCULAR CATHETER > 5 YEARS (2022); Tunneled venous catheter placement (Right, 2022); and Upper gastrointestinal endoscopy (N/A, 10/4/2022). Assessment   Body Structures, Functions, Activity Limitations Requiring Skilled Therapeutic Intervention: Decreased functional mobility ; Decreased endurance  Assessment: Pt presents with decreased functional mobility after admission for \"Seizures? \" Of note, per ED notation, pt also with \"Urinalysis nitrite positive with leukoesterase. Significant protein in urine. Consistent with the patient's renal injury. UDS positive for opiates and fentanyl. \"  Pt with history of longstanding hospitalization from 9/12/22 to 10/1/22 with readmission 10/1/22. Pt reported at home with family support (daughter) and that her father was to take her to/from new HD treatments. Status 10-7-22: Bed Mobility Mod Indep supine> sit. Transfers SBA. Gait RW 50', multiple turns, SBA, with threapist managing lines only. Patient reports generally fatigued before and after activity. Pt cont to be an elevated fall risk due to prior admission with limited recovery/at home time. At present time, anticipate with cont PT while hospitalized, hopeful pt can return Home with 24 hr S/A as needed and Home PT follow up. Will cont to see and progress mobility as able while hospitalized. Therapy Prognosis: Fair;Good  Exam: as above  Requires PT Follow-Up: Yes  Activity Tolerance  Activity Tolerance: Patient tolerated treatment well  Activity Tolerance Comments: reports and appears fatigued     Plan   Physcial Therapy Plan  General Plan: 3-5 times per week  Current Treatment Recommendations: Functional mobility training  Safety Devices  Type of Devices: Call light within reach, Nurse notified, Left in chair, Chair alarm in place, Gait belt, Patient at risk for falls (BLAISE Dow informed)  Restraints  Restraints Initially in Place: No     Restrictions  Restrictions/Precautions  Restrictions/Precautions: Isolation  Position Activity Restriction  Other position/activity restrictions: luna catheter. C-diff rule out; MRSA     Subjective   General  Chart Reviewed:  Yes  Additional Pertinent Hx: Per ED H&P, pt is \"40year-old female with a history of IV drug use with pulmonary cavitary lesions, likely candiduria, bloodstream infection, potential endocarditis, recently discharged from hospital on multiple antibiotics presenting to ED with seizure-like activity\"  In addition, \"Urinalysis nitrite positive with leukoesterase. Significant protein in urine. Consistent with the patient's renal injury. UDS positive for opiates and fentanyl. \"  PMHx as noted includng (Per Dr. Fiorella Campos note 10-2-22: \"a prolonged 19-day (Dc to home 10-1-22) stay for infected endocarditis with septic emboli and MRSA Serratia bacteremia due to IV drug use. Patient also had SI joint osteomyelitis and retroperitoneal abscess for which (S)he underwent drainage on last admission. patient is a dialysis patient and got hemodialysis yesterday prior to discharge. \"  Response To Previous Treatment: Patient with no complaints from previous session. Family / Caregiver Present: No  Referring Practitioner: Rodriguez Moyer MD  Referral Date : 10/05/22  Subjective  Subjective: Patient in [facility] chair. Agreeable to therapy. Reports feeling very cold (room temp set to 76). Reports R foot pain with ambulation. Social/Functional History  Social/Functional History  Lives With: Family (daughter and mom)  Type of Home: Apartment  Home Layout: One level (one story and a basement)  Home Access: Stairs to enter with rails  Entrance Stairs - Number of Steps: 4-5 steps down with 1 rial (pt reported had briefly used rail and 1 crutch to get into home after last admit x 16 hours--then re-admit)  Bathroom Shower/Tub: Tub/Shower unit  Bathroom Toilet: Standard  Bathroom Equipment: Commode  Home Equipment: Walker, rolling  Has the patient had two or more falls in the past year or any fall with injury in the past year?: Yes  ADL Assistance: [facility]: Independent  Homemaking Responsibilities: Yes  Ambulation Assistance: Independent (RW recently)  Transfer Assistance: Independent  Active : No  Occupation: Unemployed  Additional Comments: Pt reported only Home x 16 hours then had \"a seizure\" and then re-admit.   Pt asking about shower chair, and explained this would be out of pocket expense. Pt denied other concerns/needs. Vision/Hearing  Vision  Vision: Within Functional Limits  Hearing  Hearing: Within functional limits      Cognition   Orientation  Overall Orientation Status: Within Functional Limits  Cognition  Overall Cognitive Status: WNL  Cognition Comment: flat affect, appears sad     Objective   Bed mobility  Supine to Sit: Modified independent (HOB up, 1 rail, exiting bed to her R)  Sit to Supine: Unable to assess (In BS chair at end of session)  Transfers  Sit to Stand: Stand by assistance (good technique, without cues)  Stand to Sit: Stand by assistance (good technique)  Ambulation  Surface: Level tile  Device: Rolling Walker  Assistance: Stand by assistance  Quality of Gait: Therapist follows with monitor unit and IV. Slow but steady, step through pattern. Good positioning at and use of RW. Appears fatiguing to patient. Patient reports R foot pain during gait, states this is not new, and that she usually wears shoes. Patient reports and appears fatigued following gait, but likely not max distance. Distance: 48'  More Ambulation?: No  Stairs/Curb  Stairs?: No  Balance  Posture: Good  Sitting - Static: Good  Sitting - Dynamic: Good  Standing - Static: Good (supported)  Standing - Dynamic: Good (at RW)  Comments: S sit balance, CGA stance/transfer with RW support. AM-PAC Score  AM-PAC Inpatient Mobility Raw Score : 22 (10/07/22 1511)  AM-PAC Inpatient T-Scale Score : 53.28 (10/07/22 1511)  Mobility Inpatient CMS 0-100% Score: 20.91 (10/07/22 1511)  Mobility Inpatient CMS G-Code Modifier : CJ (10/07/22 1511)    Goals  Short Term Goals  Time Frame for Short Term Goals: by acute d/c:  10-7-22: goals ongoing unless noted  Short Term Goal 1: bed mobility S/I. 10-7-22: goal met and ongoing  Short Term Goal 2: transfers S/I  Short Term Goal 3: amb > 100 ft with RW and SBA/S, no lob.   Short Term Goal 4: assess steps as needed for d/c plans.   Patient Goals   Patient Goals : pt's goal is home as prior       Education  Patient Education  Education Given To: Patient  Education Provided: Role of Therapy;Plan of Care;Equipment  Education Method: Verbal  Education Outcome: Continued education needed;Verbalized understanding      Therapy Time   Individual Concurrent Group Co-treatment   Time In 3231         Time Out 1525         Minutes 41            TA 25; Gt 16    Kyle Carrillo PT  Electronically signed by Jan Galeazzi, [address] [place] [address] Drive [phone])  on 10/7/2022 at 3:36 PM

## 2022-10-07 NOTE — PROGRESS NOTES
10/07/22 1721   Encounter Summary   Encounter Overview/Reason  Initial Encounter   Service Provided For: Patient   Referral/Consult From: Nurse   Support System Family members   Last Encounter  10/07/22  (pt said too tired for visit & req visit on Sun/Mon CL)   Complexity of Encounter Moderate   Begin Time 1645   End Time  1655   Total Time Calculated 10 min   Encounter    Type Initial Screen/Assessment   Assessment/Intervention/Outcome   Assessment Calm;Coping; Hopeful   Intervention Active listening;Discussed illness injury and its impact; Other (Comment)  (mentioned family support)   Outcome Coping;Expressed Gratitude

## 2022-10-07 NOTE — PROGRESS NOTES
Hospital Medicine Progress Note     Date:  10/7/2022    PCP: No primary care provider on file. (Tel: None)    Date of Admission: 10/2/2022    Chief complaint:   Chief Complaint   Patient presents with    Seizures     Family reported seizure activities around [id number]; that lasted 3 minutes with lower body shakes. 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 She had a KRYSTLE drain placement by interventional radiologist and culture at the time was positive for methicillin-resistant Staphylococcus aureus and Serratia marcescens. Hospitalization at the time was complicated by acute kidney injury eventually required hemodialysis during hospital stay. She was discharged on October 1, 2022 on IV daptomycin during dialysis, oral Levaquin and clindamycin. She was readmitted 10/2/2022 with possible seizure, described as passing out and then having generalized shaking of her extremities. She was brought to the emergency room patient was noted to have \"shaking\" in the right lower extremity, rightward gaze deviation. She was initially confused but her mentation did improve while in the emergency room. She denied using illicit drug on admission and urine drug screen was positive for opiate and fentanyl; however, she had family members visit her on 10/4/2022 and shortly after work, rapid response was called in the evening for respiratory distress and hypoxia.   Patient reported that she had been vaping while in the hospital.  Urine drug screen tested positive for amphetamines, which was not present on admission urine drug screen. Assessment/plan:  Seizure. Could be secondary to illicit drug use. She is currently on Keppra. MRI-brain on 10/30/2022 was negative for acute etiology; reveals \"several nonspecific foci of white matter signal abnormality. \"  Continue seizure precautions. Acute respiratory failure with hypercapnia, present on admission. This has since improved. Septic pulmonary emboli. Continue IV antibiotics. Infectious disease on board and assisting with management. End-stage renal disease on hemodialysis. Nephrology on board and assisting with dialysis needs. Nephrology weighing possibility that patient's renal function is starting to improve and may not need ongoing dialysis. Acute on chronic micro/normocytic anemia. GI on board. EGD on 10/4/2022 demonstrates LA grade a esophagitis, salmon-colored mucosa extending from the proximal edge of the gastric folds located 41 cm from the incisor, suspicious for C1 M2 Day's esophagus. No evidence of bleeding on endoscopy. Continue PPI. Outpatient follow-up with GI for biopsy result. Hemoglobin 7.0 today. Will obtain repeat hemoglobin later today. May need blood transfusion today. IV drug use. Counseled about cessation of illicit drug use. Other comorbidities: history of IV drug use/IV fentanyl abuse, migraine headaches, depression, osteoarthritis, chronic back pain, ADHD. Disposition. Status post KRYSTLE drain removal on 10/6/2022. Diet: ADULT DIET; Regular    Code status: Full Code   ----------  Subjective  Reports some back pain. No shortness of breath. Objective  Physical exam:  Vitals: BP (!) 154/92   Pulse 84   Temp 98.1 °F (36.7 °C) (Oral)   Resp 25   Ht 5' 8\" (1.727 m)   Wt 164 lb 7.4 oz (74.6 kg)   SpO2 97%   BMI 25.01 kg/m²   Gen/overall appearance: Not in acute distress. Alert.  Oriented X3  Head: Normocephalic, atraumatic  Eyes: EOMI, good acuity  ENT: Oral mucosa moist  Neck: No JVD, thyromegaly  CVS: Nml S1S2, no MRG, RRR  Pulm: Fine crackles in lung bases. No wheezes. Gastrointestinal: Soft, NT/ND, +BS  Musculoskeletal: +bilateral lower extremity edema. Warm  Neuro: No focal deficit. Moves extremity spontaneously. Psychiatry: Appropriate affect. Not agitated. Skin: Warm, dry with normal turgor. No rash  Capillary refill: Brisk,< 3 seconds   Peripheral Pulses: +2 palpable, equal bilaterally      24HR INTAKE/OUTPUT:    Intake/Output Summary (Last 24 hours) at 10/7/2022 0808  Last data filed at 10/7/2022 0352  Gross per 24 hour   Intake 1990 ml   Output 3375 ml   Net -1385 ml       I/O last 3 completed shifts: In: 0539 [P.O.:2220; IV Piggyback:300]  Out: 9124 [Urine:4225]  No intake/output data recorded.   Meds:    ceftaroline fosamil (TEFLARO) IVPB  400 mg IntraVENous Q12H    clindamycin  300 mg Oral 3 times per day    pantoprazole (PROTONIX) 40 mg injection  40 mg IntraVENous Q12H    levETIRAcetam  500 mg IntraVENous Daily    sodium chloride flush  5-40 mL IntraVENous 2 times per day    [Held by provider] enoxaparin  40 mg SubCUTAneous Nightly    daptomycin (CUBICIN) IVPB  8 mg/kg IntraVENous Once per day on Tue Thu Sat     Infusions:    sodium chloride      sodium chloride      sodium chloride       PRN Meds: heparin (porcine), calcium carbonate, sodium chloride, sodium chloride, sodium chloride flush, sodium chloride, acetaminophen **OR** acetaminophen, naloxone, hydrALAZINE    Labs/imaging:  CBC:   Recent Labs     10/05/22  0518 10/06/22  0749 10/07/22  0542   WBC 13.2* 12.9* 8.6   HGB 7.7*  7.7* 7.1* 7.0*    325 327       BMP:    Recent Labs     10/05/22  0518 10/06/22  0749 10/07/22  0542    139 140   K 4.1 3.9 4.2    105 106   CO2 23 27 26   BUN 14 14 13   CREATININE 2.0* 2.0* 2.0*   GLUCOSE 147* 99 91       Hepatic:   Recent Labs     10/04/22  2006   AST 18   ALT 7*   BILITOT <0.2   ALKPHOS 132*         Gay Medel MD  -------------------------------  Rounding hospitalist

## 2022-10-07 NOTE — PROGRESS NOTES
[facility]  Phone: [phone]  Fax: [phone]  [facility]. [facility]         40 y.o. female who presented to [facility].  Patient was just discharged from the hospital yesterday after a prolonged 19-day stay for infected endocarditis with septic emboli and MRSA Serratia bacteremia due to IV drug use. Patient also had SI joint osteomyelitis and retroperitoneal abscess for which he underwent drainage on last admission. patient is a dialysis patient and got hemodialysis yesterday prior to discharge. Patient was at home today when she passed out and had shaking jerking motions which her father thought might have been a seizure. Admitted for further work up and management  . Renal cons called for MARYAN . Feels good . UOP/labs noted . HPI:  Breathing comfortably. ROS: No CP/SOB or GEIGER . Feels weak/ tired   C/o lower ext edema  . [address]:  medications reviewed. Physical Exam:    VITALS:  BP (!) 153/103 Comment: Dr Mikayla Mckee notified  Pulse 85   Temp 98.1 °F (36.7 °C) (Oral)   Resp 24   Ht 5' 8\" (1.727 m)   Wt 164 lb 7.4 oz (74.6 kg)   SpO2 100%   BMI 25.01 kg/m²   24HR INTAKE/OUTPUT:    Intake/Output Summary (Last 24 hours) at 10/7/2022 1305  Last data filed at 10/7/2022 0352  Gross per 24 hour   Intake 1690 ml   Output 2800 ml   Net -1110 ml         Constitutional:  awake   Respiratory:  Decrease BS at  bases   Gastrointestinal:  + tenderness. Normal Bowel Sounds  Cardiovascular:  S1, S2 RRR   Edema:  +  edema  Acc Rt TDC .     DATA:    CBC:  Lab Results   Component Value Date/Time    WBC 8.6 10/07/2022 05:42 AM    RBC 2.52 10/07/2022 05:42 AM    HGB 7.0 10/07/2022 05:42 AM    HCT 21.6 10/07/2022 05:42 AM    MCV 85.5 10/07/2022 05:42 AM    MCH 27.7 10/07/2022 05:42 AM    MCHC 32.3 10/07/2022 05:42 AM    RDW 17.8 10/07/2022 05:42 AM     10/07/2022 05:42 AM    MPV 7.4 10/07/2022 05:42 AM     CMP:  Lab Results   Component Value Date/Time     10/07/2022 05:42 AM K 4.2 10/07/2022 05:42 AM    K 3.9 10/02/2022 12:29 PM     10/07/2022 05:42 AM    CO2 26 10/07/2022 05:42 AM    BUN 13 10/07/2022 05:42 AM    CREATININE 2.0 10/07/2022 05:42 AM    GFRAA 33 10/07/2022 05:42 AM    GFRAA >60 07/28/2012 11:20 PM    AGRATIO 0.6 10/04/2022 08:06 PM    LABGLOM 27 10/07/2022 05:42 AM    GLUCOSE 91 10/07/2022 05:42 AM    PROT 7.0 10/04/2022 08:06 PM    PROT 6.5 07/28/2012 11:20 PM    CALCIUM 7.7 10/07/2022 05:42 AM    BILITOT <0.2 10/04/2022 08:06 PM    ALKPHOS 132 10/04/2022 08:06 PM    AST 18 10/04/2022 08:06 PM    ALT 7 10/04/2022 08:06 PM      Hepatic Function Panel:   Lab Results   Component Value Date/Time    ALKPHOS 132 10/04/2022 08:06 PM    ALT 7 10/04/2022 08:06 PM    AST 18 10/04/2022 08:06 PM    PROT 7.0 10/04/2022 08:06 PM    PROT 6.5 07/28/2012 11:20 PM    BILITOT <0.2 10/04/2022 08:06 PM    BILIDIR <0.2 09/27/2022 04:40 AM    IBILI see below 09/27/2022 04:40 AM      Phosphorus:   Lab Results   Component Value Date/Time    PHOS 3.8 10/07/2022 05:42 AM       ASSESSMENT/PLAN:    1-MARYAN   suspect Vancomycin toxicity /ATN/ infectious GN . 1st HD 9/20  . S./p [facility]  9/23 . Hold HD today. add low dose lasix . Dc luna cath strict I/O. Check labs  . Increase UOP noted . Monitor closely for renal recovery . 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 Monitor level  . 3-IVDA     4 Anemia   Hb noted . Ok to Tx from renal stand point . 5-Seizure ? ? On meds   Plan per neurology     6- Acute hypercarbic respiratory failure  Resolved . Etiology drug use , drug abuse .            Mary Dudley MD,FACP

## 2022-10-07 NOTE — PROGRESS NOTES
Occupational Therapy  Unable to see pt at this time due to pt currently declining OT treatment at this time due to increased fatigue and tiredness. Pt requesting to come back at a later time. Will follow up with pt as time allows. Continue with POC.     Ruth Sweeney, OTR/L

## 2022-10-08 ENCOUNTER — APPOINTMENT (OUTPATIENT)
Dept: CT IMAGING | Age: 42
DRG: 720 | End: 2022-10-08
Payer: MEDICAID

## 2022-10-08 LAB
ALBUMIN SERPL-MCNC: 2.1 G/DL (ref 3.4–5)
ANION GAP SERPL CALCULATED.3IONS-SCNC: 10 MMOL/L (ref 3–16)
BASOPHILS ABSOLUTE: 0 K/UL (ref 0–0.2)
BASOPHILS RELATIVE PERCENT: 0.5 %
BUN BLDV-MCNC: 14 MG/DL (ref 7–20)
CALCIUM SERPL-MCNC: 7.9 MG/DL (ref 8.3–10.6)
CHLORIDE BLD-SCNC: 105 MMOL/L (ref 99–110)
CO2: 24 MMOL/L (ref 21–32)
CREAT SERPL-MCNC: 2.1 MG/DL (ref 0.6–1.1)
EOSINOPHILS ABSOLUTE: 0.2 K/UL (ref 0–0.6)
EOSINOPHILS RELATIVE PERCENT: 2.2 %
GFR AFRICAN AMERICAN: 31
GFR NON-AFRICAN AMERICAN: 26
GLUCOSE BLD-MCNC: 90 MG/DL (ref 70–99)
HCT VFR BLD CALC: 21.3 % (ref 36–48)
HEMOGLOBIN: 7 G/DL (ref 12–16)
LYMPHOCYTES ABSOLUTE: 1.6 K/UL (ref 1–5.1)
LYMPHOCYTES RELATIVE PERCENT: 23.1 %
MCH RBC QN AUTO: 28.1 PG (ref 26–34)
MCHC RBC AUTO-ENTMCNC: 32.9 G/DL (ref 31–36)
MCV RBC AUTO: 85.4 FL (ref 80–100)
MONOCYTES ABSOLUTE: 0.6 K/UL (ref 0–1.3)
MONOCYTES RELATIVE PERCENT: 8.7 %
NEUTROPHILS ABSOLUTE: 4.5 K/UL (ref 1.7–7.7)
NEUTROPHILS RELATIVE PERCENT: 65.5 %
PDW BLD-RTO: 17.4 % (ref 12.4–15.4)
PHOSPHORUS: 4.6 MG/DL (ref 2.5–4.9)
PLATELET # BLD: 334 K/UL (ref 135–450)
PMV BLD AUTO: 7.3 FL (ref 5–10.5)
POTASSIUM SERPL-SCNC: 3.9 MMOL/L (ref 3.5–5.1)
RBC # BLD: 2.5 M/UL (ref 4–5.2)
SODIUM BLD-SCNC: 139 MMOL/L (ref 136–145)
WBC # BLD: 6.9 K/UL (ref 4–11)

## 2022-10-08 PROCEDURE — 2580000003 HC RX 258: Performed by: INTERNAL MEDICINE

## 2022-10-08 PROCEDURE — 6370000000 HC RX 637 (ALT 250 FOR IP): Performed by: INTERNAL MEDICINE

## 2022-10-08 PROCEDURE — 2060000000 HC ICU INTERMEDIATE R&B

## 2022-10-08 PROCEDURE — 6360000002 HC RX W HCPCS: Performed by: INTERNAL MEDICINE

## 2022-10-08 PROCEDURE — 80069 RENAL FUNCTION PANEL: CPT

## 2022-10-08 PROCEDURE — C9113 INJ PANTOPRAZOLE SODIUM, VIA: HCPCS | Performed by: INTERNAL MEDICINE

## 2022-10-08 PROCEDURE — 74176 CT ABD & PELVIS W/O CONTRAST: CPT

## 2022-10-08 PROCEDURE — 36415 COLL VENOUS BLD VENIPUNCTURE: CPT

## 2022-10-08 PROCEDURE — 85025 COMPLETE CBC W/AUTO DIFF WBC: CPT

## 2022-10-08 RX ORDER — AMLODIPINE BESYLATE 5 MG/1
5 TABLET ORAL DAILY
Status: DISCONTINUED | OUTPATIENT
Start: 2022-10-08 | End: 2022-10-09

## 2022-10-08 RX ADMIN — SODIUM CHLORIDE, PRESERVATIVE FREE 10 ML: 5 INJECTION INTRAVENOUS at 11:42

## 2022-10-08 RX ADMIN — Medication 40 MG: at 15:11

## 2022-10-08 RX ADMIN — HYDRALAZINE HYDROCHLORIDE 10 MG: 20 INJECTION INTRAMUSCULAR; INTRAVENOUS at 11:24

## 2022-10-08 RX ADMIN — ACETAMINOPHEN 650 MG: 325 TABLET, FILM COATED ORAL at 03:15

## 2022-10-08 RX ADMIN — CLINDAMYCIN HYDROCHLORIDE 300 MG: 150 CAPSULE ORAL at 15:10

## 2022-10-08 RX ADMIN — Medication 40 MG: at 03:16

## 2022-10-08 RX ADMIN — AMLODIPINE BESYLATE 5 MG: 5 TABLET ORAL at 11:25

## 2022-10-08 RX ADMIN — CLINDAMYCIN HYDROCHLORIDE 300 MG: 150 CAPSULE ORAL at 21:00

## 2022-10-08 RX ADMIN — DAPTOMYCIN 550 MG: 500 INJECTION, POWDER, LYOPHILIZED, FOR SOLUTION INTRAVENOUS at 15:13

## 2022-10-08 RX ADMIN — ANTACID TABLETS 500 MG: 500 TABLET, CHEWABLE ORAL at 11:38

## 2022-10-08 RX ADMIN — CLINDAMYCIN HYDROCHLORIDE 300 MG: 150 CAPSULE ORAL at 06:00

## 2022-10-08 RX ADMIN — ANTACID TABLETS 500 MG: 500 TABLET, CHEWABLE ORAL at 21:33

## 2022-10-08 RX ADMIN — LEVETIRACETAM 500 MG: 5 INJECTION INTRAVENOUS at 11:25

## 2022-10-08 RX ADMIN — CEFTAROLINE FOSAMIL 400 MG: 600 POWDER, FOR SOLUTION INTRAVENOUS at 23:12

## 2022-10-08 RX ADMIN — CEFTAROLINE FOSAMIL 400 MG: 600 POWDER, FOR SOLUTION INTRAVENOUS at 11:41

## 2022-10-08 ASSESSMENT — PAIN DESCRIPTION - LOCATION
LOCATION: HEAD
LOCATION: ABDOMEN

## 2022-10-08 ASSESSMENT — PAIN DESCRIPTION - FREQUENCY: FREQUENCY: CONTINUOUS

## 2022-10-08 ASSESSMENT — PAIN SCALES - GENERAL
PAINLEVEL_OUTOF10: 3
PAINLEVEL_OUTOF10: 0
PAINLEVEL_OUTOF10: 7

## 2022-10-08 ASSESSMENT — PAIN - FUNCTIONAL ASSESSMENT: PAIN_FUNCTIONAL_ASSESSMENT: ACTIVITIES ARE NOT PREVENTED

## 2022-10-08 ASSESSMENT — PAIN DESCRIPTION - ONSET: ONSET: ON-GOING

## 2022-10-08 ASSESSMENT — PAIN DESCRIPTION - DESCRIPTORS
DESCRIPTORS: ACHING
DESCRIPTORS: ACHING

## 2022-10-08 ASSESSMENT — PAIN DESCRIPTION - PAIN TYPE: TYPE: ACUTE PAIN

## 2022-10-08 NOTE — PLAN OF CARE
Problem: Discharge Planning  Goal: Discharge to home or other facility with appropriate resources  10/8/2022 1030 by Natasha Gay RN  Outcome: Progressing  10/8/2022 1030 by Natasha Gay RN  Outcome: Progressing  10/8/2022 0537 by Oscar Mina RN  Outcome: Progressing  10/8/2022 0537 by Oscar Mina RN  Outcome: Progressing     Problem: Pain  Goal: Verbalizes/displays adequate comfort level or baseline comfort level  10/8/2022 1030 by Natasha Gay RN  Outcome: Progressing  10/8/2022 1030 by Natasha Gay RN  Outcome: Progressing  10/8/2022 0537 by Oscar Mina RN  Outcome: Not Progressing  10/8/2022 0537 by Oscar Mina RN  Outcome: Progressing     Problem: Skin/Tissue Integrity  Goal: Absence of new skin breakdown  Description: 1. Monitor for areas of redness and/or skin breakdown  2. Assess vascular access sites hourly  3. Every 4-6 hours minimum:  Change oxygen saturation probe site  4. Every 4-6 hours:  If on nasal continuous positive airway pressure, respiratory therapy assess nares and determine need for appliance change or resting period. 10/8/2022 1030 by Natasha Gay RN  Outcome: Progressing  10/8/2022 1030 by Natasha Gay RN  Outcome: Progressing  10/8/2022 0537 by Oscar Mina RN  Outcome: Progressing  10/8/2022 0537 by Oscar Mina RN  Outcome: Progressing     Problem: Safety - Adult  Goal: Free from fall injury  10/8/2022 1030 by Natasha Gay RN  Outcome: Progressing  Note: Fall precautions in place. Bed locked and in lowest position. Alarm on. Call light within reach. Telesitter in place.    10/8/2022 1030 by Natasha Gay RN  Outcome: Progressing  10/8/2022 0537 by Oscar Mina RN  Outcome: Progressing  10/8/2022 0537 by Oscar Mina RN  Outcome: Progressing     Problem: ABCDS Injury Assessment  Goal: Absence of physical injury  10/8/2022 1030 by Natasha Gay RN  Outcome: Progressing  10/8/2022 1030 by Natasha Gay RN  Outcome: Progressing  10/8/2022 0537 by Jannet Moreno RN  Outcome: Progressing  10/8/2022 0537 by Jannet Moreno RN  Outcome: Progressing     Problem: Pain  Goal: Verbalizes/displays adequate comfort level or baseline comfort level  10/8/2022 1030 by Sera Mendoza RN  Outcome: Progressing  10/8/2022 1030 by Sera Mendoza RN  Outcome: Progressing  10/8/2022 0537 by Jannet Moreno RN  Outcome: Not Progressing  10/8/2022 0537 by Jannet Moreno RN  Outcome: Progressing

## 2022-10-08 NOTE — PROGRESS NOTES
Venous port of right IJ tunneled dialysis catheter accessed at this time, locked with saline. Report provided to Feliz Machuca RN.

## 2022-10-08 NOTE — PROGRESS NOTES
Hospital Medicine Progress Note     Date:  10/8/2022    PCP: No primary care provider on file. (Tel: None)    Date of Admission: 10/2/2022    Chief complaint:   Chief Complaint   Patient presents with    Seizures     Family reported seizure activities around 0911 [id number]; that lasted 3 minutes with lower body shakes. 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 She had a KRYSTLE drain placement by interventional radiologist and culture at the time was positive for methicillin-resistant Staphylococcus aureus and Serratia marcescens. Hospitalization at the time was complicated by acute kidney injury eventually required hemodialysis during hospital stay. She was discharged on October 1, 2022 on IV daptomycin during dialysis, oral Levaquin and clindamycin. She was readmitted 10/2/2022 with possible seizure, described as passing out and then having generalized shaking of her extremities. She was brought to the emergency room patient was noted to have \"shaking\" in the right lower extremity, rightward gaze deviation. She was initially confused but her mentation did improve while in the emergency room. She denied using illicit drug on admission and urine drug screen was positive for opiate and fentanyl; however, she had family members visit her on 10/4/2022 and shortly after work, rapid response was called in the evening for respiratory distress and hypoxia.   Patient reported that she had been vaping while in the hospital.  Urine drug screen tested positive for amphetamines, which was not present on admission urine drug screen. Assessment/plan:  Seizure. Could be secondary to illicit drug use. She is currently on Keppra. MRI-brain on 10/30/2022 was negative for acute etiology; reveals \"several nonspecific foci of white matter signal abnormality. \"  Continue seizure precautions. Acute respiratory failure with hypercapnia, present on admission. This has since improved. Septic pulmonary emboli. Continue IV antibiotics. Infectious disease on board and assisting with management. End-stage renal disease, was on hemodialysis. Nephrology on board and assisting with management. Nephrology weighing possibility that patient's renal function is starting to improve and may not need ongoing dialysis. If she does not need dialysis going home, dialysis catheter will need to be removed (suspect will be here till Monday). Acute on chronic micro/normocytic anemia. GI on board. EGD on 10/4/2022 demonstrates LA grade a esophagitis, salmon-colored mucosa extending from the proximal edge of the gastric folds located 41 cm from the incisor, suspicious for C1 M2 Day's esophagus. No evidence of bleeding on endoscopy. Continue PPI. Outpatient follow-up with GI for biopsy result. Hemoglobin 7.0 today. Recheck Hb at noon. Transfuse for Hb less than 7. IV drug use. Counseled about cessation of illicit drug use. Other comorbidities: history of IV drug use/IV fentanyl abuse, migraine headaches, depression, osteoarthritis, chronic back pain, ADHD. Disposition. Status post KRYSTLE drain removal on 10/6/2022. Currently monitoring for renal recovery. Possible discharge soon. Diet: ADULT DIET; Regular    Code status: Full Code   ----------  Subjective  Reports cough. No shortness of breath. Objective  Physical exam:  Vitals: /87   Pulse 76   Temp 97.9 °F (36.6 °C) (Oral)   Resp 11   Ht 5' 8\" (1.727 m)   Wt 159 lb 2.8 oz (72.2 kg)   SpO2 97%   BMI 24.20 kg/m²   Gen/overall appearance: Not in acute distress. Alert. Oriented X3  Head: Normocephalic, atraumatic  Eyes: EOMI, good acuity  ENT: Oral mucosa moist  Neck: No JVD, thyromegaly  CVS: Nml S1S2, no MRG, RRR  Pulm: Fine crackles in lung bases. No wheezes. Gastrointestinal: Soft, NT/ND, +BS  Musculoskeletal: No significant lower ext edema. Warm  Neuro: No focal deficit. Moves extremity spontaneously. Psychiatry: Appropriate affect. Not agitated. Skin: Warm, dry with normal turgor. No rash  Capillary refill: Brisk,< 3 seconds   Peripheral Pulses: +2 palpable, equal bilaterally      24HR INTAKE/OUTPUT:    Intake/Output Summary (Last 24 hours) at 10/8/2022 0815  Last data filed at 10/8/2022 0316  Gross per 24 hour   Intake --   Output 2100 ml   Net -2100 ml       I/O last 3 completed shifts: In: 240 [P.O.:240]  Out: 4375 [Urine:4375]  No intake/output data recorded.   Meds:    furosemide  20 mg Oral Daily    ceftaroline fosamil (TEFLARO) IVPB  400 mg IntraVENous Q12H    clindamycin  300 mg Oral 3 times per day    pantoprazole (PROTONIX) 40 mg injection  40 mg IntraVENous Q12H    levETIRAcetam  500 mg IntraVENous Daily    sodium chloride flush  5-40 mL IntraVENous 2 times per day    [Held by provider] enoxaparin  40 mg SubCUTAneous Nightly    daptomycin (CUBICIN) IVPB  8 mg/kg IntraVENous Once per day on Tue Thu Sat     Infusions:    sodium chloride      sodium chloride      sodium chloride       PRN Meds: heparin (porcine), calcium carbonate, sodium chloride, sodium chloride, sodium chloride flush, sodium chloride, acetaminophen **OR** acetaminophen, naloxone, hydrALAZINE    Labs/imaging:  CBC:   Recent Labs     10/06/22  0749 10/07/22  0542 10/08/22  0419   WBC 12.9* 8.6 6.9   HGB 7.1* 7.0* 7.0*    327 334       BMP:    Recent Labs     10/07/22  0542 10/07/22  1425 10/08/22  0420    142 139   K 4.2 4.1 3.9    107 105   CO2 26 25 24   BUN 13 13 14   CREATININE 2.0* 2.0* 2.1*   GLUCOSE 91 104* 90       Hepatic:   No results for input(s): AST, ALT, ALB, ERIKA ROBLES in the last 72 hours.       Frandy Bloom MD  -------------------------------  Rounding hospitalist

## 2022-10-08 NOTE — PROGRESS NOTES
Pt c/o severe abd pain- stomach noted to be distended. Perfect serve to Dr. Trista Isabel- order for Stat CT obtained. Results reviewed by Dr. Trista Isabel. No new orders.   Electronically signed by Robby Rankin RN on 10/8/22 at 5:06 PM EDT

## 2022-10-08 NOTE — PROGRESS NOTES
Nephrology Progress Note   [facility]. GL 2ours      This patient is a 40year old female whom we are following for MARYAN. Subjective: The patient was seen and examined. About to eat breakfast. Looks weak. UO=2.1L the past 24H. Family History: No family at bedside  ROS: No nausea or vomiting      Vitals:  BP (!) 163/102   Pulse 78   Temp 97.8 °F (36.6 °C) (Oral)   Resp 22   Ht 5' 8\" (1.727 m)   Wt 159 lb 2.8 oz (72.2 kg)   SpO2 98%   BMI 24.20 kg/m²   I/O last 3 completed shifts: In: 240 [P.O.:240]  Out: 4375 [Urine:4375]  No intake/output data recorded. Physical Exam  Vitals reviewed. Constitutional:       General: She is not in acute distress. Appearance: Normal appearance. HENT:      Head: Normocephalic and atraumatic. Eyes:      General: No scleral icterus. Conjunctiva/sclera: Conjunctivae normal.   Cardiovascular:      Rate and Rhythm: Normal rate. Heart sounds: No friction rub. Pulmonary:      Effort: Pulmonary effort is normal. No respiratory distress. Breath sounds: No wheezing. Abdominal:      General: Bowel sounds are normal. There is no distension. Tenderness: There is no abdominal tenderness. Neurological:      Mental Status: She is alert.      Access: R[facility] TDC      Medications:   furosemide  20 mg Oral Daily    ceftaroline fosamil (TEFLARO) IVPB  400 mg IntraVENous Q12H    clindamycin  300 mg Oral 3 times per day    pantoprazole (PROTONIX) 40 mg injection  40 mg IntraVENous Q12H    levETIRAcetam  500 mg IntraVENous Daily    sodium chloride flush  5-40 mL IntraVENous 2 times per day    [Held by provider] enoxaparin  40 mg SubCUTAneous Nightly    daptomycin (CUBICIN) IVPB  8 mg/kg IntraVENous Once per day on Tue Thu Sat         Labs:  Recent Labs     10/06/22  0749 10/07/22  0542 10/08/22  0419   WBC 12.9* 8.6 6.9   HGB 7.1* 7.0* 7.0*   HCT 22.6* 21.6* 21.3*   MCV 85.5 85.5 85.4    327 334     Recent Labs     10/06/22  0749 10/07/22  0542 10/07/22  1425 10/08/22  0420    140 142 139   K 3.9 4.2 4.1 3.9    106 107 105   CO2 27 26 25 24   GLUCOSE 99 91 104* 90   PHOS 3.2 3.8 4.0 4.6   MG 1.70*  --   --   --    BUN 14 13 13 14   CREATININE 2.0* 2.0* 2.0* 2.1*   LABGLOM 27* 27* 27* 26*   GFRAA 33* 33* 33* 31*           Assessment/Plan:    1-MARYAN   suspect Vancomycin toxicity /ATN/ infectious GN . 1st HD 9/20 . S./p [facility]  9/23. Last HD on 10/4/22. Showing signs of sivakumar function recovery. Serum creatinine is stable and non-oliguric. Will continue to monitor. If will not require any HD this weekend, will plan for [facility] removal on Monday. D/C Furosemide. 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 Difficult IV access. No PICC or midline. Discussed with HD BLAISE Hernandez to access venous port of TDC with Yaneli Gricelda NS for IV meds. Will need to plan for a central line access if patient is to continue with IV antibiotics on discharge. 3-IVDA      4-Anemia   Hgb noted . PRN blood transfusion per primary service . 5-Seizure   Plan per neurology     6- Acute hypercarbic respiratory failure. On RA.    7-Hypertension. Will start low dose Amlodipine 5mg daily. Discussed with RN at beside. Please do not hesitate to contact me at [phone] if with questions. Thank you! Jenise Flores MD  [facility] [employer]  10/8/2022

## 2022-10-08 NOTE — PLAN OF CARE
Problem: Pain  Goal: Verbalizes/displays adequate comfort level or baseline comfort level  10/8/2022 0537 by Leo Fletcher RN  Outcome: Not Progressing  10/8/2022 0537 by Leo Fletcher RN  Outcome: Progressing  10/7/2022 1636 by Hunter Lee RN  Outcome: Progressing  Patient complained of general discomfort and headache all night despite prn tylenol and non-pharmacological interventions.

## 2022-10-09 LAB
A/G RATIO: 0.7 (ref 1.1–2.2)
ALBUMIN SERPL-MCNC: 2.6 G/DL (ref 3.4–5)
ALP BLD-CCNC: 122 U/L (ref 40–129)
ALT SERPL-CCNC: <5 U/L (ref 10–40)
ANION GAP SERPL CALCULATED.3IONS-SCNC: 12 MMOL/L (ref 3–16)
AST SERPL-CCNC: 7 U/L (ref 15–37)
BASOPHILS ABSOLUTE: 0 K/UL (ref 0–0.2)
BASOPHILS RELATIVE PERCENT: 0.4 %
BILIRUB SERPL-MCNC: 0.3 MG/DL (ref 0–1)
BUN BLDV-MCNC: 10 MG/DL (ref 7–20)
C DIFF TOXIN/ANTIGEN: NORMAL
CALCIUM SERPL-MCNC: 7.8 MG/DL (ref 8.3–10.6)
CHLORIDE BLD-SCNC: 108 MMOL/L (ref 99–110)
CO2: 24 MMOL/L (ref 21–32)
CREAT SERPL-MCNC: 2 MG/DL (ref 0.6–1.1)
EOSINOPHILS ABSOLUTE: 0.1 K/UL (ref 0–0.6)
EOSINOPHILS RELATIVE PERCENT: 1.2 %
GFR AFRICAN AMERICAN: 33
GFR NON-AFRICAN AMERICAN: 27
GLUCOSE BLD-MCNC: 83 MG/DL (ref 70–99)
HCT VFR BLD CALC: 23 % (ref 36–48)
HEMOGLOBIN: 7.6 G/DL (ref 12–16)
LYMPHOCYTES ABSOLUTE: 1.4 K/UL (ref 1–5.1)
LYMPHOCYTES RELATIVE PERCENT: 15.8 %
MAGNESIUM: 1.7 MG/DL (ref 1.8–2.4)
MCH RBC QN AUTO: 27.9 PG (ref 26–34)
MCHC RBC AUTO-ENTMCNC: 33 G/DL (ref 31–36)
MCV RBC AUTO: 84.4 FL (ref 80–100)
MONOCYTES ABSOLUTE: 0.6 K/UL (ref 0–1.3)
MONOCYTES RELATIVE PERCENT: 6.6 %
NEUTROPHILS ABSOLUTE: 7 K/UL (ref 1.7–7.7)
NEUTROPHILS RELATIVE PERCENT: 76 %
PDW BLD-RTO: 17.2 % (ref 12.4–15.4)
PHOSPHORUS: 4.4 MG/DL (ref 2.5–4.9)
PLATELET # BLD: 413 K/UL (ref 135–450)
PMV BLD AUTO: 7 FL (ref 5–10.5)
POTASSIUM SERPL-SCNC: 3.6 MMOL/L (ref 3.5–5.1)
RBC # BLD: 2.72 M/UL (ref 4–5.2)
SODIUM BLD-SCNC: 144 MMOL/L (ref 136–145)
TOTAL PROTEIN: 6.2 G/DL (ref 6.4–8.2)
WBC # BLD: 9.2 K/UL (ref 4–11)

## 2022-10-09 PROCEDURE — 6360000002 HC RX W HCPCS: Performed by: INTERNAL MEDICINE

## 2022-10-09 PROCEDURE — 6370000000 HC RX 637 (ALT 250 FOR IP): Performed by: INTERNAL MEDICINE

## 2022-10-09 PROCEDURE — 80053 COMPREHEN METABOLIC PANEL: CPT

## 2022-10-09 PROCEDURE — 84100 ASSAY OF PHOSPHORUS: CPT

## 2022-10-09 PROCEDURE — 36415 COLL VENOUS BLD VENIPUNCTURE: CPT

## 2022-10-09 PROCEDURE — 2580000003 HC RX 258: Performed by: INTERNAL MEDICINE

## 2022-10-09 PROCEDURE — 94760 N-INVAS EAR/PLS OXIMETRY 1: CPT

## 2022-10-09 PROCEDURE — 83735 ASSAY OF MAGNESIUM: CPT

## 2022-10-09 PROCEDURE — 2060000000 HC ICU INTERMEDIATE R&B

## 2022-10-09 PROCEDURE — C9113 INJ PANTOPRAZOLE SODIUM, VIA: HCPCS | Performed by: INTERNAL MEDICINE

## 2022-10-09 PROCEDURE — 87324 CLOSTRIDIUM AG IA: CPT

## 2022-10-09 PROCEDURE — 87449 NOS EACH ORGANISM AG IA: CPT

## 2022-10-09 PROCEDURE — 85025 COMPLETE CBC W/AUTO DIFF WBC: CPT

## 2022-10-09 RX ORDER — FUROSEMIDE 10 MG/ML
40 INJECTION INTRAMUSCULAR; INTRAVENOUS DAILY
Status: DISCONTINUED | OUTPATIENT
Start: 2022-10-09 | End: 2022-10-10

## 2022-10-09 RX ORDER — AMLODIPINE BESYLATE 10 MG/1
10 TABLET ORAL DAILY
Status: DISCONTINUED | OUTPATIENT
Start: 2022-10-10 | End: 2022-10-10 | Stop reason: HOSPADM

## 2022-10-09 RX ORDER — ACETAMINOPHEN 500 MG
500 TABLET ORAL ONCE
Status: COMPLETED | OUTPATIENT
Start: 2022-10-09 | End: 2022-10-09

## 2022-10-09 RX ORDER — MECLIZINE HCL 12.5 MG/1
12.5 TABLET ORAL 3 TIMES DAILY PRN
Status: DISCONTINUED | OUTPATIENT
Start: 2022-10-09 | End: 2022-10-10 | Stop reason: HOSPADM

## 2022-10-09 RX ORDER — MAGNESIUM SULFATE 1 G/100ML
1000 INJECTION INTRAVENOUS ONCE
Status: COMPLETED | OUTPATIENT
Start: 2022-10-09 | End: 2022-10-09

## 2022-10-09 RX ORDER — POTASSIUM CHLORIDE 20 MEQ/1
20 TABLET, EXTENDED RELEASE ORAL 2 TIMES DAILY WITH MEALS
Status: DISCONTINUED | OUTPATIENT
Start: 2022-10-09 | End: 2022-10-10 | Stop reason: HOSPADM

## 2022-10-09 RX ORDER — LABETALOL HYDROCHLORIDE 5 MG/ML
10 INJECTION, SOLUTION INTRAVENOUS EVERY 4 HOURS PRN
Status: DISCONTINUED | OUTPATIENT
Start: 2022-10-09 | End: 2022-10-10 | Stop reason: HOSPADM

## 2022-10-09 RX ORDER — ONDANSETRON 2 MG/ML
4 INJECTION INTRAMUSCULAR; INTRAVENOUS EVERY 6 HOURS PRN
Status: DISCONTINUED | OUTPATIENT
Start: 2022-10-09 | End: 2022-10-10 | Stop reason: HOSPADM

## 2022-10-09 RX ORDER — SIMETHICONE 80 MG
80 TABLET,CHEWABLE ORAL EVERY 6 HOURS PRN
Status: DISCONTINUED | OUTPATIENT
Start: 2022-10-09 | End: 2022-10-10 | Stop reason: HOSPADM

## 2022-10-09 RX ORDER — OXYCODONE HYDROCHLORIDE 5 MG/1
5 TABLET ORAL ONCE
Status: COMPLETED | OUTPATIENT
Start: 2022-10-09 | End: 2022-10-09

## 2022-10-09 RX ADMIN — POTASSIUM CHLORIDE 20 MEQ: 1500 TABLET, EXTENDED RELEASE ORAL at 13:21

## 2022-10-09 RX ADMIN — POTASSIUM CHLORIDE 20 MEQ: 1500 TABLET, EXTENDED RELEASE ORAL at 18:59

## 2022-10-09 RX ADMIN — CLINDAMYCIN HYDROCHLORIDE 300 MG: 150 CAPSULE ORAL at 22:27

## 2022-10-09 RX ADMIN — CLINDAMYCIN HYDROCHLORIDE 300 MG: 150 CAPSULE ORAL at 06:03

## 2022-10-09 RX ADMIN — AMLODIPINE BESYLATE 5 MG: 5 TABLET ORAL at 08:02

## 2022-10-09 RX ADMIN — ACETAMINOPHEN 500 MG: 500 TABLET ORAL at 18:59

## 2022-10-09 RX ADMIN — CEFTAROLINE FOSAMIL 400 MG: 600 POWDER, FOR SOLUTION INTRAVENOUS at 11:35

## 2022-10-09 RX ADMIN — ANTACID TABLETS 500 MG: 500 TABLET, CHEWABLE ORAL at 23:07

## 2022-10-09 RX ADMIN — LEVETIRACETAM 500 MG: 5 INJECTION INTRAVENOUS at 08:04

## 2022-10-09 RX ADMIN — ONDANSETRON 4 MG: 2 INJECTION INTRAMUSCULAR; INTRAVENOUS at 23:32

## 2022-10-09 RX ADMIN — CEFTAROLINE FOSAMIL 400 MG: 600 POWDER, FOR SOLUTION INTRAVENOUS at 22:30

## 2022-10-09 RX ADMIN — ACETAMINOPHEN 650 MG: 325 TABLET, FILM COATED ORAL at 20:34

## 2022-10-09 RX ADMIN — Medication 40 MG: at 02:27

## 2022-10-09 RX ADMIN — ANTACID TABLETS 500 MG: 500 TABLET, CHEWABLE ORAL at 09:49

## 2022-10-09 RX ADMIN — FUROSEMIDE 40 MG: 10 INJECTION, SOLUTION INTRAMUSCULAR; INTRAVENOUS at 09:43

## 2022-10-09 RX ADMIN — ONDANSETRON 4 MG: 2 INJECTION INTRAMUSCULAR; INTRAVENOUS at 09:43

## 2022-10-09 RX ADMIN — CLINDAMYCIN HYDROCHLORIDE 300 MG: 150 CAPSULE ORAL at 13:21

## 2022-10-09 RX ADMIN — MAGNESIUM SULFATE HEPTAHYDRATE 1000 MG: 1 INJECTION, SOLUTION INTRAVENOUS at 13:24

## 2022-10-09 RX ADMIN — OXYCODONE 5 MG: 5 TABLET ORAL at 09:42

## 2022-10-09 RX ADMIN — ACETAMINOPHEN 650 MG: 325 TABLET, FILM COATED ORAL at 13:21

## 2022-10-09 RX ADMIN — SIMETHICONE 80 MG: 80 TABLET, CHEWABLE ORAL at 13:27

## 2022-10-09 RX ADMIN — HYDRALAZINE HYDROCHLORIDE 10 MG: 20 INJECTION INTRAMUSCULAR; INTRAVENOUS at 08:03

## 2022-10-09 RX ADMIN — Medication 40 MG: at 13:23

## 2022-10-09 ASSESSMENT — PAIN DESCRIPTION - LOCATION
LOCATION: HEAD
LOCATION: HEAD
LOCATION: ABDOMEN
LOCATION: ABDOMEN

## 2022-10-09 ASSESSMENT — PAIN SCALES - GENERAL
PAINLEVEL_OUTOF10: 10
PAINLEVEL_OUTOF10: 6
PAINLEVEL_OUTOF10: 10
PAINLEVEL_OUTOF10: 7
PAINLEVEL_OUTOF10: 3

## 2022-10-09 ASSESSMENT — PAIN DESCRIPTION - PAIN TYPE
TYPE: ACUTE PAIN
TYPE: ACUTE PAIN

## 2022-10-09 ASSESSMENT — PAIN - FUNCTIONAL ASSESSMENT
PAIN_FUNCTIONAL_ASSESSMENT: ACTIVITIES ARE NOT PREVENTED
PAIN_FUNCTIONAL_ASSESSMENT: PREVENTS OR INTERFERES SOME ACTIVE ACTIVITIES AND ADLS
PAIN_FUNCTIONAL_ASSESSMENT: PREVENTS OR INTERFERES SOME ACTIVE ACTIVITIES AND ADLS
PAIN_FUNCTIONAL_ASSESSMENT: ACTIVITIES ARE NOT PREVENTED

## 2022-10-09 ASSESSMENT — PAIN DESCRIPTION - FREQUENCY
FREQUENCY: CONTINUOUS
FREQUENCY: INTERMITTENT

## 2022-10-09 ASSESSMENT — PAIN DESCRIPTION - DESCRIPTORS
DESCRIPTORS: ACHING
DESCRIPTORS: ACHING;SQUEEZING;PRESSURE
DESCRIPTORS: ACHING;PRESSURE
DESCRIPTORS: ACHING;DISCOMFORT

## 2022-10-09 ASSESSMENT — PAIN DESCRIPTION - ONSET
ONSET: ON-GOING
ONSET: ON-GOING

## 2022-10-09 ASSESSMENT — PAIN DESCRIPTION - ORIENTATION: ORIENTATION: ANTERIOR

## 2022-10-09 NOTE — PROGRESS NOTES
Hospital Medicine Progress Note     Date:  10/9/2022    PCP: No primary care provider on file. (Tel: None)    Date of Admission: 10/2/2022    Chief complaint:   Chief Complaint   Patient presents with    Seizures     Family reported seizure activities around 0911 34 [id number]; that lasted 3 minutes with lower body shakes. EMS states she was postictal when arrival        Brief admission history: 76-year-old female with history of IV drug use/IV fentanyl abuse, migraine headaches, depression, osteoarthritis, chronic back pain, ADHD, who was recently admitted for MRSA bacteremia (thought secondary to IV drug use) and tricuspid valve endocarditis complicated by septic emboli. 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 She had a KRYSTLE drain placement by interventional radiologist and culture at the time was positive for methicillin-resistant Staphylococcus aureus and Serratia marcescens. Hospitalization at the time was complicated by acute kidney injury eventually required hemodialysis during hospital stay. She was discharged on October 1, 2022 on IV daptomycin during dialysis, oral Levaquin and clindamycin. She was readmitted 10/2/2022 with possible seizure, described as passing out and then having generalized shaking of her extremities. She was brought to the emergency room patient was noted to have \"shaking\" in the right lower extremity, rightward gaze deviation. She was initially confused but her mentation did improve while in the emergency room. She denied using illicit drug on admission and urine drug screen was positive for opiate and fentanyl; however, she had family members visit her on 10/4/2022 and shortly after work, rapid response was called in the evening for respiratory distress and hypoxia.   Patient reported that she had been vaping while in the hospital.  Urine drug screen tested positive for amphetamines, which was not present on admission urine drug screen. Assessment/plan:  Seizure. Could be secondary to illicit drug use. She is currently on Keppra. MRI-brain on 10/30/2022 was negative for acute etiology; reveals \"several nonspecific foci of white matter signal abnormality. \"  Continue seizure precautions. Acute respiratory failure with hypercapnia, present on admission. This has since improved. Septic pulmonary emboli. Continue IV antibiotics. Infectious disease on board and assisting with management. End-stage renal disease, was on hemodialysis. Nephrology on board and assisting with management. Kidney function appears to be returning. Will need HD catheter removed tomorrow. Acute on chronic micro/normocytic anemia. GI on board. EGD on 10/4/2022 demonstrates LA grade a esophagitis, salmon-colored mucosa extending from the proximal edge of the gastric folds located 41 cm from the incisor, suspicious for C1 M2 Day's esophagus. No evidence of bleeding on endoscopy. Continue PPI. Outpatient follow-up with GI for biopsy result. Monitor Hb. Transfuse for Hb less than 7. IV drug use. Counseled about cessation of illicit drug use. Other comorbidities: history of IV drug use/IV fentanyl abuse, migraine headaches, depression, osteoarthritis, chronic back pain, ADHD. Disposition. Status post KRYSTLE drain removal on 10/6/2022. Nephro plans IR consult for HD catheter removal on 10/10/2022. Likely discharge in 24-48 hrs. Diet: ADULT DIET; Regular    Code status: Full Code   ----------  Subjective  Reports pain since HD catheter being use. Reports abdominal bloating - had CT-abd/pelvis yesterday which revealed diffuse anasarca. Objective  Physical exam:  Vitals: BP (!) 155/107   Pulse 84   Temp 98.1 °F (36.7 °C) (Oral)   Resp 15   Ht 5' 8\" (1.727 m)   Wt 157 lb 6.5 oz (71.4 kg)   SpO2 97%   BMI 23.93 kg/m²   Gen/overall appearance: Not in acute distress. Alert.  Oriented X3  Head: Normocephalic, atraumatic  Eyes: EOMI, good acuity  ENT: Oral mucosa moist  Neck: No JVD, thyromegaly  CVS: Nml S1S2, no MRG, RRR  Pulm: Fine crackles in lung bases. No wheezes. Gastrointestinal: Soft, NT/ND, +BS  Musculoskeletal: No significant lower ext edema. Warm  Neuro: No focal deficit. Moves extremity spontaneously. Psychiatry: Appropriate affect. Not agitated. Skin: Warm, dry with normal turgor. No rash  Capillary refill: Brisk,< 3 seconds   Peripheral Pulses: +2 palpable, equal bilaterally      24HR INTAKE/OUTPUT:    Intake/Output Summary (Last 24 hours) at 10/9/2022 0856  Last data filed at 10/9/2022 0601  Gross per 24 hour   Intake 50 ml   Output --   Net 50 ml       I/O last 3 completed shifts: In: 48 [IV Piggyback:50]  Out: 500 [Urine:500]  No intake/output data recorded.   Meds:    [START ON 10/10/2022] amLODIPine  10 mg Oral Daily    ceftaroline fosamil (TEFLARO) IVPB  400 mg IntraVENous Q12H    clindamycin  300 mg Oral 3 times per day    pantoprazole (PROTONIX) 40 mg injection  40 mg IntraVENous Q12H    levETIRAcetam  500 mg IntraVENous Daily    sodium chloride flush  5-40 mL IntraVENous 2 times per day    [Held by provider] enoxaparin  40 mg SubCUTAneous Nightly    daptomycin (CUBICIN) IVPB  8 mg/kg IntraVENous Once per day on Tue Thu Sat     Infusions:    sodium chloride      sodium chloride      sodium chloride       PRN Meds: HYDROmorphone, labetalol, ondansetron, meclizine, trimethobenzamide, heparin (porcine), calcium carbonate, sodium chloride, sodium chloride, sodium chloride flush, sodium chloride, acetaminophen **OR** acetaminophen, naloxone    Labs/imaging:  CBC:   Recent Labs     10/07/22  0542 10/08/22  0419 10/09/22  0748   WBC 8.6 6.9 9.2   HGB 7.0* 7.0* 7.6*    334 413       BMP:    Recent Labs     10/07/22  0542 10/07/22  1425 10/08/22  0420    142 139   K 4.2 4.1 3.9    107 105   CO2 26 25 24   BUN 13 13 14   CREATININE 2.0* 2.0* 2.1*   GLUCOSE 91 104* 90     Hepatic:   No results for input(s): AST, ALT, ALB, BILITOT, ALKPHOS in the last 72 hours.       Sade Carr MD  -------------------------------  [facility]

## 2022-10-09 NOTE — PLAN OF CARE
Problem: Discharge Planning  Goal: Discharge to home or other facility with appropriate resources  10/8/2022 2331 by Halbert Kussmaul, RN  Outcome: Progressing  Flowsheets (Taken 10/8/2022 2053)  Discharge to home or other facility with appropriate resources: Identify barriers to discharge with patient and caregiver     Problem: Pain  Goal: Verbalizes/displays adequate comfort level or baseline comfort level  10/8/2022 2331 by Halbert Kussmaul, RN  Outcome: Progressing  Flowsheets (Taken 10/8/2022 2331)  Verbalizes/displays adequate comfort level or baseline comfort level:   Encourage patient to monitor pain and request assistance   Assess pain using appropriate pain scale   Administer analgesics based on type and severity of pain and evaluate response   Implement non-pharmacological measures as appropriate and evaluate response     Problem: Skin/Tissue Integrity  Goal: Absence of new skin breakdown  Description: 1. Monitor for areas of redness and/or skin breakdown  2. Assess vascular access sites hourly  3. Every 4-6 hours minimum:  Change oxygen saturation probe site  4. Every 4-6 hours:  If on nasal continuous positive airway pressure, respiratory therapy assess nares and determine need for appliance change or resting period. 10/8/2022 2331 by Halbert Kussmaul, RN  Outcome: Progressing  Note: Skin assessment completed every shift. Pt assessed for incontinence, appropriate barrier cream applied prn. Pt encouraged to turn/rotate every 2 hours. Assistance provided if pt unable to do so themselves. Problem: Safety - Adult  Goal: Free from fall injury  10/8/2022 2331 by Halbert Kussmaul, RN  Outcome: Progressing  Note: Patient assessed for fall risk; fall precautions initiated. Patient instructed about safety devices. Environment kept free of clutter and adequate lighting provided. Bed locked and in lowest position. Call light within reach. Will continue to monitor.        Problem: ABCDS Injury Assessment  Goal: Absence of physical injury  10/8/2022 2331 by Danielle Paredes RN  Outcome: Progressing  Flowsheets (Taken 10/8/2022 2331)  Absence of Physical Injury: Implement safety measures based on patient assessment

## 2022-10-09 NOTE — PROGRESS NOTES
Pt very irritable and demanding this shift. Pt frequently on call light several times an hours requesting different things and then once nurse collected these things, pt would ask for additional items. Pt educated on clustering care. Pt had 2 loose, mucousy stools this shift- Cdiff sent with negative results. Pt received IV Lasix- voided 2150ml this shift. Abdomen distention has decreased some and is softer. Pt states she has improvement in pain in her abd. Pt began to c/o headache this afternoon. PRN Tylenol administered. Pt stated that this was I/eff. Pt informed that she has nothing else that can be given. Pt stated \"well what am I supposed to do, tell the doctor he has to do something\". Perfect serve to Dr. Renny Murray. Per Dr. Renny Murray- pt may have a 1 time dose of Tylenol 500mg- No narcotics. Pt is currently sleeping.     Electronically signed by Karthik Batres RN on 10/9/22 at 6:18 PM EDT

## 2022-10-09 NOTE — PROGRESS NOTES
Nephrology Progress Note   [facility]. [facility]      This patient is a 40year old female whom we are following for MARYAN. Subjective: The patient was seen and examined. Complaining of abdominal pain. CT of the A/P from yesterday reviewed. Having diarrhea. Family History: No family at bedside  ROS: No fever or chills      Vitals:  BP (!) 155/107   Pulse 84   Temp 98.1 °F (36.7 °C) (Oral)   Resp 15   Ht 5' 8\" (1.727 m)   Wt 157 lb 6.5 oz (71.4 kg)   SpO2 96%   BMI 23.93 kg/m²   I/O last 3 completed shifts: In: 48 [IV Piggyback:50]  Out: 500 [Urine:500]  No intake/output data recorded. Physical Exam  Vitals reviewed. Constitutional:       General: She is not in acute distress. Appearance: Normal appearance. HENT:      Head: Normocephalic and atraumatic. Eyes:      General: No scleral icterus. Conjunctiva/sclera: Conjunctivae normal.   Cardiovascular:      Rate and Rhythm: Normal rate. Heart sounds: No friction rub. Pulmonary:      Effort: Pulmonary effort is normal. No respiratory distress. Breath sounds: No wheezing. Abdominal:      General: Bowel sounds are normal. There is no distension. Tenderness: There is abdominal tenderness. Neurological:      Mental Status: She is alert.      Access: [facility] TDC      Medications:   [START ON 10/10/2022] amLODIPine  10 mg Oral Daily    furosemide  40 mg IntraVENous Daily    magnesium sulfate  1,000 mg IntraVENous Once    potassium chloride  20 mEq Oral BID     ceftaroline fosamil (TEFLARO) IVPB  400 mg IntraVENous Q12H    clindamycin  300 mg Oral 3 times per day    pantoprazole (PROTONIX) 40 mg injection  40 mg IntraVENous Q12H    levETIRAcetam  500 mg IntraVENous Daily    sodium chloride flush  5-40 mL IntraVENous 2 times per day    [Held by provider] enoxaparin  40 mg SubCUTAneous Nightly    daptomycin (CUBICIN) IVPB  8 mg/kg IntraVENous Once per day on Tue Thu Sat         Labs:  Recent Labs     10/07/22  0595 10/08/22  0419 10/09/22  0748   WBC 8.6 6.9 9.2   HGB 7.0* 7.0* 7.6*   HCT 21.6* 21.3* 23.0*   MCV 85.5 85.4 84.4    334 413       Recent Labs     10/07/22  1425 10/08/22  0420 10/09/22  0748    139 144   K 4.1 3.9 3.6    105 108   CO2 25 24 24   GLUCOSE 104* 90 83   PHOS 4.0 4.6 4.4   MG  --   --  1.70*   BUN 13 14 10   CREATININE 2.0* 2.1* 2.0*   LABGLOM 27* 26* 27*   GFRAA 33* 31* 33*             Assessment/Plan:    1-MARYAN   - suspect Vancomycin toxicity /ATN/ infectious GN .  - 1st HD 9/20. S./p [facility]  9/23. Last HD on 10/4/22.  - Showing signs of renal function recovery. - Serum creatinine is stable and non-oliguric.  - Will continue to monitor. May be able to remove [facility] in a day or 2.  - Has anasarca. Will start Lasix 40mg IV daily. Kcl 20meq BID. 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 Difficult IV access. No PICC or midline. I gave permission to access venous port of TDC with Kylie CANO for IV meds. Will need to plan for a central line access if patient is to continue with IV antibiotics on discharge. 3-IVDA      4-Anemia   Hgb noted . PRN blood transfusion per primary service . 5-Seizure   Plan per neurology     6- Acute hypercarbic respiratory failure. On RA.    7-Hypertension. Agree with increasing dose of  Amlodipine to 10mg daily. IV Lasix as above. Discussed with RN. Please do not hesitate to contact me at [phone] if with questions. Thank you! Odessa Moctezuma MD  [facility] Kid[facility] and Hypertension [facility] ORTHOPEDIC [facility] [employer]  10/9/2022

## 2022-10-09 NOTE — PLAN OF CARE
Problem: Discharge Planning  Goal: Discharge to home or other facility with appropriate resources  10/9/2022 1017 by Woody Andersen RN  Outcome: Progressing  10/8/2022 2331 by Starr Powell RN  Outcome: [place] (Taken 10/8/2022 2053)  Discharge to home or other facility with appropriate resources: Identify barriers to discharge with patient and caregiver     Problem: Pain  Goal: Verbalizes/displays adequate comfort level or baseline comfort level  10/9/2022 1017 by Woody Andersen RN  Outcome: Progressing  10/8/2022 2331 by Starr Powell RN  Outcome: Progressing  Flowsheets (Taken 10/8/2022 2331)  Verbalizes/displays adequate comfort level or baseline comfort level:   Encourage patient to monitor pain and request assistance   Assess pain using appropriate pain scale   Administer analgesics based on type and severity of pain and evaluate response   Implement non-pharmacological measures as appropriate and evaluate response     Problem: Skin/Tissue Integrity  Goal: Absence of new skin breakdown  Description: 1. Monitor for areas of redness and/or skin breakdown  2. Assess vascular access sites hourly  3. Every 4-6 hours minimum:  Change oxygen saturation probe site  4. Every 4-6 hours:  If on nasal continuous positive airway pressure, respiratory therapy assess nares and determine need for appliance change or resting period. 10/9/2022 1017 by Woody Andersen RN  Outcome: Progressing  10/8/2022 2331 by Starr Powell RN  Outcome: Progressing  Note: Skin assessment completed every shift. Pt assessed for incontinence, appropriate barrier cream applied prn. Pt encouraged to turn/rotate every 2 hours. Assistance provided if pt unable to do so themselves. Problem: Safety - Adult  Goal: Free from fall injury  10/9/2022 1017 by Woody Andersen RN  Outcome: Progressing  Note: Fall precautions in place. Bed locked and in lowest position. Call light within reach. Telecam in place.    10/8/2022 2331 by Hayden Sherman RN  Outcome: Progressing  Note: Patient assessed for fall risk; fall precautions initiated. Patient instructed about safety devices. Environment kept free of clutter and adequate lighting provided. Bed locked and in lowest position. Call light within reach. Will continue to monitor.        Problem: ABCDS Injury Assessment  Goal: Absence of physical injury  10/9/2022 1017 by Robby Rankin RN  Outcome: Progressing  10/8/2022 2331 by Hayden Sherman RN  Outcome: Progressing  Flowsheets (Taken 10/8/2022 2331)  Absence of Physical Injury: Implement safety measures based on patient assessment

## 2022-10-09 NOTE — PROGRESS NOTES
Pt very tearful this morning. Pt is c/o abd pain- stomach is noted to be distended with hypoactive bowel sounds. Pt has had loose stool twice this AM- nursing reports show pt has been having formed bm's this week. Pt stated that she feels that the [facility] running into her dialysis catheter are going \"Straight to my belly and making it swell\". Pt assured that this is not the case. Pt stated that she did not want the line used anymore and would only take PO meds. Pt then demanded pain meds and then called and stated she was leaving AMA. Pt informed that she cannot leave with the line in her chest and radiology will be removing tomorrow. RN, hospitalist, and Nephro all discussed with pt that the line is not causing her abdomen to be swollen. Lasix ordered by nephro to help reduce fluid in abd. Pt administered a 1x dose of Oxycodone, PRN Zofran, and PRN tums. Pt is agreeable to staying at this time. Dr. Jessica Baker aware of all.   Electronically signed by Quinn Brooks RN on 10/9/22 at 10:22 AM EDT

## 2022-10-10 ENCOUNTER — APPOINTMENT (OUTPATIENT)
Dept: INTERVENTIONAL RADIOLOGY/VASCULAR | Age: 42
DRG: 720 | End: 2022-10-10
Payer: MEDICAID

## 2022-10-10 VITALS
HEART RATE: 83 BPM | BODY MASS INDEX: 23.69 KG/M2 | WEIGHT: 156.31 LBS | DIASTOLIC BLOOD PRESSURE: 113 MMHG | SYSTOLIC BLOOD PRESSURE: 160 MMHG | RESPIRATION RATE: 16 BRPM | HEIGHT: 68 IN | TEMPERATURE: 97.7 F | OXYGEN SATURATION: 98 %

## 2022-10-10 LAB
A/G RATIO: 0.7 (ref 1.1–2.2)
ALBUMIN SERPL-MCNC: 2.3 G/DL (ref 3.4–5)
ALP BLD-CCNC: 110 U/L (ref 40–129)
ALT SERPL-CCNC: <5 U/L (ref 10–40)
ANION GAP SERPL CALCULATED.3IONS-SCNC: 11 MMOL/L (ref 3–16)
AST SERPL-CCNC: 8 U/L (ref 15–37)
BASOPHILS ABSOLUTE: 0 K/UL (ref 0–0.2)
BASOPHILS RELATIVE PERCENT: 0.5 %
BILIRUB SERPL-MCNC: <0.2 MG/DL (ref 0–1)
BUN BLDV-MCNC: 10 MG/DL (ref 7–20)
CALCIUM SERPL-MCNC: 7.5 MG/DL (ref 8.3–10.6)
CHLORIDE BLD-SCNC: 107 MMOL/L (ref 99–110)
CO2: 24 MMOL/L (ref 21–32)
CREAT SERPL-MCNC: 2 MG/DL (ref 0.6–1.1)
EOSINOPHILS ABSOLUTE: 0.2 K/UL (ref 0–0.6)
EOSINOPHILS RELATIVE PERCENT: 2.4 %
GFR AFRICAN AMERICAN: 33
GFR NON-AFRICAN AMERICAN: 27
GLUCOSE BLD-MCNC: 85 MG/DL (ref 70–99)
HCT VFR BLD CALC: 23.9 % (ref 36–48)
HEMOGLOBIN: 7.6 G/DL (ref 12–16)
LYMPHOCYTES ABSOLUTE: 1.4 K/UL (ref 1–5.1)
LYMPHOCYTES RELATIVE PERCENT: 18.6 %
MAGNESIUM: 1.8 MG/DL (ref 1.8–2.4)
MCH RBC QN AUTO: 27.3 PG (ref 26–34)
MCHC RBC AUTO-ENTMCNC: 31.9 G/DL (ref 31–36)
MCV RBC AUTO: 85.6 FL (ref 80–100)
MONOCYTES ABSOLUTE: 0.6 K/UL (ref 0–1.3)
MONOCYTES RELATIVE PERCENT: 8.4 %
NEUTROPHILS ABSOLUTE: 5.1 K/UL (ref 1.7–7.7)
NEUTROPHILS RELATIVE PERCENT: 70.1 %
PDW BLD-RTO: 17.3 % (ref 12.4–15.4)
PHOSPHORUS: 4.9 MG/DL (ref 2.5–4.9)
PLATELET # BLD: 407 K/UL (ref 135–450)
PMV BLD AUTO: 7 FL (ref 5–10.5)
POTASSIUM SERPL-SCNC: 3.7 MMOL/L (ref 3.5–5.1)
RBC # BLD: 2.79 M/UL (ref 4–5.2)
SODIUM BLD-SCNC: 142 MMOL/L (ref 136–145)
TOTAL PROTEIN: 5.8 G/DL (ref 6.4–8.2)
WBC # BLD: 7.3 K/UL (ref 4–11)

## 2022-10-10 PROCEDURE — 6370000000 HC RX 637 (ALT 250 FOR IP): Performed by: INTERNAL MEDICINE

## 2022-10-10 PROCEDURE — 6360000002 HC RX W HCPCS: Performed by: INTERNAL MEDICINE

## 2022-10-10 PROCEDURE — 80053 COMPREHEN METABOLIC PANEL: CPT

## 2022-10-10 PROCEDURE — 83735 ASSAY OF MAGNESIUM: CPT

## 2022-10-10 PROCEDURE — 77001 FLUOROGUIDE FOR VEIN DEVICE: CPT

## 2022-10-10 PROCEDURE — 2580000003 HC RX 258: Performed by: INTERNAL MEDICINE

## 2022-10-10 PROCEDURE — 05PYX3Z REMOVAL OF INFUSION DEVICE FROM UPPER VEIN, EXTERNAL APPROACH: ICD-10-PCS | Performed by: RADIOLOGY

## 2022-10-10 PROCEDURE — 85025 COMPLETE CBC W/AUTO DIFF WBC: CPT

## 2022-10-10 PROCEDURE — 36415 COLL VENOUS BLD VENIPUNCTURE: CPT

## 2022-10-10 PROCEDURE — 36589 REMOVAL TUNNELED CV CATH: CPT

## 2022-10-10 PROCEDURE — C9113 INJ PANTOPRAZOLE SODIUM, VIA: HCPCS | Performed by: INTERNAL MEDICINE

## 2022-10-10 PROCEDURE — 84100 ASSAY OF PHOSPHORUS: CPT

## 2022-10-10 PROCEDURE — 2709999900 IR REMOVE TUNNELED CVAD WO SQ PORT/PUMP

## 2022-10-10 RX ORDER — CLINDAMYCIN HYDROCHLORIDE 300 MG/1
300 CAPSULE ORAL EVERY 8 HOURS SCHEDULED
Qty: 42 CAPSULE | Refills: 0 | Status: SHIPPED | OUTPATIENT
Start: 2022-10-10 | End: 2022-10-24

## 2022-10-10 RX ORDER — LEVETIRACETAM 500 MG/1
500 TABLET ORAL 2 TIMES DAILY
Qty: 60 TABLET | Refills: 3 | Status: SHIPPED | OUTPATIENT
Start: 2022-10-10

## 2022-10-10 RX ORDER — LEVOFLOXACIN 250 MG/1
250 TABLET ORAL DAILY
Qty: 28 TABLET | Refills: 0 | Status: SHIPPED | OUTPATIENT
Start: 2022-10-10 | End: 2022-11-07

## 2022-10-10 RX ORDER — DOXYCYCLINE HYCLATE 100 MG
100 TABLET ORAL 2 TIMES DAILY
Qty: 56 TABLET | Refills: 0 | Status: SHIPPED | OUTPATIENT
Start: 2022-10-10 | End: 2022-11-07

## 2022-10-10 RX ADMIN — SODIUM CHLORIDE, PRESERVATIVE FREE 10 ML: 5 INJECTION INTRAVENOUS at 11:29

## 2022-10-10 RX ADMIN — Medication 40 MG: at 02:05

## 2022-10-10 RX ADMIN — SIMETHICONE 80 MG: 80 TABLET, CHEWABLE ORAL at 11:14

## 2022-10-10 RX ADMIN — SIMETHICONE 80 MG: 80 TABLET, CHEWABLE ORAL at 04:19

## 2022-10-10 RX ADMIN — POTASSIUM CHLORIDE 20 MEQ: 1500 TABLET, EXTENDED RELEASE ORAL at 11:14

## 2022-10-10 RX ADMIN — FUROSEMIDE 40 MG: 10 INJECTION, SOLUTION INTRAMUSCULAR; INTRAVENOUS at 11:13

## 2022-10-10 RX ADMIN — CLINDAMYCIN HYDROCHLORIDE 300 MG: 150 CAPSULE ORAL at 05:22

## 2022-10-10 RX ADMIN — ANTACID TABLETS 500 MG: 500 TABLET, CHEWABLE ORAL at 11:13

## 2022-10-10 RX ADMIN — ACETAMINOPHEN 650 MG: 325 TABLET, FILM COATED ORAL at 04:19

## 2022-10-10 RX ADMIN — LEVETIRACETAM 500 MG: 5 INJECTION INTRAVENOUS at 11:23

## 2022-10-10 RX ADMIN — CLINDAMYCIN HYDROCHLORIDE 300 MG: 150 CAPSULE ORAL at 15:31

## 2022-10-10 RX ADMIN — AMLODIPINE BESYLATE 10 MG: 10 TABLET ORAL at 11:14

## 2022-10-10 RX ADMIN — ONDANSETRON 4 MG: 2 INJECTION INTRAMUSCULAR; INTRAVENOUS at 11:14

## 2022-10-10 ASSESSMENT — PAIN - FUNCTIONAL ASSESSMENT: PAIN_FUNCTIONAL_ASSESSMENT: PREVENTS OR INTERFERES SOME ACTIVE ACTIVITIES AND ADLS

## 2022-10-10 ASSESSMENT — PAIN SCALES - GENERAL
PAINLEVEL_OUTOF10: 4
PAINLEVEL_OUTOF10: 6

## 2022-10-10 ASSESSMENT — PAIN DESCRIPTION - LOCATION: LOCATION: ABDOMEN

## 2022-10-10 ASSESSMENT — PAIN DESCRIPTION - DESCRIPTORS: DESCRIPTORS: CRAMPING

## 2022-10-10 NOTE — PROGRESS NOTES
Nephrology Progress Note   [facility]      This patient is a 40year old female whom we are following for MARYAN. Subjective: The patient was seen and examined. Patient insisting on having dialysis catheter removed and leaving the hospital right away AMA    Family History: No family at bedside    ROS: No fever or chills      Vitals:  BP (!) 146/106   Pulse 78   Temp 97.4 °F (36.3 °C) (Oral)   Resp 17   Ht 5' 8\" (1.727 m)   Wt 156 lb 4.9 oz (70.9 kg)   SpO2 97%   BMI 23.77 kg/m²       Intake/Output Summary (Last 24 hours) at 10/10/2022 1120  Last data filed at 10/10/2022 0945  Gross per 24 hour   Intake 972.07 ml   Output 2900 ml   Net -1927.93 ml           Physical Exam  Vitals reviewed. Constitutional:       General: She is not in acute distress. Appearance: Normal appearance. HENT:      Head: Normocephalic and atraumatic. Eyes:      General: No scleral icterus. Conjunctiva/sclera: Conjunctivae normal.   Cardiovascular:      Rate and Rhythm: Normal rate. Heart sounds: No friction rub. Pulmonary:      Effort: Pulmonary effort is normal. No respiratory distress. Breath sounds: No wheezing. Abdominal:      General: There is no distension. Neurological:      Mental Status: She is alert.      Access: RIJ TDC      Medications:   amLODIPine  10 mg Oral Daily    furosemide  40 mg IntraVENous Daily    potassium chloride  20 mEq Oral BID     ceftaroline fosamil (TEFLARO) IVPB  400 mg IntraVENous Q12H    clindamycin  300 mg Oral 3 times per day    pantoprazole (PROTONIX) 40 mg injection  40 mg IntraVENous Q12H    levETIRAcetam  500 mg IntraVENous Daily    sodium chloride flush  5-40 mL IntraVENous 2 times per day    [Held by provider] enoxaparin  40 mg SubCUTAneous Nightly    daptomycin (CUBICIN) IVPB  8 mg/kg IntraVENous Once per day on Tue Thu Sat         Labs:  Recent Labs     10/08/22  0419 10/09/22  0748 10/10/22  0504   WBC 6.9 9.2 7.3   HGB 7.0* 7.6* 7.6*   HCT 21.3* 23.0* 23.9*   MCV 85.4 84.4 85.6    413 407       Recent Labs     10/08/22  0420 10/09/22  0748 10/10/22  0504    144 142   K 3.9 3.6 3.7    108 107   CO2 24 24 24   GLUCOSE 90 83 85   PHOS 4.6 4.4 4.9   MG  --  1.70* 1.80   BUN 14 10 10   CREATININE 2.1* 2.0* 2.0*   LABGLOM 26* 27* 27*   GFRAA 31* 33* 33*             Assessment/Plan:    1-MARYAN   - suspect Vancomycin toxicity /ATN/ ? infectious GN .  - 1st HD 9/20. S./p [facility]  9/23. Last HD on 10/4/22.  - Creatinine stable since 10/5/22 - non oliguric  - Can remove TDC - will arrange with IR  - Stop Lasix     2-MRSA and serratia bacteremia with retroperitoneal abscess - septic pulmonary emboli and possible TV endocarditis  - On Daptomycin and Ceftaroline. Difficult IV access. - Will need to plan for a central line access if patient is to continue with IV antibiotics on discharge. 3-IVDA      4-Anemia   - PRN blood transfusion per primary service    5-Seizure   - Plan per neurology     6-Acute hypercarbic respiratory failure - resolved    7-Hypertension - continue current medications  - Avoid Hypotension    Patient wants to leave Jovani Haines MD  [facility]  10/10/2022

## 2022-10-10 NOTE — CARE COORDINATION
Patient anxious to leave. RN to check with nephro to see if vas/cath can be removed. Based on notes looks like patient won't need to continue HD at discharge. Reviewed ID recs, says can choose oral antibiotics at discharge. Leno Zamora following and will reach out to patient regarding substance abuse treatment at discharge. No additional needs to note otherwise. Pre cert was pending for [place] however patient doesn't need IV antibiotics at discharge so doesn't have a skilled nursing need at this time.   Electronically signed by Adalid Marroquin RN Case Management [phone] on 10/10/2022 at 11:18 AM

## 2022-10-10 NOTE — PROGRESS NOTES
Dr Master Umanzor talking with pt.   About care ,  Pt using a very loud voice  stated that I want the dialysis catheter out and I am going home   today,

## 2022-10-10 NOTE — PLAN OF CARE
Problem: Discharge Planning  Goal: Discharge to home or other facility with appropriate resources  10/9/2022 2129 by Janine Bruno RN  Outcome: Progressing  Flowsheets (Taken 10/9/2022 2019)  Discharge to home or other facility with appropriate resources: Identify barriers to discharge with patient and caregiver     Problem: Pain  Goal: Verbalizes/displays adequate comfort level or baseline comfort level  10/9/2022 2129 by Janine Bruno RN  Outcome: Progressing  Flowsheets (Taken 10/9/2022 2129)  Verbalizes/displays adequate comfort level or baseline comfort level:   Encourage patient to monitor pain and request assistance   Assess pain using appropriate pain scale   Administer analgesics based on type and severity of pain and evaluate response   Implement non-pharmacological measures as appropriate and evaluate response     Problem: Skin/Tissue Integrity  Goal: Absence of new skin breakdown  Description: 1. Monitor for areas of redness and/or skin breakdown  2. Assess vascular access sites hourly  3. Every 4-6 hours minimum:  Change oxygen saturation probe site  4. Every 4-6 hours:  If on nasal continuous positive airway pressure, respiratory therapy assess nares and determine need for appliance change or resting period. 10/9/2022 2129 by Janine Bruno RN  Outcome: Progressing  Note: Skin assessment completed every shift. Pt assessed for incontinence, appropriate barrier cream applied prn. Pt encouraged to turn/rotate every 2 hours. Assistance provided if pt unable to do so themselves. Problem: Safety - Adult  Goal: Free from fall injury  10/9/2022 2129 by Janine Bruno RN  Outcome: Progressing  Note: Patient assessed for fall risk; fall precautions initiated. Patient instructed about safety devices. Environment kept free of clutter and adequate lighting provided. Bed locked and in lowest position. Call light within reach. Will continue to monitor.        Problem: ABCDS Injury Assessment  Goal: Absence of physical injury  10/9/2022 2129 by Jody Arreaga RN  Outcome: Progressing  Flowsheets (Taken 10/9/2022 2129)  Absence of Physical Injury: Implement safety measures based on patient assessment

## 2022-10-10 NOTE — DISCHARGE SUMMARY
Hospital Discharge Summary - AMA    Patient's PCP: No primary care provider on file. Admit Date: 10/2/2022   Discharge Date: 10/10/2022 - Pt left AMA    Admitting Physician: Dr. Nasrin Morin admitting provider for patient encounter. Discharge Physician: Dr. Kong Sharp MD   Consults: nephrology, ID, GI, neurology, and IR    HPI: 40 y.o. female who presented to [facility].  Patient was just discharged from the hospital yesterday after a prolonged 19-day stay for infected endocarditis with septic emboli and MRSA Serratia bacteremia due to IV drug use. Patient also had SI joint osteomyelitis and retroperitoneal abscess for which he underwent drainage on last admission. patient is a dialysis patient and got hemodialysis yesterday prior to discharge     Patient was at home today when she passed out and had shaking jerking motions which her father thought might have been a seizure. She was brought back to the emergency room for care. Appears right leg was shaking and her gaze was deviated to the right. She was noted to be hypercarbic     While in the emergency room her mentation did improve  At the time of my assessment now she feels better. She states she does not know what happens. She denies using drugs(normally does injection fentanyl)     After 19-day stay in the hospital her urine tox screen is showing up fentanyl     I confronted her on this and she states she does not know how that is possible    Brief hospital course:    Seizure. Could be secondary to illicit drug use. She is currently on Keppra. MRI-brain on 10/30/2022 was negative for acute etiology; reveals \"several nonspecific foci of white matter signal abnormality. \"  Continue seizure precautions. Acute respiratory failure with hypercapnia, present on admission. This has since improved. Septic pulmonary emboli. Continue IV antibiotics. Infectious disease on board and assisting with management.   - Will discharge with oral abx per ID  End-stage renal disease, was on hemodialysis. Nephrology on board and assisting with management. Kidney function appears to be returning. Had HD catheter removed  Acute on chronic micro/normocytic anemia. GI on board. EGD on 10/4/2022 demonstrates LA grade a esophagitis, salmon-colored mucosa extending from the proximal edge of the gastric folds located 41 cm from the incisor, suspicious for C1 M2 Day's esophagus. No evidence of bleeding on endoscopy. Continue PPI. Outpatient follow-up with GI for biopsy result. Monitor Hb. Transfuse for Hb less than 7. IV drug use. Counseled about cessation of illicit drug use. Other comorbidities: history of IV drug use/IV fentanyl abuse, migraine headaches, depression, osteoarthritis, chronic back pain, ADHD. Disposition. Status post KRYSTLE drain removal on 10/6/2022. Nephro plans IR consult for HD catheter removal on 10/10/2022. Likely discharge in 24-48 hrs. Pt insisted multiple times on leaving AMA after HD catheter removed.      Discharge Diagnoses:   Patient Active Problem List   Diagnosis    Tear of medial cartilage or meniscus of knee, current    Plica syndrome    Boxer's metacarpal fracture, neck, closed    Chondromalacia of patella    Degeneration of lumbar or lumbosacral intervertebral disc    Varicose veins of lower extremity    Intractable nausea and vomiting    Bacteremia    Drug use    MRSA bacteremia    Sepsis due to methicillin resistant Staphylococcus aureus (MRSA) without acute organ dysfunction (HCC)    Serratia marcescens infection    Acute bacterial endocarditis    Septic embolism (HCC)    Abnormal CT of the chest    Neutrophilia    Fever and chills    Hep C w/o coma, chronic (HCC)    Sepsis (HCC)    Pulmonary cavitary lesion    MARYAN (acute kidney injury) ([facility] [address]    Acute osteomyelitis of sacrum (HCC)    Seizure (HCC)       Physical Exam:   BP (!) 160/113   Pulse 83   Temp 97.7 °F (36.5 °C) (Oral)   Resp 16   Ht 5' 8\" (1.727 m)   Wt 156 lb seizures. 2. Several nonspecific foci of white matter signal abnormality. Although   nonspecific, these are likely of doubtful clinical significance. MRI PELVIS WO CONTRAST   Final Result   1. Extensive marrow edema is seen within the right sacrum and iliac bone   extending into the right acetabulum. Findings are concerning for   osteomyelitis/septic arthritis involving the right sacroiliac joint. The   degree of marrow edema appears similar to the comparison MRI.      2.  2.5 x 0.9 x 2.0 cm ovoid fluid collection within the gluteus minimus   muscle adjacent to the right iliac bone compatible with a small abscess. 3.  Persistent edema within the right iliacus muscle with no clear evidence   of persistent iliacus muscle abscess. CT HEAD WO CONTRAST   Final Result   No acute intracranial abnormality. XR CHEST PORTABLE   Final Result   Developing small bilateral pleural effusions, left greater than right. Persistent multifocal pulmonary nodules, some of which are cavitary,   suggestive of septic emboli given patient history. IR REMOVE TUNNELED CVAD WO SQ PORT/PUMP    (Results Pending)         Discharge Medications:     Medication List        ASK your doctor about these medications      calcium carbonate 500 MG chewable tablet  Commonly known as: TUMS  Take 1 tablet by mouth 3 times daily as needed for Heartburn     clindamycin 300 MG capsule  Commonly known as: CLEOCIN  Take 1 capsule by mouth 3 times daily     HYDROcodone-acetaminophen 5-325 MG per tablet  Commonly known as: NORCO  Take 1 tablet by mouth every 6 hours as needed for Pain for up to 3 days.   Ask about: Should I take this medication?     levoFLOXacin 250 MG tablet  Commonly known as: Levaquin  Take 1 tablet by mouth daily           Wound Care: as directed    Disposition: AMA  Discharged Condition:  AMA, stable  Follow Up: Primary Care Physician in one week    Total time spent on discharge, finalizing medications, referrals and arranging outpatient follow up was more than 30 minutes    Thank you Dr. Tee primary care provider on file. for the opportunity to be involved in this patients care. If you have any questions or concerns please feel free to contact me via [facility].     Lura Sandifer, MD   Internal Medicine  10/10/2022 2:09 PM  Reach via [employer]

## 2022-10-10 NOTE — CARE COORDINATION
CASE MANAGEMENT DISCHARGE SUMMARY:    DISCHARGE DATE: 10/10/22    DISCHARGED TO: Home independently     HOME CARE AGENCY: None             TRANSPORTATION: Family             TIME: TBD    HD: No longer required, Aziza Aguilar made aware. COMMENTS: Met with patient, patient will discharge home. Denies home needs. Patient to follow up with Usman Auguste at discharge. No additional needs to note.     Electronically signed by Bhumika Bejarano RN Case Management [phone] on 10/10/2022 at 3:23 PM

## 2022-10-11 NOTE — DISCHARGE INSTR - COC
Continuity of Care Form    Patient Name: Rosendo Regan   :  1980  MRN:  [id number]    Admit date:  10/2/2022  Discharge date:  ***    Code Status Order: Prior   Advance Directives:   [facility] Documentation       Date/Time Healthcare Directive Type of Healthcare Directive Copy in [facility] [address] Agent's Name Healthcare Agent's Phone Number    10/04/22 7724 No, patient does not have an advance directive for healthcare treatment -- -- -- -- --            Admitting Physician:  No admitting provider for patient encounter. PCP: No primary care provider on file. Discharging Nurse: [facility] Unit/Room#: K9U-6807/5252-01  Discharging Unit Phone Number: ***    Emergency Contact:   Extended Emergency Contact Information  Primary Emergency Contact: Lisa Veliz  Address: [address] Phone: [phone]  Work Phone: [phone]  Mobile Phone: [phone]  Relation: Parent   needed? No  Secondary Emergency Contact: [facility] Phone: [phone]  Work Phone: [phone]  Mobile Phone: [phone]  Relation: Parent   needed?  No    Past Surgical History:  Past Surgical History:   Procedure Laterality Date    IR INSERT NON TUNNELED CV CATH LESS THAN 5 YEARS Right 2022    Vascath; RIJ access; 15cm; Dr. Leonardo Kamus    IR NONTUNNELED VASCULAR CATHETER  2022    IR NONTUNNELED VASCULAR CATHETER 2022 [facility] PROCEDURES    KNEE ARTHROSCOPY  10/05/2010    PARTIAL HYSTERECTOMY (CERVIX NOT REMOVED)      TUBAL LIGATION  2004    TUNNELED VENOUS CATHETER PLACEMENT Right 2022    Permacath; RIJ access; 19cm; Dr. Chastity Bridges N/A 10/4/2022    EGD BIOPSY performed by Amena Latham MD at [facility] History:   Immunization History   Administered Date(s) Administered    Tdap (Boostrix, Adacel) 02/11/2015       Active Problems:  Patient Active Problem List   Diagnosis Code    Tear of medial cartilage or meniscus of knee, current [id number]    Plica syndrome B54.06    Boxer's metacarpal fracture, neck, closed S62.339A    Chondromalacia of patella M22.40    Degeneration of lumbar or lumbosacral intervertebral disc M51.37    Varicose veins of lower extremity I83.90    Intractable nausea and vomiting R11.2    Bacteremia R78.81    Drug use F19.90    MRSA bacteremia R78.81, B95.62    Sepsis due to methicillin resistant Staphylococcus aureus (MRSA) without acute organ dysfunction (HCC) A41.02    Serratia marcescens infection A48.8    Acute bacterial endocarditis I33.0    Septic embolism (HCC) I76    Abnormal CT of the chest R93.89    Neutrophilia D72.9    Fever and chills R50.9    Hep C w/o coma, chronic (HCC) B18.2    Sepsis ([facility]) A41.9    Pulmonary cavitary lesion J98.4    MARYAN (acute kidney injury) ([facility] [address] N17.9    Acute osteomyelitis of sacrum ([facility]) M46.28    Seizure ([facility]) R56.9       Isolation/Infection:   Isolation            No Isolation          Patient Infection Status       Infection Onset Added Last Indicated Last Indicated By Review Planned Expiration Resolved Resolved By    MRSA 09/12/22 09/14/22 09/16/22 Culture, Anaerobic and Aerobic        Resolved    C-diff Rule Out 10/03/22 10/03/22 10/09/22 Clostridium Difficile Toxin/Antigen (Ordered)   10/09/22 Rule-Out Test Resulted    COVID-19 (Rule Out) 09/06/22 09/06/22 09/07/22 COVID-19, Rapid (Ordered)   09/07/22 Rule-Out Test Resulted    COVID-19 (Rule Out) 07/20/22 07/20/22 07/20/22 COVID-19 (Ordered)   07/21/22 Rule-Out Test Resulted    COVID-19 (Rule Out) 07/20/22 07/20/22 07/20/22 COVID-19, Rapid (Ordered)   07/20/22 Rule-Out Test Resulted            Nurse Assessment:  Last Vital Signs: BP (!) 160/113   Pulse 83   Temp 97.7 °F (36.5 °C) (Oral)   Resp 16   Ht 5' 8\" (1.727 m)   Wt 156 lb 4.9 oz (70.9 kg)   SpO2 98%   BMI 23.77 kg/m²     Last documented pain score (0-10 scale): Pain Level: 4  Last Weight:   Wt Readings from Last 1 Encounters:   10/10/22 156 lb 4.9 oz (70.9 kg)     Mental Status:  {IP PT MENTAL STATUS:}    IV Access:  [employer] IV ACCESS:982425445}    Nursing Mobility/ADLs:  Walking   {CHP DME AAFT:810181884}  Transfer  {CHP DME VIBU:728736683}  Bathing  {CHP DME ROQX:600844431}  Dressing  {CHP DME LKCE:885876515}  Toileting  {CHP DME XOGV:966905117}  Feeding  {P DME BQIE:775661523}  Med Admin  {P DME EZHE:761046697}  Med Delivery   { NICKY MED Delivery:656429881}    Wound Care Documentation and Therapy:        Elimination:  Continence: Bowel: {YES / LM:16046}  Bladder: {YES / KY:73430}  Urinary Catheter: {Urinary Catheter:765435432}   Colostomy/Ileostomy/Ileal Conduit: {YES / IN:27221}       Date of Last BM: ***    Intake/Output Summary (Last 24 hours) at 10/10/2022 2358  Last data filed at 10/10/2022 0945  Gross per 24 hour   Intake 467 ml   Output 450 ml   Net 17 ml     I/O last 3 completed shifts:   In: 1072.1 [P.O.:660; I.V.:262.1; IV Piggyback:150]  Out: 2900 [Urine:2900]    Safety Concerns:     [employer] Safety Concerns:057553837}    Impairments/Disabilities:      [employer] Impairments/Disabilities:298192972}    Nutrition Therapy:  Current Nutrition Therapy:   [employer] Diet List:783132408}    Routes of Feeding: {[facility] DME Other Feedings:267035871}  Liquids: {Slp liquid thickness:80886}  Daily Fluid Restriction: {CHP DME Yes amt example:880646613}  Last Modified Barium Swallow with Video (Video Swallowing Test): {Done Not Done FSTS:214658694}    Treatments at the Time of Hospital Discharge:   Respiratory Treatments: ***  Oxygen Therapy:  {Therapy; copd oxygen:59559}  Ventilator:    { CC Vent TXSH:910133403}    Rehab Therapies: {THERAPEUTIC INTERVENTION:2840416259}  Weight Bearing Status/Restrictions: [address] Weight Bearin}  Other Medical Equipment (for information only, NOT a DME order):  {EQUIPMENT:096814412}  Other Treatments: ***    Patient's personal belongings (please select all that are sent with patient):  {CHP DME Belongings:555185689}    RN SIGNATURE:  {Esignature:067439578}    CASE MANAGEMENT/SOCIAL WORK SECTION    Inpatient Status Date: ***    Readmission Risk Assessment Score:  Readmission Risk              Risk of Unplanned Readmission:  0           Discharging to Facility/ Agency   Name:   Address:  Phone:  Fax:    Dialysis Facility (if applicable)   Name:  Address:  Dialysis Schedule:  Phone:  Fax:    / signature: {Esignature:654015252}    PHYSICIAN SECTION    Prognosis: {Prognosis:0367223046}    Condition at Discharge: [address] [place] [address] Patient Condition:072977371}    Rehab Potential (if transferring to Rehab): {Prognosis:9780938035}    Recommended Labs or Other Treatments After Discharge: ***    Physician Certification: I certify the above information and transfer of Kit Angulo  is necessary for the continuing treatment of the diagnosis listed and that she requires {Admit to Appropriate Level of Care:41851} for {GREATER/LESS:380950841} 30 days.      Update Admission H&P: {CHP DME Changes in QUTPD:767863908}    PHYSICIAN SIGNATURE:  {Esignature:658142271}

## 2022-11-03 NOTE — DISCHARGE SUMMARY
Patient: Kaye Ruffin      Gender: female  : 1980              Age: 40 y.o. MRN: [id number]     Admitting Physician: Gigi Isaac MD  Discharge Physician: Lydia Sky MD      Code Status: Full Code      Admit Date: 2022   Discharge Date:   10/1/22     Disposition:  Home with daugheter to help. Discussed risks of IVDU with chelsea iv access. Discharge Diagnoses:     Sepsis POA - endocarditis +/- cavitary PNA - MRSA/Serratia bacteremia   Pulmonary septic emboli, cavitary nodules  Endocarditis  History of IV drug abuse  ADHD  Anemia     Follow-up appointments:  with ID as arranged     Outpatient to do list: none     Condition at Discharge:  Stable     Hospital Course:      4year-old female with past medical history of drug abuse, ADHD, who presents to the hospital due to feeling fatigue, pain in her legs as well as back, she has a history of IV drug abuse, per patient she has been sober for now, patient family is also on the bedside who also contributed to the patient history. According to the patient's sister patient has been feeling sick for past few days, not feeling well. Patient was noticed to have fevers as well as chills. She was diagnosed with infective endocarditis and blood cultures grew MRSA and Serratia and she had anemia, renal impairment and cavitatory lung nodules consistent with septic emboli due to IVDU. ID, cardiology, nephrology and hemonc were consulted. She was Dcd on the below antibiotic regimen and will FU as an OP with ID.    Discharge Medications:   Current Discharge Medication List               START taking these medications     Details   clindamycin (CLEOCIN) 300 MG capsule Take 1 capsule by mouth 3 times daily  Qty: 126 capsule, Refills: 0       levoFLOXacin (LEVAQUIN) 250 MG tablet Take 1 tablet by mouth daily  Qty: 42 tablet, Refills: 0       calcium carbonate (TUMS) 500 MG chewable tablet Take 1 tablet by mouth 3 times daily as needed for Heartburn  Qty: 30 tablet, Refills: 0       HYDROcodone-acetaminophen (NORCO) 5-325 MG per tablet Take 1 tablet by mouth every 6 hours as needed for Pain for up to 3 days. Qty: 9 tablet, Refills: 0     Comments: Reduce doses taken as pain becomes manageable  Associated Diagnoses: Gluteal abscess; Septic embolism [facility])              Current Discharge Medication List          Current Discharge Medication List          Current Discharge Medication List               STOP taking these medications         ibuprofen (ADVIL;MOTRIN) 800 MG tablet Comments:   Reason for Stopping:            cefdinir (OMNICEF) 300 MG capsule Comments:   Reason for Stopping:                    Discharge ROS:  A complete review of systems was asked and negative except for above      Discharge Exam:     BP (!) 141/94   Pulse 98   Temp 98.4 °F (36.9 °C) (Oral)   Resp 17   Ht 5' 8\" (1.727 m)   Wt 152 lb 1.9 oz (69 kg)   SpO2 95%   BMI 23.13 kg/m²   General appearance: on RA, lying in bed, holding conversations, alert awake  HEENT:  Conjunctivae/corneas clear. Neck: Supple, with full range of motion. Respiratory:  Normal respiratory effort. Clear to auscultation, bilaterally without Rales/Wheezes/Rhonchi. Cardiovascular: Regular rate and rhythm with normal S1/S2 without murmurs or rubs  Abdomen: Soft, non-tender, non-distended, normal bowel sounds. Musculoskeletal: No cyanosis or edema bilaterally  Neurologic:  without any focal sensory/motor deficits. grossly non-focal.  Psychiatric: Alert and oriented, Normal mood  Peripheral Pulses: +2 palpable, equal bilaterally   No change in physical exam today     Labs:  For convenience and continuity at follow-up the following most recent labs are provided:           Lab Results   Component Value Date/Time     WBC 8.3 10/01/2022 06:00 AM     HGB 7.3 10/01/2022 06:00 AM     HCT 22.2 10/01/2022 06:00 AM     MCV 85.5 10/01/2022 06:00 AM      10/01/2022 06:00 AM      10/01/2022 06:00 AM     K 3.8 10/01/2022 06:00 AM     K 4.3 09/19/2022 05:51 AM      10/01/2022 06:00 AM     CO2 28 10/01/2022 06:00 AM     BUN 13 10/01/2022 06:00 AM     CREATININE 2.6 10/01/2022 06:00 AM     CALCIUM 8.2 10/01/2022 06:00 AM     PHOS 5.3 10/01/2022 06:00 AM     ALKPHOS 85 09/27/2022 04:40 AM     ALT 6 09/27/2022 04:40 AM     AST 9 09/27/2022 04:40 AM     BILITOT <0.2 09/27/2022 04:40 AM     BILIDIR <0.2 09/27/2022 04:40 AM     LABALBU 2.2 10/01/2022 06:00 AM            Lab Results   Component Value Date     INR 1.24 (H) 09/28/2022     INR 1.17 (H) 09/26/2022     INR 1.34 (H) 09/19/2022         Radiology:  Echocardiogram transesophageal     Result Date: 9/16/2022  Transesophageal Echocardiography Report (STEFANO)  Demographics   Patient Name       Leonette Billing   Date of Study      09/16/2022         Gender              Female   Patient Number     [id number]         Date of Birth       1980   Visit Number       [id number]          Age                 40 year(s)   Accession Number   [id number]         Room Number         2529   Corporate ID       [id number]            Cipriano Hawkins RVT   Ordering Physician Terrance Carrera MD                 Physician           Daniel Davies MD  Procedure Type of Study   STEFANO procedure:ECHOCARDIOGRAM TRANSESOPHAGEAL. Procedure Date Date: 09/16/2022 Start: 07:19 AM Study Location: [facility] Technical Quality: Adequate visualization Indications:Endocarditis. Additional Indications:No cardiac history on file. Current smoker, IVDU.  Patient Status: Routine Height: 68 inches Weight: 142 pounds BSA: 1.77 m2 BMI: 21.59 kg/m2 Rhythm: Within normal limits HR: 97 bpm BP: 123/74 mmHg  Type of Anesthesia: Moderate sedation   Conclusions   Summary Severe eccentric tricuspid regurgitation. Small, mobile tricuspid valve vegetation noted measuring 0.9cm x 0.4cm. Signature   ------------------------------------------------------------------  Electronically signed by David Tarango MD (Interpreting  physician) on 09/16/2022 at 04:29 PM  ------------------------------------------------------------------   Findings   Left Ventricle  Overall left ventricular systolic function appears normal.  Ejection fraction is visually estimated to be 55-60%. Mitral Valve  Trivial mitral regurgitation. Tricuspid Valve  Severe eccentric tricuspid regurgitation. Small, mobile tricuspid valve vegetation noted measuring 0.9cm x 0.4cm. Pericardial Effusion  No pericardial effusion noted. Pleural Effusion  No pleural effusion. Echo Complete     Result Date: 9/13/2022  Transthoracic Echocardiography Report (TTE)  Demographics   Patient Name        [facility]   Date of Study       09/13/2022     Gender               Female   Patient Number      [id number]     Date of Birth        1980   Visit Number        [id number]      Age                  40 year(s)   Accession Number    [id number]     Room Number          1904   Corporate ID        [id number]        Sonographer          Jet Gray  Physician  Mary Kate Cao  Interpreting         [address] [place] [address].                                     Physician            Morgan Murphy MD  Procedure Type of Study   TTE procedure:ECHOCARDIOGRAM COMPLETE 2D W DOPPLER W COLOR. Procedure Date Date: 09/13/2022 Start: 12:34 PM Study Location: [facility] Technical Quality: Limited visualization due to poor acoustical window. Indications:Endocarditis. Additional Indications:No Cardiac History .  Patient Status: Routine Height: 68 inches Weight: 142 pounds BSA: 1.77 m2 BMI: 21.59 kg/m2 Rhythm: Within normal limits HR: 97 bpm BP: 124/68 mmHg  Conclusions   Summary  Normal left ventricle size, wall thickness, and systolic function with an  estimated ejection fraction of 60-65%. No regional wall motion abnormalities  are seen. Normal diastolic function. Normal right ventricular size and function. Mobile vegetation on tricuspid valve suggestive of endocarditis. Moderate tricuspid regurgitation. The right atrium is mildly dilated. Signature   ------------------------------------------------------------------  Electronically signed by Billy Valles MD  (Interpreting physician) on 09/13/2022 at 04:15 PM  ------------------------------------------------------------------   Findings   Left Ventricle  Normal left ventricle size, wall thickness, and systolic function with an  estimated ejection fraction of 60-65%. No regional wall motion abnormalities  are seen. Normal diastolic function. Mitral Valve  The mitral valve is normal in structure and function. Trivial mitral regurgitation. No evidence of mitral stenosis. Left Atrium  Left atrium is of normal size. Aortic Valve  The aortic valve leaflets are not well visualized. No evidence of aortic valve regurgitation. No evidence of aortic valve stenosis. Aorta  The aortic root is normal in size. The ascending aorta is normal in size. Right Ventricle  Normal right ventricular size and function. TAPSE= 1.9cm   Tricuspid Valve  Mobile vegetation on tricuspid valve suggestive of endocarditis. Moderate tricuspid regurgitation. No evidence of tricuspid stenosis. Right Atrium  The right atrium is mildly dilated. Pulmonic Valve  The pulmonic valve is not well visualized. Trivial pulmonic regurgitation present. No evidence of pulmonic valve stenosis. Pericardial Effusion  No pericardial effusion noted. Pleural Effusion  No pleural effusion.    Miscellaneous  IVC size is normal (<2.1cm) and collapses > 50% with respiration consistent  with normal RA pressure (3mmHg). Unable to estimate pulmonary artery pressure secondary to incomplete TR jet  envelope. M-Mode/2D Measurements (cm)   LV Diastolic Dimension: 5.62 cm LV Systolic Dimension: 7.21 cm  LV Septum Diastolic: 4.63 cm  LV PW Diastolic: 8.46 cm        AO Root Dimension: 7.34 cm  RV Diastolic Dimension: 0.33 cm                                  LA Area: 18.8 cm2  LVOT: 1.87 cm                   LA volume/Index: 52.9 ml /30 ml/m2  Doppler Measurements   AV Peak Velocity: 163 cm/s     MV Peak E-Wave: 87.4 cm/s  AV Peak Gradient: 10.63 mmHg   MV Peak A-Wave: 78 cm/s  AV Mean Gradient: 6 mmHg       MV E/A Ratio: 1.12  LVOT Peak Velocity: 152 cm/s   MV P1/2t: 43 msec  AV Area (Continuity):2.53 cm2  MV Mean Gradient: 2 mmHg                                 MV Max P mmHg  TR Velocity:196 cm/s           MV Vmax:107 cm/s  TR Gradient:15.37 mmHg         MV VTI:21.6 cm/s   E' Septal Velocity: 15.1 cm/s  MV Area (continuity): 3.37 cm2  E' Lateral Velocity: 22.2 cm/s MV Deceleration Time: 148 msec  PV Peak Velocity: 119 cm/s     MV Area (PHT): 5.12 cm2  PV Peak Gradient: 5.66 mmHg   Aortic Valve   Peak Velocity: 163 cm/s     Mean Velocity: 118 cm/s  Peak Gradient: 10.63 mmHg   Mean Gradient: 6 mmHg  Area (continuity): 2.53 cm2  AV VTI: 28.7 cm  Aorta   Aortic Root: 2.61 cm  Ascending Aorta: 2.59 cm  LVOT Diameter: 1.87 cm       CT HEAD WO CONTRAST     Result Date: 2022  EXAMINATION: CT OF THE HEAD WITHOUT CONTRAST  2022 12:07 pm TECHNIQUE: CT of the head was performed without the administration of intravenous contrast. Automated exposure control, iterative reconstruction, and/or weight based adjustment of the mA/kV was utilized to reduce the radiation dose to as low as reasonably achievable. COMPARISON: None. HISTORY: ORDERING SYSTEM PROVIDED HISTORY: AMS TECHNOLOGIST PROVIDED HISTORY: Has a \"code stroke\" or \"stroke alert\" been called? ->No Reason for exam:->AMS Is the patient pregnant?->No Reason for Exam: ams FINDINGS: BRAIN/VENTRICLES: There is no acute intracranial hemorrhage, mass effect or midline shift. No abnormal extra-axial fluid collection. The gray-white differentiation is maintained without evidence of an acute infarct. There is no evidence of hydrocephalus. ORBITS: The visualized portion of the orbits demonstrate no acute abnormality. SINUSES: The visualized paranasal sinuses and mastoid air cells demonstrate no acute abnormality. SOFT TISSUES/SKULL:  No acute abnormality of the visualized skull or soft tissues. No acute intracranial abnormality. CT CHEST WO CONTRAST     Result Date: 9/27/2022  EXAMINATION: CT OF THE CHEST WITHOUT CONTRAST 9/27/2022 3:36 pm TECHNIQUE: CT of the chest was performed without the administration of intravenous contrast. Multiplanar reformatted images are provided for review. Automated exposure control, iterative reconstruction, and/or weight based adjustment of the mA/kV was utilized to reduce the radiation dose to as low as reasonably achievable. COMPARISON: 09/12/2022 HISTORY: ORDERING SYSTEM PROVIDED HISTORY: hemoptysis TECHNOLOGIST PROVIDED HISTORY: Reason for exam:->hemoptysis Is the patient pregnant?->No Reason for Exam: hemoptysis FINDINGS: Mediastinum: Thyroid gland unremarkable. Tip of PICC and central line projects in region of SVC. Small pericardial effusion is seen. Small hiatal hernia seen. There is nonspecific thickening at the GE junction. Small mediastinal nodes are noted, likely reactive Lungs/pleura: There are bilateral pleural effusions seen, left greater than right, increased compared to prior. There is increased lung consolidation, left greater than right. Cavitary and non cavitary nodules in the left lung are again identified, overall decreased compared to prior. .  For example, an index cavitary nodule in the left upper lobe measures 1.3 cm x 1.3 cm, previously measuring 2.0 cm x 2.1 cm.  On the right, a bilobed cavitary nodule which previously measured 6.9 cm by 4.2 cm, now measures 5.6 cm x 2.7 cm and now appears as 2 separate nodules. Upper Abdomen: Right adrenal gland is normal.  Left adrenal gland is normal Soft Tissues/Bones: Bones appear unchanged. There is body wall anasarca      Widespread cavitary nodules, decreased on the right and left. , in keeping with the diagnosis of septic emboli. Increased pleural effusions with increasing consolidative change at the lung bases. CT CHEST WO CONTRAST     Result Date: 9/12/2022  EXAMINATION: CT OF THE CHEST WITHOUT CONTRAST 9/12/2022 7:09 pm TECHNIQUE: CT of the chest was performed without the administration of intravenous contrast. Multiplanar reformatted images are provided for review. Automated exposure control, iterative reconstruction, and/or weight based adjustment of the mA/kV was utilized to reduce the radiation dose to as low as reasonably achievable. COMPARISON: Chest x-ray earlier same day HISTORY: ORDERING SYSTEM PROVIDED HISTORY: PNA? TECHNOLOGIST PROVIDED HISTORY: Reason for exam:->PNA? Is the patient pregnant?->No Reason for Exam: pna FINDINGS: Mediastinum: Thyroid is homogeneous in attenuation. No bulky adenopathy however a few mildly enlarged mediastinal and hilar lymph nodes of reactive adenopathy. Central airways are patent. Esophagus is normal course and caliber. Cardiac size within normal limits without pericardial effusion. Lungs/pleura: Multifocal bilateral innumerable pulmonary nodules majority of which are cavitary consistent with septic emboli including coalescent area in the right lung apex measuring up to 6.9 x 4.2 cm. Small to moderate right pleural effusion with adjacent atelectasis. Upper Abdomen: Visualized portions of the upper abdomen partially imaged reveals hepatosplenomegaly. Soft Tissues/Bones: No acute osseous or soft tissue findings. No aggressive osseous lesion.       Diffuse in numeral bilateral pulmonary nodules many of which are cavitary becoming coalescent at the right lung apex however diffusely throughout the bilateral lungs consistent of septic emboli with small to moderate right pleural effusion Partially visualized portions of the upper abdomen reveal hepatosplenomegaly      CT LUMBAR SPINE WO CONTRAST     Result Date: 9/30/2022  EXAMINATION: CT OF THE LUMBAR SPINE WITHOUT CONTRAST  9/30/2022 TECHNIQUE: CT of the lumbar spine was performed without the administration of intravenous contrast. Multiplanar reformatted images are provided for review. Adjustment of mA and/or kV according to patient size was utilized. Automated exposure control, iterative reconstruction, and/or weight based adjustment of the mA/kV was utilized to reduce the radiation dose to as low as reasonably achievable. COMPARISON: None HISTORY: ORDERING SYSTEM PROVIDED HISTORY: Lumbar abscesss s/p Drainage check for residual abscess TECHNOLOGIST PROVIDED HISTORY: Reason for exam:->Lumbar abscesss s/p Drainage check for residual abscess Is the patient pregnant?->No Reason for Exam: Lumbar abscesss s/p Drainage check for residual abscess FINDINGS: BONES/ALIGNMENT: There is normal alignment of the spine. The vertebral body heights are maintained. Erosive destructive process noted involving the right sacroiliac joint, with sclerosis and heterogenous bone density. This extends to involve the right iliac wing. No pathologic fractures noted. DEGENERATIVE CHANGES: No significant degenerative changes of the lumbar spine. SOFT TISSUES/RETROPERITONEUM: Patient had a large right iliac fossa fluid collection on previous examination. A drainage catheter is noted in this region. The right iliac fossa is incompletely covered on this examination and there is limited resolution to comment upon the residual fluid collection. A 2nd collection is noted along the right sacral canal. Soft tissue fullness noted in this region. There is limited resolution.  Bilateral moderate pleural effusions incompletely imaged. 1. Acute osteomyelitis-septic arthritis noted involving the right sacroiliac joint with osteomyelitis process extending to the right iliac wing. 2. Patient had a large right iliac fossa fluid collection on previous examination. A drainage catheter is noted in this region. The right iliac fossa is incompletely covered on this examination and there is limited resolution to comment upon the residual fluid collection. A 2nd collection is noted along the right sacral canal.  Soft tissue fullness noted in this region. There is limited resolution. Patient will benefit from a contrast enhanced CT or MR of the pelvis targeting the right iliac fossa and the pelvis. 3. Moderate bilateral pleural effusions. 4. Normal appearing bony lumbar spine. RECOMMENDATIONS: Recommend obtaining contrast enhanced CT/MR of the pelvis for complete coverage of the right iliac fossa and pelvis. CT ABSCESS DRAINAGE W CATH PLACEMENT S&I     Result Date: 9/16/2022  PROCEDURE: CT GUIDEDRIGHT ILIACUSABSCESS DRAINAGE CATHETER PLACEMENT MODERATE CONSCIOUS SEDATION 9/16/2022 HISTORY: ORDERING SYSTEM PROVIDED HISTORY: Rt gluteal abscess,IVDA, MRSA bacteremia TECHNOLOGIST PROVIDED HISTORY: See MRI L spine report Reason for exam:->Rt gluteal abscess,IVDA, MRSA bacteremia Is the patient pregnant?->No Reason for Exam: CT ABSCESS DRAINAGE W CATH PLACEMENT S&I; ORDERING SYSTEM PROVIDED HISTORY: drain TECHNOLOGIST PROVIDED HISTORY: See MRI L spine report Reason for exam:->drain Is the patient pregnant?->No Reason for Exam: CT ABSCESS DRAINAGE W CATH PLACEMENT S&I SEDATION: 1 mgversed and 50 mcg fentanyl were titrated intravenously for moderate sedation monitored under my direction. Total intraservice time of sedation was 14 minutes. The patient's vital signs were monitored throughout the procedure and recorded in the patient's medical record by the nurse.  TECHNIQUE: Informed consent was obtained after a detailed explanation of the procedure including risks, benefits, and alternatives. Universal protocol was followed. Sterile gowns, masks, hats, and gloves utilized for maximal sterile barrier. A suitable skin site was prepped and draped in sterile fashion following CT localization. An 18 gauge needle was advanced under CT guidance into the right iliacus abscess and a 0.035 guidewire was used to place a 10 Turkmen abscess drainage catheter after the fashion tract was dilated. The catheter was sutured to the skin and the patient tolerated the procedure well. The catheter was attached to KRYSTLE suction drainage. Estimated blood loss: Less than 5 cc Dose modulation, iterative reconstruction, and/or weight based adjustment of the mA/kV was utilized to reduce the radiation dose to as low as reasonably achievable. DLP: 4183.64 mGy-cm FINDINGS: A total of 100 mL of purulent fluid was removed and sent for diagnostic tests. Successful CT guided placement of a 10 Turkmen drainage catheter in the right iliacus abscess. CT GUIDED NEEDLE PLACEMENT     Result Date: 9/16/2022  PROCEDURE: CT GUIDEDRIGHT ILIACUSABSCESS DRAINAGE CATHETER PLACEMENT MODERATE CONSCIOUS SEDATION 9/16/2022 HISTORY: ORDERING SYSTEM PROVIDED HISTORY: Rt gluteal abscess,IVDA, MRSA bacteremia TECHNOLOGIST PROVIDED HISTORY: See MRI L spine report Reason for exam:->Rt gluteal abscess,IVDA, MRSA bacteremia Is the patient pregnant?->No Reason for Exam: CT ABSCESS DRAINAGE W CATH PLACEMENT S&I; ORDERING SYSTEM PROVIDED HISTORY: drain TECHNOLOGIST PROVIDED HISTORY: See MRI L spine report Reason for exam:->drain Is the patient pregnant?->No Reason for Exam: CT ABSCESS DRAINAGE W CATH PLACEMENT S&I SEDATION: 1 mgversed and 50 mcg fentanyl were titrated intravenously for moderate sedation monitored under my direction. Total intraservice time of sedation was 14 minutes.   The patient's vital signs were monitored throughout the procedure and recorded in the patient's medical record by the nurse. TECHNIQUE: Informed consent was obtained after a detailed explanation of the procedure including risks, benefits, and alternatives. Universal protocol was followed. Sterile gowns, masks, hats, and gloves utilized for maximal sterile barrier. A suitable skin site was prepped and draped in sterile fashion following CT localization. An 18 gauge needle was advanced under CT guidance into the right iliacus abscess and a 0.035 guidewire was used to place a 10 Irish abscess drainage catheter after the fashion tract was dilated. The catheter was sutured to the skin and the patient tolerated the procedure well. The catheter was attached to KRYSTLE suction drainage. Estimated blood loss: Less than 5 cc Dose modulation, iterative reconstruction, and/or weight based adjustment of the mA/kV was utilized to reduce the radiation dose to as low as reasonably achievable. DLP: 3626.84 mGy-cm FINDINGS: A total of 100 mL of purulent fluid was removed and sent for diagnostic tests. Successful CT guided placement of a 10 Irish drainage catheter in the right iliacus abscess. MRI LUMBAR SPINE W WO CONTRAST     Result Date: 9/15/2022  EXAMINATION: MRI OF THE LUMBAR SPINE WITHOUT AND WITH CONTRAST  9/15/2022 3:44 pm TECHNIQUE: Multiplanar multisequence MRI of the lumbar spine was performed without and with the administration of intravenous contrast. COMPARISON: None. HISTORY: ORDERING SYSTEM PROVIDED HISTORY: Sepsis, IVDA, Mrsa BACTEREMIA and Back pain check for Diskitis TECHNOLOGIST PROVIDED HISTORY: Please include sacral area due to pain in the lower spine Reason for exam:->Sepsis, IVDA, Mrsa BACTEREMIA and Back pain check for Diskitis Reason for Exam: Sepsis, IVDA, Mrsa BACTEREMIA and Back pain check for Diskitis FINDINGS: There is a normal lumbar lordosis. No acute fracture or traumatic subluxation is identified. There is no MRI evidence of discitis.  There is partially visualized fluid within the right sacroiliac joint with adjacent marrow edema. There is also a rim enhancing fluid collection deep to the right iliacus muscle, measuring at least 7.8 x 3.1 cm in diameter, consistent with abscess. This extends along the right pelvic sidewall. Enhancing soft tissue is also seen extending into the epidural space of the sacral canal with a small collection measuring up to 7.8 mm in diameter, consistent with phlegmon/abscess. A small fluid collection is present within the left psoas muscle measuring 5 mm in transverse diameter on image number 21 of series 17, consistent with an abscess. Presacral edema is noted. There is pelvic ascites. No significant canal or neural foraminal narrowing of the L-spine is identified. Partially visualized septic arthritis/osteomyelitis of the right sacroiliac joint, with a large adjacent retroperitoneal abscess extending into the right pelvic sidewall. Small intramuscular abscess also present within the left psoas muscle. Epidural phlegmon/abscess of the sacral canal. Pelvic ascites. The findings were sent to the Radiology Results [address] at 7:23 pm on 9/15/2022 to be communicated to a licensed caregiver. US RENAL COMPLETE     Result Date: 9/16/2022  EXAMINATION: RETROPERITONEAL ULTRASOUND OF THE KIDNEYS AND URINARY BLADDER 9/16/2022 COMPARISON: None HISTORY: ORDERING SYSTEM PROVIDED HISTORY: MARYAN TECHNOLOGIST PROVIDED HISTORY: Reason for exam:->MARYAN FINDINGS: Kidneys: The right kidney measures 13.8 cm in length and the left kidney measures 12.3 cm in length. Kidneys demonstrate normal cortical echogenicity. No evidence of hydronephrosis or intrarenal stones. Bladder: Unremarkable appearance of the bladder. No significant post void residual.      Unremarkable ultrasound of the kidneys and urinary bladder. No renal atrophy, hydronephrosis or abnormal echotexture.       XR CHEST PORTABLE     Result Date: 9/12/2022  EXAMINATION: ONE XRAY VIEW procedure well and there were no immediate complications. A clean dry sterile dressing was placed. FINDINGS: Fluoroscopic image demonstrates the tip of the catheter in the right atrium. Successful ultrasound and fluoroscopy guided non-tunneled Trialysis catheter placement. VL Extremity Venous Bilateral     Result Date: 9/15/2022  Lower Extremities DVT Study  Demographics   Patient Name        Princess Barr   Date of Study       09/15/2022     Gender               Female   Patient Number      [id number]     Date of Birth        1980   Visit Number        [id number]      Age                  40 year(s)   Accession Number    [id number]     Room Number          3928   Corporate ID        [id number]        [facility]   Ordering Physician  Shanda Dupree MD                                     Physician  Procedure Type of Study:   Veins:Lower Extremities DVT Study, VASC EXTREMITY VENOUS DUPLEX BILATERAL. Vascular Sonographer Report  Indications for Study:Leg pain. Impressions Right Impression No evidence of deep vein or superficial vein thrombosis involving the right lower extremity. Left Impression No evidence of deep vein or superficial vein thrombosis involving the left lower extremity. Conclusions   Summary   No evidence of thrombophlebitis is noted bilaterally in the deep and  superficial veins of the legs. Signature   ------------------------------------------------------------------  Electronically signed by Silverio Pendleton MD (Interpreting  physician) on 09/15/2022 at 11:52 AM  ------------------------------------------------------------------  Patient Status:Routine. Study [address] [facility]. Technical Quality:Adequate visualization.  Velocities are measured in cm/s ; Diameters are measured in mm Right Lower Extremities DVT Study Measurements Right 2D Measurements +------------------------+----------+---------------+----------+ ! Location                ! Visualized! Compressibility! Thrombosis! +------------------------+----------+---------------+----------+ ! Sapheno Femoral Junction! Yes       ! Yes            ! None      ! +------------------------+----------+---------------+----------+ ! GSV Thigh               ! Yes       ! Yes            ! None      ! +------------------------+----------+---------------+----------+ ! Common Femoral          !Yes       ! Yes            ! None      ! +------------------------+----------+---------------+----------+ ! Prox Femoral            !Yes       ! Yes            ! None      ! +------------------------+----------+---------------+----------+ ! Mid Femoral             !Yes       ! Yes            ! None      ! +------------------------+----------+---------------+----------+ ! Dist Femoral            !Yes       ! Yes            ! None      ! +------------------------+----------+---------------+----------+ ! Deep Femoral            !Yes       ! Yes            ! None      ! +------------------------+----------+---------------+----------+ ! Popliteal               !Yes       ! Yes            ! None      ! +------------------------+----------+---------------+----------+ ! GSV Below Knee          ! Yes       ! Yes            ! None      ! +------------------------+----------+---------------+----------+ ! Gastroc                 ! Yes       ! Yes            ! None      ! +------------------------+----------+---------------+----------+ ! Soleal                  !Yes       ! Yes            ! None      ! +------------------------+----------+---------------+----------+ ! PTV                     ! Yes       ! Yes            ! None      ! +------------------------+----------+---------------+----------+ ! ATV                     ! Yes       ! Yes            ! None      ! +------------------------+----------+---------------+----------+ ! Permanav                !Yes       ! Yes            ! None      ! +------------------------+----------+---------------+----------+ ! GSV Calf                ! Yes       ! Yes            ! None      ! +------------------------+----------+---------------+----------+ ! SSV                     ! Yes       ! Yes            ! None      ! +------------------------+----------+---------------+----------+ Right Doppler Measurements +--------------+------+------+------------+ ! Location      ! Signal!Reflux! Reflux (sec)! +--------------+------+------+------------+ ! Common Femoral!Phasic!      !            ! +--------------+------+------+------------+ ! Popliteal     !Phasic!      !            ! +--------------+------+------+------------+ Left Lower Extremities DVT Study Measurements Left 2D Measurements +------------------------+----------+---------------+----------+ ! Location                ! Visualized! Compressibility! Thrombosis! +------------------------+----------+---------------+----------+ ! Sapheno Femoral Junction! Yes       ! Yes            ! None      ! +------------------------+----------+---------------+----------+ ! GSV Thigh               ! Yes       ! Yes            ! None      ! +------------------------+----------+---------------+----------+ ! Common Femoral          !Yes       ! Yes            ! None      ! +------------------------+----------+---------------+----------+ ! Prox Femoral            !Yes       ! Yes            ! None      ! +------------------------+----------+---------------+----------+ ! Mid Femoral             !Yes       ! Yes            ! None      ! +------------------------+----------+---------------+----------+ ! Dist Femoral            !Yes       ! Yes            ! None      ! +------------------------+----------+---------------+----------+ ! Deep Femoral            !Yes       ! Yes            ! None      ! +------------------------+----------+---------------+----------+ ! Popliteal               !Yes       ! Yes            ! None      ! +------------------------+----------+---------------+----------+ ! GSV Below Knee          ! Yes       ! Yes            ! None      ! +------------------------+----------+---------------+----------+ ! Gastroc                 ! Yes       ! Yes            ! None      ! +------------------------+----------+---------------+----------+ ! Soleal                  !Yes       ! Yes            ! None      ! +------------------------+----------+---------------+----------+ ! PTV                     ! Yes       ! Yes            ! None      ! +------------------------+----------+---------------+----------+ ! ATV                     ! Yes       ! Yes            ! None      ! +------------------------+----------+---------------+----------+ ! Peroneal                !Yes       ! Yes            ! None      ! +------------------------+----------+---------------+----------+ ! GSV Calf                ! Yes       ! Yes            ! None      ! +------------------------+----------+---------------+----------+ ! SSV                     ! Yes       ! Yes            ! None      ! +------------------------+----------+---------------+----------+ Left Doppler Measurements +--------------+------+------+------------+ ! Location      ! Signal!Reflux! Reflux (sec)! +--------------+------+------+------------+ ! Common Femoral!Phasic!      !            ! +--------------+------+------+------------+ ! Popliteal     !Phasic!      !            ! +--------------+------+------+------------+     IR REPLACE TUNNELED CVC W SQ PORT SAME ACCESS     Result Date: 9/23/2022  PROCEDURE: FLUOROSCOPY GUIDED CONVERSION OF A NON TUNNELED HEMODIALYSIS CATHETER TO A TUNNELED HEMODIALYSIS CATHETER MODERATE CONSCIOUS SEDATION 9/23/2022 HISTORY: ORDERING SYSTEM PROVIDED HISTORY: for dialysis TECHNOLOGIST PROVIDED HISTORY: Reason for exam:->for dialysis Is the patient pregnant?->No SEDATION: 2 mgversed and 100 mcg fentanyl were titrated intravenously for moderate sedation monitored under my direction.   Total intraservice time of sedation was 14 minutes. The patient's vital signs were monitored throughout the procedure and recorded in the patient's medical record by the nurse. FLUOROSCOPY DOSE AND TYPE OR TIME AND EXPOSURES: Fluoro time: 0.1 minutes Cumulative air kerma: 2.75 mGy TECHNIQUE: Informed consent was obtained after a detailed explanation of the procedure including risks, benefits, and alternatives. Universal protocol was observed. The right neck, chest, and indwelling right internal jugular catheter were prepped and draped in sterile fashion using maximum sterile barrier technique. Local anesthesia was achieved with lidocaine. A subcutaneous tunnel was created to the infraclavicular region and a tunneled 19 cm hemodialysis catheter was pulled through the subcutaneous tunnel to the venotomy site. A 0.035 guidewire was inserted through the indwelling catheter and the catheter was exchanged over the wire for a peel-away sheath under fluoroscopic guidance. The new tunneled catheter was then advanced through the peel-away sheath under fluoroscopic guidance to the right atrium. The catheter flushed easily and there was a good blood return. The catheter was sutured to the skin. The catheter was locked with heparinized saline. The patient tolerated the procedure well and there were no immediate complications. Estimated blood loss: Less than 5 cc FINDINGS: Fluoroscopic image demonstrates the tip of the catheter in the right atrium. Successful conversion of a non tunneled hemodialysis catheter for a new 19 centimeter tunneled hemodialysis catheter. EKG      Rhythm: normal sinus   Rate: normal  Clinical Impression: no acute changes           The patient was seen and examined on day of discharge and this discharge summary is in conjunction with any daily progress note from day of discharge. Time Spent on discharge is 30 minutes  in the examination, evaluation, counseling and review of medications and discharge plan. Note that more than 30 minutes was spent in preparing discharge papers, discussing discharge with patient, medication review, etc.         Signed:     Marquise Boone MD   10/1/2022        Thank you No primary care provider on file. for the opportunity to be involved in this patient's care. If you have any questions or concerns please feel free to contact me at [facility]         ED to Hosp-Admission (Discharged) on 9/12/2022    ED to Hosp-Admission (Discharged) on 9/12/2022      Revision History      Detailed Report    Note shared with patient  Progress Notes Info    Author Note Status Last Update User Last Update Date/Time   Sampson Brandt MD Addendum Sampson Brandt MD 10/1/2022  3:18 PM     Chart Review Routing History    No routing history on file.   Hospital Medicine Discharge Summary

## (undated) DEVICE — ENDOSCOPY KIT: Brand: MEDLINE INDUSTRIES, INC.

## (undated) DEVICE — BITE BLOCK ENDOSCP AD 60 FR W/ ADJ STRP PLAS GRN BLOX

## (undated) DEVICE — CONTAINER SPEC 480ML CLR POLYSTYR 10% NEUT BUFF FRMLN ZN

## (undated) DEVICE — Z DUP USE 2149365 FORCEPS BX 240CM 2.4MM L NDL RAD JAW 4 M00513334